# Patient Record
Sex: FEMALE | Race: WHITE | Employment: UNEMPLOYED | URBAN - METROPOLITAN AREA
[De-identification: names, ages, dates, MRNs, and addresses within clinical notes are randomized per-mention and may not be internally consistent; named-entity substitution may affect disease eponyms.]

---

## 2017-01-01 ENCOUNTER — HOSPITAL ENCOUNTER (OUTPATIENT)
Dept: MAMMOGRAPHY | Age: 67
Discharge: HOME | End: 2017-10-16
Attending: INTERNAL MEDICINE
Payer: MEDICARE

## 2017-01-01 ENCOUNTER — OFFICE VISIT (OUTPATIENT)
Dept: INTERNAL MEDICINE CLINIC | Age: 67
End: 2017-01-01

## 2017-01-01 VITALS
OXYGEN SATURATION: 95 % | TEMPERATURE: 97.3 F | DIASTOLIC BLOOD PRESSURE: 80 MMHG | RESPIRATION RATE: 20 BRPM | SYSTOLIC BLOOD PRESSURE: 137 MMHG | HEIGHT: 64 IN | BODY MASS INDEX: 32.61 KG/M2 | HEART RATE: 66 BPM | WEIGHT: 191 LBS

## 2017-01-01 DIAGNOSIS — E11.9 TYPE 2 DIABETES MELLITUS WITHOUT COMPLICATION, WITH LONG-TERM CURRENT USE OF INSULIN (HCC): Primary | ICD-10-CM

## 2017-01-01 DIAGNOSIS — Z12.39 SCREENING BREAST EXAMINATION: ICD-10-CM

## 2017-01-01 DIAGNOSIS — E11.9 TYPE 2 DIABETES MELLITUS WITHOUT COMPLICATION, WITHOUT LONG-TERM CURRENT USE OF INSULIN (HCC): ICD-10-CM

## 2017-01-01 DIAGNOSIS — Z00.00 MEDICARE ANNUAL WELLNESS VISIT, INITIAL: ICD-10-CM

## 2017-01-01 DIAGNOSIS — E78.2 MIXED HYPERLIPIDEMIA: ICD-10-CM

## 2017-01-01 DIAGNOSIS — Z71.89 ADVANCE CARE PLANNING: ICD-10-CM

## 2017-01-01 DIAGNOSIS — Z79.4 TYPE 2 DIABETES MELLITUS WITHOUT COMPLICATION, WITH LONG-TERM CURRENT USE OF INSULIN (HCC): Primary | ICD-10-CM

## 2017-01-01 DIAGNOSIS — L85.3 DRY SKIN: ICD-10-CM

## 2017-01-01 DIAGNOSIS — E11.9 TYPE 2 DIABETES MELLITUS WITHOUT COMPLICATION, UNSPECIFIED LONG TERM INSULIN USE STATUS: ICD-10-CM

## 2017-01-01 DIAGNOSIS — Z23 ENCOUNTER FOR IMMUNIZATION: ICD-10-CM

## 2017-01-01 DIAGNOSIS — Z11.59 NEED FOR HEPATITIS C SCREENING TEST: ICD-10-CM

## 2017-01-01 DIAGNOSIS — I10 ESSENTIAL HYPERTENSION: ICD-10-CM

## 2017-01-01 LAB
ALBUMIN SERPL-MCNC: 3.8 G/DL (ref 3.6–4.8)
ALBUMIN/GLOB SERPL: 1.3 {RATIO} (ref 1.2–2.2)
ALP SERPL-CCNC: 76 IU/L (ref 39–117)
ALT SERPL-CCNC: 17 IU/L (ref 0–32)
AST SERPL-CCNC: 18 IU/L (ref 0–40)
BILIRUB SERPL-MCNC: <0.2 MG/DL (ref 0–1.2)
BUN SERPL-MCNC: 16 MG/DL (ref 8–27)
BUN/CREAT SERPL: 41 (ref 12–28)
CALCIUM SERPL-MCNC: 9.1 MG/DL (ref 8.7–10.3)
CHLORIDE SERPL-SCNC: 105 MMOL/L (ref 96–106)
CO2 SERPL-SCNC: 26 MMOL/L (ref 18–29)
CREAT SERPL-MCNC: 0.39 MG/DL (ref 0.57–1)
EST. AVERAGE GLUCOSE BLD GHB EST-MCNC: 192 MG/DL
GLOBULIN SER CALC-MCNC: 3 G/DL (ref 1.5–4.5)
GLUCOSE SERPL-MCNC: 108 MG/DL (ref 65–99)
HBA1C MFR BLD: 8.3 % (ref 4.8–5.6)
HCV AB S/CO SERPL IA: <0.1 S/CO RATIO (ref 0–0.9)
POTASSIUM SERPL-SCNC: 4.2 MMOL/L (ref 3.5–5.2)
PROT SERPL-MCNC: 6.8 G/DL (ref 6–8.5)
SODIUM SERPL-SCNC: 143 MMOL/L (ref 134–144)

## 2017-01-01 PROCEDURE — 77067 SCR MAMMO BI INCL CAD: CPT

## 2017-01-01 RX ORDER — AMMONIUM LACTATE 12 G/100G
CREAM TOPICAL 2 TIMES DAILY
Qty: 2 G | Refills: 2 | Status: SHIPPED | OUTPATIENT
Start: 2017-01-01 | End: 2018-01-01 | Stop reason: SDUPTHER

## 2017-01-01 RX ORDER — LISINOPRIL 10 MG/1
TABLET ORAL
Qty: 90 TAB | Refills: 1 | Status: SHIPPED | OUTPATIENT
Start: 2017-01-01 | End: 2018-01-01 | Stop reason: SDUPTHER

## 2017-01-01 RX ORDER — METOPROLOL TARTRATE 50 MG/1
TABLET ORAL
COMMUNITY
Start: 2017-01-01 | End: 2017-01-01 | Stop reason: SDUPTHER

## 2017-01-01 RX ORDER — DAPAGLIFLOZIN 10 MG/1
TABLET, FILM COATED ORAL
Qty: 90 TAB | Refills: 1 | Status: SHIPPED | OUTPATIENT
Start: 2017-01-01 | End: 2017-01-01 | Stop reason: SDUPTHER

## 2017-01-01 RX ORDER — METOPROLOL TARTRATE 50 MG/1
TABLET ORAL
Qty: 180 TAB | Refills: 0 | Status: SHIPPED | OUTPATIENT
Start: 2017-01-01 | End: 2018-01-01 | Stop reason: SDUPTHER

## 2017-01-01 RX ORDER — SAXAGLIPTIN AND METFORMIN HYDROCHLORIDE 5; 1000 MG/1; MG/1
TABLET, FILM COATED, EXTENDED RELEASE ORAL
Qty: 90 TAB | Refills: 1 | Status: SHIPPED | OUTPATIENT
Start: 2017-01-01 | End: 2018-01-01 | Stop reason: SDUPTHER

## 2017-01-01 RX ORDER — INSULIN GLARGINE 100 [IU]/ML
INJECTION, SOLUTION SUBCUTANEOUS
Qty: 30 PEN | Refills: 3 | Status: SHIPPED | OUTPATIENT
Start: 2017-01-01 | End: 2018-01-01 | Stop reason: DRUGHIGH

## 2017-01-01 RX ORDER — SIMVASTATIN 20 MG/1
TABLET, FILM COATED ORAL
Qty: 90 TAB | Refills: 1 | Status: SHIPPED | OUTPATIENT
Start: 2017-01-01 | End: 2018-01-01 | Stop reason: SDUPTHER

## 2017-01-01 RX ORDER — INSULIN GLARGINE 100 [IU]/ML
INJECTION, SOLUTION SUBCUTANEOUS
Qty: 3 PEN | Refills: 3 | Status: SHIPPED | OUTPATIENT
Start: 2017-01-01 | End: 2017-01-01 | Stop reason: SDUPTHER

## 2017-01-01 RX ORDER — ATENOLOL 100 MG/1
100 TABLET ORAL DAILY
Qty: 90 TAB | Refills: 1 | Status: SHIPPED | OUTPATIENT
Start: 2017-01-01 | End: 2018-01-01

## 2017-01-16 DIAGNOSIS — E11.9 TYPE 2 DIABETES MELLITUS WITHOUT COMPLICATION, WITHOUT LONG-TERM CURRENT USE OF INSULIN (HCC): ICD-10-CM

## 2017-01-16 RX ORDER — INSULIN GLARGINE 100 [IU]/ML
INJECTION, SOLUTION SUBCUTANEOUS
Qty: 81 ML | Refills: 2 | Status: SHIPPED | OUTPATIENT
Start: 2017-01-16 | End: 2017-01-23 | Stop reason: SDUPTHER

## 2017-01-23 DIAGNOSIS — E11.9 TYPE 2 DIABETES MELLITUS WITHOUT COMPLICATION, WITHOUT LONG-TERM CURRENT USE OF INSULIN (HCC): ICD-10-CM

## 2017-01-24 RX ORDER — INSULIN GLARGINE 100 [IU]/ML
INJECTION, SOLUTION SUBCUTANEOUS
Qty: 81 ML | Refills: 2 | Status: SHIPPED | OUTPATIENT
Start: 2017-01-24 | End: 2017-03-02 | Stop reason: SDUPTHER

## 2017-01-26 ENCOUNTER — OFFICE VISIT (OUTPATIENT)
Dept: INTERNAL MEDICINE CLINIC | Age: 67
End: 2017-01-26

## 2017-01-26 VITALS
HEIGHT: 64 IN | HEART RATE: 71 BPM | OXYGEN SATURATION: 98 % | DIASTOLIC BLOOD PRESSURE: 82 MMHG | TEMPERATURE: 97.6 F | SYSTOLIC BLOOD PRESSURE: 145 MMHG | BODY MASS INDEX: 32.37 KG/M2 | WEIGHT: 189.6 LBS | RESPIRATION RATE: 20 BRPM

## 2017-01-26 DIAGNOSIS — I10 ESSENTIAL HYPERTENSION: ICD-10-CM

## 2017-01-26 DIAGNOSIS — Z79.4 TYPE 2 DIABETES MELLITUS WITHOUT COMPLICATION, WITH LONG-TERM CURRENT USE OF INSULIN (HCC): Primary | ICD-10-CM

## 2017-01-26 DIAGNOSIS — E78.2 MIXED HYPERLIPIDEMIA: ICD-10-CM

## 2017-01-26 DIAGNOSIS — E66.9 OBESITY (BMI 30-39.9): ICD-10-CM

## 2017-01-26 DIAGNOSIS — E11.9 TYPE 2 DIABETES MELLITUS WITHOUT COMPLICATION, WITH LONG-TERM CURRENT USE OF INSULIN (HCC): Primary | ICD-10-CM

## 2017-01-26 NOTE — MR AVS SNAPSHOT
Visit Information Date & Time Provider Department Dept. Phone Encounter #  
 1/26/2017  8:30 AM Omid Isaac, [address] [facility] Internal Medicine [phone] [id number] Upcoming Health Maintenance Date Due Hepatitis C Screening 1950 Pneumococcal 19-64 Medium Risk (1 of 1 - PPSV23) 2/12/1971 DTaP/Tdap/Td series (1 - Tdap) 2/12/1973 PAP AKA CERVICAL CYTOLOGY 2/12/1973 FOBT Q 1 YEAR AGE 50-75 2/12/2002 ZOSTER VACCINE AGE 60> 2/12/2012 FOOT EXAM Q1 9/18/2015 INFLUENZA AGE 9 TO ADULT 8/1/2016 EYE EXAM RETINAL OR DILATED Q1 12/3/2016 HEMOGLOBIN A1C Q6M 3/28/2017 LIPID PANEL Q1 5/25/2017 MICROALBUMIN Q1 9/28/2017 BREAST CANCER SCRN MAMMOGRAM 2/10/2018 Allergies as of 1/26/2017  Review Complete On: 1/26/2017 By: Omid Isaac MD  
  
 Severity Noted Reaction Type Reactions Tylenol [Acetaminophen]  03/14/2012    Swelling Current Immunizations  Reviewed on 9/28/2016 Name Date Pneumococcal Conjugate (PCV-13) 9/28/2016 Not reviewed this visit You Were Diagnosed With   
  
 Codes Comments Type 2 diabetes mellitus without complication, with long-term current use of insulin (HCC)    -  Primary ICD-10-CM: E11.9, Z79.4 ICD-9-CM: 250.00, V58.67 Essential hypertension     ICD-10-CM: I10 
ICD-9-CM: 401.9 Mixed hyperlipidemia     ICD-10-CM: E78.2 ICD-9-CM: 272.2 Obesity (BMI 30-39. 9)     ICD-10-CM: E66.9 ICD-9-CM: 278.00 Vitals BP Pulse Temp Resp Height(growth percentile) Weight(growth percentile) 145/82 (BP 1 Location: Left arm, BP Patient Position: Sitting) 71 97.6 °F (36.4 °C) (Oral) 20 5' 4\" (1.626 m) 189 lb 9.6 oz (86 kg) SpO2 BMI OB Status Smoking Status 98% 32.54 kg/m2 Hysterectomy Never Smoker Vitals History BMI and BSA Data Body Mass Index Body Surface Area 32.54 kg/m 2 1.97 m 2 Preferred Pharmacy Pharmacy Name Phone [facility] [address] [place] [address] [phone] Your Updated Medication List  
  
   
This list is accurate as of: 1/26/17  9:27 AM.  Always use your most recent med list. ALLEGRA 180 mg tablet Generic drug:  fexofenadine Take  by mouth daily. ammonium lactate 12 % topical cream  
Commonly known as:  AMLACTIN Apply  to affected area two (2) times a day. rub in to affected area well  
  
 aspirin delayed-release 81 mg tablet Take 81 mg by mouth daily. atenolol 100 mg tablet Commonly known as:  TENORMIN  
TAKE 1 TABLET DAILY Blood-Glucose Meter Misc Commonly known as:  CONTOUR NEXT METER Check blood sugar BID  
  
 calcium 600 mg Cap Take  by mouth. cholecalciferol (VITAMIN D3) 5,000 unit Tab tablet Commonly known as:  VITAMIN D3 Take  by mouth daily. clotrimazole 1 % topical cream  
Commonly known as:  Mohinder Frames Apply  to affected area two (2) times a day. dapagliflozin 10 mg Tab Commonly known as:  [employer] Take 1 Tab by mouth daily. glucose blood VI test strips strip Commonly known as:  CONTOUR NEXT STRIPS Check sugar BID  
  
 insulin glargine 100 unit/mL (3 mL) pen Commonly known as:  LANTUS SOLOSTAR  
45 units sq in am and 45 units sq in pm  Indications: 90 day supply Iron 325 mg (65 mg iron) tablet Generic drug:  ferrous sulfate Take  by mouth Daily (before breakfast). lisinopril 10 mg tablet Commonly known as:  Paula Mckay Take 1 tablet by mouth daily. Lazarus Slipper LANCETS [address] [facility] Generic drug:  lancets CHECK BLOOD SUGAR TWICE DAILY * sAXagliptin-metFORMIN 5-1,000 mg Tm24 Take  by mouth. * sAXagliptin-metFORMIN 5-1,000 mg Tm24 Commonly known as:  KOMBIGLYZE XR Take 1 Tab by mouth daily. simvastatin 20 mg tablet Commonly known as:  ZOCOR  
TAKE 1 TABLET NIGHTLY * Notice: This list has 2 medication(s) that are the same as other medications prescribed for you. Read the directions carefully, and ask your doctor or other care provider to review them with you. We Performed the Following HEMOGLOBIN A1C WITH EAG [39517 CPT(R)] METABOLIC PANEL, COMPREHENSIVE [44773 CPT(R)] Patient Instructions Learning About Diabetes Food Guidelines Your Care Instructions Meal planning is important to manage diabetes. It helps keep your blood sugar at a target level (which you set with your doctor). You don't have to eat special foods. You can eat what your family eats, including sweets once in a while. But you do have to pay attention to how often you eat and how much you eat of certain foods. You may want to work with a dietitian or a certified diabetes educator (CDE) to help you plan meals and snacks. A dietitian or CDE can also help you lose weight if that is one of your goals. What should you know about eating carbs? Managing the amount of carbohydrate (carbs) you eat is an important part of healthy meals when you have diabetes. Carbohydrate is found in many foods. · Learn which foods have carbs. And learn the amounts of carbs in different foods. ¨ Bread, cereal, pasta, and rice have about 15 grams of carbs in a serving. A serving is 1 slice of bread (1 ounce), ½ cup of cooked cereal, or 1/3 cup of cooked pasta or rice. ¨ Fruits have 15 grams of carbs in a serving. A serving is 1 small fresh fruit, such as an apple or orange; ½ of a banana; ½ cup of cooked or canned fruit; ½ cup of fruit juice; 1 cup of melon or raspberries; or 2 tablespoons of dried fruit. ¨ Milk and no-sugar-added yogurt have 15 grams of carbs in a serving. A serving is 1 cup of milk or 2/3 cup of no-sugar-added yogurt. ¨ Starchy vegetables have 15 grams of carbs in a serving.  A serving is ½ cup of mashed potatoes or sweet potato; 1 cup winter squash; ½ of a small baked potato; ½ cup of cooked beans; or ½ cup cooked corn or green peas. · Learn how much carbs to eat each day and at each meal. A dietitian or CDE can teach you how to keep track of the amount of carbs you eat. This is called carbohydrate counting. · If you are not sure how to count carbohydrate grams, use the Plate Method to plan meals. It is a good, quick way to make sure that you have a balanced meal. It also helps you spread carbs throughout the day. ¨ Divide your plate by types of foods. Put non-starchy vegetables on half the plate, meat or other protein food on one-quarter of the plate, and a grain or starchy vegetable in the final quarter of the plate. To this you can add a small piece of fruit and 1 cup of milk or yogurt, depending on how many carbs you are supposed to eat at a meal. 
· Try to eat about the same amount of carbs at each meal. Do not \"save up\" your daily allowance of carbs to eat at one meal. 
· Proteins have very little or no carbs per serving. Examples of proteins are beef, chicken, turkey, fish, eggs, tofu, cheese, cottage cheese, and peanut butter. A serving size of meat is 3 ounces, which is about the size of a deck of cards. Examples of meat substitute serving sizes (equal to 1 ounce of meat) are 1/4 cup of cottage cheese, 1 egg, 1 tablespoon of peanut butter, and ½ cup of tofu. How can you eat out and still eat healthy? · Learn to estimate the serving sizes of foods that have carbohydrate. If you measure food at home, it will be easier to estimate the amount in a serving of restaurant food. · If the meal you order has too much carbohydrate (such as potatoes, corn, or baked beans), ask to have a low-carbohydrate food instead. Ask for a salad or green vegetables. · If you use insulin, check your blood sugar before and after eating out to help you plan how much to eat in the future.  
· If you eat more carbohydrate at a meal than you had planned, take a walk or do other exercise. This will help lower your blood sugar. What else should you know? · Limit saturated fat, such as the fat from meat and dairy products. This is a healthy choice because people who have diabetes are at higher risk of heart disease. So choose lean cuts of meat and nonfat or low-fat dairy products. Use olive or canola oil instead of butter or shortening when cooking. · Don't skip meals. Your blood sugar may drop too low if you skip meals and take insulin or certain medicines for diabetes. · Check with your doctor before you drink alcohol. Alcohol can cause your blood sugar to drop too low. Alcohol can also cause a bad reaction if you take certain diabetes medicines. Follow-up care is a key part of your treatment and safety. Be sure to make and go to all appointments, and call your doctor if you are having problems. It's also a good idea to know your test results and keep a list of the medicines you take. Where can you learn more? Go to http://thai-freddie.info/. Enter Y767 in the search box to learn more about \"Learning About Diabetes Food Guidelines. \" Current as of: May 23, 2016 Content Version: 11.1 © 2838-6619 [employer]. Care instructions adapted under license by [employer] (which disclaims liability or warranty for this information). If you have questions about a medical condition or this instruction, always ask your healthcare professional. [place] [address] any warranty or liability for your use of this information. Introducing [address]! Dear Mona Brasher: 
Thank you for requesting a [employer] account. Our records indicate that you have previously registered for a [employer] account but its currently inactive. Please call our [employer] support line at [phone]. Additional Information If you have questions, please visit the Frequently Asked Questions section of the [employer] website at [email or web address] Remember, [employer] is NOT to be used for urgent needs. For medical emergencies, dial 911. Now available from your iPhone and Android! Please provide this summary of care documentation to your next provider. Your primary care clinician is listed as [facility] Nurse Navigator. If you have any questions after today's visit, please call [phone].

## 2017-01-26 NOTE — PATIENT INSTRUCTIONS

## 2017-01-26 NOTE — LETTER
2/2/2017 9:38 AM 
 
[facility] [id number] Dear Sharad Villaseñor: 
 
Please find your most recent results below. Resulted Orders METABOLIC PANEL, COMPREHENSIVE Result Value Ref Range Glucose 147 (H) 65 - 99 mg/dL BUN 19 8 - 27 mg/dL Comment: **Verified by repeat analysis** Creatinine 0.38 (L) 0.57 - 1.00 mg/dL Comment: **Verified by repeat analysis**  
 GFR est non- >59 mL/min/1.73 GFR est  >59 mL/min/1.73  
 BUN/Creatinine ratio 50 (H) 11 - 26 Sodium 144 134 - 144 mmol/L Potassium 4.2 3.5 - 5.2 mmol/L Chloride 104 96 - 106 mmol/L  
 CO2 22 18 - 29 mmol/L Calcium 9.4 8.7 - 10.3 mg/dL Protein, total 6.5 6.0 - 8.5 g/dL Albumin 4.1 3.6 - 4.8 g/dL GLOBULIN, TOTAL 2.4 1.5 - 4.5 g/dL A-G Ratio 1.7 1.1 - 2.5 Bilirubin, total 0.3 0.0 - 1.2 mg/dL Alk. phosphatase 74 39 - 117 IU/L  
 AST (SGOT) 15 0 - 40 IU/L  
 ALT (SGPT) 17 0 - 32 IU/L Narrative Performed at:  [address]  [id number] : Lynne Thompson MD, Phone:  [phone] HEMOGLOBIN A1C WITH EAG Result Value Ref Range Hemoglobin A1c 9.1 (H) 4.8 - 5.6 % Comment:  
            Pre-diabetes: 5.7 - 6.4 Diabetes: >6.4 Glycemic control for adults with diabetes: <7.0 Estimated average glucose 214 mg/dL Narrative Performed at:  [address]  [id number] : Lynne Thompson MD, Phone:  [phone] RECOMMENDATIONS: 
Sharad Villaseñor your labs indicate that your diabetes is better but still high, please increase your lantus to 47 units 2 x a day. Please call me if you have any questions: [phone] Sincerely, Omid Isaac MD

## 2017-01-26 NOTE — PROGRESS NOTES
HISTORY OF PRESENT ILLNESS  Shruthi Zavala is a 61 y.o. female here for follow up. Came back from [employer]. BS log reviewed. doing well. She is seen for diabetes. She reports medication compliance: compliant all of the time. Diabetic diet compliance: compliant most of the time. Home glucose monitoring: is performed regularly, minimum reading is 155, maximum reading is 185, and average reading is 160. Angelo Ding Further diabetic ROS: no polyuria or polydipsia, no chest pain, dyspnea or TIA's, no numbness, tingling or pain in extremities, no hypoglycemia. Lab review: labs reviewed, I note that glycosylated hemoglobin normal, abnormal 10, labs reviewed and discussed with patient. Eye exam:up to date. Has elevated lipid and BP. on meds. Follow-up   Pertinent negatives include no chest pain. Hypertension    Pertinent negatives include no chest pain, no palpitations and no blurred vision. Cholesterol Problem   Pertinent negatives include no chest pain. Diabetes   Pertinent negatives include no chest pain. Review of Systems   Constitutional: Negative. Negative for chills and fever. HENT: Negative. Eyes: Negative. Negative for blurred vision and double vision. Respiratory: Negative. Cardiovascular: Negative. Negative for chest pain and palpitations. Gastrointestinal: Negative. Genitourinary: Negative. Musculoskeletal: Negative. Skin: Negative. Neurological: Negative. Psychiatric/Behavioral: Negative. Physical Exam   Constitutional: She appears well-developed and well-nourished. No distress. Neck: Normal range of motion. Neck supple. No JVD present. No thyromegaly present. Cardiovascular: Normal rate, regular rhythm, normal heart sounds and intact distal pulses. Pulmonary/Chest: Effort normal and breath sounds normal. No respiratory distress. She has no wheezes. Musculoskeletal: She exhibits no edema or tenderness. Psychiatric: She has a normal mood and affect.  Her behavior is normal.       ASSESSMENT and Malgorzata Nan was seen today for follow-up, hypertension, cholesterol problem and diabetes. Diagnoses and all orders for this visit:    Type 2 diabetes mellitus without complication, with long-term current use of insulin ([address]    On lantus and kombiglyze. Will do,  -     METABOLIC PANEL, COMPREHENSIVE  -     HEMOGLOBIN A1C WITH EAG    Essential hypertension  On tenormin.  -     METABOLIC PANEL, COMPREHENSIVE    Mixed hyperlipidemia    On statin. -     METABOLIC PANEL, COMPREHENSIVE    Obesity (BMI 30-39. 9)    Addressed weight, diet and exercise with patient. Decrease carbohydrates (white foods, sweet foods, sweet drinks and alcohol), increase green leafy vegetables and protein (lean meats and beans) with each meal. Avoid fried foods. Eat 3-5 small meals daily. Do not skip meals. Increase water intake. Increase physical activity to 30 minutes daily for health benefit or 60 minutes daily to prevent weight regain, as tolerated. Get 7-8 hours uninterrupted sleep nightly. Discussed expected course/resolution/complications of diagnosis in detail with patient. Medication risks/benefits/costs/interactions/alternatives discussed with patient. Pt was given an after visit summary which includes diagnoses, current medications & vitals. Pt expressed understanding with the diagnosis and plan.

## 2017-01-27 LAB
ALBUMIN SERPL-MCNC: 4.1 G/DL (ref 3.6–4.8)
ALBUMIN/GLOB SERPL: 1.7 {RATIO} (ref 1.1–2.5)
ALP SERPL-CCNC: 74 IU/L (ref 39–117)
ALT SERPL-CCNC: 17 IU/L (ref 0–32)
AST SERPL-CCNC: 15 IU/L (ref 0–40)
BILIRUB SERPL-MCNC: 0.3 MG/DL (ref 0–1.2)
BUN SERPL-MCNC: 19 MG/DL (ref 8–27)
BUN/CREAT SERPL: 50 (ref 11–26)
CALCIUM SERPL-MCNC: 9.4 MG/DL (ref 8.7–10.3)
CHLORIDE SERPL-SCNC: 104 MMOL/L (ref 96–106)
CO2 SERPL-SCNC: 22 MMOL/L (ref 18–29)
CREAT SERPL-MCNC: 0.38 MG/DL (ref 0.57–1)
EST. AVERAGE GLUCOSE BLD GHB EST-MCNC: 214 MG/DL
GLOBULIN SER CALC-MCNC: 2.4 G/DL (ref 1.5–4.5)
GLUCOSE SERPL-MCNC: 147 MG/DL (ref 65–99)
HBA1C MFR BLD: 9.1 % (ref 4.8–5.6)
POTASSIUM SERPL-SCNC: 4.2 MMOL/L (ref 3.5–5.2)
PROT SERPL-MCNC: 6.5 G/DL (ref 6–8.5)
SODIUM SERPL-SCNC: 144 MMOL/L (ref 134–144)

## 2017-02-02 NOTE — PROGRESS NOTES
Call placed to patient and left message to call back  Letter mailed to patient with results and recommendations from provider

## 2017-02-03 NOTE — PROGRESS NOTES
Spoke with patient after verifying name and . Notified patient of lab results and recommendation from provider. Patient verbalized understanding and given a chance to ask questions.

## 2017-03-02 DIAGNOSIS — E11.9 TYPE 2 DIABETES MELLITUS WITHOUT COMPLICATION, WITHOUT LONG-TERM CURRENT USE OF INSULIN (HCC): ICD-10-CM

## 2017-03-02 DIAGNOSIS — E11.9 TYPE 2 DIABETES MELLITUS WITHOUT COMPLICATION, UNSPECIFIED LONG TERM INSULIN USE STATUS: ICD-10-CM

## 2017-03-02 RX ORDER — INSULIN GLARGINE 100 [IU]/ML
INJECTION, SOLUTION SUBCUTANEOUS
Qty: 6 PEN | Refills: 1 | Status: SHIPPED | OUTPATIENT
Start: 2017-03-02 | End: 2017-06-01 | Stop reason: SDUPTHER

## 2017-05-04 DIAGNOSIS — E78.2 MIXED HYPERLIPIDEMIA: ICD-10-CM

## 2017-05-04 RX ORDER — SIMVASTATIN 20 MG/1
20 TABLET, FILM COATED ORAL
Qty: 90 TAB | Refills: 1 | Status: SHIPPED | OUTPATIENT
Start: 2017-05-04 | End: 2017-01-01 | Stop reason: SDUPTHER

## 2017-05-04 RX ORDER — ATENOLOL 100 MG/1
100 TABLET ORAL DAILY
Qty: 90 TAB | Refills: 1 | Status: SHIPPED | OUTPATIENT
Start: 2017-05-04 | End: 2017-01-01 | Stop reason: SDUPTHER

## 2017-05-04 RX ORDER — LISINOPRIL 10 MG/1
10 TABLET ORAL DAILY
Qty: 90 TAB | Refills: 1 | Status: SHIPPED | OUTPATIENT
Start: 2017-05-04 | End: 2017-01-01 | Stop reason: SDUPTHER

## 2017-06-01 ENCOUNTER — OFFICE VISIT (OUTPATIENT)
Dept: INTERNAL MEDICINE CLINIC | Age: 67
End: 2017-06-01

## 2017-06-01 VITALS
HEIGHT: 64 IN | HEART RATE: 67 BPM | DIASTOLIC BLOOD PRESSURE: 70 MMHG | WEIGHT: 192.8 LBS | SYSTOLIC BLOOD PRESSURE: 148 MMHG | BODY MASS INDEX: 32.91 KG/M2 | RESPIRATION RATE: 16 BRPM | OXYGEN SATURATION: 97 % | TEMPERATURE: 97.2 F

## 2017-06-01 DIAGNOSIS — E11.9 TYPE 2 DIABETES MELLITUS WITHOUT COMPLICATION, WITH LONG-TERM CURRENT USE OF INSULIN (HCC): Primary | ICD-10-CM

## 2017-06-01 DIAGNOSIS — E11.9 TYPE 2 DIABETES MELLITUS WITHOUT COMPLICATION, WITHOUT LONG-TERM CURRENT USE OF INSULIN (HCC): ICD-10-CM

## 2017-06-01 DIAGNOSIS — E78.2 MIXED HYPERLIPIDEMIA: ICD-10-CM

## 2017-06-01 DIAGNOSIS — I10 ESSENTIAL HYPERTENSION: ICD-10-CM

## 2017-06-01 DIAGNOSIS — Z79.4 TYPE 2 DIABETES MELLITUS WITHOUT COMPLICATION, WITH LONG-TERM CURRENT USE OF INSULIN (HCC): Primary | ICD-10-CM

## 2017-06-01 RX ORDER — INSULIN PUMP SYRINGE, 3 ML
EACH MISCELLANEOUS
Qty: 1 KIT | Refills: 0 | Status: SHIPPED | OUTPATIENT
Start: 2017-06-01 | End: 2018-01-01

## 2017-06-01 RX ORDER — LANCETS
EACH MISCELLANEOUS
Qty: 100 EACH | Refills: 11 | Status: SHIPPED | OUTPATIENT
Start: 2017-06-01 | End: 2018-01-01

## 2017-06-01 RX ORDER — INSULIN GLARGINE 100 [IU]/ML
INJECTION, SOLUTION SUBCUTANEOUS
Qty: 7 PEN | Refills: 1
Start: 2017-06-01 | End: 2018-01-01

## 2017-06-01 NOTE — LETTER
6/9/2017 10:21 AM 
 
Ms. Glass [place] [address] [phone] Dear Dianna Solorzano: 
 
Please find your most recent results below. Resulted Orders METABOLIC PANEL, COMPREHENSIVE Result Value Ref Range Glucose 148 (H) 65 - 99 mg/dL BUN 14 8 - 27 mg/dL Creatinine 0.47 (L) 0.57 - 1.00 mg/dL GFR est non- >59 mL/min/1.73 GFR est  >59 mL/min/1.73  
 BUN/Creatinine ratio 30 (H) 12 - 28 Sodium 142 134 - 144 mmol/L Potassium 4.2 3.5 - 5.2 mmol/L Chloride 103 96 - 106 mmol/L  
 CO2 20 18 - 29 mmol/L Calcium 9.2 8.7 - 10.3 mg/dL Protein, total 6.7 6.0 - 8.5 g/dL Albumin 4.0 3.6 - 4.8 g/dL GLOBULIN, TOTAL 2.7 1.5 - 4.5 g/dL A-G Ratio 1.5 1.2 - 2.2 Bilirubin, total 0.3 0.0 - 1.2 mg/dL Alk. phosphatase 72 39 - 117 IU/L  
 AST (SGOT) 16 0 - 40 IU/L  
 ALT (SGPT) 16 0 - 32 IU/L Narrative Performed at:  [address]  [id number] : Bobbi Britt MD, Phone:  [phone] HEMOGLOBIN A1C WITH EAG Result Value Ref Range Hemoglobin A1c 9.1 (H) 4.8 - 5.6 % Comment:  
            Pre-diabetes: 5.7 - 6.4 Diabetes: >6.4 Glycemic control for adults with diabetes: <7.0 Estimated average glucose 214 mg/dL Narrative Performed at:  [address]  [id number] : Bobbi Britt MD, Phone:  [phone] MICROALBUMIN, UR, RAND W/ MICROALBUMIN/CREA RATIO Result Value Ref Range Creatinine, urine 85.8 Not Estab. mg/dL Microalbumin, urine 15.2 Not Estab. ug/mL Microalb/Creat ratio (ug/mg creat.) 17.7 0.0 - 30.0 mg/g creat Narrative Performed at:  [address]  [id number] : Bobbi Britt MD, Phone:  [phone] LIPID PANEL Result Value Ref Range Cholesterol, total 174 100 - 199 mg/dL Triglyceride 183 (H) 0 - 149 mg/dL HDL Cholesterol 45 >39 mg/dL VLDL, calculated 37 5 - 40 mg/dL LDL, calculated 92 0 - 99 mg/dL Narrative Performed at:  [address]  [id number] : Shantell Torres MD, Phone:  [phone] CVD REPORT Result Value Ref Range INTERPRETATION Note Comment:  
   Supplement report is available. Narrative Performed at:  [address] [place] [address]  [id number] : Cheri Regan PhD, Phone:  [phone] DIABETES PATIENT EDUCATION Result Value Ref Range PDF Image Not applicable Narrative Performed at:  [address] [place] [address]  [id number] : Cheri Regan PhD, Phone:  [phone] RECOMMENDATIONS: 
Madeline Hollis your labs indicate that your diabetes is uncontrolled, please increase your insulin by 2 units and follow up with Dr Manuel Khan, endocrinology. All other labs are stable Please call me if you have any questions: [phone] Sincerely, Alessandra Guzman MD

## 2017-06-01 NOTE — PATIENT INSTRUCTIONS

## 2017-06-01 NOTE — MR AVS SNAPSHOT
Visit Information Date & Time Provider Department Dept. Phone Encounter #  
 6/1/2017  8:30 AM Remigio Howell, [address] [facility] Internal Medicine [phone] [id number] Upcoming Health Maintenance Date Due Hepatitis C Screening 1950 DTaP/Tdap/Td series (1 - Tdap) 2/12/1973 FOBT Q 1 YEAR AGE 50-75 2/12/2002 ZOSTER VACCINE AGE 60> 2/12/2012 FOOT EXAM Q1 9/18/2015 EYE EXAM RETINAL OR DILATED Q1 12/3/2016 OSTEOPOROSIS SCREENING (DEXA) 2/12/2017 MEDICARE YEARLY EXAM 2/12/2017 LIPID PANEL Q1 5/25/2017 HEMOGLOBIN A1C Q6M 7/26/2017 INFLUENZA AGE 9 TO ADULT 8/1/2017 Pneumococcal 65+ Low/Medium Risk (2 of 2 - PPSV23) 9/28/2017 MICROALBUMIN Q1 9/28/2017 GLAUCOMA SCREENING Q2Y 12/3/2017 BREAST CANCER SCRN MAMMOGRAM 2/10/2018 Allergies as of 6/1/2017  Review Complete On: 6/1/2017 By: Remigio Howell MD  
  
 Severity Noted Reaction Type Reactions Tylenol [Acetaminophen]  03/14/2012    Swelling Current Immunizations  Reviewed on 6/1/2017 Name Date Pneumococcal Conjugate (PCV-13) 9/28/2016 Reviewed by Remigio Howell MD on 6/1/2017 at  9:14 AM  
You Were Diagnosed With   
  
 Codes Comments Type 2 diabetes mellitus without complication, with long-term current use of insulin (HCC)    -  Primary ICD-10-CM: E11.9, Z79.4 ICD-9-CM: 250.00, V58.67 Essential hypertension     ICD-10-CM: I10 
ICD-9-CM: 401.9 Mixed hyperlipidemia     ICD-10-CM: E78.2 ICD-9-CM: 272.2 Type 2 diabetes mellitus without complication, without long-term current use of insulin (HCC)     ICD-10-CM: E11.9 ICD-9-CM: 250.00 Vitals BP Pulse Temp Resp Height(growth percentile) Weight(growth percentile) 148/70 (BP 1 Location: Left arm, BP Patient Position: Sitting) 67 97.2 °F (36.2 °C) (Oral) 16 5' 4\" (1.626 m) 192 lb 12.8 oz (87.5 kg) SpO2 BMI OB Status Smoking Status 97% 33.09 kg/m2 Hysterectomy Never Smoker Vitals History BMI and BSA Data Body Mass Index Body Surface Area 33.09 kg/m 2 1.99 m 2 Preferred Pharmacy Pharmacy Name Phone [facility] [address] [place] [address] [phone] Your Updated Medication List  
  
   
This list is accurate as of: 6/1/17  9:14 AM.  Always use your most recent med list. ALLEGRA 180 mg tablet Generic drug:  fexofenadine Take  by mouth daily. ammonium lactate 12 % topical cream  
Commonly known as:  AMLACTIN Apply  to affected area two (2) times a day. rub in to affected area well  
  
 aspirin delayed-release 81 mg tablet Take 81 mg by mouth daily. atenolol 100 mg tablet Commonly known as:  TENORMIN Take 1 Tab by mouth daily. * Blood-Glucose Meter Misc Commonly known as:  CONTOUR NEXT METER Check blood sugar BID * Blood-Glucose Meter monitoring kit Check BS TID. Accjefry hall meter please  
  
 calcium 600 mg Cap Take  by mouth. cholecalciferol (VITAMIN D3) 5,000 unit Tab tablet Commonly known as:  VITAMIN D3 Take  by mouth daily. clotrimazole 1 % topical cream  
Commonly known as:  Leodis Peaks Apply  to affected area two (2) times a day. dapagliflozin 10 mg Tab Commonly known as:  [employer] Take 1 Tab by mouth daily. glucose blood VI test strips strip Commonly known as:  blood glucose test  
Check BS TID.braxton hall  
  
 insulin glargine 100 unit/mL (3 mL) Inpn Commonly known as:  LANTUSBASAGLAR  
50 units in am and 50 units  in pm  Indications: 90 day supply Iron 325 mg (65 mg iron) tablet Generic drug:  ferrous sulfate Take  by mouth Daily (before breakfast). lisinopril 10 mg tablet Commonly known as:  Jordon Petersen Take 1 Tab by mouth daily. * ONETOUCH DELICA LANCETS 30 gauge Misc Generic drug:  lancets CHECK BLOOD SUGAR TWICE DAILY * Lancets Misc Check BS TID.braxton hall * sAXagliptin-metFORMIN 5-1,000 mg Tm24 Take  by mouth. * sAXagliptin-metFORMIN 5-1,000 mg Tm24 Commonly known as:  KOMBIGLYZE XR Take 1 Tab by mouth daily. simvastatin 20 mg tablet Commonly known as:  ZOCOR Take 1 Tab by mouth nightly. * Notice: This list has 6 medication(s) that are the same as other medications prescribed for you. Read the directions carefully, and ask your doctor or other care provider to review them with you. Prescriptions Sent to Pharmacy Refills Blood-Glucose Meter monitoring kit 0 Sig: Check BS TID. Accucheck nanno meter please Class: Normal  
 Pharmacy: [address] Ph #: [phone] Lancets misc 11 Sig: Check BS TID.accucheck nanno Class: Normal  
 Pharmacy: [address] Ph #: [phone]  
 glucose blood VI test strips (BLOOD GLUCOSE TEST) strip 11 Sig: Check BS TID.accucheck nanno Class: Normal  
 Pharmacy: [address] Ph #: [phone] We Performed the Following HEMOGLOBIN A1C WITH EAG [15998 CPT(R)] LIPID PANEL [09890 CPT(R)] METABOLIC PANEL, COMPREHENSIVE [08662 CPT(R)] MICROALBUMIN, UR, RAND W/ MICROALBUMIN/CREA RATIO [id number] CPT(R)] Patient Instructions Nutrition Tips for Diabetes: After Your Visit Your Care Instructions A healthy diet is important to manage diabetes. It helps you lose weight (if you need to) and keep it off. It gives you the nutrition and energy your body needs and helps prevent heart disease. But a diet for diabetes does not mean that you have to eat special foods. You can eat what your family eats, including occasional sweets and other favorites. But you do have to pay attention to how often you eat and how much you eat of certain foods. The right plan for you will give you meals that help you keep your blood sugar at healthy levels. Try to eat a variety of foods and to spread carbohydrate throughout the day. Carbohydrate raises blood sugar higher and more quickly than any other nutrient does. Carbohydrate is found in sugar, breads and cereals, fruit, starchy vegetables such as potatoes and corn, and milk and yogurt. You may want to work with a dietitian or diabetes educator to help you plan meals and snacks. A dietitian or diabetes educator also can help you lose weight if that is one of your goals. The following tips can help you enjoy your meals and stay healthy. Follow-up care is a key part of your treatment and safety. Be sure to make and go to all appointments, and call your doctor if you are having problems. Its also a good idea to know your test results and keep a list of the medicines you take. How can you care for yourself at home? · Learn which foods have carbohydrate and how much carbohydrate to eat. A dietitian or diabetes educator can help you learn to keep track of how much carbohydrate you eat. · Spread carbohydrate throughout the day. Eat some carbohydrate at all meals, but do not eat too much at any one time. · Plan meals to include food from all the food groups. These are the food groups and some example portion sizes: ¨ Grains: 1 slice of bread (1 ounce), ½ cup of cooked cereal, and 1/3 cup of cooked pasta or rice. These have about 15 grams of carbohydrate in a serving. Choose whole grains such as whole wheat bread or crackers, oatmeal, and brown rice more often than refined grains. ¨ Fruit: 1 small fresh fruit, such as an apple or orange; ½ of a banana; ½ cup of chopped, cooked, or canned fruit; ½ cup of fruit juice; 1 cup of melon or raspberries; and 2 tablespoons of dried fruit. These have about 15 grams of carbohydrate in a serving. ¨ Dairy: 1 cup of nonfat or low-fat milk and 2/3 cup of plain yogurt. These have about 15 grams of carbohydrate in a serving. ¨ Protein foods: Beef, chicken, turkey, fish, eggs, tofu, cheese, cottage cheese, and peanut butter. A serving size of meat is 3 ounces, which is about the size of a deck of cards. Examples of meat substitute serving sizes (equal to 1 ounce of meat) are 1/4 cup of cottage cheese, 1 egg, 1 tablespoon of peanut butter, and ½ cup of tofu. These have very little or no carbohydrate per serving. ¨ Vegetables: Starchy vegetables such as ½ cup of cooked dried beans, peas, potatoes, or corn have about 15 grams of carbohydrate. Nonstarchy vegetables have very little carbohydrate, such as 1 cup of raw leafy vegetables (such as spinach), ½ cup of other vegetables (cooked or chopped), and 3/4 cup of vegetable juice. · Use the plate format to plan meals. It is a good, quick way to make sure that you have a balanced meal. It also helps you spread carbohydrate throughout the day. You divide your plate by types of foods. Put vegetables on half the plate, meat or meat substitutes on one-quarter of the plate, and a grain or starchy vegetable (such as brown rice or a potato) in the final quarter of the plate. To this you can add a small piece of fruit and 1 cup of milk or yogurt, depending on how much carbohydrate you are supposed to eat at a meal. 
· Talk to your dietitian or diabetes educator about ways to add limited amounts of sweets into your meal plan. You can eat these foods now and then, as long as you include the amount of carbohydrate they have in your daily carbohydrate allowance. · If you drink alcohol, limit it to no more than 1 drink a day for women and 2 drinks a day for men. If you are pregnant, no amount of alcohol is known to be safe. · Protein, fat, and fiber do not raise blood sugar as much as carbohydrate does. If you eat a lot of these nutrients in a meal, your blood sugar will rise more slowly than it would otherwise. · Limit saturated fats, such as those from meat and dairy products.  Try to replace it with monounsaturated fat, such as olive oil. This is a healthier choice because people who have diabetes are at higher-than-average risk of heart disease. But use a modest amount of olive oil. A tablespoon of olive oil has 14 grams of fat and 120 calories. · Exercise lowers blood sugar. If you take insulin by shots or pump, you can use less than you would if you were not exercising. Keep in mind that timing matters. If you exercise within 1 hour after a meal, your body may need less insulin for that meal than it would if you exercised 3 hours after the meal. Test your blood sugar to find out how exercise affects your need for insulin. · Exercise on most days of the week. Aim for at least 30 minutes. Exercise helps you stay at a healthy weight and helps your body use insulin. Walking is an easy way to get exercise. Gradually increase the amount you walk every day. You also may want to swim, bike, or do other activities. When you eat out · Learn to estimate the serving sizes of foods that have carbohydrate. If you measure food at home, it will be easier to estimate the amount in a serving of restaurant food. · If the meal you order has too much carbohydrate (such as potatoes, corn, or baked beans), ask to have a low-carbohydrate food instead. Ask for a salad or green vegetables. · If you use insulin, check your blood sugar before and after eating out to help you plan how much to eat in the future. · If you eat more carbohydrate at a meal than you had planned, take a walk or do other exercise. This will help lower your blood sugar. Where can you learn more? Go to [employer].be Enter G892 in the search box to learn more about \"Nutrition Tips for Diabetes: After Your Visit. \"  
© 5452-4486 [employer].  Care instructions adapted under license by [employer] (which disclaims liability or warranty for this information). This care instruction is for use with your licensed healthcare professional. If you have questions about a medical condition or this instruction, always ask your healthcare professional. [address] any warranty or liability for your use of this information. Content Version: 28.2.201180; Current as of: June 4, 2014 Introducing [facility]! Dear Levine, Susan. \Hospital Has a New Name and Outlook.\"": 
Thank you for requesting a [employer] account. Our records indicate that you have previously registered for a [employer] account but its currently inactive. Please call our [employer] support line at [phone]. Additional Information If you have questions, please visit the Frequently Asked Questions section of the [employer] website at [email or web address] Remember, [employer] is NOT to be used for urgent needs. For medical emergencies, dial 911. Now available from your iPhone and Android! Please provide this summary of care documentation to your next provider. Your primary care clinician is listed as [facility] Nurse Navigator. If you have any questions after today's visit, please call [phone].

## 2017-06-01 NOTE — PROGRESS NOTES
Alexei Vail is a 74 y.o. female  Chief Complaint   Patient presents with    Diabetes    Hypertension    Cholesterol Problem     1. Have you been to the ER, urgent care clinic since your last visit? Hospitalized since your last visit? No    2. Have you seen or consulted any other health care providers outside of the [address] [place] [address] since your last visit? Include any pap smears or colon screening.   No

## 2017-06-01 NOTE — PROGRESS NOTES
HISTORY OF PRESENT ILLNESS  Harrison Sargent is a 74 y.o. female here for follow up. Her glucometer is not working,need new one. She is seen for diabetes. She reports medication compliance: compliant all of the time. Diabetic diet compliance: compliant most of the time. Home glucose monitoring: is performed regularly, minimum reading is 155, maximum reading is 185, and average reading is 160. Chales Minus Further diabetic ROS: no polyuria or polydipsia, no chest pain, dyspnea or TIA's, no numbness, tingling or pain in extremities, no hypoglycemia. Lab review: labs reviewed, I note that glycosylated hemoglobin normal, abnormal 10, labs reviewed and discussed with patient. Eye exam:up to date. Has elevated lipid and BP. on meds. Has HTN,on med. Diabetes   Pertinent negatives include no chest pain. Hypertension    Pertinent negatives include no chest pain, no palpitations and no blurred vision. Cholesterol Problem   Pertinent negatives include no chest pain. Follow-up   Pertinent negatives include no chest pain. Review of Systems   Constitutional: Negative. Negative for chills and fever. HENT: Negative. Eyes: Negative. Negative for blurred vision and double vision. Respiratory: Negative. Cardiovascular: Negative. Negative for chest pain and palpitations. Gastrointestinal: Negative. Genitourinary: Negative. Musculoskeletal: Negative. Skin: Negative. Neurological: Negative. Psychiatric/Behavioral: Negative. Physical Exam   Constitutional: She appears well-developed and well-nourished. No distress. Neck: Normal range of motion. Neck supple. No JVD present. No thyromegaly present. Cardiovascular: Normal rate, regular rhythm, normal heart sounds and intact distal pulses. Pulmonary/Chest: Effort normal and breath sounds normal. No respiratory distress. She has no wheezes. Musculoskeletal: She exhibits no edema or tenderness. Psychiatric: She has a normal mood and affect. Her behavior is normal.       ASSESSMENT and Roslyn Monteiro was seen today for diabetes, hypertension and cholesterol problem. Diagnoses and all orders for this visit:    Type 2 diabetes mellitus without complication, with long-term current use of insulin ([facility])    Will order,  -     Blood-Glucose Meter monitoring kit; Check BS TID. Accucheck nanno meter please  -     Lancets misc; Check BS TID.accucheck nanno  -     glucose blood VI test strips (BLOOD GLUCOSE TEST) strip; Check BS TID.accucheck nanno  -     METABOLIC PANEL, COMPREHENSIVE  -     HEMOGLOBIN A1C WITH EAG  -     MICROALBUMIN, UR, RAND W/ MICROALBUMIN/CREA RATIO    Essential hypertension    Stable. will do,  -     METABOLIC PANEL, COMPREHENSIVE    Mixed hyperlipidemia    On med. -     METABOLIC PANEL, COMPREHENSIVE  -     LIPID PANEL    Type 2 diabetes mellitus without complication, without long-term current use of insulin ([facility])  -     insulin glargine (LANTUS,BASAGLAR) 100 unit/mL (3 mL) inpn; 50 units in am and 50 units  in pm  Indications: 90 day supply        Discussed expected course/resolution/complications of diagnosis in detail with patient. Medication risks/benefits/costs/interactions/alternatives discussed with patient. Pt was given an after visit summary which includes diagnoses, current medications & vitals. Pt expressed understanding with the diagnosis and plan.

## 2017-06-02 LAB
ALBUMIN SERPL-MCNC: 4 G/DL (ref 3.6–4.8)
ALBUMIN/CREAT UR: 17.7 MG/G CREAT (ref 0–30)
ALBUMIN/GLOB SERPL: 1.5 {RATIO} (ref 1.2–2.2)
ALP SERPL-CCNC: 72 IU/L (ref 39–117)
ALT SERPL-CCNC: 16 IU/L (ref 0–32)
AST SERPL-CCNC: 16 IU/L (ref 0–40)
BILIRUB SERPL-MCNC: 0.3 MG/DL (ref 0–1.2)
BUN SERPL-MCNC: 14 MG/DL (ref 8–27)
BUN/CREAT SERPL: 30 (ref 12–28)
CALCIUM SERPL-MCNC: 9.2 MG/DL (ref 8.7–10.3)
CHLORIDE SERPL-SCNC: 103 MMOL/L (ref 96–106)
CHOLEST SERPL-MCNC: 174 MG/DL (ref 100–199)
CO2 SERPL-SCNC: 20 MMOL/L (ref 18–29)
CREAT SERPL-MCNC: 0.47 MG/DL (ref 0.57–1)
CREAT UR-MCNC: 85.8 MG/DL
EST. AVERAGE GLUCOSE BLD GHB EST-MCNC: 214 MG/DL
GLOBULIN SER CALC-MCNC: 2.7 G/DL (ref 1.5–4.5)
GLUCOSE SERPL-MCNC: 148 MG/DL (ref 65–99)
HBA1C MFR BLD: 9.1 % (ref 4.8–5.6)
HDLC SERPL-MCNC: 45 MG/DL
INTERPRETATION, 910389: NORMAL
LDLC SERPL CALC-MCNC: 92 MG/DL (ref 0–99)
Lab: NORMAL
MICROALBUMIN UR-MCNC: 15.2 UG/ML
POTASSIUM SERPL-SCNC: 4.2 MMOL/L (ref 3.5–5.2)
PROT SERPL-MCNC: 6.7 G/DL (ref 6–8.5)
SODIUM SERPL-SCNC: 142 MMOL/L (ref 134–144)
TRIGL SERPL-MCNC: 183 MG/DL (ref 0–149)
VLDLC SERPL CALC-MCNC: 37 MG/DL (ref 5–40)

## 2017-06-07 NOTE — PROGRESS NOTES
Diabetes is still high. need to increase insulin by 2 unit. may see endocrine. all other labs are stable.

## 2017-10-04 PROBLEM — Z71.89 ADVANCE CARE PLANNING: Status: ACTIVE | Noted: 2017-01-01

## 2017-10-04 NOTE — PROGRESS NOTES
HISTORY OF PRESENT ILLNESS  Asia Mosqueda is a 74 y.o. female here for follow up. Has dry skin,need lotion  She is seen for diabetes. She reports medication compliance: compliant all of the time. Diabetic diet compliance: compliant most of the time. Home glucose monitoring: is performed regularly, minimum reading is 155, maximum reading is 185, and average reading is 160. Laure Arzate Further diabetic ROS: no polyuria or polydipsia, no chest pain, dyspnea or TIA's, no numbness, tingling or pain in extremities, no hypoglycemia. Lab review: labs reviewed, I note that glycosylated hemoglobin normal, abnormal 10, labs reviewed and discussed with patient. Eye exam:up to date. Has elevated lipid and BP. on meds. here for medicare wellness visit. Has no living will. Need vaccine. Diabetes   Pertinent negatives include no chest pain. Hypertension    Pertinent negatives include no chest pain, no palpitations and no blurred vision. Cholesterol Problem   Pertinent negatives include no chest pain. Follow-up   Pertinent negatives include no chest pain. Review of Systems   Constitutional: Negative. Negative for chills and fever. HENT: Negative. Eyes: Negative. Negative for blurred vision and double vision. Respiratory: Negative. Cardiovascular: Negative. Negative for chest pain and palpitations. Gastrointestinal: Negative. Genitourinary: Negative. Musculoskeletal: Negative. Skin: Negative. Neurological: Negative. Psychiatric/Behavioral: Negative. Physical Exam   Constitutional: She appears well-developed and well-nourished. No distress. Neck: Normal range of motion. Neck supple. No JVD present. No thyromegaly present. Cardiovascular: Normal rate, regular rhythm, normal heart sounds and intact distal pulses. Pulmonary/Chest: Effort normal and breath sounds normal. No respiratory distress. She has no wheezes. Musculoskeletal: She exhibits no edema or tenderness.    Diabetic foot exam:     Left: Reflexes 2+     Vibratory sensation absent    Proprioception absent   Sharp/dull discrimination absent    Filament test reduced sensation with micro filament   Pulse DP: 2+ (normal)   Pulse PT: 2+ (normal)   Deformities: None  Right: Reflexes 2+   Vibratory sensation diminished   Proprioception absent   Sharp/dull discrimination absent   Filament test reduced sensation with micro filament   Pulse DP: 2+ (normal)   Pulse PT: 2+ (normal)   Deformities: None   Psychiatric: She has a normal mood and affect. Her behavior is normal.       ASSESSMENT and PLAN  Diagnoses and all orders for this visit:    1. Type 2 diabetes mellitus without complication, with long-term current use of insulin (HCC)    Stable. on meds. Will do,  -     METABOLIC PANEL, COMPREHENSIVE  -     HEMOGLOBIN A1C WITH EAG    2. Essential hypertension  Stable. on med. -     METABOLIC PANEL, COMPREHENSIVE    3. Mixed hyperlipidemia    4. Medicare annual wellness visit, initial    5. Need for hepatitis C screening test  -     HEPATITIS C AB    6. Screening breast examination  -     ABIODUN MAMMO BI SCREENING INCL CAD; Future    7. Encounter for immunization  -     PNEUMOCOCCAL POLYSACCHARIDE VACCINE, 23-VALENT, ADULT OR IMMUNOSUPPRESSED PT DOSE,    8. Dry skin  -     ammonium lactate (AMLACTIN) 12 % topical cream; Apply  to affected area two (2) times a day. rub in to affected area well    9. Advance care planning    ACP discussed with pt in detail , including choosing a healthcare agent to communicate patient's healthcare decisions if patient lost the ability to make decisions, such as after a sudden illness or accident. Understanding of the healthcare agent role was assessed and information provided. Discussed values, goals, and preferences if recovery is not expected, even with continued medical treatment in the event of: Imminent death/serious illness.   Recommended completion of Advance Directive form after review of ACP materials and conversation with prospective healthcare agent. Recommended communicating the plan and making copies for the healthcare agent, personal physician, and others as appropriate (e.g., health system). Recommended review of completed ACP document annually or upon change in health status. Information book and form given. Face to face time spent was16 minutes            Discussed expected course/resolution/complications of diagnosis in detail with patient. Medication risks/benefits/costs/interactions/alternatives discussed with patient. Pt was given an after visit summary which includes diagnoses, current medications & vitals. Pt expressed understanding with the diagnosis and plan.

## 2017-10-04 NOTE — PROGRESS NOTES
Health Maintenance Due   Topic Date Due    Hepatitis C Screening  1950    DTaP/Tdap/Td series (1 - Tdap) 02/12/1973    FOBT Q 1 YEAR AGE 50-75  02/12/2002    ZOSTER VACCINE AGE 60>  12/12/2011    FOOT EXAM Q1  09/18/2015    EYE EXAM RETINAL OR DILATED Q1  12/03/2016    OSTEOPOROSIS SCREENING (DEXA)  02/12/2017    MEDICARE YEARLY EXAM  02/12/2017    INFLUENZA AGE 9 TO ADULT  08/01/2017    Pneumococcal 65+ Low/Medium Risk (2 of 2 - PPSV23) 09/28/2017    GLAUCOMA SCREENING Q2Y  12/03/2017       Chief Complaint   Patient presents with    Diabetes    Hypertension    Cholesterol Problem    Fall     has \"knot\" on left hip/buttocks, no longer painful       1. Have you been to the ER, urgent care clinic since your last visit? Hospitalized since your last visit? No    2. Have you seen or consulted any other health care providers outside of the [address] [place] [address] since your last visit? Include any pap smears or colon screening. No    3) Do you have an Advance Directive on file? no    4) Are you interested in receiving information on Advance Directives? NO      Patient is accompanied by self I have received verbal consent from Monroe Del Valle to discuss any/all medical information while they are present in the room. Monroe Del Valle is a 74 y.o. female  who presents for routine immunizations. She denies any symptoms , reactions or allergies that would exclude them from being immunized today. Risks and adverse reactions were discussed and the VIS was given to them. All questions were addressed. She was observed for 10 min post injection. There were no reactions observed.     Andi Halsted, LPN

## 2017-10-04 NOTE — PATIENT INSTRUCTIONS
Schedule of Personalized Health Plan  (Provide Copy to Patient)  The best way to stay healthy is to live a healthy lifestyle. A healthy lifestyle includes regular exercise, eating a well-balanced diet, keeping a healthy weight and not smoking. Regular physical exams and screening tests are another important way to take care of yourself. Preventive exams provided by health care providers can find health problems early when treatment works best and can keep you from getting certain diseases or illnesses. Preventive services include exams, lab tests, screenings, shots, monitoring and information to help you take care of your own health. All people over 65 should have a pneumonia shot. Pneumonia shots are usually only needed once in a lifetime unless your doctor decides differently. Will order. All people over 65 should have a yearly flu shot. pt declined. People over 65 are at medium to high risk for Hepatitis B. Three shots are needed for complete protection. In addition to your physical exam, some screening tests are recommended:    Bone mass measurement (dexa scan) is recommended every two years  Diabetes Mellitus screening is recommended every year. Glaucoma is an eye disease caused by high pressure in the eye. An eye exam is recommended every year. Cardiovascular screening tests that check your cholesterol and other blood fat (lipid) levels are recommended every five years. Up to date    Colorectal Cancer screening tests help to find pre-cancerous polyps (growths in the colon) so they can be removed before they turn into cancer. Tests ordered for screening depend on your personal and family history risk factors. up to date,need another one.     Screening for Breast Cancer is recommended yearly with a mammogram.    Screening for Cervical Cancer is recommended every two years (annually for certain risk factors, such as previous history of STD or abnormal PAP in past 7 years), with a Pelvic Exam with PAP    Here is a list of your current Health Maintenance items with a due date:  Health Maintenance   Topic Date Due    DTaP/Tdap/Td series (1 - Tdap) 02/12/1973    FOBT Q 1 YEAR AGE 50-75  02/12/2002    ZOSTER VACCINE AGE 60>  12/12/2011    EYE EXAM RETINAL OR DILATED Q1  12/03/2016    OSTEOPOROSIS SCREENING (DEXA)  02/12/2017    Pneumococcal 65+ Low/Medium Risk (2 of 2 - PPSV23) 09/28/2017    HEMOGLOBIN A1C Q6M  12/01/2017    BREAST CANCER SCRN MAMMOGRAM  02/10/2018    MICROALBUMIN Q1  06/01/2018    LIPID PANEL Q1  06/01/2018    FOOT EXAM Q1  10/04/2018    MEDICARE YEARLY EXAM  10/05/2018    GLAUCOMA SCREENING Q2Y  09/28/2019    Hepatitis C Screening  Addressed    INFLUENZA AGE 9 TO ADULT  Addressed       Vaccine Information Statement    Pneumococcal Polysaccharide Vaccine: What You Need to Know    Many Vaccine Information Statements are available in [place] and other languages. See www.immunize.org/vis. Hojas de información Sobre Vacunas están disponibles en español y en muchos otros idiomas. Visite [employer]. 1. Why get vaccinated? Vaccination can protect older adults (and some children and younger adults) from pneumococcal disease. Pneumococcal disease is caused by bacteria that can spread from person to person through close contact. It can cause ear infections, and it can also lead to more serious infections of the:   Lungs (pneumonia),   Blood (bacteremia), and   Covering of the brain and spinal cord (meningitis). Meningitis can cause deafness and brain damage, and it can be fatal.      Anyone can get pneumococcal disease, but children under 3years of age, people with certain medical conditions, adults over 72years of age, and cigarette smokers are at the highest risk. About 18,000 older adults die each year from pneumococcal disease in the [place]. Treatment of pneumococcal infections with penicillin and other drugs used to be more effective. But some strains of the disease have become resistant to these drugs. This makes prevention of the disease, through vaccination, even more important. 2. Pneumococcal polysaccharide vaccine (PPSV23)    Pneumococcal polysaccharide vaccine (PPSV23) protects against 23 types of pneumococcal bacteria. It will not prevent all pneumococcal disease. PPSV23 is recommended for:   All adults 72years of age and older,   Anyone 2 through 59years of age with certain long-term health problems,   Anyone 2 through 59years of age with a weakened immune system,   Adults 23 through 59years of age who smoke cigarettes or have asthma. Most people need only one dose of PPSV. A second dose is recommended for certain high-risk groups. People 72 and older should get a dose even if they have gotten one or more doses of the vaccine before they turned 65. Your healthcare provider can give you more information about these recommendations. Most healthy adults develop protection within 2 to 3 weeks of getting the shot. 3. Some people should not get this vaccine     Anyone who has had a life-threatening allergic reaction to PPSV should not get another dose.  Anyone who has a severe allergy to any component of PPSV should not receive it. Tell your provider if you have any severe allergies.  Anyone who is moderately or severely ill when the shot is scheduled may be asked to wait until they recover before getting the vaccine. Someone with a mild illness can usually be vaccinated.  Children less than 3years of age should not receive this vaccine.  There is no evidence that PPSV is harmful to either a pregnant woman or to her fetus. However, as a precaution, women who need the vaccine should be vaccinated before becoming pregnant, if possible. 4. Risks of a vaccine reaction    With any medicine, including vaccines, there is a chance of side effects.  These are usually mild and go away on their own, but serious reactions are also possible. About half of people who get PPSV have mild side effects, such as redness or pain where the shot is given, which go away within about two days. Less than 1 out of 100 people develop a fever, muscle aches, or more severe local reactions. Problems that could happen after any vaccine:     People sometimes faint after a medical procedure, including vaccination. Sitting or lying down for about 15 minutes can help prevent fainting, and injuries caused by a fall. Tell your doctor if you feel dizzy, or have vision changes or ringing in the ears.  Some people get severe pain in the shoulder and have difficulty moving the arm where a shot was given. This happens very rarely.  Any medication can cause a severe allergic reaction. Such reactions from a vaccine are very rare, estimated at about 1 in a million doses, and would happen within a few minutes to a few hours after the vaccination. As with any medicine, there is a very remote chance of a vaccine causing a serious injury or death. The safety of vaccines is always being monitored. For more information, visit: www.cdc.gov/vaccinesafety/     5. What if there is a serious reaction? What should I look for? Look for anything that concerns you, such as signs of a severe allergic reaction, very high fever, or unusual behavior. Signs of a severe allergic reaction can include hives, swelling of the face and throat, difficulty breathing, a fast heartbeat, dizziness, and weakness. These would usually start a few minutes to a few hours after the vaccination. What should I do? If you think it is a severe allergic reaction or other emergency that cant wait, call 9-1-1 or get to the nearest hospital. Otherwise, call your doctor. Afterward, the reaction should be reported to the Vaccine Adverse Event Reporting System (VAERS).  Your doctor might file this report, or you can do it yourself through the VAERS web site at [employer]. [employer] [place], or by calling 9-415.772.3016. VAERS does not give medical advice. 6. How can I learn more?  Ask your doctor. He or she can give you the vaccine package insert or suggest other sources of information.  Call your local or state health department.    Contact the Centers for Disease Control and Prevention (CDC):  - Call 7-188.976.4521 (1-800-CDC-INFO) or  - Visit CDCs website at [facility] 18 04/24/2015    Department of Health and [facility] for Disease Control and Prevention    Office Use Only

## 2017-10-04 NOTE — LETTER
10/12/2017 1:46 PM 
 
[facility] [address] [id number] Dear Zaina Martino: 
 
Please find your most recent results below. Resulted Orders HEPATITIS C AB Result Value Ref Range Hep C Virus Ab <0.1 0.0 - 0.9 s/co ratio Comment:  
                                     Negative:     < 0.8 Indeterminate: 0.8 - 0.9 Positive:     > 0.9 The CDC recommends that a positive HCV antibody result 
 be followed up with a HCV Nucleic Acid Amplification 
 test ([id number]). Narrative Performed at:  [address]  [id number] : Jemima Fontenot MD, Phone:  [phone] METABOLIC PANEL, COMPREHENSIVE Result Value Ref Range Glucose 108 (H) 65 - 99 mg/dL BUN 16 8 - 27 mg/dL Creatinine 0.39 (L) 0.57 - 1.00 mg/dL GFR est non- >59 mL/min/1.73 GFR est  >59 mL/min/1.73  
 BUN/Creatinine ratio 41 (H) 12 - 28 Sodium 143 134 - 144 mmol/L Potassium 4.2 3.5 - 5.2 mmol/L Chloride 105 96 - 106 mmol/L  
 CO2 26 18 - 29 mmol/L Calcium 9.1 8.7 - 10.3 mg/dL Protein, total 6.8 6.0 - 8.5 g/dL Albumin 3.8 3.6 - 4.8 g/dL GLOBULIN, TOTAL 3.0 1.5 - 4.5 g/dL A-G Ratio 1.3 1.2 - 2.2 Bilirubin, total <0.2 0.0 - 1.2 mg/dL Alk. phosphatase 76 39 - 117 IU/L  
 AST (SGOT) 18 0 - 40 IU/L  
 ALT (SGPT) 17 0 - 32 IU/L Narrative Performed at:  [address]  [id number] : Jemima Fontenot MD, Phone:  [phone] HEMOGLOBIN A1C WITH EAG Result Value Ref Range Hemoglobin A1c 8.3 (H) 4.8 - 5.6 % Comment:  
            Pre-diabetes: 5.7 - 6.4 Diabetes: >6.4 Glycemic control for adults with diabetes: <7.0 Estimated average glucose 192 mg/dL Narrative Performed at:  [address]  [id number] : Farzaneh Parson MD, Phone:  [phone] RECOMMENDATIONS: 
Marianna Snide your diabetes had improved, please continue on a diabetic diet, keep up the good work! Please call me if you have any questions: [phone] Sincerely, Apoorva Montilla MD

## 2017-10-04 NOTE — MR AVS SNAPSHOT
Visit Information Date & Time Provider Department Dept. Phone Encounter #  
 10/4/2017  8:30 AM Elayne Soulier, [address] [facility] [phone] [id number] Upcoming Health Maintenance Date Due DTaP/Tdap/Td series (1 - Tdap) 2/12/1973 FOBT Q 1 YEAR AGE 50-75 2/12/2002 ZOSTER VACCINE AGE 60> 12/12/2011 EYE EXAM RETINAL OR DILATED Q1 12/3/2016 OSTEOPOROSIS SCREENING (DEXA) 2/12/2017 Pneumococcal 65+ Low/Medium Risk (2 of 2 - PPSV23) 9/28/2017 HEMOGLOBIN A1C Q6M 12/1/2017 BREAST CANCER SCRN MAMMOGRAM 2/10/2018 MICROALBUMIN Q1 6/1/2018 LIPID PANEL Q1 6/1/2018 FOOT EXAM Q1 10/4/2018 MEDICARE YEARLY EXAM 10/5/2018 GLAUCOMA SCREENING Q2Y 9/28/2019 Allergies as of 10/4/2017  Review Complete On: 10/4/2017 By: Elayne Soulier, MD  
  
 Severity Noted Reaction Type Reactions Tylenol [Acetaminophen]  03/14/2012    Swelling Current Immunizations  Reviewed on 6/1/2017 Name Date Pneumococcal Conjugate (PCV-13) 9/28/2016 Pneumococcal Polysaccharide (PPSV-23)  Incomplete Not reviewed this visit You Were Diagnosed With   
  
 Codes Comments Type 2 diabetes mellitus without complication, with long-term current use of insulin (HCC)    -  Primary ICD-10-CM: E11.9, Z79.4 ICD-9-CM: 250.00, V58.67 Essential hypertension     ICD-10-CM: I10 
ICD-9-CM: 401.9 Mixed hyperlipidemia     ICD-10-CM: E78.2 ICD-9-CM: 272.2 Medicare annual wellness visit, initial     ICD-10-CM: Z00.00 ICD-9-CM: V70.0 Need for hepatitis C screening test     ICD-10-CM: Z11.59 
ICD-9-CM: V73.89 Screening breast examination     ICD-10-CM: Z12.31 
ICD-9-CM: V76.10 Encounter for immunization     ICD-10-CM: H95 ICD-9-CM: V03.89 Dry skin     ICD-10-CM: L85.3 ICD-9-CM: 701.1 Advance care planning     ICD-10-CM: Z71.89 ICD-9-CM: V65.49 Vitals BP Pulse Temp Resp Height(growth percentile) Weight(growth percentile) 137/80 (BP 1 Location: Left arm, BP Patient Position: Sitting) 66 97.3 °F (36.3 °C) (Oral) 20 5' 4\" (1.626 m) 191 lb (86.6 kg) SpO2 BMI OB Status Smoking Status 95% 32.79 kg/m2 Hysterectomy Never Smoker Vitals History BMI and BSA Data Body Mass Index Body Surface Area 32.79 kg/m 2 1.98 m 2 Preferred Pharmacy Pharmacy Name Phone [facility] [address] [place] [address] [phone] Your Updated Medication List  
  
   
This list is accurate as of: 10/4/17  9:38 AM.  Always use your most recent med list. ALLEGRA 180 mg tablet Generic drug:  fexofenadine Take  by mouth daily. ammonium lactate 12 % topical cream  
Commonly known as:  AMLACTIN Apply  to affected area two (2) times a day. rub in to affected area well  
  
 aspirin delayed-release 81 mg tablet Take 81 mg by mouth daily. atenolol 100 mg tablet Commonly known as:  TENORMIN Take 1 Tab by mouth daily. * Blood-Glucose Meter Misc Commonly known as:  CONTOUR NEXT METER Check blood sugar BID * Blood-Glucose Meter monitoring kit Check BS TID. Accucheck nanno meter please  
  
 calcium 600 mg Cap Take  by mouth. cholecalciferol (VITAMIN D3) 5,000 unit Tab tablet Commonly known as:  VITAMIN D3 Take  by mouth daily. clotrimazole 1 % topical cream  
Commonly known as:  Danielle Shelby Apply  to affected area two (2) times a day. dapagliflozin 10 mg Tab tablet Commonly known as:  [employer] Take 1 Tab by mouth daily. glucose blood VI test strips strip Commonly known as:  blood glucose test  
Check BS TID.accucheck nanno * insulin glargine 100 unit/mL (3 mL) Inpn Commonly known as:  LANTUS,BASAGLAR  
50 units in am and 50 units  in pm  Indications: 90 day supply * insulin glargine 100 unit/mL (3 mL) Inpn Commonly known as:  LANTUS SOLOSTAR  
 INJECT 47 UNITS UNDER THE SKIN IN THE MORNING AND 47 UNITS IN THE EVENING ( please give 90 day supply 5 pens per box, give 6 boxes=30 pens) Iron 325 mg (65 mg iron) tablet Generic drug:  ferrous sulfate Take  by mouth Daily (before breakfast). KOMBIGLYZE XR 5-1,000 mg Tm24 Generic drug:  sAXagliptin-metFORMIN  
TAKE ONE TABLET BY MOUTH ONCE DAILY  
  
 lisinopril 10 mg tablet Commonly known as:  Cristal Pinch Take 1 Tab by mouth daily. metoprolol tartrate 50 mg tablet Commonly known as:  LOPRESSOR  
  
 * ONETOUCH DELICA LANCETS 30 gauge Misc Generic drug:  lancets CHECK BLOOD SUGAR TWICE DAILY * Lancets Misc Check BS TID.accucheck nanno  
  
 simvastatin 20 mg tablet Commonly known as:  ZOCOR Take 1 Tab by mouth nightly. * Notice: This list has 6 medication(s) that are the same as other medications prescribed for you. Read the directions carefully, and ask your doctor or other care provider to review them with you. Prescriptions Sent to Pharmacy Refills  
 ammonium lactate (AMLACTIN) 12 % topical cream 2 Sig: Apply  to affected area two (2) times a day. rub in to affected area well Class: Normal  
 Pharmacy: [address] Ph #: [phone] Route: Topical  
  
We Performed the Following HEMOGLOBIN A1C WITH EAG [60754 CPT(R)] HEPATITIS C AB [52448 CPT(R)] METABOLIC PANEL, COMPREHENSIVE [34379 CPT(R)] PNEUMOCOCCAL POLYSACCHARIDE VACCINE, 23-VALENT, ADULT OR IMMUNOSUPPRESSED PT DOSE, [44002 CPT(R)] To-Do List   
 11/04/2017 Imaging:  ABIODUN MAMMO BI SCREENING INCL CAD Patient Instructions Schedule of Personalized Health Plan (Provide Copy to Patient) The best way to stay healthy is to live a healthy lifestyle. A healthy lifestyle includes regular exercise, eating a well-balanced diet, keeping a healthy weight and not smoking. Regular physical exams and screening tests are another important way to take care of yourself. Preventive exams provided by health care providers can find health problems early when treatment works best and can keep you from getting certain diseases or illnesses. Preventive services include exams, lab tests, screenings, shots, monitoring and information to help you take care of your own health. All people over 65 should have a pneumonia shot. Pneumonia shots are usually only needed once in a lifetime unless your doctor decides differently. Will order. All people over 65 should have a yearly flu shot. pt declined. People over 65 are at medium to high risk for Hepatitis B. Three shots are needed for complete protection. In addition to your physical exam, some screening tests are recommended: 
 
Bone mass measurement (dexa scan) is recommended every two years Diabetes Mellitus screening is recommended every year. Glaucoma is an eye disease caused by high pressure in the eye. An eye exam is recommended every year. Cardiovascular screening tests that check your cholesterol and other blood fat (lipid) levels are recommended every five years. Up to date Colorectal Cancer screening tests help to find pre-cancerous polyps (growths in the colon) so they can be removed before they turn into cancer. Tests ordered for screening depend on your personal and family history risk factors. up to date,need another one. Screening for Breast Cancer is recommended yearly with a mammogram. 
 
Screening for Cervical Cancer is recommended every two years (annually for certain risk factors, such as previous history of STD or abnormal PAP in past 7 years), with a Pelvic Exam with PAP Here is a list of your current Health Maintenance items with a due date: 
Health Maintenance Topic Date Due  
 DTaP/Tdap/Td series (1 - Tdap) 02/12/1973  
 FOBT Q 1 YEAR AGE 50-75  02/12/2002  ZOSTER VACCINE AGE 60>  12/12/2011  EYE EXAM RETINAL OR DILATED Q1  12/03/2016  
 OSTEOPOROSIS SCREENING (DEXA)  02/12/2017  Pneumococcal 65+ Low/Medium Risk (2 of 2 - PPSV23) 09/28/2017  
 HEMOGLOBIN A1C Q6M  12/01/2017  BREAST CANCER SCRN MAMMOGRAM  02/10/2018  MICROALBUMIN Q1  06/01/2018  LIPID PANEL Q1  06/01/2018  
 FOOT EXAM Q1  10/04/2018  MEDICARE YEARLY EXAM  10/05/2018  GLAUCOMA SCREENING Q2Y  09/28/2019  Hepatitis C Screening  Addressed  INFLUENZA AGE 9 TO ADULT  Addressed Vaccine Information Statement Pneumococcal Polysaccharide Vaccine: What You Need to Know Many Vaccine Information Statements are available in Surinamese and other languages. See www.immunize.org/vis. Hojas de información Sobre Vacunas están disponibles en español y en muchos otros idiomas. Visite WorthScale.si. 1. Why get vaccinated? Vaccination can protect older adults (and some children and younger adults) from pneumococcal disease. Pneumococcal disease is caused by bacteria that can spread from person to person through close contact. It can cause ear infections, and it can also lead to more serious infections of the: 
 Lungs (pneumonia),  Blood (bacteremia), and 
 Covering of the brain and spinal cord (meningitis). Meningitis can cause deafness and brain damage, and it can be fatal.   
 
Anyone can get pneumococcal disease, but children under 3years of age, people with certain medical conditions, adults over 72years of age, and cigarette smokers are at the highest risk. About 18,000 older adults die each year from pneumococcal disease in the [place]. Treatment of pneumococcal infections with penicillin and other drugs used to be more effective. But some strains of the disease have become resistant to these drugs. This makes prevention of the disease, through vaccination, even more important. 2. Pneumococcal polysaccharide vaccine (PPSV23) Pneumococcal polysaccharide vaccine (PPSV23) protects against 23 types of pneumococcal bacteria. It will not prevent all pneumococcal disease. PPSV23 is recommended for:  All adults 72years of age and older,  Anyone 2 through 59years of age with certain long-term health problems, 
 Anyone 2 through 59years of age with a weakened immune system, 
 Adults 23 through 59years of age who smoke cigarettes or have asthma. Most people need only one dose of PPSV. A second dose is recommended for certain high-risk groups. People 72 and older should get a dose even if they have gotten one or more doses of the vaccine before they turned 65. Your healthcare provider can give you more information about these recommendations. Most healthy adults develop protection within 2 to 3 weeks of getting the shot. 3. Some people should not get this vaccine  Anyone who has had a life-threatening allergic reaction to PPSV should not get another dose.  Anyone who has a severe allergy to any component of PPSV should not receive it. Tell your provider if you have any severe allergies.  Anyone who is moderately or severely ill when the shot is scheduled may be asked to wait until they recover before getting the vaccine. Someone with a mild illness can usually be vaccinated.  Children less than 3years of age should not receive this vaccine.  There is no evidence that PPSV is harmful to either a pregnant woman or to her fetus. However, as a precaution, women who need the vaccine should be vaccinated before becoming pregnant, if possible. 4. Risks of a vaccine reaction With any medicine, including vaccines, there is a chance of side effects. These are usually mild and go away on their own, but serious reactions are also possible. About half of people who get PPSV have mild side effects, such as redness or pain where the shot is given, which go away within about two days. Less than 1 out of 100 people develop a fever, muscle aches, or more severe local reactions. Problems that could happen after any vaccine:  People sometimes faint after a medical procedure, including vaccination. Sitting or lying down for about 15 minutes can help prevent fainting, and injuries caused by a fall. Tell your doctor if you feel dizzy, or have vision changes or ringing in the ears.  Some people get severe pain in the shoulder and have difficulty moving the arm where a shot was given. This happens very rarely.  Any medication can cause a severe allergic reaction. Such reactions from a vaccine are very rare, estimated at about 1 in a million doses, and would happen within a few minutes to a few hours after the vaccination. As with any medicine, there is a very remote chance of a vaccine causing a serious injury or death. The safety of vaccines is always being monitored. For more information, visit: www.cdc.gov/vaccinesafety/  
 
5. What if there is a serious reaction? What should I look for? Look for anything that concerns you, such as signs of a severe allergic reaction, very high fever, or unusual behavior. Signs of a severe allergic reaction can include hives, swelling of the face and throat, difficulty breathing, a fast heartbeat, dizziness, and weakness. These would usually start a few minutes to a few hours after the vaccination. What should I do? If you think it is a severe allergic reaction or other emergency that cant wait, call 9-1-1 or get to the nearest hospital. Otherwise, call your doctor. Afterward, the reaction should be reported to the Vaccine Adverse Event Reporting System (VAERS). Your doctor might file this report, or you can do it yourself through the VAERS web site at www.vaers. hhs.gov, or by calling 4-218.606.5710. VAERS does not give medical advice. 6. How can I learn more?  Ask your doctor. He or she can give you the vaccine package insert or suggest other sources of information.  Call your local or state health department.  Contact the Centers for Disease Control and Prevention (CDC): 
- Call 4-889.736.7768 (1-800-CDC-INFO) or 
- Visit CDCs website at www.cdc.gov/vaccines Vaccine Information Statement PPSV  
04/24/2015 [facility] and [employer] Centers for Disease Control and Prevention Office Use Only Introducing \[facility]\"" & HEALTH SERVICES! [employer] introduces [employer] patient portal. Now you can access parts of your medical record, email your doctor's office, and request medication refills online. 1. In your internet browser, go to [email or web address] 2. Click on the First Time User? Click Here link in the Sign In box. You will see the New Member Sign Up page. 3. Enter your [employer] Access Code exactly as it appears below. You will not need to use this code after youve completed the sign-up process. If you do not sign up before the expiration date, you must request a new code. · [employer] Access Code: [email or web address] Expires: 1/2/2018  9:06 AM 
 
4. Enter the last four digits of your Social Security Number (xxxx) and Date of Birth (mm/dd/yyyy) as indicated and click Submit. You will be taken to the next sign-up page. 5. Create a [employer] ID. This will be your [employer] login ID and cannot be changed, so think of one that is secure and easy to remember. 6. Create a [employer] password. You can change your password at any time. 7. Enter your Password Reset Question and Answer. This can be used at a later time if you forget your password. 8. Enter your e-mail address. You will receive e-mail notification when new information is available in [address] 9. Click Sign Up. You can now view and download portions of your medical record.  
10. Click the Download Summary menu link to download a portable copy of your medical information. If you have questions, please visit the Frequently Asked Questions section of the [employer] website. Remember, [employer] is NOT to be used for urgent needs. For medical emergencies, dial 911. Now available from your iPhone and Android! Please provide this summary of care documentation to your next provider. Your primary care clinician is listed as [facility] Nurse Navigator. If you have any questions after today's visit, please call [phone].

## 2017-10-04 NOTE — PROGRESS NOTES
Krish Goodman is a 74 y.o. female and presents for annual Medicare Wellness Visit. Problem List: Reviewed with patient and discussed risk factors. Patient Active Problem List   Diagnosis Code    HTN (hypertension) I10    [facility] mellitus) [facility] E11.9    Mixed hyperlipidemia E78.2       Current medical providers:  Patient Care Team:  Liliane VIERA (Inactive) as PCP - General (Internal Medicine)  Ann Taylor LPN as Ambulatory Care Navigator (Internal Medicine)    PSH: Reviewed with patient  Past Surgical History:   Procedure Laterality Date    HX  SECTION      Hysterectomy,total for precancer        SH: Reviewed with patient  Social History   Substance Use Topics    Smoking status: Never Smoker    Smokeless tobacco: Never Used    Alcohol use No       FH: Reviewed with patient  Family History   Problem Relation Age of Onset    Alcohol abuse Father     Cancer Maternal Aunt      breast ca       Medications/Allergies: Reviewed with patient  Current Outpatient Prescriptions on File Prior to Visit   Medication Sig Dispense Refill    KOMBIGLYZE XR 5-1,000 mg TM24 TAKE ONE TABLET BY MOUTH ONCE DAILY 90 Tab 1    Blood-Glucose Meter monitoring kit Check BS TID. Accucheck nanno meter please 1 Kit 0    Lancets misc Check BS TID.accucheck nanno 100 Each 11    glucose blood VI test strips (BLOOD GLUCOSE TEST) strip Check BS TID.accucheck nanno 100 Strip 11    insulin glargine (LANTUS,BASAGLAR) 100 unit/mL (3 mL) inpn 50 units in am and 50 units  in pm  Indications: 90 day supply 7 Pen 1    lisinopril (PRINIVIL, ZESTRIL) 10 mg tablet Take 1 Tab by mouth daily. 90 Tab 1    simvastatin (ZOCOR) 20 mg tablet Take 1 Tab by mouth nightly. 90 Tab 1    dapagliflozin (FARXIGA) 10 mg tab Take 1 Tab by mouth daily. 90 Tab 1    cholecalciferol, VITAMIN D3, (VITAMIN D3) 5,000 unit tab tablet Take  by mouth daily.       ammonium lactate (AMLACTIN) 12 % topical cream Apply  to affected area two (2) times a day. rub in to affected area well 2 g 2    ferrous sulfate (IRON) 325 mg (65 mg iron) tablet Take  by mouth Daily (before breakfast).  aspirin delayed-release 81 mg tablet Take 81 mg by mouth daily.  clotrimazole (LOTRIMIN) 1 % topical cream Apply  to affected area two (2) times a day. 30 g 0    ONE TOUCH DELICA LANCETS 30 gauge misc CHECK BLOOD SUGAR TWICE DAILY 60 Each 11    Blood-Glucose Meter (CONTOUR NEXT METER) misc Check blood sugar BID 1 Each 0    calcium 600 mg Cap Take  by mouth.  fexofenadine (ALLEGRA) 180 mg tablet Take  by mouth daily.  atenolol (TENORMIN) 100 mg tablet Take 1 Tab by mouth daily. 90 Tab 1    insulin glargine (LANTUS SOLOSTAR) 100 unit/mL (3 mL) inpn INJECT 47 UNITS UNDER THE SKIN IN THE MORNING AND 47 UNITS IN THE EVENING ( please give 90 day supply 5 pens per box, give 6 boxes=30 pens) 30 Pen 3     No current facility-administered medications on file prior to visit. Allergies   Allergen Reactions    Tylenol [Acetaminophen] Swelling       Objective:  Visit Vitals    /80 (BP 1 Location: Left arm, BP Patient Position: Sitting)    Pulse 66    Temp 97.3 °F (36.3 °C) (Oral)    Resp 20    Ht 5' 4\" (1.626 m)    Wt 191 lb (86.6 kg)    SpO2 95%    BMI 32.79 kg/m2    Body mass index is 32.79 kg/(m^2). Assessment of cognitive impairment: Alert and oriented x 3    Depression Screen:   PHQ over the last two weeks 10/4/2017   Little interest or pleasure in doing things Not at all   Feeling down, depressed or hopeless Not at all   Total Score PHQ 2 0       Fall Risk Assessment:    Fall Risk Assessment, last 12 mths 10/4/2017   Able to walk? Yes   Fall in past 12 months? Yes   Fall with injury? No   Number of falls in past 12 months 1   Fall Risk Score 1       Functional Ability:   Does the patient exhibit a steady gait? yes   How long did it take the patient to get up and walk from a sitting position?  10sec   Is the patient self reliant?  (ie can do own laundry, meals, household chores)  yes     Does the patient handle his/her own medications? yes     Does the patient handle his/her own money? yes     Is the patients home safe (ie good lighting, handrails on stairs and bath, etc.)? yes     Did you notice or did patient express any hearing difficulties? no     Did you notice or did patient express any vision difficulties?   no     Were distance and reading eye charts used? no     Advance Care Planning:   Patient was offered the opportunity to discuss advance care planning:  yes     Does patient have an Advance Directive:  no   If no, did you provide information on Caring Connections? yes       Plan:      Orders Placed This Encounter    ABIODUN MAMMO BI SCREENING INCL CAD    PNEUMOCOCCAL POLYSACCHARIDE VACCINE, 23-VALENT, ADULT OR IMMUNOSUPPRESSED PT DOSE,    HEPATITIS C AB    METABOLIC PANEL, COMPREHENSIVE    HEMOGLOBIN A1C WITH EAG    metoprolol tartrate (LOPRESSOR) 50 mg tablet       Health Maintenance   Topic Date Due    DTaP/Tdap/Td series (1 - Tdap) 02/12/1973    FOBT Q 1 YEAR AGE 50-75  02/12/2002    ZOSTER VACCINE AGE 60>  12/12/2011    EYE EXAM RETINAL OR DILATED Q1  12/03/2016    OSTEOPOROSIS SCREENING (DEXA)  02/12/2017    Pneumococcal 65+ Low/Medium Risk (2 of 2 - PPSV23) 09/28/2017    HEMOGLOBIN A1C Q6M  12/01/2017    BREAST CANCER SCRN MAMMOGRAM  02/10/2018    MICROALBUMIN Q1  06/01/2018    LIPID PANEL Q1  06/01/2018    FOOT EXAM Q1  10/04/2018    MEDICARE YEARLY EXAM  10/05/2018    GLAUCOMA SCREENING Q2Y  09/28/2019    Hepatitis C Screening  Addressed    INFLUENZA AGE 9 TO ADULT  Addressed       *Patient verbalized understanding and agreement with the plan. A copy of the After Visit Summary with personalized health plan was given to the patient today.

## 2017-10-04 NOTE — LETTER
10/9/2017 3:40 PM 
 
[facility] [phone] Dear Mkie Gonzalez: 
 
Please find your most recent results below. Resulted Orders HEPATITIS C AB Result Value Ref Range Hep C Virus Ab <0.1 0.0 - 0.9 s/co ratio Comment:  
                                     Negative:     < 0.8 Indeterminate: 0.8 - 0.9 Positive:     > 0.9 The CDC recommends that a positive HCV antibody result 
 be followed up with a HCV Nucleic Acid Amplification 
 test (061249). Narrative Performed at:  [address]  [id number] : Colton Gillespie MD, Phone:  [phone] METABOLIC PANEL, COMPREHENSIVE Result Value Ref Range Glucose 108 (H) 65 - 99 mg/dL BUN 16 8 - 27 mg/dL Creatinine 0.39 (L) 0.57 - 1.00 mg/dL GFR est non- >59 mL/min/1.73 GFR est  >59 mL/min/1.73  
 BUN/Creatinine ratio 41 (H) 12 - 28 Sodium 143 134 - 144 mmol/L Potassium 4.2 3.5 - 5.2 mmol/L Chloride 105 96 - 106 mmol/L  
 CO2 26 18 - 29 mmol/L Calcium 9.1 8.7 - 10.3 mg/dL Protein, total 6.8 6.0 - 8.5 g/dL Albumin 3.8 3.6 - 4.8 g/dL GLOBULIN, TOTAL 3.0 1.5 - 4.5 g/dL A-G Ratio 1.3 1.2 - 2.2 Bilirubin, total <0.2 0.0 - 1.2 mg/dL Alk. phosphatase 76 39 - 117 IU/L  
 AST (SGOT) 18 0 - 40 IU/L  
 ALT (SGPT) 17 0 - 32 IU/L Narrative Performed at:  [address]  [id number] : Colton Gillespie MD, Phone:  [phone] HEMOGLOBIN A1C WITH EAG Result Value Ref Range Hemoglobin A1c 8.3 (H) 4.8 - 5.6 % Comment:  
            Pre-diabetes: 5.7 - 6.4 Diabetes: >6.4 Glycemic control for adults with diabetes: <7.0 Estimated average glucose 192 mg/dL Narrative Performed at:  [place] [address], [place], [place]  [id number] : Kelley Liu MD, Phone:  [phone] RECOMMENDATIONS: 
Diabetes has improved. be oN A DA diet and exercise. Please call me if you have any questions: [phone] Sincerely, Angelika Gamboa MD

## 2017-10-16 NOTE — LETTER
10/18/2017 3:30 PM 
 
Ms. Glass [facility] [address] [id number] Dear Karis Treviño: 
 
Please find your most recent results below. [facility] Orders [facility] MAMMO BI SCREENING INCL CAD Narrative STUDY: Bilateral digital screening mammogram 
 
INDICATION:  Screening. COMPARISON:  2016 BREAST COMPOSITION:  The breasts are almost entirely fatty. FINDINGS: Bilateral digital screening mammography was performed and is 
interpreted in conjunction with a computer assisted detection (CAD) system. No 
suspicious masses or calcifications are identified. The patient has a background 
bilateral diffuse secretory and vascular calcifications. There has been no 
significant change. Impression IMPRESSION: 
BI-RADS 2: Benign. No mammographic evidence of malignancy. RECOMMENDATIONS: 
Next screening mammogram is recommended in one year. The patient may be increased risk due to family history history. Consider annual 
screening MRI in addition to mammography. The patient will be notified of these results. RECOMMENDATIONS: 
Mammogram is negative, repeat in one year. Please call me if you have any questions: [phone] Sincerely, [address] [facility] [address]. Luis Dick

## 2018-01-01 ENCOUNTER — HOME CARE VISIT (OUTPATIENT)
Dept: SCHEDULING | Facility: HOME HEALTH | Age: 68
End: 2018-01-01
Payer: MEDICARE

## 2018-01-01 ENCOUNTER — ANESTHESIA (OUTPATIENT)
Dept: SURGERY | Age: 68
DRG: 330 | End: 2018-01-01
Payer: MEDICARE

## 2018-01-01 ENCOUNTER — TELEPHONE (OUTPATIENT)
Dept: ONCOLOGY | Age: 68
End: 2018-01-01

## 2018-01-01 ENCOUNTER — TELEPHONE (OUTPATIENT)
Dept: SURGERY | Age: 68
End: 2018-01-01

## 2018-01-01 ENCOUNTER — APPOINTMENT (OUTPATIENT)
Dept: ULTRASOUND IMAGING | Age: 68
DRG: 862 | End: 2018-01-01
Attending: INTERNAL MEDICINE
Payer: MEDICARE

## 2018-01-01 ENCOUNTER — TELEPHONE (OUTPATIENT)
Dept: INTERNAL MEDICINE CLINIC | Age: 68
End: 2018-01-01

## 2018-01-01 ENCOUNTER — HOSPITAL ENCOUNTER (INPATIENT)
Age: 68
LOS: 10 days | Discharge: HOME HEALTH | DRG: 862 | End: 2018-07-20
Attending: SURGERY | Admitting: SURGERY
Payer: MEDICARE

## 2018-01-01 ENCOUNTER — ANESTHESIA (OUTPATIENT)
Dept: SURGERY | Age: 68
DRG: 862 | End: 2018-01-01
Payer: MEDICARE

## 2018-01-01 ENCOUNTER — HOME HEALTH ADMISSION (OUTPATIENT)
Dept: HOME HEALTH SERVICES | Facility: HOME HEALTH | Age: 68
End: 2018-01-01

## 2018-01-01 ENCOUNTER — DOCUMENTATION ONLY (OUTPATIENT)
Dept: ONCOLOGY | Age: 68
End: 2018-01-01

## 2018-01-01 ENCOUNTER — APPOINTMENT (OUTPATIENT)
Dept: ULTRASOUND IMAGING | Age: 68
DRG: 862 | End: 2018-01-01
Attending: NURSE PRACTITIONER
Payer: MEDICARE

## 2018-01-01 ENCOUNTER — APPOINTMENT (OUTPATIENT)
Dept: GENERAL RADIOLOGY | Age: 68
DRG: 871 | End: 2018-01-01
Attending: INTERNAL MEDICINE
Payer: MEDICARE

## 2018-01-01 ENCOUNTER — OFFICE VISIT (OUTPATIENT)
Dept: SURGERY | Age: 68
End: 2018-01-01

## 2018-01-01 ENCOUNTER — OFFICE VISIT (OUTPATIENT)
Dept: INTERNAL MEDICINE CLINIC | Age: 68
End: 2018-01-01

## 2018-01-01 ENCOUNTER — HOME CARE VISIT (OUTPATIENT)
Dept: HOME HEALTH SERVICES | Facility: HOME HEALTH | Age: 68
End: 2018-01-01
Payer: MEDICARE

## 2018-01-01 ENCOUNTER — APPOINTMENT (OUTPATIENT)
Dept: GENERAL RADIOLOGY | Age: 68
DRG: 862 | End: 2018-01-01
Attending: NURSE PRACTITIONER
Payer: MEDICARE

## 2018-01-01 ENCOUNTER — APPOINTMENT (OUTPATIENT)
Dept: GENERAL RADIOLOGY | Age: 68
DRG: 330 | End: 2018-01-01
Attending: SURGERY
Payer: MEDICARE

## 2018-01-01 ENCOUNTER — PATIENT OUTREACH (OUTPATIENT)
Dept: ONCOLOGY | Age: 68
End: 2018-01-01

## 2018-01-01 ENCOUNTER — APPOINTMENT (OUTPATIENT)
Dept: CT IMAGING | Age: 68
DRG: 862 | End: 2018-01-01
Attending: NURSE PRACTITIONER
Payer: MEDICARE

## 2018-01-01 ENCOUNTER — HOSPITAL ENCOUNTER (OUTPATIENT)
Dept: INFUSION THERAPY | Age: 68
Discharge: HOME | End: 2018-07-20
Payer: MEDICARE

## 2018-01-01 ENCOUNTER — HOSPITAL ENCOUNTER (INPATIENT)
Age: 68
LOS: 7 days | Discharge: HOME HEALTH | DRG: 330 | End: 2018-06-02
Attending: EMERGENCY MEDICINE | Admitting: FAMILY MEDICINE
Payer: MEDICARE

## 2018-01-01 ENCOUNTER — APPOINTMENT (OUTPATIENT)
Dept: INTERVENTIONAL RADIOLOGY/VASCULAR | Age: 68
DRG: 862 | End: 2018-01-01
Attending: SURGERY
Payer: MEDICARE

## 2018-01-01 ENCOUNTER — APPOINTMENT (OUTPATIENT)
Dept: CT IMAGING | Age: 68
DRG: 863 | End: 2018-01-01
Attending: EMERGENCY MEDICINE
Payer: MEDICARE

## 2018-01-01 ENCOUNTER — PATIENT OUTREACH (OUTPATIENT)
Dept: INTERNAL MEDICINE CLINIC | Age: 68
End: 2018-01-01

## 2018-01-01 ENCOUNTER — NURSE NAVIGATOR (OUTPATIENT)
Dept: ONCOLOGY | Age: 68
End: 2018-01-01

## 2018-01-01 ENCOUNTER — APPOINTMENT (OUTPATIENT)
Dept: GENERAL RADIOLOGY | Age: 68
DRG: 862 | End: 2018-01-01
Attending: SURGERY
Payer: MEDICARE

## 2018-01-01 ENCOUNTER — OFFICE VISIT (OUTPATIENT)
Dept: ONCOLOGY | Age: 68
End: 2018-01-01

## 2018-01-01 ENCOUNTER — HOSPITAL ENCOUNTER (INPATIENT)
Age: 68
LOS: 4 days | DRG: 871 | End: 2018-07-27
Attending: EMERGENCY MEDICINE | Admitting: HOSPITALIST
Payer: MEDICARE

## 2018-01-01 ENCOUNTER — HOME HEALTH ADMISSION (OUTPATIENT)
Dept: HOME HEALTH SERVICES | Facility: HOME HEALTH | Age: 68
End: 2018-01-01
Payer: MEDICARE

## 2018-01-01 ENCOUNTER — APPOINTMENT (OUTPATIENT)
Dept: CT IMAGING | Age: 68
DRG: 330 | End: 2018-01-01
Attending: HOSPITALIST
Payer: MEDICARE

## 2018-01-01 ENCOUNTER — APPOINTMENT (OUTPATIENT)
Dept: CT IMAGING | Age: 68
DRG: 330 | End: 2018-01-01
Attending: PHYSICIAN ASSISTANT
Payer: MEDICARE

## 2018-01-01 ENCOUNTER — HOSPITAL ENCOUNTER (INPATIENT)
Age: 68
LOS: 5 days | Discharge: HOME HEALTH | DRG: 863 | End: 2018-06-13
Attending: EMERGENCY MEDICINE | Admitting: SURGERY
Payer: MEDICARE

## 2018-01-01 ENCOUNTER — ANESTHESIA EVENT (OUTPATIENT)
Dept: SURGERY | Age: 68
DRG: 862 | End: 2018-01-01
Payer: MEDICARE

## 2018-01-01 ENCOUNTER — APPOINTMENT (OUTPATIENT)
Dept: CT IMAGING | Age: 68
DRG: 871 | End: 2018-01-01
Attending: EMERGENCY MEDICINE
Payer: MEDICARE

## 2018-01-01 ENCOUNTER — ANESTHESIA EVENT (OUTPATIENT)
Dept: SURGERY | Age: 68
DRG: 330 | End: 2018-01-01
Payer: MEDICARE

## 2018-01-01 ENCOUNTER — APPOINTMENT (OUTPATIENT)
Dept: GENERAL RADIOLOGY | Age: 68
DRG: 871 | End: 2018-01-01
Attending: EMERGENCY MEDICINE
Payer: MEDICARE

## 2018-01-01 ENCOUNTER — APPOINTMENT (OUTPATIENT)
Dept: ULTRASOUND IMAGING | Age: 68
DRG: 862 | End: 2018-01-01
Attending: SURGERY
Payer: MEDICARE

## 2018-01-01 VITALS
HEART RATE: 88 BPM | RESPIRATION RATE: 18 BRPM | OXYGEN SATURATION: 97 % | TEMPERATURE: 98 F | SYSTOLIC BLOOD PRESSURE: 133 MMHG | DIASTOLIC BLOOD PRESSURE: 79 MMHG

## 2018-01-01 VITALS
SYSTOLIC BLOOD PRESSURE: 130 MMHG | HEART RATE: 82 BPM | DIASTOLIC BLOOD PRESSURE: 81 MMHG | HEIGHT: 64 IN | OXYGEN SATURATION: 95 % | WEIGHT: 187 LBS | BODY MASS INDEX: 31.92 KG/M2 | TEMPERATURE: 97.7 F | RESPIRATION RATE: 18 BRPM

## 2018-01-01 VITALS
OXYGEN SATURATION: 96 % | HEART RATE: 77 BPM | RESPIRATION RATE: 20 BRPM | SYSTOLIC BLOOD PRESSURE: 128 MMHG | BODY MASS INDEX: 29.71 KG/M2 | TEMPERATURE: 98 F | DIASTOLIC BLOOD PRESSURE: 83 MMHG | WEIGHT: 174 LBS | HEIGHT: 64 IN

## 2018-01-01 VITALS
BODY MASS INDEX: 35.08 KG/M2 | OXYGEN SATURATION: 98 % | DIASTOLIC BLOOD PRESSURE: 61 MMHG | TEMPERATURE: 98.8 F | SYSTOLIC BLOOD PRESSURE: 107 MMHG | WEIGHT: 204.37 LBS

## 2018-01-01 VITALS
TEMPERATURE: 98.1 F | SYSTOLIC BLOOD PRESSURE: 116 MMHG | RESPIRATION RATE: 18 BRPM | DIASTOLIC BLOOD PRESSURE: 70 MMHG | OXYGEN SATURATION: 98 % | HEART RATE: 70 BPM

## 2018-01-01 VITALS
HEIGHT: 64 IN | RESPIRATION RATE: 18 BRPM | SYSTOLIC BLOOD PRESSURE: 135 MMHG | WEIGHT: 179.2 LBS | DIASTOLIC BLOOD PRESSURE: 77 MMHG | HEART RATE: 70 BPM | OXYGEN SATURATION: 94 % | BODY MASS INDEX: 30.59 KG/M2 | TEMPERATURE: 98.3 F

## 2018-01-01 VITALS
RESPIRATION RATE: 18 BRPM | HEART RATE: 70 BPM | SYSTOLIC BLOOD PRESSURE: 122 MMHG | TEMPERATURE: 98 F | OXYGEN SATURATION: 98 % | DIASTOLIC BLOOD PRESSURE: 70 MMHG

## 2018-01-01 VITALS
SYSTOLIC BLOOD PRESSURE: 118 MMHG | HEART RATE: 82 BPM | DIASTOLIC BLOOD PRESSURE: 76 MMHG | HEIGHT: 64 IN | TEMPERATURE: 98.2 F | OXYGEN SATURATION: 98 % | WEIGHT: 174 LBS | BODY MASS INDEX: 29.71 KG/M2 | RESPIRATION RATE: 16 BRPM

## 2018-01-01 VITALS
DIASTOLIC BLOOD PRESSURE: 67 MMHG | OXYGEN SATURATION: 93 % | BODY MASS INDEX: 31.41 KG/M2 | RESPIRATION RATE: 18 BRPM | HEIGHT: 64 IN | SYSTOLIC BLOOD PRESSURE: 110 MMHG | HEART RATE: 87 BPM | WEIGHT: 184 LBS | TEMPERATURE: 98.3 F

## 2018-01-01 VITALS
DIASTOLIC BLOOD PRESSURE: 78 MMHG | RESPIRATION RATE: 18 BRPM | SYSTOLIC BLOOD PRESSURE: 122 MMHG | OXYGEN SATURATION: 98 % | HEART RATE: 70 BPM | TEMPERATURE: 98.1 F

## 2018-01-01 VITALS
WEIGHT: 182.6 LBS | OXYGEN SATURATION: 96 % | SYSTOLIC BLOOD PRESSURE: 112 MMHG | TEMPERATURE: 97.8 F | RESPIRATION RATE: 20 BRPM | HEART RATE: 104 BPM | DIASTOLIC BLOOD PRESSURE: 76 MMHG | BODY MASS INDEX: 31.18 KG/M2 | HEIGHT: 64 IN

## 2018-01-01 VITALS
SYSTOLIC BLOOD PRESSURE: 122 MMHG | OXYGEN SATURATION: 98 % | DIASTOLIC BLOOD PRESSURE: 76 MMHG | RESPIRATION RATE: 18 BRPM | HEART RATE: 70 BPM | TEMPERATURE: 98 F

## 2018-01-01 VITALS
WEIGHT: 182 LBS | DIASTOLIC BLOOD PRESSURE: 74 MMHG | HEART RATE: 110 BPM | SYSTOLIC BLOOD PRESSURE: 118 MMHG | BODY MASS INDEX: 31.07 KG/M2 | HEIGHT: 64 IN | OXYGEN SATURATION: 95 % | TEMPERATURE: 97.5 F | RESPIRATION RATE: 20 BRPM

## 2018-01-01 VITALS
RESPIRATION RATE: 18 BRPM | DIASTOLIC BLOOD PRESSURE: 70 MMHG | TEMPERATURE: 98 F | OXYGEN SATURATION: 98 % | HEART RATE: 70 BPM | SYSTOLIC BLOOD PRESSURE: 122 MMHG

## 2018-01-01 VITALS
TEMPERATURE: 98.1 F | HEART RATE: 77 BPM | RESPIRATION RATE: 18 BRPM | DIASTOLIC BLOOD PRESSURE: 64 MMHG | OXYGEN SATURATION: 97 % | SYSTOLIC BLOOD PRESSURE: 110 MMHG

## 2018-01-01 VITALS
OXYGEN SATURATION: 96 % | RESPIRATION RATE: 20 BRPM | HEIGHT: 64 IN | WEIGHT: 182 LBS | SYSTOLIC BLOOD PRESSURE: 112 MMHG | TEMPERATURE: 97.8 F | DIASTOLIC BLOOD PRESSURE: 76 MMHG | HEART RATE: 104 BPM | BODY MASS INDEX: 31.07 KG/M2

## 2018-01-01 VITALS
HEIGHT: 64 IN | WEIGHT: 182 LBS | HEART RATE: 70 BPM | BODY MASS INDEX: 31.07 KG/M2 | OXYGEN SATURATION: 95 % | DIASTOLIC BLOOD PRESSURE: 76 MMHG | RESPIRATION RATE: 20 BRPM | TEMPERATURE: 97.8 F | SYSTOLIC BLOOD PRESSURE: 142 MMHG

## 2018-01-01 VITALS
HEIGHT: 64 IN | BODY MASS INDEX: 30.3 KG/M2 | RESPIRATION RATE: 20 BRPM | HEART RATE: 81 BPM | WEIGHT: 177.5 LBS | DIASTOLIC BLOOD PRESSURE: 74 MMHG | TEMPERATURE: 98.8 F | SYSTOLIC BLOOD PRESSURE: 123 MMHG

## 2018-01-01 VITALS
DIASTOLIC BLOOD PRESSURE: 76 MMHG | RESPIRATION RATE: 18 BRPM | HEART RATE: 70 BPM | OXYGEN SATURATION: 98 % | TEMPERATURE: 98 F | SYSTOLIC BLOOD PRESSURE: 126 MMHG

## 2018-01-01 VITALS
HEART RATE: 72 BPM | WEIGHT: 183.8 LBS | BODY MASS INDEX: 31.38 KG/M2 | OXYGEN SATURATION: 92 % | RESPIRATION RATE: 18 BRPM | TEMPERATURE: 97.7 F | SYSTOLIC BLOOD PRESSURE: 146 MMHG | DIASTOLIC BLOOD PRESSURE: 71 MMHG | HEIGHT: 64 IN

## 2018-01-01 DIAGNOSIS — R14.0 BLOATING: ICD-10-CM

## 2018-01-01 DIAGNOSIS — K56.609 SBO (SMALL BOWEL OBSTRUCTION) (HCC): ICD-10-CM

## 2018-01-01 DIAGNOSIS — I10 ESSENTIAL HYPERTENSION: Primary | ICD-10-CM

## 2018-01-01 DIAGNOSIS — R10.84 GENERALIZED ABDOMINAL PAIN: ICD-10-CM

## 2018-01-01 DIAGNOSIS — Z90.49 S/P RIGHT COLECTOMY: ICD-10-CM

## 2018-01-01 DIAGNOSIS — E11.9 TYPE 2 DIABETES MELLITUS WITHOUT COMPLICATION, WITH LONG-TERM CURRENT USE OF INSULIN (HCC): ICD-10-CM

## 2018-01-01 DIAGNOSIS — C78.7 CANCER, METASTATIC TO LIVER (HCC): ICD-10-CM

## 2018-01-01 DIAGNOSIS — S31.109D OPEN WOUND OF ANTERIOR ABDOMINAL WALL WITH COMPLICATION, SUBSEQUENT ENCOUNTER: ICD-10-CM

## 2018-01-01 DIAGNOSIS — Z79.4 TYPE 2 DIABETES MELLITUS WITHOUT COMPLICATION, WITH LONG-TERM CURRENT USE OF INSULIN (HCC): ICD-10-CM

## 2018-01-01 DIAGNOSIS — G93.40 ACUTE ENCEPHALOPATHY: ICD-10-CM

## 2018-01-01 DIAGNOSIS — K63.89 COLONIC MASS: ICD-10-CM

## 2018-01-01 DIAGNOSIS — E88.09 HYPOALBUMINEMIA: ICD-10-CM

## 2018-01-01 DIAGNOSIS — N17.9 ACUTE RENAL FAILURE, UNSPECIFIED ACUTE RENAL FAILURE TYPE (HCC): ICD-10-CM

## 2018-01-01 DIAGNOSIS — C18.2 MALIGNANT NEOPLASM OF ASCENDING COLON (HCC): ICD-10-CM

## 2018-01-01 DIAGNOSIS — K63.89 MASS OF CECUM: Primary | ICD-10-CM

## 2018-01-01 DIAGNOSIS — R05.9 COUGH: Primary | ICD-10-CM

## 2018-01-01 DIAGNOSIS — C18.9 METASTATIC COLON CANCER IN FEMALE (HCC): Primary | ICD-10-CM

## 2018-01-01 DIAGNOSIS — S31.109D OPEN WOUND OF ANTERIOR ABDOMINAL WALL WITH COMPLICATION, SUBSEQUENT ENCOUNTER: Primary | ICD-10-CM

## 2018-01-01 DIAGNOSIS — R11.2 NAUSEA AND VOMITING IN ADULT: ICD-10-CM

## 2018-01-01 DIAGNOSIS — R53.0 NEOPLASTIC MALIGNANT RELATED FATIGUE: ICD-10-CM

## 2018-01-01 DIAGNOSIS — Z09 FOLLOW-UP EXAMINATION FOLLOWING SURGERY: Primary | ICD-10-CM

## 2018-01-01 DIAGNOSIS — J30.2 ACUTE SEASONAL ALLERGIC RHINITIS, UNSPECIFIED TRIGGER: ICD-10-CM

## 2018-01-01 DIAGNOSIS — L85.3 DRY SKIN: ICD-10-CM

## 2018-01-01 DIAGNOSIS — R10.9 ABDOMINAL PAIN, UNSPECIFIED ABDOMINAL LOCATION: ICD-10-CM

## 2018-01-01 DIAGNOSIS — R18.0 MALIGNANT ASCITES: ICD-10-CM

## 2018-01-01 DIAGNOSIS — R63.0 POOR APPETITE: ICD-10-CM

## 2018-01-01 DIAGNOSIS — C18.9 METASTATIC COLON CANCER IN FEMALE (HCC): ICD-10-CM

## 2018-01-01 DIAGNOSIS — R53.1 WEAKNESS GENERALIZED: ICD-10-CM

## 2018-01-01 DIAGNOSIS — Z79.899 PALLIATIVE CHEMOTHERAPY UNDERWAY: ICD-10-CM

## 2018-01-01 DIAGNOSIS — R68.81 EARLY SATIETY: ICD-10-CM

## 2018-01-01 DIAGNOSIS — D72.829 LEUKOCYTOSIS, UNSPECIFIED TYPE: ICD-10-CM

## 2018-01-01 DIAGNOSIS — S31.109S OPEN WOUND OF ABDOMINAL WALL, SEQUELA: ICD-10-CM

## 2018-01-01 DIAGNOSIS — J96.02 ACUTE RESPIRATORY FAILURE WITH HYPOXIA AND HYPERCAPNIA (HCC): ICD-10-CM

## 2018-01-01 DIAGNOSIS — C18.2 MALIGNANT NEOPLASM OF ASCENDING COLON (HCC): Primary | ICD-10-CM

## 2018-01-01 DIAGNOSIS — S31.109A OPEN WOUND OF ANTERIOR ABDOMINAL WALL WITH COMPLICATION, INITIAL ENCOUNTER: ICD-10-CM

## 2018-01-01 DIAGNOSIS — I10 ESSENTIAL HYPERTENSION: ICD-10-CM

## 2018-01-01 DIAGNOSIS — J40 BRONCHITIS: ICD-10-CM

## 2018-01-01 DIAGNOSIS — A49.8 METHICILLIN RESISTANT STAPHYLOCOCCUS EPIDERMIDIS INFECTION: ICD-10-CM

## 2018-01-01 DIAGNOSIS — Z16.29 METHICILLIN RESISTANT STAPHYLOCOCCUS EPIDERMIDIS INFECTION: ICD-10-CM

## 2018-01-01 DIAGNOSIS — E11.9 TYPE 2 DIABETES MELLITUS WITHOUT COMPLICATION, UNSPECIFIED LONG TERM INSULIN USE STATUS: ICD-10-CM

## 2018-01-01 DIAGNOSIS — R57.9 SHOCK (HCC): ICD-10-CM

## 2018-01-01 DIAGNOSIS — R65.10 SYSTEMIC INFLAMMATORY RESPONSE SYNDROME (SIRS) (HCC): ICD-10-CM

## 2018-01-01 DIAGNOSIS — K76.9 LIVER LESION: ICD-10-CM

## 2018-01-01 DIAGNOSIS — L08.9 WOUND INFECTION: Primary | ICD-10-CM

## 2018-01-01 DIAGNOSIS — T14.8XXA WOUND INFECTION: Primary | ICD-10-CM

## 2018-01-01 DIAGNOSIS — Z51.5 CARING FOR THE DYING: ICD-10-CM

## 2018-01-01 DIAGNOSIS — R91.8 PULMONARY NODULES: ICD-10-CM

## 2018-01-01 DIAGNOSIS — Z71.89 COUNSELING REGARDING GOALS OF CARE: ICD-10-CM

## 2018-01-01 DIAGNOSIS — Z90.49 S/P RIGHT COLECTOMY: Primary | ICD-10-CM

## 2018-01-01 DIAGNOSIS — J96.01 ACUTE RESPIRATORY FAILURE WITH HYPOXIA AND HYPERCAPNIA (HCC): ICD-10-CM

## 2018-01-01 DIAGNOSIS — I46.9 CARDIAC ARREST (HCC): Primary | ICD-10-CM

## 2018-01-01 DIAGNOSIS — K92.2 UPPER GI BLEED: ICD-10-CM

## 2018-01-01 DIAGNOSIS — G93.1 ANOXIC ENCEPHALOPATHY (HCC): ICD-10-CM

## 2018-01-01 DIAGNOSIS — E78.2 MIXED HYPERLIPIDEMIA: ICD-10-CM

## 2018-01-01 DIAGNOSIS — R14.0 ABDOMINAL BLOATING: ICD-10-CM

## 2018-01-01 LAB
ABO + RH BLD: NORMAL
ABO + RH BLD: NORMAL
ALBUMIN FLD-MCNC: 1.8 G/DL
ALBUMIN SERPL-MCNC: 1.3 G/DL (ref 3.5–5)
ALBUMIN SERPL-MCNC: 1.4 G/DL (ref 3.5–5)
ALBUMIN SERPL-MCNC: 1.5 G/DL (ref 3.5–5)
ALBUMIN SERPL-MCNC: 1.7 G/DL (ref 3.5–5)
ALBUMIN SERPL-MCNC: 1.9 G/DL (ref 3.5–5)
ALBUMIN SERPL-MCNC: 2.2 G/DL (ref 3.5–5)
ALBUMIN SERPL-MCNC: 2.2 G/DL (ref 3.5–5)
ALBUMIN SERPL-MCNC: 3 G/DL (ref 3.5–5)
ALBUMIN SERPL-MCNC: 4.1 G/DL (ref 3.6–4.8)
ALBUMIN/CREAT UR: 15 MG/G CREAT (ref 0–30)
ALBUMIN/GLOB SERPL: 0.4 {RATIO} (ref 1.1–2.2)
ALBUMIN/GLOB SERPL: 0.5 {RATIO} (ref 1.1–2.2)
ALBUMIN/GLOB SERPL: 0.8 {RATIO} (ref 1.1–2.2)
ALBUMIN/GLOB SERPL: 1.5 {RATIO} (ref 1.2–2.2)
ALP SERPL-CCNC: 185 U/L (ref 45–117)
ALP SERPL-CCNC: 186 U/L (ref 45–117)
ALP SERPL-CCNC: 215 U/L (ref 45–117)
ALP SERPL-CCNC: 240 U/L (ref 45–117)
ALP SERPL-CCNC: 71 U/L (ref 45–117)
ALP SERPL-CCNC: 73 U/L (ref 45–117)
ALP SERPL-CCNC: 76 U/L (ref 45–117)
ALP SERPL-CCNC: 80 IU/L (ref 39–117)
ALP SERPL-CCNC: 91 U/L (ref 45–117)
ALT SERPL-CCNC: 10 U/L (ref 12–78)
ALT SERPL-CCNC: 14 IU/L (ref 0–32)
ALT SERPL-CCNC: 20 U/L (ref 12–78)
ALT SERPL-CCNC: 23 U/L (ref 12–78)
ALT SERPL-CCNC: 37 U/L (ref 12–78)
ALT SERPL-CCNC: 43 U/L (ref 12–78)
ALT SERPL-CCNC: 54 U/L (ref 12–78)
ALT SERPL-CCNC: 89 U/L (ref 12–78)
ALT SERPL-CCNC: 9 U/L (ref 12–78)
AMORPH CRY URNS QL MICRO: ABNORMAL
AMYLASE SERPL-CCNC: 8 U/L (ref 25–115)
ANION GAP SERPL CALC-SCNC: 10 MMOL/L (ref 5–15)
ANION GAP SERPL CALC-SCNC: 11 MMOL/L (ref 5–15)
ANION GAP SERPL CALC-SCNC: 12 MMOL/L (ref 5–15)
ANION GAP SERPL CALC-SCNC: 13 MMOL/L (ref 5–15)
ANION GAP SERPL CALC-SCNC: 14 MMOL/L (ref 5–15)
ANION GAP SERPL CALC-SCNC: 14 MMOL/L (ref 5–15)
ANION GAP SERPL CALC-SCNC: 16 MMOL/L (ref 5–15)
ANION GAP SERPL CALC-SCNC: 18 MMOL/L (ref 5–15)
ANION GAP SERPL CALC-SCNC: 5 MMOL/L (ref 5–15)
ANION GAP SERPL CALC-SCNC: 6 MMOL/L (ref 5–15)
ANION GAP SERPL CALC-SCNC: 7 MMOL/L (ref 5–15)
ANION GAP SERPL CALC-SCNC: 8 MMOL/L (ref 5–15)
ANION GAP SERPL CALC-SCNC: 9 MMOL/L (ref 5–15)
ANION GAP SERPL CALC-SCNC: 9 MMOL/L (ref 5–15)
APPEARANCE FLD: ABNORMAL
APPEARANCE UR: ABNORMAL
APPEARANCE UR: CLEAR
ARTERIAL PATENCY WRIST A: ABNORMAL
ARTERIAL PATENCY WRIST A: YES
AST SERPL-CCNC: 101 U/L (ref 15–37)
AST SERPL-CCNC: 12 U/L (ref 15–37)
AST SERPL-CCNC: 149 U/L (ref 15–37)
AST SERPL-CCNC: 19 IU/L (ref 0–40)
AST SERPL-CCNC: 195 U/L (ref 15–37)
AST SERPL-CCNC: 21 U/L (ref 15–37)
AST SERPL-CCNC: 218 U/L (ref 15–37)
AST SERPL-CCNC: 27 U/L (ref 15–37)
AST SERPL-CCNC: 415 U/L (ref 15–37)
ATRIAL RATE: 85 BPM
ATRIAL RATE: 88 BPM
BACTERIA SPEC CULT: ABNORMAL
BACTERIA SPEC CULT: NORMAL
BACTERIA URNS QL MICRO: ABNORMAL /HPF
BACTERIA URNS QL MICRO: ABNORMAL /HPF
BASE DEFICIT BLDA-SCNC: 13.2 MMOL/L
BASE DEFICIT BLDA-SCNC: 4.4 MMOL/L
BASE DEFICIT BLDA-SCNC: 5.4 MMOL/L
BASE EXCESS BLDA CALC-SCNC: 0.2 MMOL/L
BASOPHILS # BLD: 0 K/UL (ref 0–0.1)
BASOPHILS # BLD: 0.1 K/UL (ref 0–0.1)
BASOPHILS # BLD: 0.2 K/UL (ref 0–0.1)
BASOPHILS NFR BLD: 0 % (ref 0–1)
BASOPHILS NFR BLD: 1 % (ref 0–1)
BDY SITE: ABNORMAL
BILIRUB SERPL-MCNC: 0.2 MG/DL (ref 0.2–1)
BILIRUB SERPL-MCNC: 0.2 MG/DL (ref 0.2–1)
BILIRUB SERPL-MCNC: 0.2 MG/DL (ref 0–1.2)
BILIRUB SERPL-MCNC: 0.3 MG/DL (ref 0.2–1)
BILIRUB SERPL-MCNC: 0.4 MG/DL (ref 0.2–1)
BILIRUB SERPL-MCNC: 0.5 MG/DL (ref 0.2–1)
BILIRUB SERPL-MCNC: 0.6 MG/DL (ref 0.2–1)
BILIRUB UR QL CFM: NEGATIVE
BILIRUB UR QL CFM: NEGATIVE
BLD PROD TYP BPU: NORMAL
BLOOD GROUP ANTIBODIES SERPL: NORMAL
BLOOD GROUP ANTIBODIES SERPL: NORMAL
BNP SERPL-MCNC: ABNORMAL PG/ML (ref 0–125)
BPU ID: NORMAL
BUN SERPL-MCNC: 10 MG/DL (ref 6–20)
BUN SERPL-MCNC: 11 MG/DL (ref 6–20)
BUN SERPL-MCNC: 11 MG/DL (ref 6–20)
BUN SERPL-MCNC: 12 MG/DL (ref 6–20)
BUN SERPL-MCNC: 14 MG/DL (ref 6–20)
BUN SERPL-MCNC: 16 MG/DL (ref 6–20)
BUN SERPL-MCNC: 17 MG/DL (ref 6–20)
BUN SERPL-MCNC: 17 MG/DL (ref 6–20)
BUN SERPL-MCNC: 19 MG/DL (ref 6–20)
BUN SERPL-MCNC: 19 MG/DL (ref 8–27)
BUN SERPL-MCNC: 22 MG/DL (ref 6–20)
BUN SERPL-MCNC: 23 MG/DL (ref 6–20)
BUN SERPL-MCNC: 27 MG/DL (ref 6–20)
BUN SERPL-MCNC: 31 MG/DL (ref 6–20)
BUN SERPL-MCNC: 34 MG/DL (ref 6–20)
BUN SERPL-MCNC: 4 MG/DL (ref 6–20)
BUN SERPL-MCNC: 46 MG/DL (ref 6–20)
BUN SERPL-MCNC: 7 MG/DL (ref 6–20)
BUN SERPL-MCNC: 7 MG/DL (ref 6–20)
BUN SERPL-MCNC: 8 MG/DL (ref 6–20)
BUN/CREAT SERPL: 16 (ref 12–20)
BUN/CREAT SERPL: 16 (ref 12–20)
BUN/CREAT SERPL: 17 (ref 12–20)
BUN/CREAT SERPL: 18 (ref 12–20)
BUN/CREAT SERPL: 19 (ref 12–20)
BUN/CREAT SERPL: 19 (ref 12–20)
BUN/CREAT SERPL: 20 (ref 12–20)
BUN/CREAT SERPL: 22 (ref 12–20)
BUN/CREAT SERPL: 22 (ref 12–20)
BUN/CREAT SERPL: 24 (ref 12–20)
BUN/CREAT SERPL: 26 (ref 12–20)
BUN/CREAT SERPL: 30 (ref 12–20)
BUN/CREAT SERPL: 31 (ref 12–20)
BUN/CREAT SERPL: 33 (ref 12–20)
BUN/CREAT SERPL: 38 (ref 12–20)
BUN/CREAT SERPL: 42 (ref 12–20)
BUN/CREAT SERPL: 43 (ref 12–28)
BUN/CREAT SERPL: 64 (ref 12–20)
BUN/CREAT SERPL: 65 (ref 12–20)
BUN/CREAT SERPL: 68 (ref 12–20)
CALCIUM SERPL-MCNC: 7.8 MG/DL (ref 8.5–10.1)
CALCIUM SERPL-MCNC: 7.9 MG/DL (ref 8.5–10.1)
CALCIUM SERPL-MCNC: 7.9 MG/DL (ref 8.5–10.1)
CALCIUM SERPL-MCNC: 8.1 MG/DL (ref 8.5–10.1)
CALCIUM SERPL-MCNC: 8.1 MG/DL (ref 8.5–10.1)
CALCIUM SERPL-MCNC: 8.2 MG/DL (ref 8.5–10.1)
CALCIUM SERPL-MCNC: 8.2 MG/DL (ref 8.5–10.1)
CALCIUM SERPL-MCNC: 8.3 MG/DL (ref 8.5–10.1)
CALCIUM SERPL-MCNC: 8.4 MG/DL (ref 8.5–10.1)
CALCIUM SERPL-MCNC: 8.7 MG/DL (ref 8.5–10.1)
CALCIUM SERPL-MCNC: 8.9 MG/DL (ref 8.5–10.1)
CALCIUM SERPL-MCNC: 9.1 MG/DL (ref 8.5–10.1)
CALCIUM SERPL-MCNC: 9.2 MG/DL (ref 8.5–10.1)
CALCIUM SERPL-MCNC: 9.3 MG/DL (ref 8.5–10.1)
CALCIUM SERPL-MCNC: 9.7 MG/DL (ref 8.5–10.1)
CALCIUM SERPL-MCNC: 9.7 MG/DL (ref 8.7–10.3)
CALCULATED P AXIS, ECG09: -7 DEGREES
CALCULATED P AXIS, ECG09: 26 DEGREES
CALCULATED R AXIS, ECG10: -30 DEGREES
CALCULATED R AXIS, ECG10: -72 DEGREES
CALCULATED T AXIS, ECG11: 0 DEGREES
CALCULATED T AXIS, ECG11: 31 DEGREES
CC UR VC: NORMAL
CEA SERPL-MCNC: 28.4 NG/ML
CHLORIDE SERPL-SCNC: 100 MMOL/L (ref 97–108)
CHLORIDE SERPL-SCNC: 101 MMOL/L (ref 97–108)
CHLORIDE SERPL-SCNC: 101 MMOL/L (ref 97–108)
CHLORIDE SERPL-SCNC: 103 MMOL/L (ref 96–106)
CHLORIDE SERPL-SCNC: 104 MMOL/L (ref 97–108)
CHLORIDE SERPL-SCNC: 105 MMOL/L (ref 97–108)
CHLORIDE SERPL-SCNC: 107 MMOL/L (ref 97–108)
CHLORIDE SERPL-SCNC: 107 MMOL/L (ref 97–108)
CHLORIDE SERPL-SCNC: 108 MMOL/L (ref 97–108)
CHLORIDE SERPL-SCNC: 109 MMOL/L (ref 97–108)
CHLORIDE SERPL-SCNC: 109 MMOL/L (ref 97–108)
CHLORIDE SERPL-SCNC: 110 MMOL/L (ref 97–108)
CHLORIDE SERPL-SCNC: 110 MMOL/L (ref 97–108)
CHLORIDE SERPL-SCNC: 111 MMOL/L (ref 97–108)
CHLORIDE SERPL-SCNC: 112 MMOL/L (ref 97–108)
CHLORIDE SERPL-SCNC: 112 MMOL/L (ref 97–108)
CHLORIDE SERPL-SCNC: 98 MMOL/L (ref 97–108)
CHLORIDE SERPL-SCNC: 98 MMOL/L (ref 97–108)
CHLORIDE SERPL-SCNC: 99 MMOL/L (ref 97–108)
CK MB CFR SERPL CALC: 1.4 % (ref 0–2.5)
CK MB CFR SERPL CALC: 2.2 % (ref 0–2.5)
CK MB SERPL-MCNC: 14.6 NG/ML (ref 5–25)
CK MB SERPL-MCNC: 6.5 NG/ML (ref 5–25)
CK SERPL-CCNC: 462 U/L (ref 26–192)
CK SERPL-CCNC: 670 U/L (ref 26–192)
CO2 SERPL-SCNC: 17 MMOL/L (ref 21–32)
CO2 SERPL-SCNC: 19 MMOL/L (ref 21–32)
CO2 SERPL-SCNC: 20 MMOL/L (ref 21–32)
CO2 SERPL-SCNC: 21 MMOL/L (ref 18–29)
CO2 SERPL-SCNC: 21 MMOL/L (ref 21–32)
CO2 SERPL-SCNC: 22 MMOL/L (ref 21–32)
CO2 SERPL-SCNC: 23 MMOL/L (ref 21–32)
CO2 SERPL-SCNC: 24 MMOL/L (ref 21–32)
CO2 SERPL-SCNC: 26 MMOL/L (ref 21–32)
CO2 SERPL-SCNC: 27 MMOL/L (ref 21–32)
CO2 SERPL-SCNC: 28 MMOL/L (ref 21–32)
CO2 SERPL-SCNC: 28 MMOL/L (ref 21–32)
CO2 SERPL-SCNC: 29 MMOL/L (ref 21–32)
CO2 SERPL-SCNC: 31 MMOL/L (ref 21–32)
COLOR FLD: YELLOW
COLOR UR: ABNORMAL
COLOR UR: ABNORMAL
CREAT SERPL-MCNC: 0.18 MG/DL (ref 0.55–1.02)
CREAT SERPL-MCNC: 0.21 MG/DL (ref 0.55–1.02)
CREAT SERPL-MCNC: 0.26 MG/DL (ref 0.55–1.02)
CREAT SERPL-MCNC: 0.34 MG/DL (ref 0.55–1.02)
CREAT SERPL-MCNC: 0.36 MG/DL (ref 0.55–1.02)
CREAT SERPL-MCNC: 0.37 MG/DL (ref 0.55–1.02)
CREAT SERPL-MCNC: 0.38 MG/DL (ref 0.55–1.02)
CREAT SERPL-MCNC: 0.42 MG/DL (ref 0.55–1.02)
CREAT SERPL-MCNC: 0.42 MG/DL (ref 0.55–1.02)
CREAT SERPL-MCNC: 0.44 MG/DL (ref 0.57–1)
CREAT SERPL-MCNC: 0.45 MG/DL (ref 0.55–1.02)
CREAT SERPL-MCNC: 0.47 MG/DL (ref 0.55–1.02)
CREAT SERPL-MCNC: 0.47 MG/DL (ref 0.55–1.02)
CREAT SERPL-MCNC: 0.49 MG/DL (ref 0.55–1.02)
CREAT SERPL-MCNC: 0.49 MG/DL (ref 0.55–1.02)
CREAT SERPL-MCNC: 0.5 MG/DL (ref 0.55–1.02)
CREAT SERPL-MCNC: 0.61 MG/DL (ref 0.55–1.02)
CREAT SERPL-MCNC: 0.94 MG/DL (ref 0.55–1.02)
CREAT SERPL-MCNC: 1.4 MG/DL (ref 0.55–1.02)
CREAT SERPL-MCNC: 1.52 MG/DL (ref 0.55–1.02)
CREAT SERPL-MCNC: 1.52 MG/DL (ref 0.55–1.02)
CREAT SERPL-MCNC: 1.66 MG/DL (ref 0.55–1.02)
CREAT UR-MCNC: 84 MG/DL
CROSSMATCH RESULT,%XM: NORMAL
DATE LAST DOSE: ABNORMAL
DIAGNOSIS, 93000: NORMAL
DIAGNOSIS, 93000: NORMAL
DIFFERENTIAL METHOD BLD: ABNORMAL
EOSINOPHIL # BLD: 0 K/UL (ref 0–0.4)
EOSINOPHIL # BLD: 0.4 K/UL (ref 0–0.4)
EOSINOPHIL # BLD: 0.6 K/UL (ref 0–0.4)
EOSINOPHIL # BLD: 0.8 K/UL (ref 0–0.4)
EOSINOPHIL # BLD: 1.3 K/UL (ref 0–0.4)
EOSINOPHIL # BLD: 1.9 K/UL (ref 0–0.4)
EOSINOPHIL # BLD: 2 K/UL (ref 0–0.4)
EOSINOPHIL NFR BLD: 0 % (ref 0–7)
EOSINOPHIL NFR BLD: 13 % (ref 0–7)
EOSINOPHIL NFR BLD: 14 % (ref 0–7)
EOSINOPHIL NFR BLD: 2 % (ref 0–7)
EOSINOPHIL NFR BLD: 4 % (ref 0–7)
EOSINOPHIL NFR BLD: 7 % (ref 0–7)
EOSINOPHIL NFR BLD: 8 % (ref 0–7)
EOSINOPHIL NFR FLD MANUAL: 21 %
EPAP/CPAP/PEEP, PAPEEP: 6
EPITH CASTS URNS QL MICRO: ABNORMAL /LPF
EPITH CASTS URNS QL MICRO: ABNORMAL /LPF
ERYTHROCYTE [DISTWIDTH] IN BLOOD BY AUTOMATED COUNT: 13.4 % (ref 11.5–14.5)
ERYTHROCYTE [DISTWIDTH] IN BLOOD BY AUTOMATED COUNT: 13.4 % (ref 11.5–14.5)
ERYTHROCYTE [DISTWIDTH] IN BLOOD BY AUTOMATED COUNT: 13.5 % (ref 11.5–14.5)
ERYTHROCYTE [DISTWIDTH] IN BLOOD BY AUTOMATED COUNT: 13.5 % (ref 11.5–14.5)
ERYTHROCYTE [DISTWIDTH] IN BLOOD BY AUTOMATED COUNT: 13.7 % (ref 11.5–14.5)
ERYTHROCYTE [DISTWIDTH] IN BLOOD BY AUTOMATED COUNT: 13.8 % (ref 11.5–14.5)
ERYTHROCYTE [DISTWIDTH] IN BLOOD BY AUTOMATED COUNT: 14.7 % (ref 11.5–14.5)
ERYTHROCYTE [DISTWIDTH] IN BLOOD BY AUTOMATED COUNT: 14.8 % (ref 11.5–14.5)
ERYTHROCYTE [DISTWIDTH] IN BLOOD BY AUTOMATED COUNT: 14.9 % (ref 11.5–14.5)
ERYTHROCYTE [DISTWIDTH] IN BLOOD BY AUTOMATED COUNT: 15.6 % (ref 12.3–15.4)
ERYTHROCYTE [DISTWIDTH] IN BLOOD BY AUTOMATED COUNT: 16.5 % (ref 11.5–14.5)
ERYTHROCYTE [DISTWIDTH] IN BLOOD BY AUTOMATED COUNT: 16.5 % (ref 11.5–14.5)
ERYTHROCYTE [DISTWIDTH] IN BLOOD BY AUTOMATED COUNT: 16.9 % (ref 11.5–14.5)
ERYTHROCYTE [DISTWIDTH] IN BLOOD BY AUTOMATED COUNT: 17.1 % (ref 11.5–14.5)
EST. AVERAGE GLUCOSE BLD GHB EST-MCNC: 186 MG/DL
EST. AVERAGE GLUCOSE BLD GHB EST-MCNC: 192 MG/DL
FIO2 ON VENT: 100 %
FIO2 ON VENT: 40 %
FIO2 ON VENT: 40 %
FIO2 ON VENT: 50 %
GAS FLOW.O2 O2 DELIVERY SYS: 16 L/MIN
GAS FLOW.O2 SETTING OXYMISER: 14 L/MIN
GAS FLOW.O2 SETTING OXYMISER: 14 L/MIN
GAS FLOW.O2 SETTING OXYMISER: 16 L/MIN
GAS FLOW.O2 SETTING OXYMISER: 16 L/MIN
GASTROCULT GAST QL: POSITIVE
GFR SERPLBLD CREATININE-BSD FMLA CKD-EPI: 106 ML/MIN/1.73
GFR SERPLBLD CREATININE-BSD FMLA CKD-EPI: 122 ML/MIN/1.73
GLOBULIN SER CALC-MCNC: 2.8 G/DL (ref 1.5–4.5)
GLOBULIN SER CALC-MCNC: 3.3 G/DL (ref 2–4)
GLOBULIN SER CALC-MCNC: 3.4 G/DL (ref 2–4)
GLOBULIN SER CALC-MCNC: 3.5 G/DL (ref 2–4)
GLOBULIN SER CALC-MCNC: 3.6 G/DL (ref 2–4)
GLOBULIN SER CALC-MCNC: 3.7 G/DL (ref 2–4)
GLOBULIN SER CALC-MCNC: 4 G/DL (ref 2–4)
GLOBULIN SER CALC-MCNC: 4.7 G/DL (ref 2–4)
GLOBULIN SER CALC-MCNC: 4.9 G/DL (ref 2–4)
GLUCOSE BLD STRIP.AUTO-MCNC: 100 MG/DL (ref 65–100)
GLUCOSE BLD STRIP.AUTO-MCNC: 110 MG/DL (ref 65–100)
GLUCOSE BLD STRIP.AUTO-MCNC: 111 MG/DL (ref 65–100)
GLUCOSE BLD STRIP.AUTO-MCNC: 115 MG/DL (ref 65–100)
GLUCOSE BLD STRIP.AUTO-MCNC: 117 MG/DL (ref 65–100)
GLUCOSE BLD STRIP.AUTO-MCNC: 118 MG/DL (ref 65–100)
GLUCOSE BLD STRIP.AUTO-MCNC: 124 MG/DL (ref 65–100)
GLUCOSE BLD STRIP.AUTO-MCNC: 125 MG/DL (ref 65–100)
GLUCOSE BLD STRIP.AUTO-MCNC: 126 MG/DL (ref 65–100)
GLUCOSE BLD STRIP.AUTO-MCNC: 127 MG/DL (ref 65–100)
GLUCOSE BLD STRIP.AUTO-MCNC: 128 MG/DL (ref 65–100)
GLUCOSE BLD STRIP.AUTO-MCNC: 130 MG/DL (ref 65–100)
GLUCOSE BLD STRIP.AUTO-MCNC: 131 MG/DL (ref 65–100)
GLUCOSE BLD STRIP.AUTO-MCNC: 131 MG/DL (ref 65–100)
GLUCOSE BLD STRIP.AUTO-MCNC: 132 MG/DL (ref 65–100)
GLUCOSE BLD STRIP.AUTO-MCNC: 134 MG/DL (ref 65–100)
GLUCOSE BLD STRIP.AUTO-MCNC: 135 MG/DL (ref 65–100)
GLUCOSE BLD STRIP.AUTO-MCNC: 136 MG/DL (ref 65–100)
GLUCOSE BLD STRIP.AUTO-MCNC: 136 MG/DL (ref 65–100)
GLUCOSE BLD STRIP.AUTO-MCNC: 137 MG/DL (ref 65–100)
GLUCOSE BLD STRIP.AUTO-MCNC: 137 MG/DL (ref 65–100)
GLUCOSE BLD STRIP.AUTO-MCNC: 138 MG/DL (ref 65–100)
GLUCOSE BLD STRIP.AUTO-MCNC: 141 MG/DL (ref 65–100)
GLUCOSE BLD STRIP.AUTO-MCNC: 141 MG/DL (ref 65–100)
GLUCOSE BLD STRIP.AUTO-MCNC: 144 MG/DL (ref 65–100)
GLUCOSE BLD STRIP.AUTO-MCNC: 145 MG/DL (ref 65–100)
GLUCOSE BLD STRIP.AUTO-MCNC: 145 MG/DL (ref 65–100)
GLUCOSE BLD STRIP.AUTO-MCNC: 147 MG/DL (ref 65–100)
GLUCOSE BLD STRIP.AUTO-MCNC: 148 MG/DL (ref 65–100)
GLUCOSE BLD STRIP.AUTO-MCNC: 149 MG/DL (ref 65–100)
GLUCOSE BLD STRIP.AUTO-MCNC: 151 MG/DL (ref 65–100)
GLUCOSE BLD STRIP.AUTO-MCNC: 152 MG/DL (ref 65–100)
GLUCOSE BLD STRIP.AUTO-MCNC: 153 MG/DL (ref 65–100)
GLUCOSE BLD STRIP.AUTO-MCNC: 157 MG/DL (ref 65–100)
GLUCOSE BLD STRIP.AUTO-MCNC: 157 MG/DL (ref 65–100)
GLUCOSE BLD STRIP.AUTO-MCNC: 158 MG/DL (ref 65–100)
GLUCOSE BLD STRIP.AUTO-MCNC: 160 MG/DL (ref 65–100)
GLUCOSE BLD STRIP.AUTO-MCNC: 162 MG/DL (ref 65–100)
GLUCOSE BLD STRIP.AUTO-MCNC: 162 MG/DL (ref 65–100)
GLUCOSE BLD STRIP.AUTO-MCNC: 164 MG/DL (ref 65–100)
GLUCOSE BLD STRIP.AUTO-MCNC: 165 MG/DL (ref 65–100)
GLUCOSE BLD STRIP.AUTO-MCNC: 166 MG/DL (ref 65–100)
GLUCOSE BLD STRIP.AUTO-MCNC: 166 MG/DL (ref 65–100)
GLUCOSE BLD STRIP.AUTO-MCNC: 168 MG/DL (ref 65–100)
GLUCOSE BLD STRIP.AUTO-MCNC: 168 MG/DL (ref 65–100)
GLUCOSE BLD STRIP.AUTO-MCNC: 169 MG/DL (ref 65–100)
GLUCOSE BLD STRIP.AUTO-MCNC: 172 MG/DL (ref 65–100)
GLUCOSE BLD STRIP.AUTO-MCNC: 172 MG/DL (ref 65–100)
GLUCOSE BLD STRIP.AUTO-MCNC: 173 MG/DL (ref 65–100)
GLUCOSE BLD STRIP.AUTO-MCNC: 173 MG/DL (ref 65–100)
GLUCOSE BLD STRIP.AUTO-MCNC: 174 MG/DL (ref 65–100)
GLUCOSE BLD STRIP.AUTO-MCNC: 175 MG/DL (ref 65–100)
GLUCOSE BLD STRIP.AUTO-MCNC: 178 MG/DL (ref 65–100)
GLUCOSE BLD STRIP.AUTO-MCNC: 180 MG/DL (ref 65–100)
GLUCOSE BLD STRIP.AUTO-MCNC: 181 MG/DL (ref 65–100)
GLUCOSE BLD STRIP.AUTO-MCNC: 181 MG/DL (ref 65–100)
GLUCOSE BLD STRIP.AUTO-MCNC: 183 MG/DL (ref 65–100)
GLUCOSE BLD STRIP.AUTO-MCNC: 185 MG/DL (ref 65–100)
GLUCOSE BLD STRIP.AUTO-MCNC: 186 MG/DL (ref 65–100)
GLUCOSE BLD STRIP.AUTO-MCNC: 191 MG/DL (ref 65–100)
GLUCOSE BLD STRIP.AUTO-MCNC: 195 MG/DL (ref 65–100)
GLUCOSE BLD STRIP.AUTO-MCNC: 197 MG/DL (ref 65–100)
GLUCOSE BLD STRIP.AUTO-MCNC: 197 MG/DL (ref 65–100)
GLUCOSE BLD STRIP.AUTO-MCNC: 203 MG/DL (ref 65–100)
GLUCOSE BLD STRIP.AUTO-MCNC: 206 MG/DL (ref 65–100)
GLUCOSE BLD STRIP.AUTO-MCNC: 207 MG/DL (ref 65–100)
GLUCOSE BLD STRIP.AUTO-MCNC: 209 MG/DL (ref 65–100)
GLUCOSE BLD STRIP.AUTO-MCNC: 211 MG/DL (ref 65–100)
GLUCOSE BLD STRIP.AUTO-MCNC: 213 MG/DL (ref 65–100)
GLUCOSE BLD STRIP.AUTO-MCNC: 215 MG/DL (ref 65–100)
GLUCOSE BLD STRIP.AUTO-MCNC: 219 MG/DL (ref 65–100)
GLUCOSE BLD STRIP.AUTO-MCNC: 220 MG/DL (ref 65–100)
GLUCOSE BLD STRIP.AUTO-MCNC: 221 MG/DL (ref 65–100)
GLUCOSE BLD STRIP.AUTO-MCNC: 222 MG/DL (ref 65–100)
GLUCOSE BLD STRIP.AUTO-MCNC: 225 MG/DL (ref 65–100)
GLUCOSE BLD STRIP.AUTO-MCNC: 238 MG/DL (ref 65–100)
GLUCOSE BLD STRIP.AUTO-MCNC: 239 MG/DL (ref 65–100)
GLUCOSE BLD STRIP.AUTO-MCNC: 245 MG/DL (ref 65–100)
GLUCOSE BLD STRIP.AUTO-MCNC: 247 MG/DL (ref 65–100)
GLUCOSE BLD STRIP.AUTO-MCNC: 254 MG/DL (ref 65–100)
GLUCOSE BLD STRIP.AUTO-MCNC: 257 MG/DL (ref 65–100)
GLUCOSE BLD STRIP.AUTO-MCNC: 257 MG/DL (ref 65–100)
GLUCOSE BLD STRIP.AUTO-MCNC: 309 MG/DL (ref 65–100)
GLUCOSE BLD STRIP.AUTO-MCNC: 340 MG/DL (ref 65–100)
GLUCOSE BLD STRIP.AUTO-MCNC: 364 MG/DL (ref 65–100)
GLUCOSE BLD STRIP.AUTO-MCNC: 51 MG/DL (ref 65–100)
GLUCOSE BLD STRIP.AUTO-MCNC: 55 MG/DL (ref 65–100)
GLUCOSE BLD STRIP.AUTO-MCNC: 58 MG/DL (ref 65–100)
GLUCOSE BLD STRIP.AUTO-MCNC: 60 MG/DL (ref 65–100)
GLUCOSE BLD STRIP.AUTO-MCNC: 65 MG/DL (ref 65–100)
GLUCOSE BLD STRIP.AUTO-MCNC: 70 MG/DL (ref 65–100)
GLUCOSE BLD STRIP.AUTO-MCNC: 74 MG/DL (ref 65–100)
GLUCOSE BLD STRIP.AUTO-MCNC: 75 MG/DL (ref 65–100)
GLUCOSE BLD STRIP.AUTO-MCNC: 81 MG/DL (ref 65–100)
GLUCOSE BLD STRIP.AUTO-MCNC: 84 MG/DL (ref 65–100)
GLUCOSE BLD STRIP.AUTO-MCNC: 85 MG/DL (ref 65–100)
GLUCOSE BLD STRIP.AUTO-MCNC: 88 MG/DL (ref 65–100)
GLUCOSE BLD STRIP.AUTO-MCNC: 89 MG/DL (ref 65–100)
GLUCOSE BLD STRIP.AUTO-MCNC: 90 MG/DL (ref 65–100)
GLUCOSE BLD STRIP.AUTO-MCNC: 97 MG/DL (ref 65–100)
GLUCOSE BLD STRIP.AUTO-MCNC: 98 MG/DL (ref 65–100)
GLUCOSE SERPL-MCNC: 107 MG/DL (ref 65–100)
GLUCOSE SERPL-MCNC: 114 MG/DL (ref 65–100)
GLUCOSE SERPL-MCNC: 120 MG/DL (ref 65–100)
GLUCOSE SERPL-MCNC: 129 MG/DL (ref 65–100)
GLUCOSE SERPL-MCNC: 133 MG/DL (ref 65–100)
GLUCOSE SERPL-MCNC: 135 MG/DL (ref 65–100)
GLUCOSE SERPL-MCNC: 141 MG/DL (ref 65–100)
GLUCOSE SERPL-MCNC: 145 MG/DL (ref 65–100)
GLUCOSE SERPL-MCNC: 151 MG/DL (ref 65–100)
GLUCOSE SERPL-MCNC: 174 MG/DL (ref 65–100)
GLUCOSE SERPL-MCNC: 180 MG/DL (ref 65–100)
GLUCOSE SERPL-MCNC: 181 MG/DL (ref 65–100)
GLUCOSE SERPL-MCNC: 183 MG/DL (ref 65–100)
GLUCOSE SERPL-MCNC: 201 MG/DL (ref 65–100)
GLUCOSE SERPL-MCNC: 207 MG/DL (ref 65–100)
GLUCOSE SERPL-MCNC: 208 MG/DL (ref 65–100)
GLUCOSE SERPL-MCNC: 213 MG/DL (ref 65–100)
GLUCOSE SERPL-MCNC: 51 MG/DL (ref 65–100)
GLUCOSE SERPL-MCNC: 57 MG/DL (ref 65–100)
GLUCOSE SERPL-MCNC: 78 MG/DL (ref 65–99)
GLUCOSE SERPL-MCNC: 82 MG/DL (ref 65–100)
GLUCOSE SERPL-MCNC: 91 MG/DL (ref 65–100)
GLUCOSE UR STRIP.AUTO-MCNC: >1000 MG/DL
GLUCOSE UR STRIP.AUTO-MCNC: NEGATIVE MG/DL
GRAM STN SPEC: ABNORMAL
GRAM STN SPEC: NORMAL
GRAM STN SPEC: NORMAL
GRAN CASTS URNS QL MICRO: ABNORMAL /LPF
HBA1C MFR BLD: 8.1 % (ref 4.8–5.6)
HBA1C MFR BLD: 8.3 % (ref 4.2–6.3)
HCO3 BLDA-SCNC: 14 MMOL/L (ref 22–26)
HCO3 BLDA-SCNC: 18 MMOL/L (ref 22–26)
HCO3 BLDA-SCNC: 19 MMOL/L (ref 22–26)
HCO3 BLDA-SCNC: 21 MMOL/L (ref 22–26)
HCT VFR BLD AUTO: 29 % (ref 35–47)
HCT VFR BLD AUTO: 30.3 % (ref 35–47)
HCT VFR BLD AUTO: 31.7 % (ref 35–47)
HCT VFR BLD AUTO: 32.7 % (ref 35–47)
HCT VFR BLD AUTO: 33.2 % (ref 35–47)
HCT VFR BLD AUTO: 33.4 % (ref 35–47)
HCT VFR BLD AUTO: 33.7 % (ref 35–47)
HCT VFR BLD AUTO: 33.8 % (ref 35–47)
HCT VFR BLD AUTO: 34.2 % (ref 35–47)
HCT VFR BLD AUTO: 35.2 % (ref 35–47)
HCT VFR BLD AUTO: 35.3 % (ref 35–47)
HCT VFR BLD AUTO: 35.5 % (ref 35–47)
HCT VFR BLD AUTO: 35.9 % (ref 35–47)
HCT VFR BLD AUTO: 36.2 % (ref 35–47)
HCT VFR BLD AUTO: 36.8 % (ref 35–47)
HCT VFR BLD AUTO: 37.6 % (ref 35–47)
HCT VFR BLD AUTO: 38.3 % (ref 35–47)
HCT VFR BLD AUTO: 40.8 % (ref 35–47)
HCT VFR BLD AUTO: 42.1 % (ref 35–47)
HCT VFR BLD AUTO: 45.7 % (ref 34–46.6)
HCT VFR BLD AUTO: 45.7 % (ref 35–47)
HCT VFR BLD AUTO: 45.8 % (ref 35–47)
HCT VFR BLD AUTO: 51.1 % (ref 35–47)
HGB BLD-MCNC: 10.3 G/DL (ref 11.5–16)
HGB BLD-MCNC: 10.5 G/DL (ref 11.5–16)
HGB BLD-MCNC: 10.5 G/DL (ref 11.5–16)
HGB BLD-MCNC: 10.6 G/DL (ref 11.5–16)
HGB BLD-MCNC: 10.9 G/DL (ref 11.5–16)
HGB BLD-MCNC: 11 G/DL (ref 11.5–16)
HGB BLD-MCNC: 11.1 G/DL (ref 11.5–16)
HGB BLD-MCNC: 11.3 G/DL (ref 11.5–16)
HGB BLD-MCNC: 11.4 G/DL (ref 11.5–16)
HGB BLD-MCNC: 11.5 G/DL (ref 11.5–16)
HGB BLD-MCNC: 11.7 G/DL (ref 11.5–16)
HGB BLD-MCNC: 12.3 G/DL (ref 11.5–16)
HGB BLD-MCNC: 13.4 G/DL (ref 11.5–16)
HGB BLD-MCNC: 13.6 G/DL (ref 11.5–16)
HGB BLD-MCNC: 14.5 G/DL (ref 11.5–16)
HGB BLD-MCNC: 14.9 G/DL (ref 11.5–16)
HGB BLD-MCNC: 15.2 G/DL (ref 11.5–16)
HGB BLD-MCNC: 15.4 G/DL (ref 11.1–15.9)
HGB BLD-MCNC: 9.3 G/DL (ref 11.5–16)
HGB BLD-MCNC: 9.3 G/DL (ref 11.5–16)
HGB BLD-MCNC: 9.7 G/DL (ref 11.5–16)
HGB UR QL STRIP: ABNORMAL
HGB UR QL STRIP: NEGATIVE
IMM GRANULOCYTES # BLD: 0 K/UL
IMM GRANULOCYTES # BLD: 0 K/UL (ref 0–0.04)
IMM GRANULOCYTES # BLD: 0.1 K/UL (ref 0–0.04)
IMM GRANULOCYTES # BLD: 0.2 K/UL (ref 0–0.04)
IMM GRANULOCYTES NFR BLD AUTO: 0 %
IMM GRANULOCYTES NFR BLD AUTO: 0 % (ref 0–0.5)
IMM GRANULOCYTES NFR BLD AUTO: 1 % (ref 0–0.5)
INR PPP: 1.3 (ref 0.9–1.1)
INR PPP: 1.5 (ref 0.9–1.1)
KETONES UR QL STRIP.AUTO: 80 MG/DL
KETONES UR QL STRIP.AUTO: ABNORMAL MG/DL
LACTATE SERPL-SCNC: 1.3 MMOL/L (ref 0.4–2)
LACTATE SERPL-SCNC: 1.5 MMOL/L (ref 0.4–2)
LACTATE SERPL-SCNC: 2 MMOL/L (ref 0.4–2)
LACTATE SERPL-SCNC: 3.5 MMOL/L (ref 0.4–2)
LACTATE SERPL-SCNC: 7.7 MMOL/L (ref 0.4–2)
LDH FLD L TO P-CCNC: 568 U/L
LEUKOCYTE ESTERASE UR QL STRIP.AUTO: ABNORMAL
LEUKOCYTE ESTERASE UR QL STRIP.AUTO: ABNORMAL
LIPASE SERPL-CCNC: 26 U/L (ref 73–393)
LIPASE SERPL-CCNC: 38 U/L (ref 73–393)
LYMPHOCYTES # BLD: 0.7 K/UL (ref 0.8–3.5)
LYMPHOCYTES # BLD: 0.9 K/UL (ref 0.8–3.5)
LYMPHOCYTES # BLD: 1.1 K/UL (ref 0.8–3.5)
LYMPHOCYTES # BLD: 1.3 K/UL (ref 0.8–3.5)
LYMPHOCYTES # BLD: 1.3 K/UL (ref 0.8–3.5)
LYMPHOCYTES # BLD: 1.4 K/UL (ref 0.8–3.5)
LYMPHOCYTES # BLD: 1.5 K/UL (ref 0.8–3.5)
LYMPHOCYTES # BLD: 1.6 K/UL (ref 0.8–3.5)
LYMPHOCYTES # BLD: 1.7 K/UL (ref 0.8–3.5)
LYMPHOCYTES # BLD: 3.3 K/UL (ref 0.8–3.5)
LYMPHOCYTES NFR BLD: 10 % (ref 12–49)
LYMPHOCYTES NFR BLD: 10 % (ref 12–49)
LYMPHOCYTES NFR BLD: 14 % (ref 12–49)
LYMPHOCYTES NFR BLD: 2 % (ref 12–49)
LYMPHOCYTES NFR BLD: 4 % (ref 12–49)
LYMPHOCYTES NFR BLD: 6 % (ref 12–49)
LYMPHOCYTES NFR BLD: 6 % (ref 12–49)
LYMPHOCYTES NFR BLD: 7 % (ref 12–49)
LYMPHOCYTES NFR BLD: 9 % (ref 12–49)
LYMPHOCYTES NFR BLD: 9 % (ref 12–49)
LYMPHOCYTES NFR FLD: 55 %
MAGNESIUM SERPL-MCNC: 1.9 MG/DL (ref 1.6–2.4)
MAGNESIUM SERPL-MCNC: 1.9 MG/DL (ref 1.6–2.4)
MAGNESIUM SERPL-MCNC: 2 MG/DL (ref 1.6–2.4)
MAGNESIUM SERPL-MCNC: 2.1 MG/DL (ref 1.6–2.4)
MAGNESIUM SERPL-MCNC: 2.2 MG/DL (ref 1.6–2.4)
MAGNESIUM SERPL-MCNC: 2.2 MG/DL (ref 1.6–2.4)
MAGNESIUM SERPL-MCNC: 2.3 MG/DL (ref 1.6–2.4)
MCH RBC QN AUTO: 24.7 PG (ref 26–34)
MCH RBC QN AUTO: 25.3 PG (ref 26–34)
MCH RBC QN AUTO: 25.4 PG (ref 26–34)
MCH RBC QN AUTO: 25.6 PG (ref 26–34)
MCH RBC QN AUTO: 26.1 PG (ref 26–34)
MCH RBC QN AUTO: 27 PG (ref 26–34)
MCH RBC QN AUTO: 27 PG (ref 26–34)
MCH RBC QN AUTO: 27.1 PG (ref 26–34)
MCH RBC QN AUTO: 27.4 PG (ref 26–34)
MCH RBC QN AUTO: 27.6 PG (ref 26–34)
MCH RBC QN AUTO: 27.9 PG (ref 26–34)
MCH RBC QN AUTO: 28 PG (ref 26–34)
MCH RBC QN AUTO: 28.1 PG (ref 26–34)
MCH RBC QN AUTO: 28.1 PG (ref 26–34)
MCH RBC QN AUTO: 28.2 PG (ref 26–34)
MCH RBC QN AUTO: 28.2 PG (ref 26–34)
MCH RBC QN AUTO: 28.3 PG (ref 26.6–33)
MCH RBC QN AUTO: 28.8 PG (ref 26–34)
MCHC RBC AUTO-ENTMCNC: 29.3 G/DL (ref 30–36.5)
MCHC RBC AUTO-ENTMCNC: 29.7 G/DL (ref 30–36.5)
MCHC RBC AUTO-ENTMCNC: 30.6 G/DL (ref 30–36.5)
MCHC RBC AUTO-ENTMCNC: 31 G/DL (ref 30–36.5)
MCHC RBC AUTO-ENTMCNC: 31.5 G/DL (ref 30–36.5)
MCHC RBC AUTO-ENTMCNC: 31.6 G/DL (ref 30–36.5)
MCHC RBC AUTO-ENTMCNC: 31.7 G/DL (ref 30–36.5)
MCHC RBC AUTO-ENTMCNC: 31.7 G/DL (ref 30–36.5)
MCHC RBC AUTO-ENTMCNC: 31.8 G/DL (ref 30–36.5)
MCHC RBC AUTO-ENTMCNC: 31.8 G/DL (ref 30–36.5)
MCHC RBC AUTO-ENTMCNC: 32 G/DL (ref 30–36.5)
MCHC RBC AUTO-ENTMCNC: 32 G/DL (ref 30–36.5)
MCHC RBC AUTO-ENTMCNC: 32.1 G/DL (ref 30–36.5)
MCHC RBC AUTO-ENTMCNC: 32.3 G/DL (ref 30–36.5)
MCHC RBC AUTO-ENTMCNC: 32.5 G/DL (ref 30–36.5)
MCHC RBC AUTO-ENTMCNC: 32.5 G/DL (ref 30–36.5)
MCHC RBC AUTO-ENTMCNC: 32.8 G/DL (ref 30–36.5)
MCHC RBC AUTO-ENTMCNC: 33.7 G/DL (ref 31.5–35.7)
MCV RBC AUTO: 76.9 FL (ref 80–99)
MCV RBC AUTO: 77.9 FL (ref 80–99)
MCV RBC AUTO: 79.3 FL (ref 80–99)
MCV RBC AUTO: 79.6 FL (ref 80–99)
MCV RBC AUTO: 80 FL (ref 80–99)
MCV RBC AUTO: 81.1 FL (ref 80–99)
MCV RBC AUTO: 84 FL (ref 79–97)
MCV RBC AUTO: 85.4 FL (ref 80–99)
MCV RBC AUTO: 85.4 FL (ref 80–99)
MCV RBC AUTO: 85.6 FL (ref 80–99)
MCV RBC AUTO: 85.8 FL (ref 80–99)
MCV RBC AUTO: 86.4 FL (ref 80–99)
MCV RBC AUTO: 86.7 FL (ref 80–99)
MCV RBC AUTO: 86.8 FL (ref 80–99)
MCV RBC AUTO: 87 FL (ref 80–99)
MCV RBC AUTO: 87 FL (ref 80–99)
MCV RBC AUTO: 87.2 FL (ref 80–99)
MCV RBC AUTO: 87.3 FL (ref 80–99)
MCV RBC AUTO: 87.3 FL (ref 80–99)
MCV RBC AUTO: 88.7 FL (ref 80–99)
MCV RBC AUTO: 91.5 FL (ref 80–99)
MCV RBC AUTO: 96.8 FL (ref 80–99)
MESOTHL CELL NFR FLD: 12 %
METAMYELOCYTES NFR BLD MANUAL: 1 %
MICROALBUMIN UR-MCNC: 12.6 UG/ML
MONOCYTES # BLD: 0 K/UL (ref 0–1)
MONOCYTES # BLD: 0.3 K/UL (ref 0–1)
MONOCYTES # BLD: 0.8 K/UL (ref 0–1)
MONOCYTES # BLD: 1 K/UL (ref 0–1)
MONOCYTES # BLD: 1.3 K/UL (ref 0–1)
MONOCYTES # BLD: 1.3 K/UL (ref 0–1)
MONOCYTES # BLD: 1.4 K/UL (ref 0–1)
MONOCYTES # BLD: 1.4 K/UL (ref 0–1)
MONOCYTES # BLD: 1.5 K/UL (ref 0–1)
MONOCYTES # BLD: 1.6 K/UL (ref 0–1)
MONOCYTES NFR BLD: 0 % (ref 5–13)
MONOCYTES NFR BLD: 1 % (ref 5–13)
MONOCYTES NFR BLD: 11 % (ref 5–13)
MONOCYTES NFR BLD: 12 % (ref 5–13)
MONOCYTES NFR BLD: 3 % (ref 5–13)
MONOCYTES NFR BLD: 5 % (ref 5–13)
MONOCYTES NFR BLD: 8 % (ref 5–13)
MONOCYTES NFR BLD: 9 % (ref 5–13)
MONOS+MACROS NFR FLD: 10 %
NEUTROPHILS NFR FLD: 2 %
NEUTS BAND NFR BLD MANUAL: 11 %
NEUTS BAND NFR BLD MANUAL: 4 % (ref 0–6)
NEUTS BAND NFR BLD MANUAL: 5 %
NEUTS BAND NFR BLD MANUAL: 8 %
NEUTS SEG # BLD: 10 K/UL (ref 1.8–8)
NEUTS SEG # BLD: 11.6 K/UL (ref 1.8–8)
NEUTS SEG # BLD: 11.8 K/UL (ref 1.8–8)
NEUTS SEG # BLD: 13.7 K/UL (ref 1.8–8)
NEUTS SEG # BLD: 13.9 K/UL (ref 1.8–8)
NEUTS SEG # BLD: 31.7 K/UL (ref 1.8–8)
NEUTS SEG # BLD: 32.1 K/UL (ref 1.8–8)
NEUTS SEG # BLD: 51.7 K/UL (ref 1.8–8)
NEUTS SEG # BLD: 7.6 K/UL (ref 1.8–8)
NEUTS SEG # BLD: 9.5 K/UL (ref 1.8–8)
NEUTS SEG NFR BLD: 64 % (ref 32–75)
NEUTS SEG NFR BLD: 67 % (ref 32–75)
NEUTS SEG NFR BLD: 67 % (ref 32–75)
NEUTS SEG NFR BLD: 75 % (ref 32–75)
NEUTS SEG NFR BLD: 76 % (ref 32–75)
NEUTS SEG NFR BLD: 79 % (ref 32–75)
NEUTS SEG NFR BLD: 83 % (ref 32–75)
NEUTS SEG NFR BLD: 85 % (ref 32–75)
NEUTS SEG NFR BLD: 86 % (ref 32–75)
NEUTS SEG NFR BLD: 90 % (ref 32–75)
NITRITE UR QL STRIP.AUTO: NEGATIVE
NITRITE UR QL STRIP.AUTO: NEGATIVE
NRBC # BLD: 0 K/UL (ref 0–0.01)
NRBC # BLD: 0.13 K/UL (ref 0–0.01)
NRBC # BLD: 0.19 K/UL (ref 0–0.01)
NRBC # BLD: 0.26 K/UL (ref 0–0.01)
NRBC # BLD: 0.51 K/UL (ref 0–0.01)
NRBC BLD-RTO: 0 PER 100 WBC
NRBC BLD-RTO: 0.4 PER 100 WBC
NRBC BLD-RTO: 0.6 PER 100 WBC
NRBC BLD-RTO: 0.8 PER 100 WBC
NRBC BLD-RTO: 0.9 PER 100 WBC
NUC CELL # FLD: 1056 /CU MM
O+P SPEC MICRO: NORMAL
O+P STL CONC: NORMAL
P-R INTERVAL, ECG05: 140 MS
P-R INTERVAL, ECG05: 176 MS
PATH REV BLD -IMP: NORMAL
PCO2 BLDA: 26 MMHG (ref 35–45)
PCO2 BLDA: 31 MMHG (ref 35–45)
PCO2 BLDA: 31 MMHG (ref 35–45)
PCO2 BLDA: 38 MMHG (ref 35–45)
PH BLDA: 7.19 [PH] (ref 7.35–7.45)
PH BLDA: 7.39 [PH] (ref 7.35–7.45)
PH BLDA: 7.41 [PH] (ref 7.35–7.45)
PH BLDA: 7.53 [PH] (ref 7.35–7.45)
PH GAST: 4 [PH] (ref 1.5–3.5)
PH UR STRIP: 5 [PH] (ref 5–8)
PH UR STRIP: 6 [PH] (ref 5–8)
PHOSPHATE SERPL-MCNC: 2.8 MG/DL (ref 2.6–4.7)
PHOSPHATE SERPL-MCNC: 3.3 MG/DL (ref 2.6–4.7)
PHOSPHATE SERPL-MCNC: 3.4 MG/DL (ref 2.6–4.7)
PHOSPHATE SERPL-MCNC: 3.5 MG/DL (ref 2.6–4.7)
PLATELET # BLD AUTO: 223 K/UL (ref 150–400)
PLATELET # BLD AUTO: 235 X10E3/UL (ref 150–379)
PLATELET # BLD AUTO: 268 K/UL (ref 150–400)
PLATELET # BLD AUTO: 277 K/UL (ref 150–400)
PLATELET # BLD AUTO: 277 K/UL (ref 150–400)
PLATELET # BLD AUTO: 278 K/UL (ref 150–400)
PLATELET # BLD AUTO: 283 K/UL (ref 150–400)
PLATELET # BLD AUTO: 285 K/UL (ref 150–400)
PLATELET # BLD AUTO: 292 K/UL (ref 150–400)
PLATELET # BLD AUTO: 294 K/UL (ref 150–400)
PLATELET # BLD AUTO: 315 K/UL (ref 150–400)
PLATELET # BLD AUTO: 315 K/UL (ref 150–400)
PLATELET # BLD AUTO: 317 K/UL (ref 150–400)
PLATELET # BLD AUTO: 333 K/UL (ref 150–400)
PLATELET # BLD AUTO: 339 K/UL (ref 150–400)
PLATELET # BLD AUTO: 379 K/UL (ref 150–400)
PLATELET # BLD AUTO: 390 K/UL (ref 150–400)
PLATELET # BLD AUTO: 420 K/UL (ref 150–400)
PLATELET # BLD AUTO: 431 K/UL (ref 150–400)
PLATELET # BLD AUTO: 439 K/UL (ref 150–400)
PLATELET # BLD AUTO: 441 K/UL (ref 150–400)
PLATELET # BLD AUTO: 471 K/UL (ref 150–400)
PMV BLD AUTO: 10 FL (ref 8.9–12.9)
PMV BLD AUTO: 10.1 FL (ref 8.9–12.9)
PMV BLD AUTO: 10.2 FL (ref 8.9–12.9)
PMV BLD AUTO: 9.2 FL (ref 8.9–12.9)
PMV BLD AUTO: 9.2 FL (ref 8.9–12.9)
PMV BLD AUTO: 9.3 FL (ref 8.9–12.9)
PMV BLD AUTO: 9.4 FL (ref 8.9–12.9)
PMV BLD AUTO: 9.4 FL (ref 8.9–12.9)
PMV BLD AUTO: 9.5 FL (ref 8.9–12.9)
PMV BLD AUTO: 9.6 FL (ref 8.9–12.9)
PMV BLD AUTO: 9.6 FL (ref 8.9–12.9)
PMV BLD AUTO: 9.7 FL (ref 8.9–12.9)
PMV BLD AUTO: 9.8 FL (ref 8.9–12.9)
PO2 BLDA: 135 MMHG (ref 80–100)
PO2 BLDA: 80 MMHG (ref 80–100)
PO2 BLDA: 84 MMHG (ref 80–100)
PO2 BLDA: 97 MMHG (ref 80–100)
POTASSIUM SERPL-SCNC: 3.1 MMOL/L (ref 3.5–5.1)
POTASSIUM SERPL-SCNC: 3.3 MMOL/L (ref 3.5–5.1)
POTASSIUM SERPL-SCNC: 3.4 MMOL/L (ref 3.5–5.1)
POTASSIUM SERPL-SCNC: 3.5 MMOL/L (ref 3.5–5.1)
POTASSIUM SERPL-SCNC: 3.6 MMOL/L (ref 3.5–5.1)
POTASSIUM SERPL-SCNC: 3.7 MMOL/L (ref 3.5–5.1)
POTASSIUM SERPL-SCNC: 3.7 MMOL/L (ref 3.5–5.1)
POTASSIUM SERPL-SCNC: 3.8 MMOL/L (ref 3.5–5.1)
POTASSIUM SERPL-SCNC: 3.9 MMOL/L (ref 3.5–5.1)
POTASSIUM SERPL-SCNC: 4.1 MMOL/L (ref 3.5–5.1)
POTASSIUM SERPL-SCNC: 4.1 MMOL/L (ref 3.5–5.1)
POTASSIUM SERPL-SCNC: 4.3 MMOL/L (ref 3.5–5.1)
POTASSIUM SERPL-SCNC: 4.4 MMOL/L (ref 3.5–5.1)
POTASSIUM SERPL-SCNC: 4.7 MMOL/L (ref 3.5–5.1)
POTASSIUM SERPL-SCNC: 4.7 MMOL/L (ref 3.5–5.2)
POTASSIUM SERPL-SCNC: 5 MMOL/L (ref 3.5–5.1)
PROCALCITONIN SERPL-MCNC: 4.7 NG/ML (ref 0–0.08)
PROT FLD-MCNC: 4.2 G/DL
PROT SERPL-MCNC: 4.6 G/DL (ref 6.4–8.2)
PROT SERPL-MCNC: 5.1 G/DL (ref 6.4–8.2)
PROT SERPL-MCNC: 5.4 G/DL (ref 6.4–8.2)
PROT SERPL-MCNC: 6.9 G/DL (ref 6.4–8.2)
PROT SERPL-MCNC: 6.9 G/DL (ref 6–8.5)
PROT SERPL-MCNC: 7 G/DL (ref 6.4–8.2)
PROT SERPL-MCNC: 7.1 G/DL (ref 6.4–8.2)
PROT UR STRIP-MCNC: 100 MG/DL
PROT UR STRIP-MCNC: NEGATIVE MG/DL
PROTHROMBIN TIME: 13.5 SEC (ref 9–11.1)
PROTHROMBIN TIME: 15.1 SEC (ref 9–11.1)
Q-T INTERVAL, ECG07: 298 MS
Q-T INTERVAL, ECG07: 396 MS
QRS DURATION, ECG06: 102 MS
QRS DURATION, ECG06: 74 MS
QTC CALCULATION (BEZET), ECG08: 479 MS
QTC CALCULATION (BEZET), ECG08: 493 MS
RBC # BLD AUTO: 3.67 M/UL (ref 3.8–5.2)
RBC # BLD AUTO: 3.77 M/UL (ref 3.8–5.2)
RBC # BLD AUTO: 3.81 M/UL (ref 3.8–5.2)
RBC # BLD AUTO: 3.82 M/UL (ref 3.8–5.2)
RBC # BLD AUTO: 3.83 M/UL (ref 3.8–5.2)
RBC # BLD AUTO: 3.86 M/UL (ref 3.8–5.2)
RBC # BLD AUTO: 3.91 M/UL (ref 3.8–5.2)
RBC # BLD AUTO: 4.03 M/UL (ref 3.8–5.2)
RBC # BLD AUTO: 4.05 M/UL (ref 3.8–5.2)
RBC # BLD AUTO: 4.08 M/UL (ref 3.8–5.2)
RBC # BLD AUTO: 4.11 M/UL (ref 3.8–5.2)
RBC # BLD AUTO: 4.24 M/UL (ref 3.8–5.2)
RBC # BLD AUTO: 4.39 M/UL (ref 3.8–5.2)
RBC # BLD AUTO: 4.49 M/UL (ref 3.8–5.2)
RBC # BLD AUTO: 4.55 M/UL (ref 3.8–5.2)
RBC # BLD AUTO: 4.72 M/UL (ref 3.8–5.2)
RBC # BLD AUTO: 4.78 M/UL (ref 3.8–5.2)
RBC # BLD AUTO: 4.84 M/UL (ref 3.8–5.2)
RBC # BLD AUTO: 5.15 M/UL (ref 3.8–5.2)
RBC # BLD AUTO: 5.28 M/UL (ref 3.8–5.2)
RBC # BLD AUTO: 5.35 M/UL (ref 3.8–5.2)
RBC # BLD AUTO: 5.45 X10E6/UL (ref 3.77–5.28)
RBC # FLD: >100 /CU MM
RBC #/AREA URNS HPF: >100 /HPF (ref 0–5)
RBC #/AREA URNS HPF: ABNORMAL /HPF (ref 0–5)
RBC MORPH BLD: ABNORMAL
REPORTED DOSE,DOSE: ABNORMAL UNITS
REPORTED DOSE/TIME,TMG: ABNORMAL
SAO2 % BLD: 94 % (ref 92–97)
SAO2 % BLD: 97 % (ref 92–97)
SAO2 % BLD: 97 % (ref 92–97)
SAO2 % BLD: 99 % (ref 92–97)
SAO2% DEVICE SAO2% SENSOR NAME: ABNORMAL
SERVICE CMNT-IMP: ABNORMAL
SERVICE CMNT-IMP: NORMAL
SODIUM SERPL-SCNC: 129 MMOL/L (ref 136–145)
SODIUM SERPL-SCNC: 133 MMOL/L (ref 136–145)
SODIUM SERPL-SCNC: 134 MMOL/L (ref 136–145)
SODIUM SERPL-SCNC: 134 MMOL/L (ref 136–145)
SODIUM SERPL-SCNC: 135 MMOL/L (ref 136–145)
SODIUM SERPL-SCNC: 136 MMOL/L (ref 136–145)
SODIUM SERPL-SCNC: 138 MMOL/L (ref 136–145)
SODIUM SERPL-SCNC: 139 MMOL/L (ref 136–145)
SODIUM SERPL-SCNC: 139 MMOL/L (ref 136–145)
SODIUM SERPL-SCNC: 140 MMOL/L (ref 136–145)
SODIUM SERPL-SCNC: 141 MMOL/L (ref 136–145)
SODIUM SERPL-SCNC: 141 MMOL/L (ref 136–145)
SODIUM SERPL-SCNC: 143 MMOL/L (ref 134–144)
SODIUM SERPL-SCNC: 143 MMOL/L (ref 136–145)
SODIUM SERPL-SCNC: 143 MMOL/L (ref 136–145)
SODIUM SERPL-SCNC: 144 MMOL/L (ref 136–145)
SODIUM SERPL-SCNC: 145 MMOL/L (ref 136–145)
SODIUM SERPL-SCNC: 146 MMOL/L (ref 136–145)
SODIUM SERPL-SCNC: 146 MMOL/L (ref 136–145)
SODIUM SERPL-SCNC: 148 MMOL/L (ref 136–145)
SP GR UR REFRACTOMETRY: 1.01
SP GR UR REFRACTOMETRY: 1.02 (ref 1–1.03)
SPECIMEN EXP DATE BLD: NORMAL
SPECIMEN EXP DATE BLD: NORMAL
SPECIMEN SITE: ABNORMAL
SPECIMEN SOURCE FLD: ABNORMAL
SPECIMEN SOURCE FLD: NORMAL
SPECIMEN SOURCE: NORMAL
STATUS OF UNIT,%ST: NORMAL
TROPONIN I SERPL-MCNC: 1.25 NG/ML
TROPONIN I SERPL-MCNC: 1.54 NG/ML
TROPONIN I SERPL-MCNC: 1.82 NG/ML
UA: UC IF INDICATED,UAUC: ABNORMAL
UNIT DIVISION, %UDIV: 0
UR CULT HOLD, URHOLD: NORMAL
UROBILINOGEN UR QL STRIP.AUTO: 0.2 EU/DL (ref 0.2–1)
UROBILINOGEN UR QL STRIP.AUTO: 1 EU/DL (ref 0.2–1)
VANCOMYCIN TROUGH SERPL-MCNC: 13.9 UG/ML (ref 5–10)
VENTILATION MODE VENT: ABNORMAL
VENTRICULAR RATE, ECG03: 165 BPM
VENTRICULAR RATE, ECG03: 88 BPM
VT SETTING VENT: 450 ML
WBC # BLD AUTO: 10.6 K/UL (ref 3.6–11)
WBC # BLD AUTO: 10.7 K/UL (ref 3.6–11)
WBC # BLD AUTO: 10.8 K/UL (ref 3.6–11)
WBC # BLD AUTO: 11.5 K/UL (ref 3.6–11)
WBC # BLD AUTO: 11.6 K/UL (ref 3.6–11)
WBC # BLD AUTO: 11.8 K/UL (ref 3.6–11)
WBC # BLD AUTO: 11.9 K/UL (ref 3.6–11)
WBC # BLD AUTO: 11.9 X10E3/UL (ref 3.4–10.8)
WBC # BLD AUTO: 13 K/UL (ref 3.6–11)
WBC # BLD AUTO: 14.6 K/UL (ref 3.6–11)
WBC # BLD AUTO: 14.7 K/UL (ref 3.6–11)
WBC # BLD AUTO: 14.8 K/UL (ref 3.6–11)
WBC # BLD AUTO: 15.1 K/UL (ref 3.6–11)
WBC # BLD AUTO: 15.6 K/UL (ref 3.6–11)
WBC # BLD AUTO: 15.7 K/UL (ref 3.6–11)
WBC # BLD AUTO: 15.9 K/UL (ref 3.6–11)
WBC # BLD AUTO: 17.4 K/UL (ref 3.6–11)
WBC # BLD AUTO: 33.4 K/UL (ref 3.6–11)
WBC # BLD AUTO: 34.1 K/UL (ref 3.6–11)
WBC # BLD AUTO: 34.4 K/UL (ref 3.6–11)
WBC # BLD AUTO: 55 K/UL (ref 3.6–11)
WBC # BLD AUTO: 9.3 K/UL (ref 3.6–11)
WBC MORPH BLD: ABNORMAL
WBC MORPH BLD: ABNORMAL
WBC URNS QL MICRO: >100 /HPF (ref 0–4)
WBC URNS QL MICRO: ABNORMAL /HPF (ref 0–4)

## 2018-01-01 PROCEDURE — 82962 GLUCOSE BLOOD TEST: CPT

## 2018-01-01 PROCEDURE — 74011250637 HC RX REV CODE- 250/637: Performed by: SURGERY

## 2018-01-01 PROCEDURE — 3331090002 HH PPS REVENUE DEBIT

## 2018-01-01 PROCEDURE — 36415 COLL VENOUS BLD VENIPUNCTURE: CPT | Performed by: SURGERY

## 2018-01-01 PROCEDURE — 71045 X-RAY EXAM CHEST 1 VIEW: CPT

## 2018-01-01 PROCEDURE — 74011636637 HC RX REV CODE- 636/637: Performed by: FAMILY MEDICINE

## 2018-01-01 PROCEDURE — 77030011640 HC PAD GRND REM COVD -A: Performed by: SURGERY

## 2018-01-01 PROCEDURE — 74177 CT ABD & PELVIS W/CONTRAST: CPT

## 2018-01-01 PROCEDURE — 85027 COMPLETE CBC AUTOMATED: CPT | Performed by: SURGERY

## 2018-01-01 PROCEDURE — 74011250636 HC RX REV CODE- 250/636: Performed by: SURGERY

## 2018-01-01 PROCEDURE — 80048 BASIC METABOLIC PNL TOTAL CA: CPT | Performed by: SURGERY

## 2018-01-01 PROCEDURE — 80053 COMPREHEN METABOLIC PANEL: CPT | Performed by: INTERNAL MEDICINE

## 2018-01-01 PROCEDURE — 74011250636 HC RX REV CODE- 250/636: Performed by: INTERNAL MEDICINE

## 2018-01-01 PROCEDURE — 84484 ASSAY OF TROPONIN QUANT: CPT | Performed by: EMERGENCY MEDICINE

## 2018-01-01 PROCEDURE — 74011250637 HC RX REV CODE- 250/637: Performed by: HOSPITALIST

## 2018-01-01 PROCEDURE — 77030037878 HC DRSG MEPILEX >48IN BORD MOLN -B

## 2018-01-01 PROCEDURE — 77030008684 HC TU ET CUF COVD -B: Performed by: NURSE ANESTHETIST, CERTIFIED REGISTERED

## 2018-01-01 PROCEDURE — 65270000032 HC RM SEMIPRIVATE

## 2018-01-01 PROCEDURE — 49083 ABD PARACENTESIS W/IMAGING: CPT

## 2018-01-01 PROCEDURE — 81001 URINALYSIS AUTO W/SCOPE: CPT | Performed by: INTERNAL MEDICINE

## 2018-01-01 PROCEDURE — A6260 WOUND CLEANSER ANY TYPE/SIZE: HCPCS

## 2018-01-01 PROCEDURE — 74011250636 HC RX REV CODE- 250/636: Performed by: NURSE PRACTITIONER

## 2018-01-01 PROCEDURE — 96372 THER/PROPH/DIAG INJ SC/IM: CPT

## 2018-01-01 PROCEDURE — 74011636320 HC RX REV CODE- 636/320: Performed by: EMERGENCY MEDICINE

## 2018-01-01 PROCEDURE — 93005 ELECTROCARDIOGRAM TRACING: CPT

## 2018-01-01 PROCEDURE — 74011250637 HC RX REV CODE- 250/637: Performed by: NURSE PRACTITIONER

## 2018-01-01 PROCEDURE — 31500 INSERT EMERGENCY AIRWAY: CPT

## 2018-01-01 PROCEDURE — 87185 SC STD ENZYME DETCJ PER NZM: CPT | Performed by: SURGERY

## 2018-01-01 PROCEDURE — 87186 SC STD MICRODIL/AGAR DIL: CPT | Performed by: EMERGENCY MEDICINE

## 2018-01-01 PROCEDURE — 74011250637 HC RX REV CODE- 250/637: Performed by: INTERNAL MEDICINE

## 2018-01-01 PROCEDURE — A4216 STERILE WATER/SALINE, 10 ML: HCPCS

## 2018-01-01 PROCEDURE — 74011636637 HC RX REV CODE- 636/637: Performed by: HOSPITALIST

## 2018-01-01 PROCEDURE — 85025 COMPLETE CBC W/AUTO DIFF WBC: CPT | Performed by: SURGERY

## 2018-01-01 PROCEDURE — 95816 EEG AWAKE AND DROWSY: CPT | Performed by: PSYCHIATRY & NEUROLOGY

## 2018-01-01 PROCEDURE — 3331090001 HH PPS REVENUE CREDIT

## 2018-01-01 PROCEDURE — 77030011256 HC DRSG MEPILEX <16IN NO BORD MOLN -A

## 2018-01-01 PROCEDURE — 87177 OVA AND PARASITES SMEARS: CPT | Performed by: INTERNAL MEDICINE

## 2018-01-01 PROCEDURE — 96365 THER/PROPH/DIAG IV INF INIT: CPT

## 2018-01-01 PROCEDURE — 94760 N-INVAS EAR/PLS OXIMETRY 1: CPT

## 2018-01-01 PROCEDURE — 82803 BLOOD GASES ANY COMBINATION: CPT | Performed by: INTERNAL MEDICINE

## 2018-01-01 PROCEDURE — 74011636637 HC RX REV CODE- 636/637: Performed by: INTERNAL MEDICINE

## 2018-01-01 PROCEDURE — 36430 TRANSFUSION BLD/BLD COMPNT: CPT

## 2018-01-01 PROCEDURE — 76010000172 HC OR TIME 2.5 TO 3 HR INTENSV-TIER 1: Performed by: SURGERY

## 2018-01-01 PROCEDURE — 36415 COLL VENOUS BLD VENIPUNCTURE: CPT | Performed by: HOSPITALIST

## 2018-01-01 PROCEDURE — 74011000258 HC RX REV CODE- 258: Performed by: NURSE PRACTITIONER

## 2018-01-01 PROCEDURE — 94003 VENT MGMT INPAT SUBQ DAY: CPT

## 2018-01-01 PROCEDURE — 77030018836 HC SOL IRR NACL ICUM -A

## 2018-01-01 PROCEDURE — 02HV33Z INSERTION OF INFUSION DEVICE INTO SUPERIOR VENA CAVA, PERCUTANEOUS APPROACH: ICD-10-PCS | Performed by: EMERGENCY MEDICINE

## 2018-01-01 PROCEDURE — C1729 CATH, DRAINAGE: HCPCS

## 2018-01-01 PROCEDURE — 74011250636 HC RX REV CODE- 250/636

## 2018-01-01 PROCEDURE — 77030026438 HC STYL ET INTUB CARD -A: Performed by: ANESTHESIOLOGY

## 2018-01-01 PROCEDURE — C1751 CATH, INF, PER/CENT/MIDLINE: HCPCS

## 2018-01-01 PROCEDURE — 74011000250 HC RX REV CODE- 250

## 2018-01-01 PROCEDURE — 77030018846 HC SOL IRR STRL H20 ICUM -A

## 2018-01-01 PROCEDURE — 77030018846 HC SOL IRR STRL H20 ICUM -A: Performed by: SURGERY

## 2018-01-01 PROCEDURE — 74011000258 HC RX REV CODE- 258: Performed by: INTERNAL MEDICINE

## 2018-01-01 PROCEDURE — 65610000006 HC RM INTENSIVE CARE

## 2018-01-01 PROCEDURE — 71275 CT ANGIOGRAPHY CHEST: CPT

## 2018-01-01 PROCEDURE — 74011000250 HC RX REV CODE- 250: Performed by: SURGERY

## 2018-01-01 PROCEDURE — 87077 CULTURE AEROBIC IDENTIFY: CPT | Performed by: EMERGENCY MEDICINE

## 2018-01-01 PROCEDURE — 74011250636 HC RX REV CODE- 250/636: Performed by: HOSPITALIST

## 2018-01-01 PROCEDURE — 77030018719 HC DRSG PTCH ANTIMIC J&J -A

## 2018-01-01 PROCEDURE — 74011000250 HC RX REV CODE- 250: Performed by: INTERNAL MEDICINE

## 2018-01-01 PROCEDURE — P9059 PLASMA, FRZ BETWEEN 8-24HOUR: HCPCS | Performed by: EMERGENCY MEDICINE

## 2018-01-01 PROCEDURE — 94762 N-INVAS EAR/PLS OXIMTRY CONT: CPT

## 2018-01-01 PROCEDURE — 80053 COMPREHEN METABOLIC PANEL: CPT | Performed by: SURGERY

## 2018-01-01 PROCEDURE — 85025 COMPLETE CBC W/AUTO DIFF WBC: CPT | Performed by: FAMILY MEDICINE

## 2018-01-01 PROCEDURE — 81001 URINALYSIS AUTO W/SCOPE: CPT | Performed by: PHYSICIAN ASSISTANT

## 2018-01-01 PROCEDURE — 36415 COLL VENOUS BLD VENIPUNCTURE: CPT | Performed by: NURSE PRACTITIONER

## 2018-01-01 PROCEDURE — 84484 ASSAY OF TROPONIN QUANT: CPT | Performed by: HOSPITALIST

## 2018-01-01 PROCEDURE — 65270000029 HC RM PRIVATE

## 2018-01-01 PROCEDURE — 74011636637 HC RX REV CODE- 636/637: Performed by: NURSE PRACTITIONER

## 2018-01-01 PROCEDURE — 77030034850: Performed by: SURGERY

## 2018-01-01 PROCEDURE — 83735 ASSAY OF MAGNESIUM: CPT | Performed by: INTERNAL MEDICINE

## 2018-01-01 PROCEDURE — 82042 OTHER SOURCE ALBUMIN QUAN EA: CPT | Performed by: NURSE PRACTITIONER

## 2018-01-01 PROCEDURE — 74011000258 HC RX REV CODE- 258: Performed by: SURGERY

## 2018-01-01 PROCEDURE — 74011636320 HC RX REV CODE- 636/320: Performed by: HOSPITALIST

## 2018-01-01 PROCEDURE — 83735 ASSAY OF MAGNESIUM: CPT | Performed by: SURGERY

## 2018-01-01 PROCEDURE — 74011636637 HC RX REV CODE- 636/637: Performed by: SURGERY

## 2018-01-01 PROCEDURE — 77030031197 HC NDL INFUS INTOSSE TELE -C

## 2018-01-01 PROCEDURE — 74018 RADEX ABDOMEN 1 VIEW: CPT

## 2018-01-01 PROCEDURE — 77030005208 HC CATH HLDR GLWC -A

## 2018-01-01 PROCEDURE — 97116 GAIT TRAINING THERAPY: CPT

## 2018-01-01 PROCEDURE — 80048 BASIC METABOLIC PNL TOTAL CA: CPT | Performed by: INTERNAL MEDICINE

## 2018-01-01 PROCEDURE — 77030039266 HC ADH SKN EXOFIN S2SG -A: Performed by: SURGERY

## 2018-01-01 PROCEDURE — 0DBW0ZZ EXCISION OF PERITONEUM, OPEN APPROACH: ICD-10-PCS | Performed by: SURGERY

## 2018-01-01 PROCEDURE — 74011000258 HC RX REV CODE- 258: Performed by: EMERGENCY MEDICINE

## 2018-01-01 PROCEDURE — 36591 DRAW BLOOD OFF VENOUS DEVICE: CPT

## 2018-01-01 PROCEDURE — G0299 HHS/HOSPICE OF RN EA 15 MIN: HCPCS

## 2018-01-01 PROCEDURE — 94002 VENT MGMT INPAT INIT DAY: CPT

## 2018-01-01 PROCEDURE — C9113 INJ PANTOPRAZOLE SODIUM, VIA: HCPCS | Performed by: HOSPITALIST

## 2018-01-01 PROCEDURE — 77030013079 HC BLNKT BAIR HGGR 3M -A: Performed by: NURSE ANESTHETIST, CERTIFIED REGISTERED

## 2018-01-01 PROCEDURE — 36415 COLL VENOUS BLD VENIPUNCTURE: CPT | Performed by: INTERNAL MEDICINE

## 2018-01-01 PROCEDURE — 83605 ASSAY OF LACTIC ACID: CPT | Performed by: HOSPITALIST

## 2018-01-01 PROCEDURE — 36600 WITHDRAWAL OF ARTERIAL BLOOD: CPT | Performed by: INTERNAL MEDICINE

## 2018-01-01 PROCEDURE — 0W9G3ZZ DRAINAGE OF PERITONEAL CAVITY, PERCUTANEOUS APPROACH: ICD-10-PCS | Performed by: RADIOLOGY

## 2018-01-01 PROCEDURE — 86920 COMPATIBILITY TEST SPIN: CPT | Performed by: EMERGENCY MEDICINE

## 2018-01-01 PROCEDURE — 83036 HEMOGLOBIN GLYCOSYLATED A1C: CPT | Performed by: FAMILY MEDICINE

## 2018-01-01 PROCEDURE — 77030022643 HC PAD DEFIB AD HRTSTRT PHL -B

## 2018-01-01 PROCEDURE — 70450 CT HEAD/BRAIN W/O DYE: CPT

## 2018-01-01 PROCEDURE — 85025 COMPLETE CBC W/AUTO DIFF WBC: CPT | Performed by: EMERGENCY MEDICINE

## 2018-01-01 PROCEDURE — 77030010545

## 2018-01-01 PROCEDURE — 85025 COMPLETE CBC W/AUTO DIFF WBC: CPT | Performed by: INTERNAL MEDICINE

## 2018-01-01 PROCEDURE — 84100 ASSAY OF PHOSPHORUS: CPT | Performed by: INTERNAL MEDICINE

## 2018-01-01 PROCEDURE — C1788 PORT, INDWELLING, IMP: HCPCS | Performed by: SURGERY

## 2018-01-01 PROCEDURE — A6216 NON-STERILE GAUZE<=16 SQ IN: HCPCS

## 2018-01-01 PROCEDURE — 74011000250 HC RX REV CODE- 250: Performed by: HOSPITALIST

## 2018-01-01 PROCEDURE — 88309 TISSUE EXAM BY PATHOLOGIST: CPT | Performed by: SURGERY

## 2018-01-01 PROCEDURE — 88305 TISSUE EXAM BY PATHOLOGIST: CPT | Performed by: SURGERY

## 2018-01-01 PROCEDURE — 84145 PROCALCITONIN (PCT): CPT | Performed by: INTERNAL MEDICINE

## 2018-01-01 PROCEDURE — 36415 COLL VENOUS BLD VENIPUNCTURE: CPT | Performed by: PHYSICIAN ASSISTANT

## 2018-01-01 PROCEDURE — 400013 HH SOC

## 2018-01-01 PROCEDURE — 74011000258 HC RX REV CODE- 258: Performed by: HOSPITALIST

## 2018-01-01 PROCEDURE — 76060000033 HC ANESTHESIA 1 TO 1.5 HR: Performed by: SURGERY

## 2018-01-01 PROCEDURE — 77030011255 HC DSG AQUACEL AG BMS -A

## 2018-01-01 PROCEDURE — 82378 CARCINOEMBRYONIC ANTIGEN: CPT | Performed by: NURSE PRACTITIONER

## 2018-01-01 PROCEDURE — 87186 SC STD MICRODIL/AGAR DIL: CPT | Performed by: INTERNAL MEDICINE

## 2018-01-01 PROCEDURE — 85025 COMPLETE CBC W/AUTO DIFF WBC: CPT | Performed by: HOSPITALIST

## 2018-01-01 PROCEDURE — 87205 SMEAR GRAM STAIN: CPT | Performed by: INTERNAL MEDICINE

## 2018-01-01 PROCEDURE — 86900 BLOOD TYPING SEROLOGIC ABO: CPT | Performed by: EMERGENCY MEDICINE

## 2018-01-01 PROCEDURE — A6446 CONFORM BAND S W>=3" <5"/YD: HCPCS

## 2018-01-01 PROCEDURE — 74011000258 HC RX REV CODE- 258: Performed by: PHYSICIAN ASSISTANT

## 2018-01-01 PROCEDURE — 36600 WITHDRAWAL OF ARTERIAL BLOOD: CPT | Performed by: EMERGENCY MEDICINE

## 2018-01-01 PROCEDURE — 71260 CT THORAX DX C+: CPT

## 2018-01-01 PROCEDURE — 76210000006 HC OR PH I REC 0.5 TO 1 HR: Performed by: SURGERY

## 2018-01-01 PROCEDURE — 96368 THER/DIAG CONCURRENT INF: CPT

## 2018-01-01 PROCEDURE — 83605 ASSAY OF LACTIC ACID: CPT | Performed by: INTERNAL MEDICINE

## 2018-01-01 PROCEDURE — A4452 WATERPROOF TAPE: HCPCS

## 2018-01-01 PROCEDURE — 77030022474 HC RELD STPLR ENDO GIA COVD -C: Performed by: SURGERY

## 2018-01-01 PROCEDURE — 97605 NEG PRS WND THER DME<=50SQCM: CPT

## 2018-01-01 PROCEDURE — 76705 ECHO EXAM OF ABDOMEN: CPT

## 2018-01-01 PROCEDURE — 77030026438 HC STYL ET INTUB CARD -A: Performed by: NURSE ANESTHETIST, CERTIFIED REGISTERED

## 2018-01-01 PROCEDURE — 77030008771 HC TU NG SALEM SUMP -A

## 2018-01-01 PROCEDURE — 77030008684 HC TU ET CUF COVD -B: Performed by: ANESTHESIOLOGY

## 2018-01-01 PROCEDURE — 77030010939 HC CLP LIG TELE -B: Performed by: SURGERY

## 2018-01-01 PROCEDURE — 30233N1 TRANSFUSION OF NONAUTOLOGOUS RED BLOOD CELLS INTO PERIPHERAL VEIN, PERCUTANEOUS APPROACH: ICD-10-PCS | Performed by: HOSPITALIST

## 2018-01-01 PROCEDURE — 0W9G30Z DRAINAGE OF PERITONEAL CAVITY WITH DRAINAGE DEVICE, PERCUTANEOUS APPROACH: ICD-10-PCS | Performed by: RADIOLOGY

## 2018-01-01 PROCEDURE — 96366 THER/PROPH/DIAG IV INF ADDON: CPT

## 2018-01-01 PROCEDURE — 80048 BASIC METABOLIC PNL TOTAL CA: CPT | Performed by: HOSPITALIST

## 2018-01-01 PROCEDURE — 77030020256 HC SOL INJ NACL 0.9%  500ML: Performed by: SURGERY

## 2018-01-01 PROCEDURE — 99284 EMERGENCY DEPT VISIT MOD MDM: CPT

## 2018-01-01 PROCEDURE — 87077 CULTURE AEROBIC IDENTIFY: CPT | Performed by: INTERNAL MEDICINE

## 2018-01-01 PROCEDURE — 83615 LACTATE (LD) (LDH) ENZYME: CPT | Performed by: NURSE PRACTITIONER

## 2018-01-01 PROCEDURE — P9045 ALBUMIN (HUMAN), 5%, 250 ML: HCPCS

## 2018-01-01 PROCEDURE — 83735 ASSAY OF MAGNESIUM: CPT | Performed by: NURSE PRACTITIONER

## 2018-01-01 PROCEDURE — 85027 COMPLETE CBC AUTOMATED: CPT | Performed by: NURSE PRACTITIONER

## 2018-01-01 PROCEDURE — 77030019702 HC WRP THER MENM -C: Performed by: SURGERY

## 2018-01-01 PROCEDURE — 85025 COMPLETE CBC W/AUTO DIFF WBC: CPT | Performed by: NURSE PRACTITIONER

## 2018-01-01 PROCEDURE — 80053 COMPREHEN METABOLIC PANEL: CPT | Performed by: HOSPITALIST

## 2018-01-01 PROCEDURE — 96374 THER/PROPH/DIAG INJ IV PUSH: CPT

## 2018-01-01 PROCEDURE — 76937 US GUIDE VASCULAR ACCESS: CPT

## 2018-01-01 PROCEDURE — 92950 HEART/LUNG RESUSCITATION CPR: CPT

## 2018-01-01 PROCEDURE — 77030022473 HC HNDL ENDO GIA UNIV USDA -C: Performed by: SURGERY

## 2018-01-01 PROCEDURE — 80053 COMPREHEN METABOLIC PANEL: CPT | Performed by: EMERGENCY MEDICINE

## 2018-01-01 PROCEDURE — 88112 CYTOPATH CELL ENHANCE TECH: CPT | Performed by: SURGERY

## 2018-01-01 PROCEDURE — 85018 HEMOGLOBIN: CPT | Performed by: EMERGENCY MEDICINE

## 2018-01-01 PROCEDURE — 87205 SMEAR GRAM STAIN: CPT | Performed by: EMERGENCY MEDICINE

## 2018-01-01 PROCEDURE — 36600 WITHDRAWAL OF ARTERIAL BLOOD: CPT | Performed by: HOSPITALIST

## 2018-01-01 PROCEDURE — 5A1945Z RESPIRATORY VENTILATION, 24-96 CONSECUTIVE HOURS: ICD-10-PCS | Performed by: HOSPITALIST

## 2018-01-01 PROCEDURE — 76450000000

## 2018-01-01 PROCEDURE — 97161 PT EVAL LOW COMPLEX 20 MIN: CPT | Performed by: PHYSICAL THERAPIST

## 2018-01-01 PROCEDURE — G8978 MOBILITY CURRENT STATUS: HCPCS

## 2018-01-01 PROCEDURE — 99285 EMERGENCY DEPT VISIT HI MDM: CPT

## 2018-01-01 PROCEDURE — 83605 ASSAY OF LACTIC ACID: CPT | Performed by: SURGERY

## 2018-01-01 PROCEDURE — 82803 BLOOD GASES ANY COMBINATION: CPT | Performed by: EMERGENCY MEDICINE

## 2018-01-01 PROCEDURE — 77030032490 HC SLV COMPR SCD KNE COVD -B

## 2018-01-01 PROCEDURE — 97530 THERAPEUTIC ACTIVITIES: CPT | Performed by: PHYSICAL THERAPIST

## 2018-01-01 PROCEDURE — 87205 SMEAR GRAM STAIN: CPT | Performed by: NURSE PRACTITIONER

## 2018-01-01 PROCEDURE — 77030034850

## 2018-01-01 PROCEDURE — 76010000149 HC OR TIME 1 TO 1.5 HR: Performed by: SURGERY

## 2018-01-01 PROCEDURE — 89050 BODY FLUID CELL COUNT: CPT | Performed by: NURSE PRACTITIONER

## 2018-01-01 PROCEDURE — 83605 ASSAY OF LACTIC ACID: CPT | Performed by: NURSE PRACTITIONER

## 2018-01-01 PROCEDURE — 77030009526 HC GEL CARSYN MDII -A

## 2018-01-01 PROCEDURE — 87086 URINE CULTURE/COLONY COUNT: CPT | Performed by: INTERNAL MEDICINE

## 2018-01-01 PROCEDURE — 74011250636 HC RX REV CODE- 250/636: Performed by: PHYSICIAN ASSISTANT

## 2018-01-01 PROCEDURE — 71046 X-RAY EXAM CHEST 2 VIEWS: CPT

## 2018-01-01 PROCEDURE — 83605 ASSAY OF LACTIC ACID: CPT | Performed by: EMERGENCY MEDICINE

## 2018-01-01 PROCEDURE — 80048 BASIC METABOLIC PNL TOTAL CA: CPT | Performed by: NURSE PRACTITIONER

## 2018-01-01 PROCEDURE — 97161 PT EVAL LOW COMPLEX 20 MIN: CPT

## 2018-01-01 PROCEDURE — 83735 ASSAY OF MAGNESIUM: CPT | Performed by: HOSPITALIST

## 2018-01-01 PROCEDURE — 77030033065 HC RET VISC GLSMN DISP CARF -B: Performed by: SURGERY

## 2018-01-01 PROCEDURE — 77030008467 HC STPLR SKN COVD -B: Performed by: SURGERY

## 2018-01-01 PROCEDURE — 87040 BLOOD CULTURE FOR BACTERIA: CPT | Performed by: INTERNAL MEDICINE

## 2018-01-01 PROCEDURE — 82150 ASSAY OF AMYLASE: CPT | Performed by: NURSE PRACTITIONER

## 2018-01-01 PROCEDURE — 85027 COMPLETE CBC AUTOMATED: CPT | Performed by: HOSPITALIST

## 2018-01-01 PROCEDURE — 87040 BLOOD CULTURE FOR BACTERIA: CPT | Performed by: EMERGENCY MEDICINE

## 2018-01-01 PROCEDURE — 77030008771 HC TU NG SALEM SUMP -A: Performed by: SURGERY

## 2018-01-01 PROCEDURE — 77030020365 HC SOL INJ SOD CL 0.9% 50ML

## 2018-01-01 PROCEDURE — 77030031139 HC SUT VCRL2 J&J -A: Performed by: SURGERY

## 2018-01-01 PROCEDURE — 82550 ASSAY OF CK (CPK): CPT | Performed by: HOSPITALIST

## 2018-01-01 PROCEDURE — 87070 CULTURE OTHR SPECIMN AEROBIC: CPT | Performed by: INTERNAL MEDICINE

## 2018-01-01 PROCEDURE — 77030019952 HC CANSTR VAC ASST KCON -B

## 2018-01-01 PROCEDURE — 77030025242: Performed by: SURGERY

## 2018-01-01 PROCEDURE — 36415 COLL VENOUS BLD VENIPUNCTURE: CPT | Performed by: EMERGENCY MEDICINE

## 2018-01-01 PROCEDURE — 74011000250 HC RX REV CODE- 250: Performed by: NURSE PRACTITIONER

## 2018-01-01 PROCEDURE — 77030008683 HC TU ET CUF COVD -A

## 2018-01-01 PROCEDURE — 82803 BLOOD GASES ANY COMBINATION: CPT | Performed by: HOSPITALIST

## 2018-01-01 PROCEDURE — 80053 COMPREHEN METABOLIC PANEL: CPT | Performed by: NURSE PRACTITIONER

## 2018-01-01 PROCEDURE — 77030002933 HC SUT MCRYL J&J -A: Performed by: SURGERY

## 2018-01-01 PROCEDURE — 0DTF0ZZ RESECTION OF RIGHT LARGE INTESTINE, OPEN APPROACH: ICD-10-PCS | Performed by: SURGERY

## 2018-01-01 PROCEDURE — 74011250636 HC RX REV CODE- 250/636: Performed by: EMERGENCY MEDICINE

## 2018-01-01 PROCEDURE — 83880 ASSAY OF NATRIURETIC PEPTIDE: CPT | Performed by: EMERGENCY MEDICINE

## 2018-01-01 PROCEDURE — 77030020847 HC STATLOK BARD -A

## 2018-01-01 PROCEDURE — 76000 FLUOROSCOPY <1 HR PHYS/QHP: CPT

## 2018-01-01 PROCEDURE — 77030019895 HC PCKNG STRP IODO -A

## 2018-01-01 PROCEDURE — 74011250636 HC RX REV CODE- 250/636: Performed by: FAMILY MEDICINE

## 2018-01-01 PROCEDURE — A9270 NON-COVERED ITEM OR SERVICE: HCPCS

## 2018-01-01 PROCEDURE — 88342 IMHCHEM/IMCYTCHM 1ST ANTB: CPT | Performed by: SURGERY

## 2018-01-01 PROCEDURE — 77030032034 HC SYS EVAC ASEPT DISP BBMI -B

## 2018-01-01 PROCEDURE — 0JH60WZ INSERTION OF TOTALLY IMPLANTABLE VASCULAR ACCESS DEVICE INTO CHEST SUBCUTANEOUS TISSUE AND FASCIA, OPEN APPROACH: ICD-10-PCS | Performed by: SURGERY

## 2018-01-01 PROCEDURE — 74011000250 HC RX REV CODE- 250: Performed by: RADIOLOGY

## 2018-01-01 PROCEDURE — 76060000036 HC ANESTHESIA 2.5 TO 3 HR: Performed by: SURGERY

## 2018-01-01 PROCEDURE — 36415 COLL VENOUS BLD VENIPUNCTURE: CPT | Performed by: FAMILY MEDICINE

## 2018-01-01 PROCEDURE — 77010033678 HC OXYGEN DAILY

## 2018-01-01 PROCEDURE — 74011250636 HC RX REV CODE- 250/636: Performed by: ANESTHESIOLOGY

## 2018-01-01 PROCEDURE — 85027 COMPLETE CBC AUTOMATED: CPT | Performed by: INTERNAL MEDICINE

## 2018-01-01 PROCEDURE — 75810000137 HC PLCMT CENT VENOUS CATH

## 2018-01-01 PROCEDURE — 84100 ASSAY OF PHOSPHORUS: CPT | Performed by: HOSPITALIST

## 2018-01-01 PROCEDURE — G8979 MOBILITY GOAL STATUS: HCPCS

## 2018-01-01 PROCEDURE — 80202 ASSAY OF VANCOMYCIN: CPT | Performed by: SURGERY

## 2018-01-01 PROCEDURE — 87040 BLOOD CULTURE FOR BACTERIA: CPT | Performed by: HOSPITALIST

## 2018-01-01 PROCEDURE — 95816 EEG AWAKE AND DROWSY: CPT | Performed by: INTERNAL MEDICINE

## 2018-01-01 PROCEDURE — 84157 ASSAY OF PROTEIN OTHER: CPT | Performed by: NURSE PRACTITIONER

## 2018-01-01 PROCEDURE — 74011636320 HC RX REV CODE- 636/320: Performed by: SURGERY

## 2018-01-01 PROCEDURE — 96361 HYDRATE IV INFUSION ADD-ON: CPT

## 2018-01-01 PROCEDURE — P9047 ALBUMIN (HUMAN), 25%, 50ML: HCPCS | Performed by: HOSPITALIST

## 2018-01-01 PROCEDURE — 88112 CYTOPATH CELL ENHANCE TECH: CPT | Performed by: FAMILY MEDICINE

## 2018-01-01 PROCEDURE — 84100 ASSAY OF PHOSPHORUS: CPT | Performed by: NURSE PRACTITIONER

## 2018-01-01 PROCEDURE — 77030018836 HC SOL IRR NACL ICUM -A: Performed by: SURGERY

## 2018-01-01 PROCEDURE — P9016 RBC LEUKOCYTES REDUCED: HCPCS | Performed by: EMERGENCY MEDICINE

## 2018-01-01 PROCEDURE — 86900 BLOOD TYPING SEROLOGIC ABO: CPT | Performed by: SURGERY

## 2018-01-01 PROCEDURE — 77030012935 HC DRSG AQUACEL BMS -B: Performed by: SURGERY

## 2018-01-01 PROCEDURE — 74011000250 HC RX REV CODE- 250: Performed by: EMERGENCY MEDICINE

## 2018-01-01 PROCEDURE — 51702 INSERT TEMP BLADDER CATH: CPT

## 2018-01-01 PROCEDURE — 93306 TTE W/DOPPLER COMPLETE: CPT

## 2018-01-01 PROCEDURE — 77030037366 HC STPLR ENDO TRI-STPLR COVD -C: Performed by: SURGERY

## 2018-01-01 PROCEDURE — 77030002996 HC SUT SLK J&J -A: Performed by: SURGERY

## 2018-01-01 PROCEDURE — 77030009965 HC RELD STPLR ENDOS COVD -D: Performed by: SURGERY

## 2018-01-01 PROCEDURE — 77030032490 HC SLV COMPR SCD KNE COVD -B: Performed by: SURGERY

## 2018-01-01 PROCEDURE — 80048 BASIC METABOLIC PNL TOTAL CA: CPT | Performed by: FAMILY MEDICINE

## 2018-01-01 PROCEDURE — 83690 ASSAY OF LIPASE: CPT | Performed by: NURSE PRACTITIONER

## 2018-01-01 PROCEDURE — 88305 TISSUE EXAM BY PATHOLOGIST: CPT | Performed by: FAMILY MEDICINE

## 2018-01-01 PROCEDURE — 0DBU0ZZ EXCISION OF OMENTUM, OPEN APPROACH: ICD-10-PCS | Performed by: SURGERY

## 2018-01-01 PROCEDURE — 85610 PROTHROMBIN TIME: CPT | Performed by: HOSPITALIST

## 2018-01-01 PROCEDURE — 87205 SMEAR GRAM STAIN: CPT | Performed by: SURGERY

## 2018-01-01 PROCEDURE — 82271 OCCULT BLOOD OTHER SOURCES: CPT | Performed by: EMERGENCY MEDICINE

## 2018-01-01 PROCEDURE — 77030018717 HC DRSG GRNUFM KCON -B

## 2018-01-01 PROCEDURE — 30233K1 TRANSFUSION OF NONAUTOLOGOUS FROZEN PLASMA INTO PERIPHERAL VEIN, PERCUTANEOUS APPROACH: ICD-10-PCS | Performed by: HOSPITALIST

## 2018-01-01 PROCEDURE — 83690 ASSAY OF LIPASE: CPT | Performed by: PHYSICIAN ASSISTANT

## 2018-01-01 PROCEDURE — 02HV33Z INSERTION OF INFUSION DEVICE INTO SUPERIOR VENA CAVA, PERCUTANEOUS APPROACH: ICD-10-PCS | Performed by: SURGERY

## 2018-01-01 PROCEDURE — 77030026102 HC DEV TISS ENSEAL G2 J&J -F: Performed by: SURGERY

## 2018-01-01 PROCEDURE — 88360 TUMOR IMMUNOHISTOCHEM/MANUAL: CPT | Performed by: SURGERY

## 2018-01-01 PROCEDURE — 85610 PROTHROMBIN TIME: CPT | Performed by: EMERGENCY MEDICINE

## 2018-01-01 DEVICE — SYSTEM INFUS PRT CATH 8FR L66CM INTRO 8FR CHST TI SGL LUMN: Type: IMPLANTABLE DEVICE | Site: CHEST | Status: FUNCTIONAL

## 2018-01-01 RX ORDER — MIDAZOLAM HYDROCHLORIDE 1 MG/ML
INJECTION, SOLUTION INTRAMUSCULAR; INTRAVENOUS AS NEEDED
Status: DISCONTINUED | OUTPATIENT
Start: 2018-01-01 | End: 2018-01-01 | Stop reason: HOSPADM

## 2018-01-01 RX ORDER — DEXTROSE MONOHYDRATE 50 MG/ML
25 INJECTION, SOLUTION INTRAVENOUS CONTINUOUS
Status: DISCONTINUED | OUTPATIENT
Start: 2018-01-01 | End: 2018-01-01

## 2018-01-01 RX ORDER — SODIUM CHLORIDE 0.9 % (FLUSH) 0.9 %
10 SYRINGE (ML) INJECTION
Status: COMPLETED | OUTPATIENT
Start: 2018-01-01 | End: 2018-01-01

## 2018-01-01 RX ORDER — FENTANYL CITRATE 50 UG/ML
INJECTION, SOLUTION INTRAMUSCULAR; INTRAVENOUS AS NEEDED
Status: DISCONTINUED | OUTPATIENT
Start: 2018-01-01 | End: 2018-01-01 | Stop reason: HOSPADM

## 2018-01-01 RX ORDER — MIDAZOLAM HYDROCHLORIDE 1 MG/ML
1 INJECTION, SOLUTION INTRAMUSCULAR; INTRAVENOUS AS NEEDED
Status: DISCONTINUED | OUTPATIENT
Start: 2018-01-01 | End: 2018-01-01 | Stop reason: HOSPADM

## 2018-01-01 RX ORDER — SODIUM CHLORIDE 0.9 % (FLUSH) 0.9 %
5-10 SYRINGE (ML) INJECTION EVERY 8 HOURS
Status: DISCONTINUED | OUTPATIENT
Start: 2018-01-01 | End: 2018-01-01 | Stop reason: HOSPADM

## 2018-01-01 RX ORDER — SODIUM BICARBONATE 42 MG/ML
1 INJECTION, SOLUTION INTRAVENOUS
Status: COMPLETED | OUTPATIENT
Start: 2018-01-01 | End: 2018-01-01

## 2018-01-01 RX ORDER — VANCOMYCIN HYDROCHLORIDE
1250 EVERY 12 HOURS
Status: DISCONTINUED | OUTPATIENT
Start: 2018-01-01 | End: 2018-01-01 | Stop reason: DRUGHIGH

## 2018-01-01 RX ORDER — AMMONIUM LACTATE 12 G/100G
CREAM TOPICAL
Qty: 1 TUBE | Refills: 2 | Status: SHIPPED | OUTPATIENT
Start: 2018-01-01 | End: 2018-01-01 | Stop reason: DRUGHIGH

## 2018-01-01 RX ORDER — SODIUM CHLORIDE 9 MG/ML
INJECTION, SOLUTION INTRAVENOUS
Status: DISCONTINUED | OUTPATIENT
Start: 2018-01-01 | End: 2018-01-01 | Stop reason: HOSPADM

## 2018-01-01 RX ORDER — NALOXONE HYDROCHLORIDE 0.4 MG/ML
0.4 INJECTION, SOLUTION INTRAMUSCULAR; INTRAVENOUS; SUBCUTANEOUS AS NEEDED
Status: DISCONTINUED | OUTPATIENT
Start: 2018-01-01 | End: 2018-01-01 | Stop reason: HOSPADM

## 2018-01-01 RX ORDER — DEXMEDETOMIDINE HYDROCHLORIDE 4 UG/ML
INJECTION, SOLUTION INTRAVENOUS AS NEEDED
Status: DISCONTINUED | OUTPATIENT
Start: 2018-01-01 | End: 2018-01-01 | Stop reason: HOSPADM

## 2018-01-01 RX ORDER — LIDOCAINE HYDROCHLORIDE AND EPINEPHRINE 10; 10 MG/ML; UG/ML
INJECTION, SOLUTION INFILTRATION; PERINEURAL AS NEEDED
Status: DISCONTINUED | OUTPATIENT
Start: 2018-01-01 | End: 2018-01-01 | Stop reason: HOSPADM

## 2018-01-01 RX ORDER — POTASSIUM CHLORIDE 750 MG/1
30 TABLET, FILM COATED, EXTENDED RELEASE ORAL
Status: COMPLETED | OUTPATIENT
Start: 2018-01-01 | End: 2018-01-01

## 2018-01-01 RX ORDER — ONDANSETRON 2 MG/ML
INJECTION INTRAMUSCULAR; INTRAVENOUS AS NEEDED
Status: DISCONTINUED | OUTPATIENT
Start: 2018-01-01 | End: 2018-01-01 | Stop reason: HOSPADM

## 2018-01-01 RX ORDER — SODIUM CHLORIDE 9 MG/ML
50 INJECTION, SOLUTION INTRAVENOUS CONTINUOUS
Status: DISCONTINUED | OUTPATIENT
Start: 2018-01-01 | End: 2018-01-01

## 2018-01-01 RX ORDER — INSULIN LISPRO 100 [IU]/ML
INJECTION, SOLUTION INTRAVENOUS; SUBCUTANEOUS EVERY 6 HOURS
Status: DISCONTINUED | OUTPATIENT
Start: 2018-01-01 | End: 2018-01-01 | Stop reason: SDUPTHER

## 2018-01-01 RX ORDER — HYDROMORPHONE HYDROCHLORIDE 2 MG/ML
INJECTION, SOLUTION INTRAMUSCULAR; INTRAVENOUS; SUBCUTANEOUS AS NEEDED
Status: DISCONTINUED | OUTPATIENT
Start: 2018-01-01 | End: 2018-01-01 | Stop reason: HOSPADM

## 2018-01-01 RX ORDER — INSULIN GLARGINE 100 [IU]/ML
25 INJECTION, SOLUTION SUBCUTANEOUS DAILY
Status: DISCONTINUED | OUTPATIENT
Start: 2018-01-01 | End: 2018-01-01 | Stop reason: HOSPADM

## 2018-01-01 RX ORDER — SODIUM CHLORIDE 9 MG/ML
25 INJECTION, SOLUTION INTRAVENOUS CONTINUOUS
Status: DISCONTINUED | OUTPATIENT
Start: 2018-01-01 | End: 2018-01-01

## 2018-01-01 RX ORDER — SIMVASTATIN 20 MG/1
20 TABLET, FILM COATED ORAL
Status: DISCONTINUED | OUTPATIENT
Start: 2018-01-01 | End: 2018-01-01 | Stop reason: HOSPADM

## 2018-01-01 RX ORDER — SODIUM CHLORIDE 0.9 % (FLUSH) 0.9 %
5-10 SYRINGE (ML) INJECTION EVERY 8 HOURS
Status: DISCONTINUED | OUTPATIENT
Start: 2018-01-01 | End: 2018-01-01

## 2018-01-01 RX ORDER — ONDANSETRON 2 MG/ML
4 INJECTION INTRAMUSCULAR; INTRAVENOUS AS NEEDED
Status: DISCONTINUED | OUTPATIENT
Start: 2018-01-01 | End: 2018-01-01

## 2018-01-01 RX ORDER — ONDANSETRON 2 MG/ML
4 INJECTION INTRAMUSCULAR; INTRAVENOUS
Status: DISCONTINUED | OUTPATIENT
Start: 2018-01-01 | End: 2018-01-01

## 2018-01-01 RX ORDER — SIMVASTATIN 20 MG/1
20 TABLET, FILM COATED ORAL
Qty: 90 TAB | Refills: 1 | Status: SHIPPED | OUTPATIENT
Start: 2018-01-01

## 2018-01-01 RX ORDER — HYDROMORPHONE HYDROCHLORIDE 1 MG/ML
1 INJECTION, SOLUTION INTRAMUSCULAR; INTRAVENOUS; SUBCUTANEOUS
Status: DISCONTINUED | OUTPATIENT
Start: 2018-01-01 | End: 2018-01-01

## 2018-01-01 RX ORDER — FLUTICASONE PROPIONATE 50 MCG
2 SPRAY, SUSPENSION (ML) NASAL
COMMUNITY

## 2018-01-01 RX ORDER — INSULIN GLARGINE 100 [IU]/ML
5 INJECTION, SOLUTION SUBCUTANEOUS EVERY 24 HOURS
Status: DISCONTINUED | OUTPATIENT
Start: 2018-01-01 | End: 2018-01-01

## 2018-01-01 RX ORDER — ONDANSETRON 2 MG/ML
4 INJECTION INTRAMUSCULAR; INTRAVENOUS
Status: DISCONTINUED | OUTPATIENT
Start: 2018-01-01 | End: 2018-01-01 | Stop reason: HOSPADM

## 2018-01-01 RX ORDER — MIDAZOLAM HYDROCHLORIDE 1 MG/ML
0.5 INJECTION, SOLUTION INTRAMUSCULAR; INTRAVENOUS
Status: DISCONTINUED | OUTPATIENT
Start: 2018-01-01 | End: 2018-01-01 | Stop reason: HOSPADM

## 2018-01-01 RX ORDER — DEXTROSE 50 % IN WATER (D50W) INTRAVENOUS SYRINGE
12.5-25 AS NEEDED
Status: DISCONTINUED | OUTPATIENT
Start: 2018-01-01 | End: 2018-01-01 | Stop reason: SDUPTHER

## 2018-01-01 RX ORDER — PHENYLEPHRINE HCL IN 0.9% NACL 0.4MG/10ML
SYRINGE (ML) INTRAVENOUS AS NEEDED
Status: DISCONTINUED | OUTPATIENT
Start: 2018-01-01 | End: 2018-01-01 | Stop reason: HOSPADM

## 2018-01-01 RX ORDER — LIDOCAINE HYDROCHLORIDE 20 MG/ML
INJECTION, SOLUTION EPIDURAL; INFILTRATION; INTRACAUDAL; PERINEURAL AS NEEDED
Status: DISCONTINUED | OUTPATIENT
Start: 2018-01-01 | End: 2018-01-01 | Stop reason: HOSPADM

## 2018-01-01 RX ORDER — ROPIVACAINE HYDROCHLORIDE 5 MG/ML
30 INJECTION, SOLUTION EPIDURAL; INFILTRATION; PERINEURAL AS NEEDED
Status: DISCONTINUED | OUTPATIENT
Start: 2018-01-01 | End: 2018-01-01 | Stop reason: HOSPADM

## 2018-01-01 RX ORDER — SODIUM CHLORIDE 0.9 % (FLUSH) 0.9 %
5-10 SYRINGE (ML) INJECTION AS NEEDED
Status: DISCONTINUED | OUTPATIENT
Start: 2018-01-01 | End: 2018-01-01 | Stop reason: HOSPADM

## 2018-01-01 RX ORDER — ALBUTEROL SULFATE 0.83 MG/ML
2.5 SOLUTION RESPIRATORY (INHALATION)
Status: DISCONTINUED | OUTPATIENT
Start: 2018-01-01 | End: 2018-01-01 | Stop reason: HOSPADM

## 2018-01-01 RX ORDER — INSULIN GLARGINE 100 [IU]/ML
30 INJECTION, SOLUTION SUBCUTANEOUS
Status: DISCONTINUED | OUTPATIENT
Start: 2018-01-01 | End: 2018-01-01

## 2018-01-01 RX ORDER — LORATADINE 10 MG/1
10 TABLET ORAL DAILY
Status: DISCONTINUED | OUTPATIENT
Start: 2018-01-01 | End: 2018-01-01

## 2018-01-01 RX ORDER — DEXTROSE 50 % IN WATER (D50W) INTRAVENOUS SYRINGE
12.5-25 AS NEEDED
Status: DISCONTINUED | OUTPATIENT
Start: 2018-01-01 | End: 2018-01-01

## 2018-01-01 RX ORDER — VANCOMYCIN 2 GRAM/500 ML IN 0.9 % SODIUM CHLORIDE INTRAVENOUS
2000
Status: COMPLETED | OUTPATIENT
Start: 2018-01-01 | End: 2018-01-01

## 2018-01-01 RX ORDER — ONDANSETRON 4 MG/1
4 TABLET, ORALLY DISINTEGRATING ORAL
Status: DISCONTINUED | OUTPATIENT
Start: 2018-01-01 | End: 2018-01-01 | Stop reason: HOSPADM

## 2018-01-01 RX ORDER — OXYCODONE HYDROCHLORIDE 5 MG/1
5 TABLET ORAL
Qty: 21 TAB | Refills: 0 | Status: SHIPPED | OUTPATIENT
Start: 2018-01-01

## 2018-01-01 RX ORDER — ALBUTEROL SULFATE 90 UG/1
1 AEROSOL, METERED RESPIRATORY (INHALATION)
Status: DISCONTINUED | OUTPATIENT
Start: 2018-01-01 | End: 2018-01-01 | Stop reason: CLARIF

## 2018-01-01 RX ORDER — LIDOCAINE HYDROCHLORIDE 10 MG/ML
0.1 INJECTION, SOLUTION EPIDURAL; INFILTRATION; INTRACAUDAL; PERINEURAL AS NEEDED
Status: DISCONTINUED | OUTPATIENT
Start: 2018-01-01 | End: 2018-01-01 | Stop reason: HOSPADM

## 2018-01-01 RX ORDER — SODIUM CHLORIDE 9 MG/ML
1000 INJECTION, SOLUTION INTRAVENOUS ONCE
Status: COMPLETED | OUTPATIENT
Start: 2018-01-01 | End: 2018-01-01

## 2018-01-01 RX ORDER — ALBUMIN HUMAN 50 G/1000ML
SOLUTION INTRAVENOUS AS NEEDED
Status: DISCONTINUED | OUTPATIENT
Start: 2018-01-01 | End: 2018-01-01 | Stop reason: HOSPADM

## 2018-01-01 RX ORDER — NOREPINEPHRINE BITARTRATE/D5W 8 MG/250ML
2-30 PLASTIC BAG, INJECTION (ML) INTRAVENOUS
Status: DISCONTINUED | OUTPATIENT
Start: 2018-01-01 | End: 2018-01-01

## 2018-01-01 RX ORDER — OXYCODONE HYDROCHLORIDE 5 MG/1
5 TABLET ORAL AS NEEDED
Status: DISCONTINUED | OUTPATIENT
Start: 2018-01-01 | End: 2018-01-01 | Stop reason: HOSPADM

## 2018-01-01 RX ORDER — INSULIN GLARGINE 100 [IU]/ML
47 INJECTION, SOLUTION SUBCUTANEOUS
Status: DISCONTINUED | OUTPATIENT
Start: 2018-01-01 | End: 2018-01-01 | Stop reason: HOSPADM

## 2018-01-01 RX ORDER — CELECOXIB 200 MG/1
200 CAPSULE ORAL ONCE
Status: DISCONTINUED | OUTPATIENT
Start: 2018-01-01 | End: 2018-01-01 | Stop reason: HOSPADM

## 2018-01-01 RX ORDER — ACETAMINOPHEN 325 MG/1
650 TABLET ORAL
Status: DISCONTINUED | OUTPATIENT
Start: 2018-01-01 | End: 2018-01-01 | Stop reason: SINTOL

## 2018-01-01 RX ORDER — HEPARIN 100 UNIT/ML
300 SYRINGE INTRAVENOUS AS NEEDED
Status: DISCONTINUED | OUTPATIENT
Start: 2018-01-01 | End: 2018-01-01 | Stop reason: HOSPADM

## 2018-01-01 RX ORDER — LIDOCAINE HYDROCHLORIDE 10 MG/ML
5 INJECTION, SOLUTION EPIDURAL; INFILTRATION; INTRACAUDAL; PERINEURAL
Status: DISPENSED | OUTPATIENT
Start: 2018-01-01 | End: 2018-01-01

## 2018-01-01 RX ORDER — INSULIN GLARGINE 100 [IU]/ML
28 INJECTION, SOLUTION SUBCUTANEOUS DAILY
Status: DISCONTINUED | OUTPATIENT
Start: 2018-01-01 | End: 2018-01-01 | Stop reason: HOSPADM

## 2018-01-01 RX ORDER — AMOXICILLIN 250 MG
1 CAPSULE ORAL
Qty: 30 TAB | Refills: 0 | Status: SHIPPED | OUTPATIENT
Start: 2018-01-01 | End: 2018-01-01

## 2018-01-01 RX ORDER — LISINOPRIL 10 MG/1
10 TABLET ORAL DAILY
Status: DISCONTINUED | OUTPATIENT
Start: 2018-01-01 | End: 2018-01-01 | Stop reason: HOSPADM

## 2018-01-01 RX ORDER — METRONIDAZOLE 500 MG/1
500 TABLET ORAL 3 TIMES DAILY
Qty: 30 TAB | Refills: 0 | Status: SHIPPED | OUTPATIENT
Start: 2018-01-01 | End: 2018-01-01

## 2018-01-01 RX ORDER — SODIUM CHLORIDE 9 MG/ML
50 INJECTION, SOLUTION INTRAVENOUS CONTINUOUS
Status: DISCONTINUED | OUTPATIENT
Start: 2018-01-01 | End: 2018-01-01 | Stop reason: HOSPADM

## 2018-01-01 RX ORDER — INSULIN GLARGINE 100 [IU]/ML
10 INJECTION, SOLUTION SUBCUTANEOUS ONCE
Status: COMPLETED | OUTPATIENT
Start: 2018-01-01 | End: 2018-01-01

## 2018-01-01 RX ORDER — INSULIN LISPRO 100 [IU]/ML
INJECTION, SOLUTION INTRAVENOUS; SUBCUTANEOUS EVERY 6 HOURS
Status: DISCONTINUED | OUTPATIENT
Start: 2018-01-01 | End: 2018-01-01

## 2018-01-01 RX ORDER — METOPROLOL TARTRATE 50 MG/1
50 TABLET ORAL DAILY
Status: DISCONTINUED | OUTPATIENT
Start: 2018-01-01 | End: 2018-01-01

## 2018-01-01 RX ORDER — SUCCINYLCHOLINE CHLORIDE 20 MG/ML
INJECTION INTRAMUSCULAR; INTRAVENOUS AS NEEDED
Status: DISCONTINUED | OUTPATIENT
Start: 2018-01-01 | End: 2018-01-01 | Stop reason: HOSPADM

## 2018-01-01 RX ORDER — SODIUM CHLORIDE, SODIUM LACTATE, POTASSIUM CHLORIDE, CALCIUM CHLORIDE 600; 310; 30; 20 MG/100ML; MG/100ML; MG/100ML; MG/100ML
25 INJECTION, SOLUTION INTRAVENOUS CONTINUOUS
Status: DISCONTINUED | OUTPATIENT
Start: 2018-01-01 | End: 2018-01-01 | Stop reason: HOSPADM

## 2018-01-01 RX ORDER — PROPOFOL 10 MG/ML
INJECTION, EMULSION INTRAVENOUS AS NEEDED
Status: DISCONTINUED | OUTPATIENT
Start: 2018-01-01 | End: 2018-01-01 | Stop reason: HOSPADM

## 2018-01-01 RX ORDER — SODIUM CHLORIDE 450 MG/100ML
100 INJECTION, SOLUTION INTRAVENOUS CONTINUOUS
Status: DISCONTINUED | OUTPATIENT
Start: 2018-01-01 | End: 2018-01-01

## 2018-01-01 RX ORDER — OXYCODONE HYDROCHLORIDE 5 MG/1
5 TABLET ORAL
Status: DISCONTINUED | OUTPATIENT
Start: 2018-01-01 | End: 2018-01-01 | Stop reason: HOSPADM

## 2018-01-01 RX ORDER — ENOXAPARIN SODIUM 100 MG/ML
40 INJECTION SUBCUTANEOUS EVERY 24 HOURS
Status: DISCONTINUED | OUTPATIENT
Start: 2018-01-01 | End: 2018-01-01 | Stop reason: HOSPADM

## 2018-01-01 RX ORDER — ROCURONIUM BROMIDE 10 MG/ML
INJECTION, SOLUTION INTRAVENOUS AS NEEDED
Status: DISCONTINUED | OUTPATIENT
Start: 2018-01-01 | End: 2018-01-01 | Stop reason: HOSPADM

## 2018-01-01 RX ORDER — DEXTROSE MONOHYDRATE AND SODIUM CHLORIDE 5; .45 G/100ML; G/100ML
75 INJECTION, SOLUTION INTRAVENOUS CONTINUOUS
Status: DISCONTINUED | OUTPATIENT
Start: 2018-01-01 | End: 2018-01-01

## 2018-01-01 RX ORDER — KETOROLAC TROMETHAMINE 30 MG/ML
15 INJECTION, SOLUTION INTRAMUSCULAR; INTRAVENOUS
Status: ACTIVE | OUTPATIENT
Start: 2018-01-01 | End: 2018-01-01

## 2018-01-01 RX ORDER — SODIUM CHLORIDE 0.9 % (FLUSH) 0.9 %
5-10 SYRINGE (ML) INJECTION AS NEEDED
Status: DISCONTINUED | OUTPATIENT
Start: 2018-01-01 | End: 2018-01-01

## 2018-01-01 RX ORDER — ROPIVACAINE HYDROCHLORIDE 5 MG/ML
30 INJECTION, SOLUTION EPIDURAL; INFILTRATION; PERINEURAL ONCE
Status: DISCONTINUED | OUTPATIENT
Start: 2018-01-01 | End: 2018-01-01 | Stop reason: HOSPADM

## 2018-01-01 RX ORDER — ALBUTEROL SULFATE 90 UG/1
1 AEROSOL, METERED RESPIRATORY (INHALATION)
Qty: 1 INHALER | Refills: 0 | Status: SHIPPED | OUTPATIENT
Start: 2018-01-01

## 2018-01-01 RX ORDER — CEFOTETAN DISODIUM 2 G/20ML
INJECTION, POWDER, FOR SOLUTION INTRAMUSCULAR; INTRAVENOUS
Status: COMPLETED
Start: 2018-01-01 | End: 2018-01-01

## 2018-01-01 RX ORDER — FENTANYL CITRATE 50 UG/ML
25 INJECTION, SOLUTION INTRAMUSCULAR; INTRAVENOUS
Status: DISCONTINUED | OUTPATIENT
Start: 2018-01-01 | End: 2018-01-01

## 2018-01-01 RX ORDER — FLUTICASONE PROPIONATE 50 MCG
2 SPRAY, SUSPENSION (ML) NASAL DAILY
Status: DISCONTINUED | OUTPATIENT
Start: 2018-01-01 | End: 2018-01-01

## 2018-01-01 RX ORDER — IBUPROFEN 600 MG/1
600 TABLET ORAL EVERY 8 HOURS
Qty: 12 TAB | Refills: 0 | Status: SHIPPED | OUTPATIENT
Start: 2018-01-01 | End: 2018-01-01

## 2018-01-01 RX ORDER — SODIUM CHLORIDE, SODIUM LACTATE, POTASSIUM CHLORIDE, CALCIUM CHLORIDE 600; 310; 30; 20 MG/100ML; MG/100ML; MG/100ML; MG/100ML
100 INJECTION, SOLUTION INTRAVENOUS CONTINUOUS
Status: DISCONTINUED | OUTPATIENT
Start: 2018-01-01 | End: 2018-01-01 | Stop reason: HOSPADM

## 2018-01-01 RX ORDER — POTASSIUM CHLORIDE 750 MG/1
40 TABLET, FILM COATED, EXTENDED RELEASE ORAL
Status: DISCONTINUED | OUTPATIENT
Start: 2018-01-01 | End: 2018-01-01

## 2018-01-01 RX ORDER — METRONIDAZOLE 500 MG/100ML
500 INJECTION, SOLUTION INTRAVENOUS EVERY 12 HOURS
Status: DISCONTINUED | OUTPATIENT
Start: 2018-01-01 | End: 2018-01-01

## 2018-01-01 RX ORDER — MORPHINE SULFATE 10 MG/ML
2 INJECTION, SOLUTION INTRAMUSCULAR; INTRAVENOUS
Status: DISCONTINUED | OUTPATIENT
Start: 2018-01-01 | End: 2018-01-01 | Stop reason: HOSPADM

## 2018-01-01 RX ORDER — FAMOTIDINE 20 MG/1
20 TABLET, FILM COATED ORAL 2 TIMES DAILY
Status: DISCONTINUED | OUTPATIENT
Start: 2018-01-01 | End: 2018-01-01 | Stop reason: HOSPADM

## 2018-01-01 RX ORDER — ALBUMIN HUMAN 250 G/1000ML
25 SOLUTION INTRAVENOUS ONCE
Status: COMPLETED | OUTPATIENT
Start: 2018-01-01 | End: 2018-01-01

## 2018-01-01 RX ORDER — INSULIN LISPRO 100 [IU]/ML
INJECTION, SOLUTION INTRAVENOUS; SUBCUTANEOUS EVERY 6 HOURS
Status: DISCONTINUED | OUTPATIENT
Start: 2018-01-01 | End: 2018-01-01 | Stop reason: HOSPADM

## 2018-01-01 RX ORDER — ENOXAPARIN SODIUM 100 MG/ML
40 INJECTION SUBCUTANEOUS DAILY
Status: DISCONTINUED | OUTPATIENT
Start: 2018-01-01 | End: 2018-01-01 | Stop reason: HOSPADM

## 2018-01-01 RX ORDER — MAGNESIUM SULFATE 100 %
4 CRYSTALS MISCELLANEOUS AS NEEDED
Status: DISCONTINUED | OUTPATIENT
Start: 2018-01-01 | End: 2018-01-01 | Stop reason: HOSPADM

## 2018-01-01 RX ORDER — ENOXAPARIN SODIUM 100 MG/ML
40 INJECTION SUBCUTANEOUS EVERY 24 HOURS
Status: DISCONTINUED | OUTPATIENT
Start: 2018-01-01 | End: 2018-01-01

## 2018-01-01 RX ORDER — MUPIROCIN 20 MG/G
OINTMENT TOPICAL EVERY 12 HOURS
Status: DISCONTINUED | OUTPATIENT
Start: 2018-01-01 | End: 2018-01-01 | Stop reason: HOSPADM

## 2018-01-01 RX ORDER — LEVOFLOXACIN 5 MG/ML
750 INJECTION, SOLUTION INTRAVENOUS EVERY 24 HOURS
Status: DISCONTINUED | OUTPATIENT
Start: 2018-01-01 | End: 2018-01-01 | Stop reason: DRUGHIGH

## 2018-01-01 RX ORDER — INSULIN LISPRO 100 [IU]/ML
INJECTION, SOLUTION INTRAVENOUS; SUBCUTANEOUS
Status: DISCONTINUED | OUTPATIENT
Start: 2018-01-01 | End: 2018-01-01 | Stop reason: HOSPADM

## 2018-01-01 RX ORDER — NEOSTIGMINE METHYLSULFATE 1 MG/ML
INJECTION INTRAVENOUS AS NEEDED
Status: DISCONTINUED | OUTPATIENT
Start: 2018-01-01 | End: 2018-01-01 | Stop reason: HOSPADM

## 2018-01-01 RX ORDER — POTASSIUM CHLORIDE 750 MG/1
40 TABLET, FILM COATED, EXTENDED RELEASE ORAL 2 TIMES DAILY
Status: COMPLETED | OUTPATIENT
Start: 2018-01-01 | End: 2018-01-01

## 2018-01-01 RX ORDER — POTASSIUM CHLORIDE 750 MG/1
10 TABLET, FILM COATED, EXTENDED RELEASE ORAL DAILY
Status: COMPLETED | OUTPATIENT
Start: 2018-01-01 | End: 2018-01-01

## 2018-01-01 RX ORDER — FENTANYL CITRATE-0.9 % NACL/PF 900MCG/30
PATIENT CONTROLLED ANALGESIA VIAL INJECTION
Status: DISCONTINUED | OUTPATIENT
Start: 2018-01-01 | End: 2018-01-01

## 2018-01-01 RX ORDER — SODIUM CHLORIDE, SODIUM LACTATE, POTASSIUM CHLORIDE, CALCIUM CHLORIDE 600; 310; 30; 20 MG/100ML; MG/100ML; MG/100ML; MG/100ML
125 INJECTION, SOLUTION INTRAVENOUS CONTINUOUS
Status: DISCONTINUED | OUTPATIENT
Start: 2018-01-01 | End: 2018-01-01

## 2018-01-01 RX ORDER — LANOLIN ALCOHOL/MO/W.PET/CERES
1 CREAM (GRAM) TOPICAL
Status: DISCONTINUED | OUTPATIENT
Start: 2018-01-01 | End: 2018-01-01 | Stop reason: HOSPADM

## 2018-01-01 RX ORDER — HYDROMORPHONE HYDROCHLORIDE 1 MG/ML
0.5 INJECTION, SOLUTION INTRAMUSCULAR; INTRAVENOUS; SUBCUTANEOUS
Status: DISCONTINUED | OUTPATIENT
Start: 2018-01-01 | End: 2018-01-01 | Stop reason: HOSPADM

## 2018-01-01 RX ORDER — POTASSIUM CHLORIDE 14.9 MG/ML
10 INJECTION INTRAVENOUS
Status: COMPLETED | OUTPATIENT
Start: 2018-01-01 | End: 2018-01-01

## 2018-01-01 RX ORDER — SODIUM BICARBONATE 1 MEQ/ML
SYRINGE (ML) INTRAVENOUS
Status: COMPLETED | OUTPATIENT
Start: 2018-01-01 | End: 2018-01-01

## 2018-01-01 RX ORDER — MAGNESIUM SULFATE 100 %
4 CRYSTALS MISCELLANEOUS AS NEEDED
Status: DISCONTINUED | OUTPATIENT
Start: 2018-01-01 | End: 2018-01-01 | Stop reason: SDUPTHER

## 2018-01-01 RX ORDER — SODIUM CHLORIDE 9 MG/ML
50 INJECTION, SOLUTION INTRAVENOUS
Status: COMPLETED | OUTPATIENT
Start: 2018-01-01 | End: 2018-01-01

## 2018-01-01 RX ORDER — LEVOFLOXACIN 750 MG/1
750 TABLET ORAL EVERY 24 HOURS
Status: DISCONTINUED | OUTPATIENT
Start: 2018-01-01 | End: 2018-01-01

## 2018-01-01 RX ORDER — INSULIN GLARGINE 100 [IU]/ML
30 INJECTION, SOLUTION SUBCUTANEOUS DAILY
Status: DISCONTINUED | OUTPATIENT
Start: 2018-01-01 | End: 2018-01-01 | Stop reason: HOSPADM

## 2018-01-01 RX ORDER — METOPROLOL TARTRATE 50 MG/1
50 TABLET ORAL 2 TIMES DAILY
Status: DISCONTINUED | OUTPATIENT
Start: 2018-01-01 | End: 2018-01-01 | Stop reason: HOSPADM

## 2018-01-01 RX ORDER — SAME BUTANEDISULFONATE/BETAINE 400-600 MG
250 POWDER IN PACKET (EA) ORAL DAILY
Status: DISCONTINUED | OUTPATIENT
Start: 2018-01-01 | End: 2018-01-01 | Stop reason: HOSPADM

## 2018-01-01 RX ORDER — METOPROLOL TARTRATE 5 MG/5ML
2.5 INJECTION INTRAVENOUS
Status: DISCONTINUED | OUTPATIENT
Start: 2018-01-01 | End: 2018-01-01

## 2018-01-01 RX ORDER — KETOROLAC TROMETHAMINE 30 MG/ML
15 INJECTION, SOLUTION INTRAMUSCULAR; INTRAVENOUS
Status: COMPLETED | OUTPATIENT
Start: 2018-01-01 | End: 2018-01-01

## 2018-01-01 RX ORDER — LORAZEPAM 2 MG/ML
2 INJECTION INTRAMUSCULAR
Status: DISCONTINUED | OUTPATIENT
Start: 2018-01-01 | End: 2018-01-01 | Stop reason: HOSPADM

## 2018-01-01 RX ORDER — METOPROLOL TARTRATE 50 MG/1
TABLET ORAL
Qty: 180 TAB | Refills: 0 | Status: SHIPPED | OUTPATIENT
Start: 2018-01-01

## 2018-01-01 RX ORDER — FENTANYL CITRATE 50 UG/ML
25 INJECTION, SOLUTION INTRAMUSCULAR; INTRAVENOUS
Status: DISCONTINUED | OUTPATIENT
Start: 2018-01-01 | End: 2018-01-01 | Stop reason: HOSPADM

## 2018-01-01 RX ORDER — VANCOMYCIN 2 GRAM/500 ML IN 0.9 % SODIUM CHLORIDE INTRAVENOUS
2000 ONCE
Status: COMPLETED | OUTPATIENT
Start: 2018-01-01 | End: 2018-01-01

## 2018-01-01 RX ORDER — METRONIDAZOLE 250 MG/1
500 TABLET ORAL EVERY 12 HOURS
Status: DISCONTINUED | OUTPATIENT
Start: 2018-01-01 | End: 2018-01-01

## 2018-01-01 RX ORDER — CHLORHEXIDINE GLUCONATE 1.2 MG/ML
15 RINSE ORAL EVERY 12 HOURS
Status: DISCONTINUED | OUTPATIENT
Start: 2018-01-01 | End: 2018-01-01

## 2018-01-01 RX ORDER — AZITHROMYCIN 250 MG/1
TABLET, FILM COATED ORAL
Qty: 6 TAB | Refills: 0 | Status: SHIPPED | OUTPATIENT
Start: 2018-01-01 | End: 2018-01-01

## 2018-01-01 RX ORDER — GLYCOPYRROLATE 0.2 MG/ML
INJECTION INTRAMUSCULAR; INTRAVENOUS AS NEEDED
Status: DISCONTINUED | OUTPATIENT
Start: 2018-01-01 | End: 2018-01-01 | Stop reason: HOSPADM

## 2018-01-01 RX ORDER — INSULIN GLARGINE 100 [IU]/ML
28 INJECTION, SOLUTION SUBCUTANEOUS ONCE
Status: COMPLETED | OUTPATIENT
Start: 2018-01-01 | End: 2018-01-01

## 2018-01-01 RX ORDER — LEVOFLOXACIN 500 MG/1
500 TABLET, FILM COATED ORAL DAILY
Qty: 10 TAB | Refills: 0 | Status: SHIPPED | OUTPATIENT
Start: 2018-01-01 | End: 2018-01-01

## 2018-01-01 RX ORDER — INSULIN GLARGINE 100 [IU]/ML
47 INJECTION, SOLUTION SUBCUTANEOUS DAILY
Status: DISCONTINUED | OUTPATIENT
Start: 2018-01-01 | End: 2018-01-01

## 2018-01-01 RX ORDER — LIDOCAINE HYDROCHLORIDE 10 MG/ML
10 INJECTION, SOLUTION EPIDURAL; INFILTRATION; INTRACAUDAL; PERINEURAL
Status: COMPLETED | OUTPATIENT
Start: 2018-01-01 | End: 2018-01-01

## 2018-01-01 RX ORDER — FENTANYL CITRATE 50 UG/ML
50 INJECTION, SOLUTION INTRAMUSCULAR; INTRAVENOUS
Status: DISCONTINUED | OUTPATIENT
Start: 2018-01-01 | End: 2018-01-01 | Stop reason: HOSPADM

## 2018-01-01 RX ORDER — METRONIDAZOLE 500 MG/100ML
500 INJECTION, SOLUTION INTRAVENOUS EVERY 8 HOURS
Status: DISCONTINUED | OUTPATIENT
Start: 2018-01-01 | End: 2018-01-01 | Stop reason: DRUGHIGH

## 2018-01-01 RX ORDER — SODIUM CHLORIDE, SODIUM LACTATE, POTASSIUM CHLORIDE, CALCIUM CHLORIDE 600; 310; 30; 20 MG/100ML; MG/100ML; MG/100ML; MG/100ML
75 INJECTION, SOLUTION INTRAVENOUS CONTINUOUS
Status: DISCONTINUED | OUTPATIENT
Start: 2018-01-01 | End: 2018-01-01

## 2018-01-01 RX ORDER — FENTANYL CITRATE 50 UG/ML
50 INJECTION, SOLUTION INTRAMUSCULAR; INTRAVENOUS AS NEEDED
Status: DISCONTINUED | OUTPATIENT
Start: 2018-01-01 | End: 2018-01-01 | Stop reason: HOSPADM

## 2018-01-01 RX ORDER — MORPHINE SULFATE 4 MG/ML
2-8 INJECTION INTRAVENOUS AS NEEDED
Status: DISCONTINUED | OUTPATIENT
Start: 2018-01-01 | End: 2018-01-01 | Stop reason: HOSPADM

## 2018-01-01 RX ORDER — SODIUM CHLORIDE, SODIUM LACTATE, POTASSIUM CHLORIDE, CALCIUM CHLORIDE 600; 310; 30; 20 MG/100ML; MG/100ML; MG/100ML; MG/100ML
125 INJECTION, SOLUTION INTRAVENOUS CONTINUOUS
Status: DISCONTINUED | OUTPATIENT
Start: 2018-01-01 | End: 2018-01-01 | Stop reason: HOSPADM

## 2018-01-01 RX ORDER — SODIUM CHLORIDE 9 MG/ML
250 INJECTION, SOLUTION INTRAVENOUS AS NEEDED
Status: DISCONTINUED | OUTPATIENT
Start: 2018-01-01 | End: 2018-01-01

## 2018-01-01 RX ORDER — SCOLOPAMINE TRANSDERMAL SYSTEM 1 MG/1
1 PATCH, EXTENDED RELEASE TRANSDERMAL
Status: DISCONTINUED | OUTPATIENT
Start: 2018-01-01 | End: 2018-01-01 | Stop reason: HOSPADM

## 2018-01-01 RX ORDER — DEXTROSE MONOHYDRATE AND SODIUM CHLORIDE 5; .9 G/100ML; G/100ML
75 INJECTION, SOLUTION INTRAVENOUS CONTINUOUS
Status: DISCONTINUED | OUTPATIENT
Start: 2018-01-01 | End: 2018-01-01

## 2018-01-01 RX ORDER — AMMONIUM LACTATE 12 G/100G
CREAM TOPICAL
COMMUNITY

## 2018-01-01 RX ORDER — LEVOFLOXACIN 5 MG/ML
750 INJECTION, SOLUTION INTRAVENOUS
Status: DISCONTINUED | OUTPATIENT
Start: 2018-01-01 | End: 2018-01-01

## 2018-01-01 RX ORDER — AMOXICILLIN 250 MG
2 CAPSULE ORAL
Status: COMPLETED | OUTPATIENT
Start: 2018-01-01 | End: 2018-01-01

## 2018-01-01 RX ORDER — POTASSIUM CHLORIDE AND SODIUM CHLORIDE 450; 150 MG/100ML; MG/100ML
INJECTION, SOLUTION INTRAVENOUS CONTINUOUS
Status: DISCONTINUED | OUTPATIENT
Start: 2018-01-01 | End: 2018-01-01

## 2018-01-01 RX ORDER — OXYCODONE HYDROCHLORIDE 5 MG/1
5 TABLET ORAL
Qty: 20 TAB | Refills: 0 | Status: SHIPPED | OUTPATIENT
Start: 2018-01-01 | End: 2018-01-01

## 2018-01-01 RX ORDER — ALVIMOPAN 12 MG/1
12 CAPSULE ORAL ONCE
Status: DISCONTINUED | OUTPATIENT
Start: 2018-01-01 | End: 2018-01-01 | Stop reason: HOSPADM

## 2018-01-01 RX ORDER — ALVIMOPAN 12 MG/1
12 CAPSULE ORAL 2 TIMES DAILY
Status: DISCONTINUED | OUTPATIENT
Start: 2018-01-01 | End: 2018-01-01

## 2018-01-01 RX ORDER — MAGNESIUM SULFATE 100 %
4 CRYSTALS MISCELLANEOUS AS NEEDED
Status: DISCONTINUED | OUTPATIENT
Start: 2018-01-01 | End: 2018-01-01

## 2018-01-01 RX ORDER — DIPHENHYDRAMINE HYDROCHLORIDE 50 MG/ML
12.5 INJECTION, SOLUTION INTRAMUSCULAR; INTRAVENOUS AS NEEDED
Status: DISCONTINUED | OUTPATIENT
Start: 2018-01-01 | End: 2018-01-01 | Stop reason: HOSPADM

## 2018-01-01 RX ORDER — DEXTROSE 50 % IN WATER (D50W) INTRAVENOUS SYRINGE
12.5-25 AS NEEDED
Status: DISCONTINUED | OUTPATIENT
Start: 2018-01-01 | End: 2018-01-01 | Stop reason: HOSPADM

## 2018-01-01 RX ORDER — INSULIN GLARGINE 100 [IU]/ML
30 INJECTION, SOLUTION SUBCUTANEOUS
Status: DISCONTINUED | OUTPATIENT
Start: 2018-01-01 | End: 2018-01-01 | Stop reason: HOSPADM

## 2018-01-01 RX ORDER — POTASSIUM CHLORIDE 7.45 MG/ML
10 INJECTION INTRAVENOUS
Status: COMPLETED | OUTPATIENT
Start: 2018-01-01 | End: 2018-01-01

## 2018-01-01 RX ORDER — ENOXAPARIN SODIUM 100 MG/ML
40 INJECTION SUBCUTANEOUS EVERY 24 HOURS
Status: DISCONTINUED | OUTPATIENT
Start: 2018-01-01 | End: 2018-01-01 | Stop reason: SDUPTHER

## 2018-01-01 RX ORDER — MINERAL OIL
180 ENEMA (ML) RECTAL DAILY
Qty: 30 TAB | Refills: 3 | Status: SHIPPED | OUTPATIENT
Start: 2018-01-01 | End: 2018-01-01 | Stop reason: DRUGHIGH

## 2018-01-01 RX ORDER — DIPHENHYDRAMINE HCL 25 MG
25 CAPSULE ORAL
Status: ACTIVE | OUTPATIENT
Start: 2018-01-01 | End: 2018-01-01

## 2018-01-01 RX ORDER — LORAZEPAM 2 MG/ML
1 INJECTION INTRAMUSCULAR
Status: DISCONTINUED | OUTPATIENT
Start: 2018-01-01 | End: 2018-01-01

## 2018-01-01 RX ORDER — GABAPENTIN 300 MG/1
600 CAPSULE ORAL ONCE
Status: DISCONTINUED | OUTPATIENT
Start: 2018-01-01 | End: 2018-01-01 | Stop reason: HOSPADM

## 2018-01-01 RX ORDER — CEFDINIR 300 MG/1
300 CAPSULE ORAL EVERY 12 HOURS
Status: DISCONTINUED | OUTPATIENT
Start: 2018-01-01 | End: 2018-01-01 | Stop reason: HOSPADM

## 2018-01-01 RX ORDER — LEVOFLOXACIN 5 MG/ML
500 INJECTION, SOLUTION INTRAVENOUS EVERY 24 HOURS
Status: DISCONTINUED | OUTPATIENT
Start: 2018-01-01 | End: 2018-01-01 | Stop reason: DRUGHIGH

## 2018-01-01 RX ORDER — DIPHENHYDRAMINE HYDROCHLORIDE 50 MG/ML
12.5 INJECTION, SOLUTION INTRAMUSCULAR; INTRAVENOUS AS NEEDED
Status: ACTIVE | OUTPATIENT
Start: 2018-01-01 | End: 2018-01-01

## 2018-01-01 RX ORDER — LIDOCAINE 40 MG/G
CREAM TOPICAL AS NEEDED
Status: DISCONTINUED | OUTPATIENT
Start: 2018-01-01 | End: 2018-01-01 | Stop reason: HOSPADM

## 2018-01-01 RX ORDER — LISINOPRIL 10 MG/1
10 TABLET ORAL DAILY
Qty: 90 TAB | Refills: 1 | Status: SHIPPED | OUTPATIENT
Start: 2018-01-01

## 2018-01-01 RX ORDER — SODIUM CHLORIDE 9 MG/ML
25 INJECTION, SOLUTION INTRAVENOUS CONTINUOUS
Status: DISCONTINUED | OUTPATIENT
Start: 2018-01-01 | End: 2018-01-01 | Stop reason: HOSPADM

## 2018-01-01 RX ORDER — ONDANSETRON 2 MG/ML
8 INJECTION INTRAMUSCULAR; INTRAVENOUS ONCE
Status: COMPLETED | OUTPATIENT
Start: 2018-01-01 | End: 2018-01-01

## 2018-01-01 RX ORDER — DAPAGLIFLOZIN 10 MG/1
TABLET, FILM COATED ORAL
Qty: 90 TAB | Refills: 1 | Status: SHIPPED | OUTPATIENT
Start: 2018-01-01 | End: 2018-01-01 | Stop reason: SDUPTHER

## 2018-01-01 RX ORDER — ZOLPIDEM TARTRATE 5 MG/1
5 TABLET ORAL
Status: DISCONTINUED | OUTPATIENT
Start: 2018-01-01 | End: 2018-01-01 | Stop reason: HOSPADM

## 2018-01-01 RX ORDER — VANCOMYCIN HYDROCHLORIDE
1250 EVERY 24 HOURS
Status: DISCONTINUED | OUTPATIENT
Start: 2018-01-01 | End: 2018-01-01

## 2018-01-01 RX ORDER — INSULIN GLARGINE 100 [IU]/ML
20 INJECTION, SOLUTION SUBCUTANEOUS DAILY
Status: DISCONTINUED | OUTPATIENT
Start: 2018-01-01 | End: 2018-01-01

## 2018-01-01 RX ORDER — METOPROLOL TARTRATE 50 MG/1
TABLET ORAL
Qty: 180 TAB | Refills: 0 | Status: SHIPPED | OUTPATIENT
Start: 2018-01-01 | End: 2018-01-01 | Stop reason: SDUPTHER

## 2018-01-01 RX ORDER — EPINEPHRINE 0.1 MG/ML
INJECTION INTRACARDIAC; INTRAVENOUS
Status: COMPLETED | OUTPATIENT
Start: 2018-01-01 | End: 2018-01-01

## 2018-01-01 RX ORDER — DIPHENHYDRAMINE HYDROCHLORIDE 50 MG/ML
12.5 INJECTION, SOLUTION INTRAMUSCULAR; INTRAVENOUS
Status: ACTIVE | OUTPATIENT
Start: 2018-01-01 | End: 2018-01-01

## 2018-01-01 RX ORDER — PROCHLORPERAZINE EDISYLATE 5 MG/ML
5 INJECTION INTRAMUSCULAR; INTRAVENOUS
Status: DISCONTINUED | OUTPATIENT
Start: 2018-01-01 | End: 2018-01-01 | Stop reason: HOSPADM

## 2018-01-01 RX ORDER — ONDANSETRON 2 MG/ML
4 INJECTION INTRAMUSCULAR; INTRAVENOUS AS NEEDED
Status: DISCONTINUED | OUTPATIENT
Start: 2018-01-01 | End: 2018-01-01 | Stop reason: HOSPADM

## 2018-01-01 RX ORDER — LEVOFLOXACIN 5 MG/ML
750 INJECTION, SOLUTION INTRAVENOUS EVERY 24 HOURS
Status: DISCONTINUED | OUTPATIENT
Start: 2018-01-01 | End: 2018-01-01

## 2018-01-01 RX ORDER — MINERAL OIL
180 ENEMA (ML) RECTAL
COMMUNITY

## 2018-01-01 RX ORDER — SODIUM CHLORIDE 9 MG/ML
75 INJECTION, SOLUTION INTRAVENOUS CONTINUOUS
Status: DISCONTINUED | OUTPATIENT
Start: 2018-01-01 | End: 2018-01-01

## 2018-01-01 RX ORDER — INSULIN GLARGINE 100 [IU]/ML
10 INJECTION, SOLUTION SUBCUTANEOUS DAILY
Status: DISCONTINUED | OUTPATIENT
Start: 2018-01-01 | End: 2018-01-01

## 2018-01-01 RX ORDER — GLYCOPYRROLATE 0.2 MG/ML
0.2 INJECTION INTRAMUSCULAR; INTRAVENOUS 3 TIMES DAILY
Status: DISCONTINUED | OUTPATIENT
Start: 2018-01-01 | End: 2018-01-01 | Stop reason: HOSPADM

## 2018-01-01 RX ORDER — SODIUM CHLORIDE 0.9 % (FLUSH) 0.9 %
10 SYRINGE (ML) INJECTION
Status: DISPENSED | OUTPATIENT
Start: 2018-01-01 | End: 2018-01-01

## 2018-01-01 RX ORDER — SODIUM CHLORIDE, SODIUM LACTATE, POTASSIUM CHLORIDE, CALCIUM CHLORIDE 600; 310; 30; 20 MG/100ML; MG/100ML; MG/100ML; MG/100ML
75 INJECTION, SOLUTION INTRAVENOUS CONTINUOUS
Status: DISCONTINUED | OUTPATIENT
Start: 2018-01-01 | End: 2018-01-01 | Stop reason: HOSPADM

## 2018-01-01 RX ORDER — INSULIN GLARGINE 100 [IU]/ML
47 INJECTION, SOLUTION SUBCUTANEOUS 2 TIMES DAILY
Status: DISCONTINUED | OUTPATIENT
Start: 2018-01-01 | End: 2018-01-01

## 2018-01-01 RX ORDER — INSULIN GLARGINE 100 [IU]/ML
40 INJECTION, SOLUTION SUBCUTANEOUS
Status: DISCONTINUED | OUTPATIENT
Start: 2018-01-01 | End: 2018-01-01

## 2018-01-01 RX ORDER — DEXAMETHASONE 4 MG/1
8 TABLET ORAL DAILY
Status: COMPLETED | OUTPATIENT
Start: 2018-01-01 | End: 2018-01-01

## 2018-01-01 RX ORDER — CELECOXIB 100 MG/1
100 CAPSULE ORAL 2 TIMES DAILY
Status: DISCONTINUED | OUTPATIENT
Start: 2018-01-01 | End: 2018-01-01 | Stop reason: HOSPADM

## 2018-01-01 RX ORDER — POTASSIUM CHLORIDE 20MEQ/15ML
40 LIQUID (ML) ORAL DAILY
Status: DISCONTINUED | OUTPATIENT
Start: 2018-01-01 | End: 2018-01-01 | Stop reason: HOSPADM

## 2018-01-01 RX ORDER — CHLORPHENIRAMINE MALEATE 4 MG
TABLET ORAL
COMMUNITY

## 2018-01-01 RX ORDER — VANCOMYCIN/0.9 % SOD CHLORIDE 1.5G/250ML
1500 PLASTIC BAG, INJECTION (ML) INTRAVENOUS EVERY 12 HOURS
Status: DISCONTINUED | OUTPATIENT
Start: 2018-01-01 | End: 2018-01-01 | Stop reason: HOSPADM

## 2018-01-01 RX ORDER — FLUOROURACIL 50 MG/ML
390 INJECTION, SOLUTION INTRAVENOUS ONCE
Status: COMPLETED | OUTPATIENT
Start: 2018-01-01 | End: 2018-01-01

## 2018-01-01 RX ORDER — INSULIN GLARGINE 100 [IU]/ML
30 INJECTION, SOLUTION SUBCUTANEOUS 2 TIMES DAILY
COMMUNITY

## 2018-01-01 RX ORDER — ASPIRIN 300 MG/1
300 SUPPOSITORY RECTAL DAILY
Status: DISCONTINUED | OUTPATIENT
Start: 2018-01-01 | End: 2018-01-01

## 2018-01-01 RX ORDER — FLUTICASONE PROPIONATE 50 MCG
2 SPRAY, SUSPENSION (ML) NASAL DAILY
Qty: 1 BOTTLE | Refills: 2 | Status: SHIPPED | OUTPATIENT
Start: 2018-01-01 | End: 2018-01-01 | Stop reason: DRUGHIGH

## 2018-01-01 RX ADMIN — OXYCODONE HYDROCHLORIDE 5 MG: 5 TABLET ORAL at 20:45

## 2018-01-01 RX ADMIN — CELECOXIB 100 MG: 100 CAPSULE ORAL at 17:57

## 2018-01-01 RX ADMIN — VANCOMYCIN HYDROCHLORIDE 1500 MG: 10 INJECTION, POWDER, LYOPHILIZED, FOR SOLUTION INTRAVENOUS at 23:11

## 2018-01-01 RX ADMIN — HUMAN INSULIN 3 UNITS: 100 INJECTION, SOLUTION SUBCUTANEOUS at 12:29

## 2018-01-01 RX ADMIN — LISINOPRIL 10 MG: 10 TABLET ORAL at 09:00

## 2018-01-01 RX ADMIN — FLUOROURACIL 1200 MG: 50 INJECTION, SOLUTION INTRAVENOUS at 17:33

## 2018-01-01 RX ADMIN — FAMOTIDINE 20 MG: 20 TABLET ORAL at 10:25

## 2018-01-01 RX ADMIN — DEXTROSE MONOHYDRATE AND SODIUM CHLORIDE 75 ML/HR: 5; .9 INJECTION, SOLUTION INTRAVENOUS at 12:04

## 2018-01-01 RX ADMIN — OXYCODONE HYDROCHLORIDE 5 MG: 5 TABLET ORAL at 17:10

## 2018-01-01 RX ADMIN — CHLORHEXIDINE GLUCONATE 15 ML: 1.2 RINSE ORAL at 08:14

## 2018-01-01 RX ADMIN — SODIUM CHLORIDE AND POTASSIUM CHLORIDE: 4.5; 1.49 INJECTION, SOLUTION INTRAVENOUS at 09:09

## 2018-01-01 RX ADMIN — OXYCODONE HYDROCHLORIDE 5 MG: 5 TABLET ORAL at 14:55

## 2018-01-01 RX ADMIN — CEFEPIME HYDROCHLORIDE 2 G: 2 INJECTION, POWDER, FOR SOLUTION INTRAVENOUS at 17:45

## 2018-01-01 RX ADMIN — LORAZEPAM 1 MG: 2 INJECTION INTRAMUSCULAR; INTRAVENOUS at 09:49

## 2018-01-01 RX ADMIN — INSULIN GLARGINE 5 UNITS: 100 INJECTION, SOLUTION SUBCUTANEOUS at 11:00

## 2018-01-01 RX ADMIN — Medication 10 ML: at 23:34

## 2018-01-01 RX ADMIN — Medication 5 ML: at 22:00

## 2018-01-01 RX ADMIN — SODIUM CHLORIDE 100 ML/HR: 450 INJECTION, SOLUTION INTRAVENOUS at 18:27

## 2018-01-01 RX ADMIN — INSULIN GLARGINE 47 UNITS: 100 INJECTION, SOLUTION SUBCUTANEOUS at 17:07

## 2018-01-01 RX ADMIN — VANCOMYCIN HYDROCHLORIDE 1250 MG: 10 INJECTION, POWDER, LYOPHILIZED, FOR SOLUTION INTRAVENOUS at 21:08

## 2018-01-01 RX ADMIN — HYDROMORPHONE HYDROCHLORIDE 0.5 MG: 1 INJECTION, SOLUTION INTRAMUSCULAR; INTRAVENOUS; SUBCUTANEOUS at 15:08

## 2018-01-01 RX ADMIN — SODIUM CHLORIDE 50 ML/HR: 900 INJECTION, SOLUTION INTRAVENOUS at 21:09

## 2018-01-01 RX ADMIN — HEPARIN 500 UNITS: 100 SYRINGE at 16:46

## 2018-01-01 RX ADMIN — Medication 10 ML: at 14:36

## 2018-01-01 RX ADMIN — LIDOCAINE HYDROCHLORIDE 10 ML: 10 INJECTION, SOLUTION EPIDURAL; INFILTRATION; INTRACAUDAL; PERINEURAL at 09:38

## 2018-01-01 RX ADMIN — Medication 10 ML: at 13:16

## 2018-01-01 RX ADMIN — HUMAN INSULIN 3 UNITS: 100 INJECTION, SOLUTION SUBCUTANEOUS at 17:36

## 2018-01-01 RX ADMIN — RDII 250 MG CAPSULE 250 MG: at 10:25

## 2018-01-01 RX ADMIN — CHLORHEXIDINE GLUCONATE 15 ML: 1.2 RINSE ORAL at 21:52

## 2018-01-01 RX ADMIN — LISINOPRIL 10 MG: 10 TABLET ORAL at 08:41

## 2018-01-01 RX ADMIN — IOPAMIDOL 100 ML: 755 INJECTION, SOLUTION INTRAVENOUS at 23:32

## 2018-01-01 RX ADMIN — VANCOMYCIN HYDROCHLORIDE 2000 MG: 10 INJECTION, POWDER, LYOPHILIZED, FOR SOLUTION INTRAVENOUS at 23:00

## 2018-01-01 RX ADMIN — SODIUM CHLORIDE 125 ML/HR: 900 INJECTION, SOLUTION INTRAVENOUS at 18:13

## 2018-01-01 RX ADMIN — CEFEPIME HYDROCHLORIDE 2 G: 2 INJECTION, POWDER, FOR SOLUTION INTRAVENOUS at 17:43

## 2018-01-01 RX ADMIN — METOPROLOL TARTRATE 50 MG: 50 TABLET ORAL at 09:21

## 2018-01-01 RX ADMIN — DEXTROSE MONOHYDRATE 12.5 G: 25 INJECTION, SOLUTION INTRAVENOUS at 20:55

## 2018-01-01 RX ADMIN — INSULIN LISPRO 2 UNITS: 100 INJECTION, SOLUTION INTRAVENOUS; SUBCUTANEOUS at 23:19

## 2018-01-01 RX ADMIN — PIPERACILLIN SODIUM AND TAZOBACTAM SODIUM 3.38 G: 3; .375 INJECTION, POWDER, LYOPHILIZED, FOR SOLUTION INTRAVENOUS at 11:46

## 2018-01-01 RX ADMIN — FAMOTIDINE 20 MG: 20 TABLET ORAL at 17:42

## 2018-01-01 RX ADMIN — Medication 10 ML: at 11:33

## 2018-01-01 RX ADMIN — CEFOTETAN DISODIUM 2 G: 2 INJECTION, POWDER, FOR SOLUTION INTRAMUSCULAR; INTRAVENOUS at 15:00

## 2018-01-01 RX ADMIN — SODIUM CHLORIDE 40 MG: 9 INJECTION INTRAMUSCULAR; INTRAVENOUS; SUBCUTANEOUS at 21:05

## 2018-01-01 RX ADMIN — Medication 10 ML: at 06:32

## 2018-01-01 RX ADMIN — ENOXAPARIN SODIUM 40 MG: 100 INJECTION SUBCUTANEOUS at 17:08

## 2018-01-01 RX ADMIN — INSULIN GLARGINE 30 UNITS: 100 INJECTION, SOLUTION SUBCUTANEOUS at 23:10

## 2018-01-01 RX ADMIN — Medication 10 ML: at 22:20

## 2018-01-01 RX ADMIN — METOPROLOL TARTRATE 50 MG: 50 TABLET ORAL at 18:39

## 2018-01-01 RX ADMIN — LISINOPRIL 10 MG: 10 TABLET ORAL at 09:53

## 2018-01-01 RX ADMIN — HYDROMORPHONE HYDROCHLORIDE 0.5 MG: 1 INJECTION, SOLUTION INTRAMUSCULAR; INTRAVENOUS; SUBCUTANEOUS at 18:01

## 2018-01-01 RX ADMIN — DEXMEDETOMIDINE HYDROCHLORIDE 4 MCG: 4 INJECTION, SOLUTION INTRAVENOUS at 20:33

## 2018-01-01 RX ADMIN — ZOLPIDEM TARTRATE 5 MG: 5 TABLET ORAL at 21:46

## 2018-01-01 RX ADMIN — Medication 10 ML: at 03:46

## 2018-01-01 RX ADMIN — LEVOFLOXACIN 750 MG: 5 INJECTION, SOLUTION INTRAVENOUS at 17:17

## 2018-01-01 RX ADMIN — Medication 10 ML: at 12:03

## 2018-01-01 RX ADMIN — Medication 10 ML: at 23:32

## 2018-01-01 RX ADMIN — POTASSIUM CHLORIDE 40 MEQ: 750 TABLET, EXTENDED RELEASE ORAL at 17:21

## 2018-01-01 RX ADMIN — PIPERACILLIN SODIUM AND TAZOBACTAM SODIUM 3.38 G: 3; .375 INJECTION, POWDER, LYOPHILIZED, FOR SOLUTION INTRAVENOUS at 11:45

## 2018-01-01 RX ADMIN — SODIUM CHLORIDE 50 ML/HR: 900 INJECTION, SOLUTION INTRAVENOUS at 04:16

## 2018-01-01 RX ADMIN — SODIUM CHLORIDE AND POTASSIUM CHLORIDE: 4.5; 1.49 INJECTION, SOLUTION INTRAVENOUS at 21:07

## 2018-01-01 RX ADMIN — CELECOXIB 100 MG: 100 CAPSULE ORAL at 09:56

## 2018-01-01 RX ADMIN — INSULIN LISPRO 3 UNITS: 100 INJECTION, SOLUTION INTRAVENOUS; SUBCUTANEOUS at 06:51

## 2018-01-01 RX ADMIN — VANCOMYCIN HYDROCHLORIDE 1250 MG: 10 INJECTION, POWDER, LYOPHILIZED, FOR SOLUTION INTRAVENOUS at 12:50

## 2018-01-01 RX ADMIN — METOPROLOL TARTRATE 50 MG: 50 TABLET ORAL at 08:41

## 2018-01-01 RX ADMIN — METRONIDAZOLE 500 MG: 500 INJECTION, SOLUTION INTRAVENOUS at 03:46

## 2018-01-01 RX ADMIN — IOPAMIDOL 100 ML: 755 INJECTION, SOLUTION INTRAVENOUS at 14:24

## 2018-01-01 RX ADMIN — METRONIDAZOLE 500 MG: 250 TABLET ORAL at 16:55

## 2018-01-01 RX ADMIN — ONDANSETRON 4 MG: 4 TABLET, ORALLY DISINTEGRATING ORAL at 10:26

## 2018-01-01 RX ADMIN — Medication 10 ML: at 23:05

## 2018-01-01 RX ADMIN — CELECOXIB 100 MG: 100 CAPSULE ORAL at 09:09

## 2018-01-01 RX ADMIN — DEXTROSE MONOHYDRATE AND SODIUM CHLORIDE 75 ML/HR: 5; .45 INJECTION, SOLUTION INTRAVENOUS at 10:21

## 2018-01-01 RX ADMIN — LEVOFLOXACIN 750 MG: 750 TABLET, FILM COATED ORAL at 13:34

## 2018-01-01 RX ADMIN — FENTANYL CITRATE 50 MCG: 50 INJECTION, SOLUTION INTRAMUSCULAR; INTRAVENOUS at 17:32

## 2018-01-01 RX ADMIN — INSULIN GLARGINE 30 UNITS: 100 INJECTION, SOLUTION SUBCUTANEOUS at 22:01

## 2018-01-01 RX ADMIN — VANCOMYCIN HYDROCHLORIDE 2000 MG: 10 INJECTION, POWDER, LYOPHILIZED, FOR SOLUTION INTRAVENOUS at 00:04

## 2018-01-01 RX ADMIN — ALVIMOPAN 12 MG: 12 CAPSULE ORAL at 17:49

## 2018-01-01 RX ADMIN — INSULIN GLARGINE 47 UNITS: 100 INJECTION, SOLUTION SUBCUTANEOUS at 17:21

## 2018-01-01 RX ADMIN — Medication 10 ML: at 15:52

## 2018-01-01 RX ADMIN — INSULIN LISPRO 4 UNITS: 100 INJECTION, SOLUTION INTRAVENOUS; SUBCUTANEOUS at 18:07

## 2018-01-01 RX ADMIN — CHLORHEXIDINE GLUCONATE 15 ML: 1.2 RINSE ORAL at 09:45

## 2018-01-01 RX ADMIN — INSULIN GLARGINE 5 UNITS: 100 INJECTION, SOLUTION SUBCUTANEOUS at 11:52

## 2018-01-01 RX ADMIN — INSULIN LISPRO 2 UNITS: 100 INJECTION, SOLUTION INTRAVENOUS; SUBCUTANEOUS at 12:25

## 2018-01-01 RX ADMIN — POTASSIUM CHLORIDE 10 MEQ: 200 INJECTION, SOLUTION INTRAVENOUS at 08:43

## 2018-01-01 RX ADMIN — FLUOROURACIL 390 MG: 50 INJECTION, SOLUTION INTRAVENOUS at 17:26

## 2018-01-01 RX ADMIN — ROCURONIUM BROMIDE 10 MG: 10 INJECTION, SOLUTION INTRAVENOUS at 16:05

## 2018-01-01 RX ADMIN — PIPERACILLIN SODIUM AND TAZOBACTAM SODIUM 3.38 G: 3; .375 INJECTION, POWDER, LYOPHILIZED, FOR SOLUTION INTRAVENOUS at 11:19

## 2018-01-01 RX ADMIN — Medication 10 ML: at 13:25

## 2018-01-01 RX ADMIN — INSULIN LISPRO 2 UNITS: 100 INJECTION, SOLUTION INTRAVENOUS; SUBCUTANEOUS at 05:29

## 2018-01-01 RX ADMIN — OXYCODONE HYDROCHLORIDE 5 MG: 5 TABLET ORAL at 04:49

## 2018-01-01 RX ADMIN — INSULIN GLARGINE 10 UNITS: 100 INJECTION, SOLUTION SUBCUTANEOUS at 08:04

## 2018-01-01 RX ADMIN — DAPAGLIFLOZIN 10 MG: 10 TABLET, FILM COATED ORAL at 08:44

## 2018-01-01 RX ADMIN — Medication 10 ML: at 14:11

## 2018-01-01 RX ADMIN — Medication 10 ML: at 16:53

## 2018-01-01 RX ADMIN — FLUOROURACIL 1100 MG: 50 INJECTION, SOLUTION INTRAVENOUS at 17:58

## 2018-01-01 RX ADMIN — METOPROLOL TARTRATE 50 MG: 50 TABLET ORAL at 08:14

## 2018-01-01 RX ADMIN — LEVOFLOXACIN 750 MG: 5 INJECTION, SOLUTION INTRAVENOUS at 15:43

## 2018-01-01 RX ADMIN — FERROUS SULFATE TAB 325 MG (65 MG ELEMENTAL FE) 325 MG: 325 (65 FE) TAB at 08:14

## 2018-01-01 RX ADMIN — INSULIN LISPRO 3 UNITS: 100 INJECTION, SOLUTION INTRAVENOUS; SUBCUTANEOUS at 12:41

## 2018-01-01 RX ADMIN — METOPROLOL TARTRATE 50 MG: 50 TABLET ORAL at 09:53

## 2018-01-01 RX ADMIN — SIMVASTATIN 20 MG: 20 TABLET, FILM COATED ORAL at 21:46

## 2018-01-01 RX ADMIN — Medication 10 ML: at 21:05

## 2018-01-01 RX ADMIN — FENTANYL CITRATE 100 MCG: 50 INJECTION, SOLUTION INTRAMUSCULAR; INTRAVENOUS at 19:34

## 2018-01-01 RX ADMIN — HYDROMORPHONE HYDROCHLORIDE 0.5 MG: 1 INJECTION, SOLUTION INTRAMUSCULAR; INTRAVENOUS; SUBCUTANEOUS at 20:59

## 2018-01-01 RX ADMIN — SODIUM CHLORIDE 75 ML/HR: 900 INJECTION, SOLUTION INTRAVENOUS at 17:42

## 2018-01-01 RX ADMIN — Medication 10 ML: at 21:49

## 2018-01-01 RX ADMIN — LISINOPRIL 10 MG: 10 TABLET ORAL at 09:13

## 2018-01-01 RX ADMIN — LISINOPRIL 10 MG: 10 TABLET ORAL at 09:06

## 2018-01-01 RX ADMIN — METOPROLOL TARTRATE 50 MG: 50 TABLET ORAL at 10:26

## 2018-01-01 RX ADMIN — SODIUM CHLORIDE 1000 ML/HR: 900 INJECTION, SOLUTION INTRAVENOUS at 23:44

## 2018-01-01 RX ADMIN — LORAZEPAM 1 MG: 2 INJECTION INTRAMUSCULAR; INTRAVENOUS at 10:08

## 2018-01-01 RX ADMIN — LEVOFLOXACIN 750 MG: 5 INJECTION, SOLUTION INTRAVENOUS at 18:19

## 2018-01-01 RX ADMIN — METRONIDAZOLE 500 MG: 500 INJECTION, SOLUTION INTRAVENOUS at 15:48

## 2018-01-01 RX ADMIN — CEFDINIR 300 MG: 300 CAPSULE ORAL at 00:09

## 2018-01-01 RX ADMIN — Medication 10 ML: at 05:31

## 2018-01-01 RX ADMIN — METOPROLOL TARTRATE 50 MG: 50 TABLET ORAL at 09:00

## 2018-01-01 RX ADMIN — SODIUM CHLORIDE 100 ML: 900 INJECTION, SOLUTION INTRAVENOUS at 23:32

## 2018-01-01 RX ADMIN — INSULIN GLARGINE 25 UNITS: 100 INJECTION, SOLUTION SUBCUTANEOUS at 10:21

## 2018-01-01 RX ADMIN — SODIUM CHLORIDE 50 ML/HR: 900 INJECTION, SOLUTION INTRAVENOUS at 01:00

## 2018-01-01 RX ADMIN — INSULIN LISPRO 2 UNITS: 100 INJECTION, SOLUTION INTRAVENOUS; SUBCUTANEOUS at 17:54

## 2018-01-01 RX ADMIN — POTASSIUM CHLORIDE 10 MEQ: 10 INJECTION, SOLUTION INTRAVENOUS at 00:25

## 2018-01-01 RX ADMIN — ROCURONIUM BROMIDE 10 MG: 10 INJECTION, SOLUTION INTRAVENOUS at 15:17

## 2018-01-01 RX ADMIN — LIDOCAINE HYDROCHLORIDE 80 MG: 20 INJECTION, SOLUTION EPIDURAL; INFILTRATION; INTRACAUDAL; PERINEURAL at 14:55

## 2018-01-01 RX ADMIN — FAMOTIDINE 20 MG: 20 TABLET ORAL at 18:00

## 2018-01-01 RX ADMIN — LISINOPRIL 10 MG: 10 TABLET ORAL at 08:21

## 2018-01-01 RX ADMIN — CEFEPIME HYDROCHLORIDE 2 G: 2 INJECTION, POWDER, FOR SOLUTION INTRAVENOUS at 06:14

## 2018-01-01 RX ADMIN — LISINOPRIL 10 MG: 10 TABLET ORAL at 08:30

## 2018-01-01 RX ADMIN — POTASSIUM CHLORIDE 10 MEQ: 750 TABLET, EXTENDED RELEASE ORAL at 15:17

## 2018-01-01 RX ADMIN — ROCURONIUM BROMIDE 5 MG: 10 INJECTION, SOLUTION INTRAVENOUS at 19:34

## 2018-01-01 RX ADMIN — INSULIN GLARGINE 20 UNITS: 100 INJECTION, SOLUTION SUBCUTANEOUS at 08:24

## 2018-01-01 RX ADMIN — METOPROLOL TARTRATE 50 MG: 50 TABLET ORAL at 19:15

## 2018-01-01 RX ADMIN — ALVIMOPAN 12 MG: 12 CAPSULE ORAL at 17:08

## 2018-01-01 RX ADMIN — METOPROLOL TARTRATE 50 MG: 50 TABLET ORAL at 10:20

## 2018-01-01 RX ADMIN — ONDANSETRON 4 MG: 2 INJECTION INTRAMUSCULAR; INTRAVENOUS at 19:51

## 2018-01-01 RX ADMIN — OXYCODONE HYDROCHLORIDE 5 MG: 5 TABLET ORAL at 19:02

## 2018-01-01 RX ADMIN — METOPROLOL TARTRATE 2.5 MG: 1 INJECTION, SOLUTION INTRAVENOUS at 18:03

## 2018-01-01 RX ADMIN — INSULIN LISPRO 2 UNITS: 100 INJECTION, SOLUTION INTRAVENOUS; SUBCUTANEOUS at 17:07

## 2018-01-01 RX ADMIN — CEFDINIR 300 MG: 300 CAPSULE ORAL at 10:25

## 2018-01-01 RX ADMIN — INSULIN GLARGINE 47 UNITS: 100 INJECTION, SOLUTION SUBCUTANEOUS at 17:08

## 2018-01-01 RX ADMIN — METOPROLOL TARTRATE 50 MG: 50 TABLET ORAL at 08:23

## 2018-01-01 RX ADMIN — SODIUM CHLORIDE 100 ML/HR: 450 INJECTION, SOLUTION INTRAVENOUS at 09:20

## 2018-01-01 RX ADMIN — OXYCODONE HYDROCHLORIDE 5 MG: 5 TABLET ORAL at 14:20

## 2018-01-01 RX ADMIN — POTASSIUM CHLORIDE 10 MEQ: 750 TABLET, EXTENDED RELEASE ORAL at 08:30

## 2018-01-01 RX ADMIN — INSULIN GLARGINE 10 UNITS: 100 INJECTION, SOLUTION SUBCUTANEOUS at 09:21

## 2018-01-01 RX ADMIN — PIPERACILLIN SODIUM AND TAZOBACTAM SODIUM 3.38 G: 3; .375 INJECTION, POWDER, LYOPHILIZED, FOR SOLUTION INTRAVENOUS at 19:48

## 2018-01-01 RX ADMIN — SIMVASTATIN 20 MG: 20 TABLET, FILM COATED ORAL at 21:26

## 2018-01-01 RX ADMIN — INSULIN LISPRO 3 UNITS: 100 INJECTION, SOLUTION INTRAVENOUS; SUBCUTANEOUS at 23:15

## 2018-01-01 RX ADMIN — SODIUM CHLORIDE 125 ML/HR: 900 INJECTION, SOLUTION INTRAVENOUS at 12:29

## 2018-01-01 RX ADMIN — SITAGLIPTIN 50 MG: 25 TABLET, FILM COATED ORAL at 10:25

## 2018-01-01 RX ADMIN — Medication 10 ML: at 18:16

## 2018-01-01 RX ADMIN — DEXTROSE MONOHYDRATE 25 ML/HR: 5 INJECTION, SOLUTION INTRAVENOUS at 14:19

## 2018-01-01 RX ADMIN — PIPERACILLIN SODIUM AND TAZOBACTAM SODIUM 3.38 G: 3; .375 INJECTION, POWDER, LYOPHILIZED, FOR SOLUTION INTRAVENOUS at 04:08

## 2018-01-01 RX ADMIN — ONDANSETRON 4 MG: 2 INJECTION, SOLUTION INTRAMUSCULAR; INTRAVENOUS at 18:11

## 2018-01-01 RX ADMIN — CELECOXIB 100 MG: 100 CAPSULE ORAL at 09:07

## 2018-01-01 RX ADMIN — Medication 10 ML: at 22:35

## 2018-01-01 RX ADMIN — VANCOMYCIN HYDROCHLORIDE 1250 MG: 10 INJECTION, POWDER, LYOPHILIZED, FOR SOLUTION INTRAVENOUS at 02:41

## 2018-01-01 RX ADMIN — Medication 10 ML: at 22:00

## 2018-01-01 RX ADMIN — PIPERACILLIN SODIUM AND TAZOBACTAM SODIUM 3.38 G: 3; .375 INJECTION, POWDER, LYOPHILIZED, FOR SOLUTION INTRAVENOUS at 18:25

## 2018-01-01 RX ADMIN — MIDAZOLAM HYDROCHLORIDE 2 MG: 1 INJECTION, SOLUTION INTRAMUSCULAR; INTRAVENOUS at 14:44

## 2018-01-01 RX ADMIN — PIPERACILLIN SODIUM AND TAZOBACTAM SODIUM 3.38 G: 3; .375 INJECTION, POWDER, LYOPHILIZED, FOR SOLUTION INTRAVENOUS at 04:15

## 2018-01-01 RX ADMIN — METOPROLOL TARTRATE 50 MG: 50 TABLET ORAL at 17:08

## 2018-01-01 RX ADMIN — SIMVASTATIN 20 MG: 20 TABLET, FILM COATED ORAL at 22:01

## 2018-01-01 RX ADMIN — ENOXAPARIN SODIUM 40 MG: 100 INJECTION SUBCUTANEOUS at 08:21

## 2018-01-01 RX ADMIN — FAMOTIDINE 20 MG: 20 TABLET ORAL at 19:51

## 2018-01-01 RX ADMIN — INSULIN LISPRO 2 UNITS: 100 INJECTION, SOLUTION INTRAVENOUS; SUBCUTANEOUS at 06:40

## 2018-01-01 RX ADMIN — INSULIN LISPRO 2 UNITS: 100 INJECTION, SOLUTION INTRAVENOUS; SUBCUTANEOUS at 17:53

## 2018-01-01 RX ADMIN — VANCOMYCIN HYDROCHLORIDE 1500 MG: 10 INJECTION, POWDER, LYOPHILIZED, FOR SOLUTION INTRAVENOUS at 21:18

## 2018-01-01 RX ADMIN — IOPAMIDOL 100 ML: 755 INJECTION, SOLUTION INTRAVENOUS at 22:45

## 2018-01-01 RX ADMIN — Medication 10 ML: at 23:16

## 2018-01-01 RX ADMIN — Medication 10 ML: at 23:20

## 2018-01-01 RX ADMIN — SIMVASTATIN 20 MG: 20 TABLET, FILM COATED ORAL at 22:16

## 2018-01-01 RX ADMIN — Medication 10 ML: at 05:13

## 2018-01-01 RX ADMIN — DEXTROSE MONOHYDRATE AND SODIUM CHLORIDE 125 ML/HR: 5; .45 INJECTION, SOLUTION INTRAVENOUS at 05:49

## 2018-01-01 RX ADMIN — FAMOTIDINE 20 MG: 20 TABLET ORAL at 18:13

## 2018-01-01 RX ADMIN — HYDROMORPHONE HYDROCHLORIDE 0.5 MG: 1 INJECTION, SOLUTION INTRAMUSCULAR; INTRAVENOUS; SUBCUTANEOUS at 21:49

## 2018-01-01 RX ADMIN — MUPIROCIN: 20 OINTMENT TOPICAL at 09:54

## 2018-01-01 RX ADMIN — INSULIN GLARGINE 47 UNITS: 100 INJECTION, SOLUTION SUBCUTANEOUS at 22:18

## 2018-01-01 RX ADMIN — METOPROLOL TARTRATE 50 MG: 50 TABLET ORAL at 08:43

## 2018-01-01 RX ADMIN — WATER: 1000 INJECTION, SOLUTION INTRAVENOUS at 02:48

## 2018-01-01 RX ADMIN — HYDROMORPHONE HYDROCHLORIDE 0.5 MG: 1 INJECTION, SOLUTION INTRAMUSCULAR; INTRAVENOUS; SUBCUTANEOUS at 08:03

## 2018-01-01 RX ADMIN — FAMOTIDINE 20 MG: 20 TABLET ORAL at 18:05

## 2018-01-01 RX ADMIN — PIPERACILLIN SODIUM AND TAZOBACTAM SODIUM 3.38 G: 3; .375 INJECTION, POWDER, LYOPHILIZED, FOR SOLUTION INTRAVENOUS at 02:25

## 2018-01-01 RX ADMIN — CELECOXIB 100 MG: 100 CAPSULE ORAL at 08:23

## 2018-01-01 RX ADMIN — PIPERACILLIN SODIUM AND TAZOBACTAM SODIUM 3.38 G: 3; .375 INJECTION, POWDER, LYOPHILIZED, FOR SOLUTION INTRAVENOUS at 19:14

## 2018-01-01 RX ADMIN — DEXTROSE MONOHYDRATE AND SODIUM CHLORIDE 75 ML/HR: 5; .45 INJECTION, SOLUTION INTRAVENOUS at 20:21

## 2018-01-01 RX ADMIN — INSULIN LISPRO 3 UNITS: 100 INJECTION, SOLUTION INTRAVENOUS; SUBCUTANEOUS at 19:03

## 2018-01-01 RX ADMIN — CHLORHEXIDINE GLUCONATE 15 ML: 1.2 RINSE ORAL at 02:00

## 2018-01-01 RX ADMIN — SIMVASTATIN 20 MG: 20 TABLET, FILM COATED ORAL at 21:05

## 2018-01-01 RX ADMIN — FAMOTIDINE 20 MG: 20 TABLET ORAL at 09:49

## 2018-01-01 RX ADMIN — IOPAMIDOL 100 ML: 612 INJECTION, SOLUTION INTRAVENOUS at 16:53

## 2018-01-01 RX ADMIN — FENTANYL CITRATE 25 MCG: 50 INJECTION, SOLUTION INTRAMUSCULAR; INTRAVENOUS at 17:34

## 2018-01-01 RX ADMIN — INSULIN GLARGINE 47 UNITS: 100 INJECTION, SOLUTION SUBCUTANEOUS at 08:44

## 2018-01-01 RX ADMIN — CEFEPIME HYDROCHLORIDE 2 G: 2 INJECTION, POWDER, FOR SOLUTION INTRAVENOUS at 05:17

## 2018-01-01 RX ADMIN — HUMAN INSULIN 3 UNITS: 100 INJECTION, SOLUTION SUBCUTANEOUS at 18:48

## 2018-01-01 RX ADMIN — HUMAN INSULIN 4 UNITS: 100 INJECTION, SOLUTION SUBCUTANEOUS at 12:06

## 2018-01-01 RX ADMIN — METOPROLOL TARTRATE 50 MG: 50 TABLET ORAL at 09:56

## 2018-01-01 RX ADMIN — CHLORHEXIDINE GLUCONATE 15 ML: 1.2 RINSE ORAL at 21:06

## 2018-01-01 RX ADMIN — METRONIDAZOLE 500 MG: 500 INJECTION, SOLUTION INTRAVENOUS at 14:12

## 2018-01-01 RX ADMIN — MUPIROCIN: 20 OINTMENT TOPICAL at 09:26

## 2018-01-01 RX ADMIN — HUMAN INSULIN 3 UNITS: 100 INJECTION, SOLUTION SUBCUTANEOUS at 06:26

## 2018-01-01 RX ADMIN — METRONIDAZOLE 500 MG: 500 INJECTION, SOLUTION INTRAVENOUS at 14:36

## 2018-01-01 RX ADMIN — POTASSIUM CHLORIDE 30 MEQ: 750 TABLET, EXTENDED RELEASE ORAL at 12:17

## 2018-01-01 RX ADMIN — INSULIN GLARGINE 47 UNITS: 100 INJECTION, SOLUTION SUBCUTANEOUS at 09:19

## 2018-01-01 RX ADMIN — POTASSIUM CHLORIDE 40 MEQ: 750 TABLET, EXTENDED RELEASE ORAL at 11:18

## 2018-01-01 RX ADMIN — CEFOTETAN DISODIUM: 2 INJECTION, POWDER, FOR SOLUTION INTRAMUSCULAR; INTRAVENOUS at 15:00

## 2018-01-01 RX ADMIN — HUMAN INSULIN 3 UNITS: 100 INJECTION, SOLUTION SUBCUTANEOUS at 12:15

## 2018-01-01 RX ADMIN — METRONIDAZOLE 500 MG: 500 INJECTION, SOLUTION INTRAVENOUS at 03:35

## 2018-01-01 RX ADMIN — OXYCODONE HYDROCHLORIDE 5 MG: 5 TABLET ORAL at 19:33

## 2018-01-01 RX ADMIN — FAMOTIDINE 20 MG: 20 TABLET ORAL at 09:53

## 2018-01-01 RX ADMIN — MORPHINE SULFATE 4 MG: 4 INJECTION INTRAVENOUS at 10:11

## 2018-01-01 RX ADMIN — SODIUM CHLORIDE, SODIUM LACTATE, POTASSIUM CHLORIDE, AND CALCIUM CHLORIDE 125 ML/HR: 600; 310; 30; 20 INJECTION, SOLUTION INTRAVENOUS at 18:14

## 2018-01-01 RX ADMIN — SODIUM CHLORIDE 125 ML/HR: 900 INJECTION, SOLUTION INTRAVENOUS at 13:01

## 2018-01-01 RX ADMIN — LISINOPRIL 10 MG: 10 TABLET ORAL at 10:25

## 2018-01-01 RX ADMIN — DEXAMETHASONE 8 MG: 4 TABLET ORAL at 09:52

## 2018-01-01 RX ADMIN — INSULIN GLARGINE 10 UNITS: 100 INJECTION, SOLUTION SUBCUTANEOUS at 12:05

## 2018-01-01 RX ADMIN — ALVIMOPAN 12 MG: 12 CAPSULE ORAL at 09:56

## 2018-01-01 RX ADMIN — Medication 10 ML: at 05:14

## 2018-01-01 RX ADMIN — Medication 10 ML: at 13:53

## 2018-01-01 RX ADMIN — FAMOTIDINE 20 MG: 20 TABLET ORAL at 17:36

## 2018-01-01 RX ADMIN — Medication 10 ML: at 14:32

## 2018-01-01 RX ADMIN — PROPOFOL 100 MG: 10 INJECTION, EMULSION INTRAVENOUS at 19:34

## 2018-01-01 RX ADMIN — PROPOFOL 150 MG: 10 INJECTION, EMULSION INTRAVENOUS at 14:55

## 2018-01-01 RX ADMIN — INSULIN LISPRO 8 UNITS: 100 INJECTION, SOLUTION INTRAVENOUS; SUBCUTANEOUS at 12:15

## 2018-01-01 RX ADMIN — SODIUM CHLORIDE 125 ML/HR: 900 INJECTION, SOLUTION INTRAVENOUS at 05:14

## 2018-01-01 RX ADMIN — FAMOTIDINE 20 MG: 20 TABLET ORAL at 09:13

## 2018-01-01 RX ADMIN — LISINOPRIL 10 MG: 10 TABLET ORAL at 10:20

## 2018-01-01 RX ADMIN — ENOXAPARIN SODIUM 40 MG: 100 INJECTION SUBCUTANEOUS at 15:17

## 2018-01-01 RX ADMIN — CELECOXIB 100 MG: 100 CAPSULE ORAL at 17:48

## 2018-01-01 RX ADMIN — Medication 80 MCG: at 19:47

## 2018-01-01 RX ADMIN — LORAZEPAM 2 MG: 2 INJECTION INTRAMUSCULAR; INTRAVENOUS at 11:02

## 2018-01-01 RX ADMIN — SODIUM CHLORIDE: 9 INJECTION, SOLUTION INTRAVENOUS at 19:29

## 2018-01-01 RX ADMIN — CELECOXIB 100 MG: 100 CAPSULE ORAL at 10:20

## 2018-01-01 RX ADMIN — ALVIMOPAN 12 MG: 12 CAPSULE ORAL at 10:20

## 2018-01-01 RX ADMIN — LIDOCAINE: 40 CREAM TOPICAL at 14:23

## 2018-01-01 RX ADMIN — GLYCOPYRROLATE 0.2 MG: 0.2 INJECTION, SOLUTION INTRAMUSCULAR; INTRAVENOUS at 11:09

## 2018-01-01 RX ADMIN — ZOLPIDEM TARTRATE 5 MG: 5 TABLET ORAL at 21:05

## 2018-01-01 RX ADMIN — Medication 10 ML: at 22:45

## 2018-01-01 RX ADMIN — MUPIROCIN: 20 OINTMENT TOPICAL at 21:52

## 2018-01-01 RX ADMIN — POTASSIUM CHLORIDE 40 MEQ: 20 SOLUTION ORAL at 09:08

## 2018-01-01 RX ADMIN — PIPERACILLIN SODIUM AND TAZOBACTAM SODIUM 3.38 G: 3; .375 INJECTION, POWDER, LYOPHILIZED, FOR SOLUTION INTRAVENOUS at 04:44

## 2018-01-01 RX ADMIN — ASPIRIN 300 MG: 300 SUPPOSITORY RECTAL at 09:26

## 2018-01-01 RX ADMIN — LISINOPRIL 10 MG: 10 TABLET ORAL at 09:09

## 2018-01-01 RX ADMIN — Medication 10 ML: at 05:52

## 2018-01-01 RX ADMIN — CHLORHEXIDINE GLUCONATE 15 ML: 1.2 RINSE ORAL at 08:37

## 2018-01-01 RX ADMIN — HYDROMORPHONE HYDROCHLORIDE 0.5 MG: 1 INJECTION, SOLUTION INTRAMUSCULAR; INTRAVENOUS; SUBCUTANEOUS at 04:31

## 2018-01-01 RX ADMIN — OXYCODONE HYDROCHLORIDE 5 MG: 5 TABLET ORAL at 08:43

## 2018-01-01 RX ADMIN — ENOXAPARIN SODIUM 40 MG: 100 INJECTION SUBCUTANEOUS at 23:59

## 2018-01-01 RX ADMIN — HYDROMORPHONE HYDROCHLORIDE 0.5 MG: 1 INJECTION, SOLUTION INTRAMUSCULAR; INTRAVENOUS; SUBCUTANEOUS at 16:32

## 2018-01-01 RX ADMIN — Medication 10 ML: at 21:43

## 2018-01-01 RX ADMIN — ENOXAPARIN SODIUM 40 MG: 100 INJECTION SUBCUTANEOUS at 17:54

## 2018-01-01 RX ADMIN — DEXTRAN 70, AND HYPROMELLOSE 2910 1 DROP: 1; 3 SOLUTION/ DROPS OPHTHALMIC at 12:42

## 2018-01-01 RX ADMIN — CELECOXIB 100 MG: 100 CAPSULE ORAL at 17:54

## 2018-01-01 RX ADMIN — SODIUM CHLORIDE AND POTASSIUM CHLORIDE: 4.5; 1.49 INJECTION, SOLUTION INTRAVENOUS at 17:08

## 2018-01-01 RX ADMIN — Medication 10 ML: at 21:25

## 2018-01-01 RX ADMIN — FAMOTIDINE 20 MG: 20 TABLET ORAL at 19:14

## 2018-01-01 RX ADMIN — METOPROLOL TARTRATE 50 MG: 50 TABLET ORAL at 19:51

## 2018-01-01 RX ADMIN — METOPROLOL TARTRATE 50 MG: 50 TABLET ORAL at 09:07

## 2018-01-01 RX ADMIN — METOPROLOL TARTRATE 50 MG: 50 TABLET ORAL at 17:07

## 2018-01-01 RX ADMIN — INSULIN GLARGINE 47 UNITS: 100 INJECTION, SOLUTION SUBCUTANEOUS at 23:16

## 2018-01-01 RX ADMIN — PIPERACILLIN SODIUM AND TAZOBACTAM SODIUM 3.38 G: 3; .375 INJECTION, POWDER, LYOPHILIZED, FOR SOLUTION INTRAVENOUS at 11:35

## 2018-01-01 RX ADMIN — METOPROLOL TARTRATE 50 MG: 50 TABLET ORAL at 09:13

## 2018-01-01 RX ADMIN — METRONIDAZOLE 500 MG: 250 TABLET ORAL at 06:29

## 2018-01-01 RX ADMIN — CEFEPIME HYDROCHLORIDE 2 G: 2 INJECTION, POWDER, FOR SOLUTION INTRAVENOUS at 05:51

## 2018-01-01 RX ADMIN — GLYCOPYRROLATE 0.4 MG: 0.2 INJECTION INTRAMUSCULAR; INTRAVENOUS at 17:34

## 2018-01-01 RX ADMIN — METOPROLOL TARTRATE 50 MG: 50 TABLET ORAL at 19:54

## 2018-01-01 RX ADMIN — SODIUM CHLORIDE AND POTASSIUM CHLORIDE: 4.5; 1.49 INJECTION, SOLUTION INTRAVENOUS at 22:09

## 2018-01-01 RX ADMIN — ASPIRIN 300 MG: 300 SUPPOSITORY RECTAL at 08:45

## 2018-01-01 RX ADMIN — METOPROLOL TARTRATE 50 MG: 50 TABLET ORAL at 08:06

## 2018-01-01 RX ADMIN — FENTANYL CITRATE 25 MCG: 50 INJECTION, SOLUTION INTRAMUSCULAR; INTRAVENOUS at 17:37

## 2018-01-01 RX ADMIN — DEXTROSE MONOHYDRATE 25 G: 25 INJECTION, SOLUTION INTRAVENOUS at 06:58

## 2018-01-01 RX ADMIN — HUMAN INSULIN 3 UNITS: 100 INJECTION, SOLUTION SUBCUTANEOUS at 06:21

## 2018-01-01 RX ADMIN — SODIUM CHLORIDE 125 ML/HR: 900 INJECTION, SOLUTION INTRAVENOUS at 10:16

## 2018-01-01 RX ADMIN — INSULIN LISPRO 3 UNITS: 100 INJECTION, SOLUTION INTRAVENOUS; SUBCUTANEOUS at 11:32

## 2018-01-01 RX ADMIN — INSULIN LISPRO 2 UNITS: 100 INJECTION, SOLUTION INTRAVENOUS; SUBCUTANEOUS at 11:44

## 2018-01-01 RX ADMIN — OXYCODONE HYDROCHLORIDE 5 MG: 5 TABLET ORAL at 12:58

## 2018-01-01 RX ADMIN — Medication 10 ML: at 18:01

## 2018-01-01 RX ADMIN — SODIUM CHLORIDE 1000 ML: 900 INJECTION, SOLUTION INTRAVENOUS at 12:56

## 2018-01-01 RX ADMIN — Medication 10 ML: at 02:48

## 2018-01-01 RX ADMIN — HUMAN INSULIN 4 UNITS: 100 INJECTION, SOLUTION SUBCUTANEOUS at 17:41

## 2018-01-01 RX ADMIN — METOPROLOL TARTRATE 50 MG: 50 TABLET ORAL at 18:15

## 2018-01-01 RX ADMIN — INSULIN LISPRO 5 UNITS: 100 INJECTION, SOLUTION INTRAVENOUS; SUBCUTANEOUS at 17:07

## 2018-01-01 RX ADMIN — Medication 10 ML: at 09:22

## 2018-01-01 RX ADMIN — MUPIROCIN: 20 OINTMENT TOPICAL at 22:25

## 2018-01-01 RX ADMIN — INSULIN GLARGINE 10 UNITS: 100 INJECTION, SOLUTION SUBCUTANEOUS at 10:58

## 2018-01-01 RX ADMIN — ALBUMIN (HUMAN) 25 G: 0.25 INJECTION, SOLUTION INTRAVENOUS at 18:01

## 2018-01-01 RX ADMIN — OXYCODONE HYDROCHLORIDE 5 MG: 5 TABLET ORAL at 19:44

## 2018-01-01 RX ADMIN — SIMVASTATIN 20 MG: 20 TABLET, FILM COATED ORAL at 21:55

## 2018-01-01 RX ADMIN — FAMOTIDINE 20 MG: 10 INJECTION, SOLUTION INTRAVENOUS at 08:14

## 2018-01-01 RX ADMIN — HYDROMORPHONE HYDROCHLORIDE 0.5 MG: 2 INJECTION, SOLUTION INTRAMUSCULAR; INTRAVENOUS; SUBCUTANEOUS at 15:19

## 2018-01-01 RX ADMIN — LEUCOVORIN CALCIUM 390 MG: 200 INJECTION, POWDER, LYOPHILIZED, FOR SOLUTION INTRAMUSCULAR; INTRAVENOUS at 14:27

## 2018-01-01 RX ADMIN — HUMAN INSULIN 4 UNITS: 100 INJECTION, SOLUTION SUBCUTANEOUS at 07:20

## 2018-01-01 RX ADMIN — SODIUM CHLORIDE 40 MG: 9 INJECTION INTRAMUSCULAR; INTRAVENOUS; SUBCUTANEOUS at 09:26

## 2018-01-01 RX ADMIN — DEXTRAN 70, AND HYPROMELLOSE 2910 1 DROP: 1; 3 SOLUTION/ DROPS OPHTHALMIC at 17:25

## 2018-01-01 RX ADMIN — PIPERACILLIN SODIUM AND TAZOBACTAM SODIUM 3.38 G: 3; .375 INJECTION, POWDER, LYOPHILIZED, FOR SOLUTION INTRAVENOUS at 19:01

## 2018-01-01 RX ADMIN — HUMAN INSULIN 7 UNITS: 100 INJECTION, SOLUTION SUBCUTANEOUS at 18:38

## 2018-01-01 RX ADMIN — Medication 10 ML: at 00:29

## 2018-01-01 RX ADMIN — PIPERACILLIN SODIUM AND TAZOBACTAM SODIUM 3.38 G: 3; .375 INJECTION, POWDER, LYOPHILIZED, FOR SOLUTION INTRAVENOUS at 19:43

## 2018-01-01 RX ADMIN — METOPROLOL TARTRATE 50 MG: 50 TABLET ORAL at 17:57

## 2018-01-01 RX ADMIN — ENOXAPARIN SODIUM 40 MG: 100 INJECTION SUBCUTANEOUS at 10:52

## 2018-01-01 RX ADMIN — DEXTROSE MONOHYDRATE AND SODIUM CHLORIDE 50 ML/HR: 5; .45 INJECTION, SOLUTION INTRAVENOUS at 01:33

## 2018-01-01 RX ADMIN — ENOXAPARIN SODIUM 40 MG: 100 INJECTION SUBCUTANEOUS at 08:14

## 2018-01-01 RX ADMIN — LIDOCAINE HYDROCHLORIDE 10 ML: 10 INJECTION, SOLUTION EPIDURAL; INFILTRATION; INTRACAUDAL; PERINEURAL at 18:00

## 2018-01-01 RX ADMIN — ENOXAPARIN SODIUM 40 MG: 100 INJECTION SUBCUTANEOUS at 16:07

## 2018-01-01 RX ADMIN — METOPROLOL TARTRATE 50 MG: 50 TABLET ORAL at 17:54

## 2018-01-01 RX ADMIN — ONDANSETRON 4 MG: 4 TABLET, ORALLY DISINTEGRATING ORAL at 10:25

## 2018-01-01 RX ADMIN — Medication 10 ML: at 17:26

## 2018-01-01 RX ADMIN — MUPIROCIN: 20 OINTMENT TOPICAL at 08:14

## 2018-01-01 RX ADMIN — LISINOPRIL 10 MG: 10 TABLET ORAL at 08:14

## 2018-01-01 RX ADMIN — Medication 10 ML: at 15:20

## 2018-01-01 RX ADMIN — Medication 10 ML: at 15:39

## 2018-01-01 RX ADMIN — ENOXAPARIN SODIUM 40 MG: 100 INJECTION SUBCUTANEOUS at 09:52

## 2018-01-01 RX ADMIN — Medication 10 ML: at 05:49

## 2018-01-01 RX ADMIN — LISINOPRIL 10 MG: 10 TABLET ORAL at 08:43

## 2018-01-01 RX ADMIN — METOPROLOL TARTRATE 50 MG: 50 TABLET ORAL at 08:30

## 2018-01-01 RX ADMIN — SUCCINYLCHOLINE CHLORIDE 200 MG: 20 INJECTION INTRAMUSCULAR; INTRAVENOUS at 19:35

## 2018-01-01 RX ADMIN — Medication 10 ML: at 05:18

## 2018-01-01 RX ADMIN — MUPIROCIN: 20 OINTMENT TOPICAL at 20:42

## 2018-01-01 RX ADMIN — POTASSIUM CHLORIDE 10 MEQ: 10 INJECTION, SOLUTION INTRAVENOUS at 21:58

## 2018-01-01 RX ADMIN — DAPAGLIFLOZIN 10 MG: 10 TABLET, FILM COATED ORAL at 09:00

## 2018-01-01 RX ADMIN — Medication 5 ML: at 06:00

## 2018-01-01 RX ADMIN — SODIUM CHLORIDE 40 MG: 9 INJECTION INTRAMUSCULAR; INTRAVENOUS; SUBCUTANEOUS at 08:38

## 2018-01-01 RX ADMIN — FAMOTIDINE 20 MG: 20 TABLET ORAL at 08:14

## 2018-01-01 RX ADMIN — INSULIN GLARGINE 10 UNITS: 100 INJECTION, SOLUTION SUBCUTANEOUS at 08:43

## 2018-01-01 RX ADMIN — INSULIN LISPRO 3 UNITS: 100 INJECTION, SOLUTION INTRAVENOUS; SUBCUTANEOUS at 23:59

## 2018-01-01 RX ADMIN — EPINEPHRINE 1 MG: 0.1 INJECTION, SOLUTION ENDOTRACHEAL; INTRACARDIAC; INTRAVENOUS at 11:00

## 2018-01-01 RX ADMIN — Medication 10 ML: at 08:40

## 2018-01-01 RX ADMIN — VANCOMYCIN HYDROCHLORIDE 1500 MG: 10 INJECTION, POWDER, LYOPHILIZED, FOR SOLUTION INTRAVENOUS at 09:08

## 2018-01-01 RX ADMIN — HYDROMORPHONE HYDROCHLORIDE 0.5 MG: 2 INJECTION, SOLUTION INTRAMUSCULAR; INTRAVENOUS; SUBCUTANEOUS at 15:33

## 2018-01-01 RX ADMIN — Medication 10 ML: at 06:22

## 2018-01-01 RX ADMIN — CELECOXIB 100 MG: 100 CAPSULE ORAL at 17:08

## 2018-01-01 RX ADMIN — INSULIN LISPRO 2 UNITS: 100 INJECTION, SOLUTION INTRAVENOUS; SUBCUTANEOUS at 05:50

## 2018-01-01 RX ADMIN — FAMOTIDINE 20 MG: 20 TABLET ORAL at 10:53

## 2018-01-01 RX ADMIN — METOPROLOL TARTRATE 50 MG: 50 TABLET ORAL at 09:05

## 2018-01-01 RX ADMIN — Medication: at 18:21

## 2018-01-01 RX ADMIN — ENOXAPARIN SODIUM 40 MG: 100 INJECTION SUBCUTANEOUS at 16:24

## 2018-01-01 RX ADMIN — VANCOMYCIN HYDROCHLORIDE 2250 MG: 10 INJECTION, POWDER, LYOPHILIZED, FOR SOLUTION INTRAVENOUS at 20:45

## 2018-01-01 RX ADMIN — INSULIN LISPRO 3 UNITS: 100 INJECTION, SOLUTION INTRAVENOUS; SUBCUTANEOUS at 12:59

## 2018-01-01 RX ADMIN — WATER: 1000 INJECTION, SOLUTION INTRAVENOUS at 17:51

## 2018-01-01 RX ADMIN — POTASSIUM CHLORIDE 40 MEQ: 20 SOLUTION ORAL at 08:23

## 2018-01-01 RX ADMIN — WATER: 1000 INJECTION, SOLUTION INTRAVENOUS at 05:51

## 2018-01-01 RX ADMIN — METOPROLOL TARTRATE 50 MG: 50 TABLET ORAL at 17:21

## 2018-01-01 RX ADMIN — IOPAMIDOL 100 ML: 755 INJECTION, SOLUTION INTRAVENOUS at 21:43

## 2018-01-01 RX ADMIN — Medication 10 ML: at 19:00

## 2018-01-01 RX ADMIN — PIPERACILLIN SODIUM AND TAZOBACTAM SODIUM 3.38 G: 3; .375 INJECTION, POWDER, LYOPHILIZED, FOR SOLUTION INTRAVENOUS at 22:16

## 2018-01-01 RX ADMIN — LISINOPRIL 10 MG: 10 TABLET ORAL at 09:55

## 2018-01-01 RX ADMIN — DEXAMETHASONE 8 MG: 4 TABLET ORAL at 10:52

## 2018-01-01 RX ADMIN — OXALIPLATIN 80 MG: 5 INJECTION, SOLUTION INTRAVENOUS at 14:35

## 2018-01-01 RX ADMIN — INSULIN GLARGINE 47 UNITS: 100 INJECTION, SOLUTION SUBCUTANEOUS at 11:19

## 2018-01-01 RX ADMIN — OXYCODONE HYDROCHLORIDE 5 MG: 5 TABLET ORAL at 19:47

## 2018-01-01 RX ADMIN — SODIUM CHLORIDE, POTASSIUM CHLORIDE, SODIUM LACTATE AND CALCIUM CHLORIDE: 600; 310; 30; 20 INJECTION, SOLUTION INTRAVENOUS at 16:36

## 2018-01-01 RX ADMIN — INSULIN GLARGINE 47 UNITS: 100 INJECTION, SOLUTION SUBCUTANEOUS at 08:36

## 2018-01-01 RX ADMIN — METOPROLOL TARTRATE 50 MG: 50 TABLET ORAL at 08:21

## 2018-01-01 RX ADMIN — Medication 10 ML: at 16:46

## 2018-01-01 RX ADMIN — HUMAN INSULIN 3 UNITS: 100 INJECTION, SOLUTION SUBCUTANEOUS at 06:59

## 2018-01-01 RX ADMIN — SODIUM CHLORIDE 100 ML: 900 INJECTION, SOLUTION INTRAVENOUS at 16:53

## 2018-01-01 RX ADMIN — Medication 10 ML: at 06:14

## 2018-01-01 RX ADMIN — OXYCODONE HYDROCHLORIDE 5 MG: 5 TABLET ORAL at 17:05

## 2018-01-01 RX ADMIN — INSULIN LISPRO 3 UNITS: 100 INJECTION, SOLUTION INTRAVENOUS; SUBCUTANEOUS at 12:25

## 2018-01-01 RX ADMIN — Medication 80 MCG: at 19:34

## 2018-01-01 RX ADMIN — CEFDINIR 300 MG: 300 CAPSULE ORAL at 09:51

## 2018-01-01 RX ADMIN — Medication 10 ML: at 10:54

## 2018-01-01 RX ADMIN — FAMOTIDINE 20 MG: 20 TABLET ORAL at 08:05

## 2018-01-01 RX ADMIN — FAMOTIDINE 20 MG: 20 TABLET ORAL at 08:21

## 2018-01-01 RX ADMIN — Medication 10 ML: at 21:57

## 2018-01-01 RX ADMIN — VANCOMYCIN HYDROCHLORIDE 1500 MG: 10 INJECTION, POWDER, LYOPHILIZED, FOR SOLUTION INTRAVENOUS at 08:44

## 2018-01-01 RX ADMIN — Medication 10 ML: at 04:43

## 2018-01-01 RX ADMIN — CHLORHEXIDINE GLUCONATE 15 ML: 1.2 RINSE ORAL at 20:38

## 2018-01-01 RX ADMIN — SODIUM CHLORIDE 100 ML: 900 INJECTION, SOLUTION INTRAVENOUS at 22:45

## 2018-01-01 RX ADMIN — LISINOPRIL 10 MG: 10 TABLET ORAL at 08:23

## 2018-01-01 RX ADMIN — SODIUM CHLORIDE 100 ML: 900 INJECTION, SOLUTION INTRAVENOUS at 21:43

## 2018-01-01 RX ADMIN — Medication 10 ML: at 07:02

## 2018-01-01 RX ADMIN — INSULIN LISPRO 2 UNITS: 100 INJECTION, SOLUTION INTRAVENOUS; SUBCUTANEOUS at 06:32

## 2018-01-01 RX ADMIN — NEOSTIGMINE METHYLSULFATE 3 MG: 1 INJECTION INTRAVENOUS at 17:34

## 2018-01-01 RX ADMIN — INSULIN LISPRO 3 UNITS: 100 INJECTION, SOLUTION INTRAVENOUS; SUBCUTANEOUS at 23:10

## 2018-01-01 RX ADMIN — ENOXAPARIN SODIUM 40 MG: 100 INJECTION SUBCUTANEOUS at 15:50

## 2018-01-01 RX ADMIN — Medication 10 ML: at 22:26

## 2018-01-01 RX ADMIN — PIPERACILLIN SODIUM AND TAZOBACTAM SODIUM 3.38 G: 3; .375 INJECTION, POWDER, LYOPHILIZED, FOR SOLUTION INTRAVENOUS at 03:50

## 2018-01-01 RX ADMIN — SODIUM CHLORIDE 50 ML/HR: 900 INJECTION, SOLUTION INTRAVENOUS at 14:24

## 2018-01-01 RX ADMIN — PIPERACILLIN SODIUM AND TAZOBACTAM SODIUM 3.38 G: 3; .375 INJECTION, POWDER, LYOPHILIZED, FOR SOLUTION INTRAVENOUS at 12:41

## 2018-01-01 RX ADMIN — VANCOMYCIN HYDROCHLORIDE 1250 MG: 10 INJECTION, POWDER, LYOPHILIZED, FOR SOLUTION INTRAVENOUS at 00:51

## 2018-01-01 RX ADMIN — ENOXAPARIN SODIUM 40 MG: 100 INJECTION SUBCUTANEOUS at 17:42

## 2018-01-01 RX ADMIN — DEXMEDETOMIDINE HYDROCHLORIDE 6 MCG: 4 INJECTION, SOLUTION INTRAVENOUS at 20:22

## 2018-01-01 RX ADMIN — ENOXAPARIN SODIUM 40 MG: 100 INJECTION SUBCUTANEOUS at 17:50

## 2018-01-01 RX ADMIN — INSULIN GLARGINE 20 UNITS: 100 INJECTION, SOLUTION SUBCUTANEOUS at 09:09

## 2018-01-01 RX ADMIN — MORPHINE SULFATE 4 MG: 4 INJECTION INTRAVENOUS at 17:52

## 2018-01-01 RX ADMIN — CEFEPIME HYDROCHLORIDE 2 G: 2 INJECTION, POWDER, FOR SOLUTION INTRAVENOUS at 05:12

## 2018-01-01 RX ADMIN — Medication 10 ML: at 14:25

## 2018-01-01 RX ADMIN — VANCOMYCIN HYDROCHLORIDE 1000 MG: 1 INJECTION, POWDER, LYOPHILIZED, FOR SOLUTION INTRAVENOUS at 10:53

## 2018-01-01 RX ADMIN — INSULIN GLARGINE 28 UNITS: 100 INJECTION, SOLUTION SUBCUTANEOUS at 10:37

## 2018-01-01 RX ADMIN — INSULIN GLARGINE 20 UNITS: 100 INJECTION, SOLUTION SUBCUTANEOUS at 14:11

## 2018-01-01 RX ADMIN — Medication 5 ML: at 23:47

## 2018-01-01 RX ADMIN — SODIUM CHLORIDE 125 ML/HR: 900 INJECTION, SOLUTION INTRAVENOUS at 04:43

## 2018-01-01 RX ADMIN — Medication 10 ML: at 07:13

## 2018-01-01 RX ADMIN — SODIUM CHLORIDE AND POTASSIUM CHLORIDE: 4.5; 1.49 INJECTION, SOLUTION INTRAVENOUS at 09:32

## 2018-01-01 RX ADMIN — METOPROLOL TARTRATE 50 MG: 50 TABLET ORAL at 17:59

## 2018-01-01 RX ADMIN — ENOXAPARIN SODIUM 40 MG: 100 INJECTION SUBCUTANEOUS at 17:55

## 2018-01-01 RX ADMIN — FAMOTIDINE 20 MG: 20 TABLET ORAL at 08:41

## 2018-01-01 RX ADMIN — ENOXAPARIN SODIUM 40 MG: 100 INJECTION SUBCUTANEOUS at 08:04

## 2018-01-01 RX ADMIN — ALBUMIN HUMAN 250 ML: 50 SOLUTION INTRAVENOUS at 19:47

## 2018-01-01 RX ADMIN — INSULIN LISPRO 2 UNITS: 100 INJECTION, SOLUTION INTRAVENOUS; SUBCUTANEOUS at 18:24

## 2018-01-01 RX ADMIN — INSULIN GLARGINE 10 UNITS: 100 INJECTION, SOLUTION SUBCUTANEOUS at 08:21

## 2018-01-01 RX ADMIN — MUPIROCIN: 20 OINTMENT TOPICAL at 21:06

## 2018-01-01 RX ADMIN — INSULIN GLARGINE 25 UNITS: 100 INJECTION, SOLUTION SUBCUTANEOUS at 09:52

## 2018-01-01 RX ADMIN — Medication 10 ML: at 06:57

## 2018-01-01 RX ADMIN — SUCCINYLCHOLINE CHLORIDE 140 MG: 20 INJECTION INTRAMUSCULAR; INTRAVENOUS at 14:55

## 2018-01-01 RX ADMIN — Medication 10 ML: at 23:10

## 2018-01-01 RX ADMIN — ROCURONIUM BROMIDE 30 MG: 10 INJECTION, SOLUTION INTRAVENOUS at 15:00

## 2018-01-01 RX ADMIN — IOHEXOL 50 ML: 240 INJECTION, SOLUTION INTRATHECAL; INTRAVASCULAR; INTRAVENOUS; ORAL at 20:00

## 2018-01-01 RX ADMIN — LEVOFLOXACIN 750 MG: 5 INJECTION, SOLUTION INTRAVENOUS at 14:36

## 2018-01-01 RX ADMIN — OXYCODONE HYDROCHLORIDE 5 MG: 5 TABLET ORAL at 05:37

## 2018-01-01 RX ADMIN — ENOXAPARIN SODIUM 40 MG: 100 INJECTION SUBCUTANEOUS at 18:49

## 2018-01-01 RX ADMIN — FENTANYL CITRATE 50 MCG: 50 INJECTION, SOLUTION INTRAMUSCULAR; INTRAVENOUS at 14:55

## 2018-01-01 RX ADMIN — Medication 10 ML: at 04:15

## 2018-01-01 RX ADMIN — SODIUM CHLORIDE 40 MG: 9 INJECTION INTRAMUSCULAR; INTRAVENOUS; SUBCUTANEOUS at 20:38

## 2018-01-01 RX ADMIN — Medication 10 ML: at 07:24

## 2018-01-01 RX ADMIN — SODIUM BICARBONATE 210 MG: 42 INJECTION, SOLUTION INTRAVENOUS at 17:01

## 2018-01-01 RX ADMIN — HYDROMORPHONE HYDROCHLORIDE 0.5 MG: 2 INJECTION, SOLUTION INTRAMUSCULAR; INTRAVENOUS; SUBCUTANEOUS at 17:18

## 2018-01-01 RX ADMIN — Medication 10 ML: at 21:50

## 2018-01-01 RX ADMIN — SODIUM CHLORIDE 125 ML/HR: 900 INJECTION, SOLUTION INTRAVENOUS at 02:29

## 2018-01-01 RX ADMIN — POTASSIUM CHLORIDE 10 MEQ: 750 TABLET, EXTENDED RELEASE ORAL at 09:53

## 2018-01-01 RX ADMIN — LIDOCAINE HYDROCHLORIDE 50 MG: 20 INJECTION, SOLUTION EPIDURAL; INFILTRATION; INTRACAUDAL; PERINEURAL at 19:34

## 2018-01-01 RX ADMIN — CEFDINIR 300 MG: 300 CAPSULE ORAL at 21:31

## 2018-01-01 RX ADMIN — ENOXAPARIN SODIUM 40 MG: 100 INJECTION SUBCUTANEOUS at 15:39

## 2018-01-01 RX ADMIN — WATER: 1000 INJECTION, SOLUTION INTRAVENOUS at 20:36

## 2018-01-01 RX ADMIN — PIPERACILLIN SODIUM AND TAZOBACTAM SODIUM 3.38 G: 3; .375 INJECTION, POWDER, LYOPHILIZED, FOR SOLUTION INTRAVENOUS at 03:54

## 2018-01-01 RX ADMIN — DEXTROSE MONOHYDRATE AND SODIUM CHLORIDE 75 ML/HR: 5; .9 INJECTION, SOLUTION INTRAVENOUS at 21:43

## 2018-01-01 RX ADMIN — ONDANSETRON 8 MG: 2 INJECTION INTRAMUSCULAR; INTRAVENOUS at 13:34

## 2018-01-01 RX ADMIN — INSULIN GLARGINE 30 UNITS: 100 INJECTION, SOLUTION SUBCUTANEOUS at 22:43

## 2018-01-01 RX ADMIN — SODIUM CHLORIDE 1000 ML: 900 INJECTION, SOLUTION INTRAVENOUS at 21:57

## 2018-01-01 RX ADMIN — HYDROMORPHONE HYDROCHLORIDE 0.5 MG: 1 INJECTION, SOLUTION INTRAMUSCULAR; INTRAVENOUS; SUBCUTANEOUS at 02:40

## 2018-01-01 RX ADMIN — HUMAN INSULIN 3 UNITS: 100 INJECTION, SOLUTION SUBCUTANEOUS at 06:00

## 2018-01-01 RX ADMIN — KETOROLAC TROMETHAMINE 15 MG: 30 INJECTION, SOLUTION INTRAMUSCULAR; INTRAVENOUS at 22:35

## 2018-01-01 RX ADMIN — LEVOFLOXACIN 750 MG: 5 INJECTION, SOLUTION INTRAVENOUS at 14:11

## 2018-01-01 RX ADMIN — PEGFILGRASTIM 6 MG: 6 INJECTION SUBCUTANEOUS at 17:49

## 2018-01-01 RX ADMIN — Medication 10 ML: at 21:09

## 2018-01-01 RX ADMIN — FERROUS SULFATE TAB 325 MG (65 MG ELEMENTAL FE) 325 MG: 325 (65 FE) TAB at 07:24

## 2018-01-01 RX ADMIN — HYDROMORPHONE HYDROCHLORIDE 0.5 MG: 1 INJECTION, SOLUTION INTRAMUSCULAR; INTRAVENOUS; SUBCUTANEOUS at 05:49

## 2018-01-01 RX ADMIN — FENTANYL CITRATE 50 MCG: 50 INJECTION, SOLUTION INTRAMUSCULAR; INTRAVENOUS at 15:18

## 2018-01-01 RX ADMIN — METOPROLOL TARTRATE 50 MG: 50 TABLET ORAL at 09:09

## 2018-01-01 RX ADMIN — Medication 10 ML: at 05:12

## 2018-01-01 RX ADMIN — SODIUM CHLORIDE 125 ML/HR: 900 INJECTION, SOLUTION INTRAVENOUS at 21:16

## 2018-01-01 RX ADMIN — Medication 10 ML: at 06:51

## 2018-01-01 RX ADMIN — METOPROLOL TARTRATE 50 MG: 50 TABLET ORAL at 17:00

## 2018-01-01 RX ADMIN — SIMVASTATIN 20 MG: 20 TABLET, FILM COATED ORAL at 22:33

## 2018-01-01 RX ADMIN — RDII 250 MG CAPSULE 250 MG: at 09:52

## 2018-01-01 RX ADMIN — VANCOMYCIN HYDROCHLORIDE 1250 MG: 10 INJECTION, POWDER, LYOPHILIZED, FOR SOLUTION INTRAVENOUS at 11:44

## 2018-01-01 RX ADMIN — VANCOMYCIN HYDROCHLORIDE 1250 MG: 10 INJECTION, POWDER, LYOPHILIZED, FOR SOLUTION INTRAVENOUS at 11:24

## 2018-01-01 RX ADMIN — SODIUM CHLORIDE AND POTASSIUM CHLORIDE: 4.5; 1.49 INJECTION, SOLUTION INTRAVENOUS at 12:25

## 2018-01-01 RX ADMIN — ENOXAPARIN SODIUM 40 MG: 100 INJECTION SUBCUTANEOUS at 09:05

## 2018-01-01 RX ADMIN — OXYCODONE HYDROCHLORIDE 5 MG: 5 TABLET ORAL at 06:00

## 2018-01-01 RX ADMIN — Medication 10 ML: at 06:50

## 2018-01-01 RX ADMIN — MUPIROCIN: 20 OINTMENT TOPICAL at 08:35

## 2018-01-01 RX ADMIN — HUMAN INSULIN 4 UNITS: 100 INJECTION, SOLUTION SUBCUTANEOUS at 01:43

## 2018-01-01 RX ADMIN — INSULIN LISPRO 2 UNITS: 100 INJECTION, SOLUTION INTRAVENOUS; SUBCUTANEOUS at 11:52

## 2018-01-01 RX ADMIN — Medication 10 ML: at 21:51

## 2018-01-01 RX ADMIN — SODIUM BICARBONATE 100 MEQ: 84 INJECTION, SOLUTION INTRAVENOUS at 10:58

## 2018-01-01 RX ADMIN — CELECOXIB 100 MG: 100 CAPSULE ORAL at 09:05

## 2018-01-01 RX ADMIN — HYDROMORPHONE HYDROCHLORIDE 0.5 MG: 1 INJECTION, SOLUTION INTRAMUSCULAR; INTRAVENOUS; SUBCUTANEOUS at 06:21

## 2018-01-01 RX ADMIN — POTASSIUM CHLORIDE 10 MEQ: 200 INJECTION, SOLUTION INTRAVENOUS at 10:19

## 2018-01-01 RX ADMIN — FERROUS SULFATE TAB 325 MG (65 MG ELEMENTAL FE) 325 MG: 325 (65 FE) TAB at 12:17

## 2018-01-01 RX ADMIN — EPINEPHRINE 1 MG: 0.1 INJECTION, SOLUTION ENDOTRACHEAL; INTRACARDIAC; INTRAVENOUS at 10:57

## 2018-01-01 RX ADMIN — SODIUM CHLORIDE 125 ML/HR: 900 INJECTION, SOLUTION INTRAVENOUS at 20:34

## 2018-01-01 RX ADMIN — LISINOPRIL 10 MG: 10 TABLET ORAL at 09:05

## 2018-01-01 RX ADMIN — INSULIN GLARGINE 10 UNITS: 100 INJECTION, SOLUTION SUBCUTANEOUS at 09:05

## 2018-01-01 RX ADMIN — DEXTROSE MONOHYDRATE 25 G: 25 INJECTION, SOLUTION INTRAVENOUS at 05:32

## 2018-01-01 RX ADMIN — INSULIN LISPRO 2 UNITS: 100 INJECTION, SOLUTION INTRAVENOUS; SUBCUTANEOUS at 08:13

## 2018-01-01 RX ADMIN — FAMOTIDINE 20 MG: 20 TABLET ORAL at 18:49

## 2018-01-01 RX ADMIN — WATER: 1000 INJECTION, SOLUTION INTRAVENOUS at 09:21

## 2018-01-01 RX ADMIN — METOPROLOL TARTRATE 50 MG: 50 TABLET ORAL at 18:49

## 2018-01-01 RX ADMIN — INSULIN GLARGINE 30 UNITS: 100 INJECTION, SOLUTION SUBCUTANEOUS at 21:47

## 2018-01-01 RX ADMIN — STANDARDIZED SENNA CONCENTRATE AND DOCUSATE SODIUM 2 TABLET: 8.6; 5 TABLET, FILM COATED ORAL at 10:58

## 2018-01-01 RX ADMIN — SODIUM CHLORIDE 125 ML/HR: 900 INJECTION, SOLUTION INTRAVENOUS at 05:00

## 2018-01-01 RX ADMIN — METOPROLOL TARTRATE 50 MG: 50 TABLET ORAL at 18:04

## 2018-01-01 RX ADMIN — Medication 10 ML: at 08:15

## 2018-01-01 RX ADMIN — SODIUM CHLORIDE 100 ML/HR: 450 INJECTION, SOLUTION INTRAVENOUS at 04:15

## 2018-01-01 RX ADMIN — SODIUM CHLORIDE, POTASSIUM CHLORIDE, SODIUM LACTATE AND CALCIUM CHLORIDE: 600; 310; 30; 20 INJECTION, SOLUTION INTRAVENOUS at 14:43

## 2018-01-01 RX ADMIN — HUMAN INSULIN 3 UNITS: 100 INJECTION, SOLUTION SUBCUTANEOUS at 12:07

## 2018-01-01 RX ADMIN — DEXAMETHASONE SODIUM PHOSPHATE 12 MG: 4 INJECTION, SOLUTION INTRAMUSCULAR; INTRAVENOUS at 13:40

## 2018-01-01 RX ADMIN — FAMOTIDINE 20 MG: 20 TABLET ORAL at 18:39

## 2018-01-01 RX ADMIN — ALVIMOPAN 12 MG: 12 CAPSULE ORAL at 09:05

## 2018-01-01 RX ADMIN — INSULIN LISPRO 2 UNITS: 100 INJECTION, SOLUTION INTRAVENOUS; SUBCUTANEOUS at 22:00

## 2018-01-01 RX ADMIN — ONDANSETRON 4 MG: 2 INJECTION INTRAMUSCULAR; INTRAVENOUS at 15:00

## 2018-01-01 RX ADMIN — FAMOTIDINE 20 MG: 10 INJECTION, SOLUTION INTRAVENOUS at 20:31

## 2018-01-01 RX ADMIN — ASPIRIN 300 MG: 300 SUPPOSITORY RECTAL at 08:14

## 2018-01-01 RX ADMIN — FAMOTIDINE 20 MG: 20 TABLET ORAL at 08:16

## 2018-01-01 RX ADMIN — Medication 5 MCG/MIN: at 11:09

## 2018-01-01 RX ADMIN — METOPROLOL TARTRATE 50 MG: 50 TABLET ORAL at 17:48

## 2018-01-01 RX ADMIN — ENOXAPARIN SODIUM 40 MG: 100 INJECTION SUBCUTANEOUS at 10:21

## 2018-01-01 RX ADMIN — INSULIN LISPRO 3 UNITS: 100 INJECTION, SOLUTION INTRAVENOUS; SUBCUTANEOUS at 12:03

## 2018-03-02 NOTE — MR AVS SNAPSHOT
[address] [facility] [address] [place] [address] 
[phone] Patient: Jim Montnao MRN: [id number] Visit Information Date & Time Provider Department Dept. Phone Encounter #  
 3/2/2018  1:20 PM Jennifer Gutierrez, [address] [facility] Internal Medicine [phone] [id number] Your Appointments 3/19/2018  8:30 AM  
ROUTINE CARE with Armaan Lopez MD  
[facility]: follow up [address] [facility] [address] [facility] [address]  
[phone]  
  
   
 [address] [facility] [address] Upcoming Health Maintenance Date Due DTaP/Tdap/Td series (1 - Tdap) 2/12/1973 FOBT Q 1 YEAR AGE 50-75 2/12/2002 ZOSTER VACCINE AGE 60> 12/12/2011 EYE EXAM RETINAL OR DILATED Q1 12/3/2016 OSTEOPOROSIS SCREENING (DEXA) 2/12/2017 HEMOGLOBIN A1C Q6M 4/4/2018 MICROALBUMIN Q1 6/1/2018 LIPID PANEL Q1 6/1/2018 FOOT EXAM Q1 10/4/2018 MEDICARE YEARLY EXAM 10/5/2018 GLAUCOMA SCREENING Q2Y 9/28/2019 BREAST CANCER SCRN MAMMOGRAM 10/16/2019 Allergies as of 3/2/2018  Review Complete On: 3/2/2018 By: Jennifer Gutierrez, NP Severity Noted Reaction Type Reactions Tylenol [Acetaminophen]  03/14/2012    Swelling Current Immunizations  Reviewed on 10/4/2017 Name Date Pneumococcal Conjugate (PCV-13) 9/28/2016 Pneumococcal Polysaccharide (PPSV-23) 10/4/2017 Not reviewed this visit You Were Diagnosed With   
  
 Codes Comments Cough    -  Primary ICD-10-CM: X65 ICD-9-CM: 133. 2 Type 2 diabetes mellitus without complication, with long-term current use of insulin (HCC)     ICD-10-CM: E11.9, Z79.4 ICD-9-CM: 250.00, V58.67 Essential hypertension     ICD-10-CM: I10 
ICD-9-CM: 401.9 Vitals BP Pulse Temp Resp Height(growth percentile) Weight(growth percentile) 146/71 (BP 1 Location: Left arm, BP Patient Position: Sitting) 72 97.7 °F (36.5 °C) (Oral) 18 5' 4\" (1.626 m) 183 lb 12.8 oz (83.4 kg) SpO2 BMI OB Status Smoking Status 92% 31.55 kg/m2 Hysterectomy Never Smoker Vitals History BMI and BSA Data Body Mass Index Body Surface Area 31.55 kg/m 2 1.94 m 2 Preferred Pharmacy Pharmacy Name Phone [address] [place] [address] [phone] Your Updated Medication List  
  
   
This list is accurate as of 3/2/18  1:55 PM.  Always use your most recent med list.  
  
  
  
  
 * fexofenadine 180 mg tablet Commonly known as:  Gabby Pies Take 1 Tab by mouth daily. * ALLEGRA 180 mg tablet Generic drug:  fexofenadine Take  by mouth daily. ammonium lactate 12 % topical cream  
Commonly known as:  AMLACTIN Apply  to affected area two (2) times a day. rub in to affected area well  
  
 aspirin delayed-release 81 mg tablet Take 81 mg by mouth daily. atenolol 100 mg tablet Commonly known as:  TENORMIN Take 1 Tab by mouth daily. * Blood-Glucose Meter Misc Commonly known as:  CONTOUR NEXT METER Check blood sugar BID * Blood-Glucose Meter monitoring kit Check BS TID. Accucheck nanno meter please  
  
 calcium 600 mg Cap Take  by mouth. cholecalciferol (VITAMIN D3) 5,000 unit Tab tablet Commonly known as:  VITAMIN D3 Take  by mouth daily. clotrimazole 1 % topical cream  
Commonly known as:  Gregg Canal Apply  to affected area two (2) times a day. * dapagliflozin 10 mg Tab tablet Commonly known as:  [employer] Take 1 Tab by mouth daily. * FARXIGA 10 mg Tab tablet Generic drug:  dapagliflozin TAKE ONE TABLET BY MOUTH ONCE DAILY  
  
 fluticasone 50 mcg/actuation nasal spray Commonly known as:  Aubery Cables 2 Sprays by Both Nostrils route daily. glucose blood VI test strips strip Commonly known as:  blood glucose test  
 Check BS TID.accucheck nanno * insulin glargine 100 unit/mL (3 mL) Inpn Commonly known as:  LANTUS,BASAGLAR  
50 units in am and 50 units  in pm  Indications: 90 day supply * insulin glargine 100 unit/mL (3 mL) Inpn Commonly known as:  LANTUS SOLOSTAR U-100 INSULIN INJECT 47 UNITS UNDER THE SKIN IN THE MORNING AND 47 UNITS IN THE EVENING ( please give 90 day supply 5 pens per box, give 6 boxes=30 pens) Iron 325 mg (65 mg iron) tablet Generic drug:  ferrous sulfate Take  by mouth Daily (before breakfast). KOMBIGLYZE XR 5-1,000 mg Tm24 Generic drug:  sAXagliptin-metFORMIN  
TAKE ONE TABLET BY MOUTH ONCE DAILY  
  
 lisinopril 10 mg tablet Commonly known as:  PRINIVIL, ZESTRIL  
TAKE ONE TABLET BY MOUTH ONCE DAILY  
  
 metoprolol tartrate 50 mg tablet Commonly known as:  LOPRESSOR  
TAKE ONE TABLET BY MOUTH TWICE DAILY  
  
 * Te Minium LANCETS 30 gauge Misc Generic drug:  lancets CHECK BLOOD SUGAR TWICE DAILY * Lancets Misc Check BS TID.accucheck nanno  
  
 simvastatin 20 mg tablet Commonly known as:  ZOCOR  
TAKE ONE TABLET BY MOUTH NIGHTLY * Notice: This list has 10 medication(s) that are the same as other medications prescribed for you. Read the directions carefully, and ask your doctor or other care provider to review them with you. Prescriptions Sent to Pharmacy Refills  
 fexofenadine (ALLEGRA) 180 mg tablet 3 Sig: Take 1 Tab by mouth daily. Class: Normal  
 Pharmacy: [facility] [address] [facility] Ph #: [phone] Route: Oral  
 fluticasone (FLONASE) 50 mcg/actuation nasal spray 2 Si Sprays by Both Nostrils route daily. Class: Normal  
 Pharmacy: [facility] [address] [facility] Ph #: [phone] Route: Both Nostrils Introducing [facility] [place] [facility]!    
 [facility] introduces RedT patient portal. Now you can access parts of your medical record, email your doctor's office, and request medication refills online. 1. In your internet browser, go to [email or web address] 2. Click on the First Time User? Click Here link in the Sign In box. You will see the New Member Sign Up page. 3. Enter your [employer] Access Code exactly as it appears below. You will not need to use this code after youve completed the sign-up process. If you do not sign up before the expiration date, you must request a new code. · [employer] Access Code: [email or web address] Expires: 5/31/2018  1:55 PM 
 
4. Enter the last four digits of your Social Security Number (xxxx) and Date of Birth (mm/dd/yyyy) as indicated and click Submit. You will be taken to the next sign-up page. 5. Create a [employer] ID. This will be your [employer] login ID and cannot be changed, so think of one that is secure and easy to remember. 6. Create a [employer] password. You can change your password at any time. 7. Enter your Password Reset Question and Answer. This can be used at a later time if you forget your password. 8. Enter your e-mail address. You will receive e-mail notification when new information is available in [address] 9. Click Sign Up. You can now view and download portions of your medical record. 10. Click the Download Summary menu link to download a portable copy of your medical information. If you have questions, please visit the Frequently Asked Questions section of the [employer] website. Remember, [employer] is NOT to be used for urgent needs. For medical emergencies, dial 911. Now available from your iPhone and Android! Please provide this summary of care documentation to your next provider. Your primary care clinician is listed as [facility] Nurse Navigator. If you have any questions after today's visit, please call [phone].

## 2018-03-02 NOTE — PROGRESS NOTES
Health Maintenance Due   Topic Date Due    DTaP/Tdap/Td series (1 - Tdap) 02/12/1973    FOBT Q 1 YEAR AGE 50-75  02/12/2002    ZOSTER VACCINE AGE 60>  12/12/2011    EYE EXAM RETINAL OR DILATED Q1  12/03/2016    OSTEOPOROSIS SCREENING (DEXA)  02/12/2017       Chief Complaint   Patient presents with    Cough     Phlem, coughs alot, dry cough,       1. Have you been to the ER, urgent care clinic since your last visit? Hospitalized since your last visit? No    2. Have you seen or consulted any other health care providers outside of the [address] [place] [address] since your last visit? Include any pap smears or colon screening. No    3) Do you have an Advance Directive on file? no    4) Are you interested in receiving information on Advance Directives? NO      Patient is accompanied by self I have received verbal consent from Merline Ramírez to discuss any/all medical information while they are present in the room. Was in [place] in January, winds were bad, could be allergies.

## 2018-03-02 NOTE — PROGRESS NOTES
Subjective:     Chief Complaint   Patient presents with    Cough     Phlem, coughs alot, dry cough,    Allergies    Nasal Discharge     runny, clear    Sore Throat       History of Present Illness    Brian Castillo is a 79y.o. year old female who is a patient of Dr. Jennifer Clemente that presents today with complaints of cough and clear nasal drainage. She just arrived back in [place] after spending 5 months in [place]. She states her symptoms started while in [place]. She state the weather was much different there than normal, windy, and changes in temperature. She denies any fever or other associated symptoms. She has not taken anything for her symptoms. She is due to see Dr. Jennifer Clemente later this month for 6 month follow-up. She reports her BS have been running from 100-120. Her BP is elevated today but she states that is because she is tired and doesn't feel well. No CP, SOB, GI, or  symptoms. Reviewed medications, recent lab work and imaging with patient. Pt reports compliance with medications. Current Outpatient Prescriptions on File Prior to Visit   Medication Sig Dispense Refill    metoprolol tartrate (LOPRESSOR) 50 mg tablet TAKE ONE TABLET BY MOUTH TWICE DAILY 180 Tab 0    FARXIGA 10 mg tab tablet TAKE ONE TABLET BY MOUTH ONCE DAILY 90 Tab 1    simvastatin (ZOCOR) 20 mg tablet TAKE ONE TABLET BY MOUTH NIGHTLY 90 Tab 1    lisinopril (PRINIVIL, ZESTRIL) 10 mg tablet TAKE ONE TABLET BY MOUTH ONCE DAILY 90 Tab 1    ammonium lactate (AMLACTIN) 12 % topical cream Apply  to affected area two (2) times a day. rub in to affected area well 2 g 2    KOMBIGLYZE XR 5-1,000 mg TM24 TAKE ONE TABLET BY MOUTH ONCE DAILY 90 Tab 1    atenolol (TENORMIN) 100 mg tablet Take 1 Tab by mouth daily.  90 Tab 1    insulin glargine (LANTUS SOLOSTAR) 100 unit/mL (3 mL) inpn INJECT 47 UNITS UNDER THE SKIN IN THE MORNING AND 47 UNITS IN THE EVENING ( please give 90 day supply 5 pens per box, give 6 boxes=30 pens) 30 Pen 3    Blood-Glucose Meter monitoring kit Check BS TID. Accucheck nanno meter please 1 Kit 0    Lancets misc Check BS TID.accucheck nanno 100 Each 11    glucose blood VI test strips (BLOOD GLUCOSE TEST) strip Check BS TID.accucheck nanno 100 Strip 11    insulin glargine (LANTUS,BASAGLAR) 100 unit/mL (3 mL) inpn 50 units in am and 50 units  in pm  Indications: 90 day supply 7 Pen 1    dapagliflozin (FARXIGA) 10 mg tab Take 1 Tab by mouth daily. 90 Tab 1    cholecalciferol, VITAMIN D3, (VITAMIN D3) 5,000 unit tab tablet Take  by mouth daily.  ferrous sulfate (IRON) 325 mg (65 mg iron) tablet Take  by mouth Daily (before breakfast).  aspirin delayed-release 81 mg tablet Take 81 mg by mouth daily.  clotrimazole (LOTRIMIN) 1 % topical cream Apply  to affected area two (2) times a day. 30 g 0    ONE TOUCH DELICA LANCETS 30 gauge misc CHECK BLOOD SUGAR TWICE DAILY 60 Each 11    Blood-Glucose Meter (CONTOUR NEXT METER) misc Check blood sugar BID 1 Each 0    calcium 600 mg Cap Take  by mouth.  fexofenadine (ALLEGRA) 180 mg tablet Take  by mouth daily. No current facility-administered medications on file prior to visit.         Allergies   Allergen Reactions    Tylenol [Acetaminophen] Swelling      Past Medical History:   Diagnosis Date    Anemia NEC     [facility] [address]     Diabetes ([facility] [address] [facility]     Headache(784.0)     Hypercholesterolemia     Hypertension       Past Surgical History:   Procedure Laterality Date    HX  SECTION      Hysterectomy,total for precancer      Social History   Substance Use Topics    Smoking status: Never Smoker    Smokeless tobacco: Never Used    Alcohol use No      Family History   Problem Relation Age of Onset    Alcohol abuse Father     Cancer Maternal Aunt      breast ca    Breast Cancer Maternal Aunt     Breast Cancer Sister     Breast Cancer Daughter         Objective:     Vitals:    18 1341   BP: 146/71   Pulse: 72   Resp: 18   Temp: 97.7 °F (36.5 °C)   TempSrc: Oral   SpO2: 92%   Weight: 183 lb 12.8 oz (83.4 kg)   Height: 5' 4\" (1.626 m)       Review of Systems   Constitutional: Negative. Negative for chills and fever. HENT: Positive for congestion. Eyes: Negative. Negative for blurred vision and double vision. Respiratory: Positive for cough. Cardiovascular: Positive for PND. Negative for chest pain and palpitations. Gastrointestinal: Negative. Genitourinary: Negative. Musculoskeletal: Negative. Skin: Negative. Neurological: Negative. Psychiatric/Behavioral: Negative. Physical Exam   Constitutional: She is oriented to person, place, and time. She appears well-developed and well-nourished. No distress. Well-appearing  female. NAD   HENT:   Head: Normocephalic and atraumatic. Eyes: Conjunctivae and EOM are normal. Pupils are equal, round, and reactive to light. Neck: Normal range of motion. Neck supple. Cardiovascular: Normal rate, regular rhythm and normal heart sounds. Pulmonary/Chest: Effort normal and breath sounds normal. No respiratory distress. She has no wheezes. Dry cough   Abdominal: She exhibits no distension. Musculoskeletal: Normal range of motion. She exhibits no edema, tenderness or deformity. Neurological: She is alert and oriented to person, place, and time. Skin: Skin is warm and dry. She is not diaphoretic. Psychiatric: She has a normal mood and affect. Her behavior is normal.   Nursing note and vitals reviewed. Assessment/ Plan:   Diagnoses and all orders for this visit:    1. Cough   Will order  -     fexofenadine (ALLEGRA) 180 mg tablet; Take 1 Tab by mouth daily. -     fluticasone (FLONASE) 50 mcg/actuation nasal spray; 2 Sprays by Both Nostrils route daily. 2. Type 2 diabetes mellitus without complication, with long-term current use of insulin ([address]    3. Essential hypertension    Patient's plan of care has been reviewed with them.   Patient and/or family have verbally conveyed their understanding and agreement of the patient's signs, symptoms, diagnosis, treatment and prognosis and additionally agree to follow up as recommended or return to [facility] should their condition change prior to follow-up. Discharge instructions have also been provided to the patient with some educational information regarding their diagnosis as well a list of reasons why they would want to return to the office prior to their follow-up appointment should their condition change.     Follow-up with Dr. Angela Fry as scheduled

## 2018-03-02 NOTE — PATIENT INSTRUCTIONS
Allergies: Care Instructions  Your Care Instructions    Allergies occur when your body's defense system (immune system) overreacts to certain substances. The immune system treats a harmless substance as if it were a harmful germ or virus. Many things can cause this overreaction, including pollens, medicine, food, dust, animal dander, and mold. Allergies can be mild or severe. Mild allergies can be managed with home treatment. But medicine may be needed to prevent problems. Managing your allergies is an important part of staying healthy. Your doctor may suggest that you have allergy testing to help find out what is causing your allergies. When you know what things trigger your symptoms, you can avoid them. This can prevent allergy symptoms and other health problems. For severe allergies that cause reactions that affect your whole body (anaphylactic reactions), your doctor may prescribe a shot of epinephrine to carry with you in case you have a severe reaction. Learn how to give yourself the shot and keep it with you at all times. Make sure it is not . Follow-up care is a key part of your treatment and safety. Be sure to make and go to all appointments, and call your doctor if you are having problems. It's also a good idea to know your test results and keep a list of the medicines you take. How can you care for yourself at home? · If you have been told by your doctor that dust or dust mites are causing your allergy, decrease the dust around your bed:  [facility] sheets, pillowcases, and other bedding in hot water every week. ¨ Use dust-proof covers for pillows, duvets, and mattresses. Avoid plastic covers because they tear easily and do not \"breathe. \" Wash as instructed on the label. ¨ Do not use any blankets and pillows that you do not need. ¨ Use blankets that you can wash in your washing machine. ¨ Consider removing drapes and carpets, which attract and hold dust, from your bedroom.   · If you are allergic to house dust and mites, do not use home humidifiers. Your doctor can suggest ways you can control dust and mites. · Look for signs of cockroaches. Cockroaches cause allergic reactions. Use cockroach baits to get rid of them. Then, clean your home well. Cockroaches like areas where grocery bags, newspapers, empty bottles, or cardboard boxes are stored. Do not keep these inside your home, and keep trash and food containers sealed. Seal off any spots where cockroaches might enter your home. · If you are allergic to mold, get rid of furniture, rugs, and drapes that smell musty. Check for mold in the bathroom. · If you are allergic to outdoor pollen or mold spores, use air-conditioning. Change or clean all filters every month. Keep windows closed. · If you are allergic to pollen, stay inside when pollen counts are high. Use a vacuum  with a HEPA filter or a double-thickness filter at least two times each week. · Stay inside when air pollution is bad. Avoid paint fumes, perfumes, and other strong odors. · Avoid conditions that make your allergies worse. Stay away from smoke. Do not smoke or let anyone else smoke in your house. Do not use fireplaces or wood-burning stoves. · If you are allergic to your pets, change the air filter in your furnace every month. Use high-efficiency filters. · If you are allergic to pet dander, keep pets outside or out of your bedroom. Old carpet and cloth furniture can hold a lot of animal dander. You may need to replace them. When should you call for help? Give an epinephrine shot if:  ? · You think you are having a severe allergic reaction. ? · You have symptoms in more than one body area, such as mild nausea and an itchy mouth. ? After giving an epinephrine shot call 911, even if you feel better. ?Call 911 if:  ? · You have symptoms of a severe allergic reaction. These may include:  ¨ Sudden raised, red areas (hives) all over your body.   ¨ Swelling of the throat, mouth, lips, or tongue. ¨ Trouble breathing. ¨ Passing out (losing consciousness). Or you may feel very lightheaded or suddenly feel weak, confused, or restless. ? · You have been given an epinephrine shot, even if you feel better. ?Call your doctor now or seek immediate medical care if:  ? · You have symptoms of an allergic reaction, such as:  ¨ A rash or hives (raised, red areas on the skin). ¨ Itching. ¨ Swelling. ¨ Belly pain, nausea, or vomiting. ? Watch closely for changes in your health, and be sure to contact your doctor if:  ? · You do not get better as expected. Where can you learn more? Go to http://thai-freddie.info/. Enter K054 in the search box to learn more about \"Allergies: Care Instructions. \"  Current as of: September 29, 2016  Content Version: 11.4  © 8387-0142 [employer]. Care instructions adapted under license by [employer] (which disclaims liability or warranty for this information). If you have questions about a medical condition or this instruction, always ask your healthcare professional. [place] [address] any warranty or liability for your use of this information. Managing Your Allergies: Care Instructions  Your Care Instructions    Managing your allergies is an important part of staying healthy. Your doctor will help you find out what may be causing the allergies. Common causes of allergy symptoms are house dust and dust mites, animal dander, mold, and pollen. As soon as you know what triggers your symptoms, try to reduce your exposure to your triggers. This can help prevent allergy symptoms, asthma, and other health problems. Ask your doctor about allergy medicine or immunotherapy. These treatments may help reduce or prevent allergy symptoms. Follow-up care is a key part of your treatment and safety. Be sure to make and go to all appointments, and call your doctor if you are having problems.  It's also a good idea to know your test results and keep a list of the medicines you take. How can you care for yourself at home? · If you think that dust or dust mites are causing your allergies:  ¨ Wash sheets, pillowcases, and other bedding every week in hot water. ¨ Use airtight, dust-proof covers for pillows, duvets, and mattresses. Avoid plastic covers, because they tend to tear quickly and do not \"breathe. \" Wash according to the instructions. ¨ Remove extra blankets and pillows that you don't need. ¨ Use blankets that are machine-washable. ¨ Don't use home humidifiers. They can help mites live longer. · Use air-conditioning. Change or clean all filters every month. Keep windows closed. Use high-efficiency air filters. Don't use window or attic fans, which draw dust into the air. · If you're allergic to pet dander, keep pets outside or, at the very least, out of your bedroom. Old carpet and cloth-covered furniture can hold a lot of animal dander. You may need to replace them. · Look for signs of cockroaches. Use cockroach baits to get rid of them. Then clean your home well. · If you're allergic to mold, don't keep indoor plants, because molds can grow in soil. Get rid of furniture, rugs, and drapes that smell musty. Check for mold in the bathroom. · If you're allergic to pollen, stay inside when pollen counts are high. · Don't smoke or let anyone else smoke in your house. Don't use fireplaces or wood-burning stoves. Avoid paint fumes, perfumes, and other strong odors. When should you call for help? Give an epinephrine shot if:  ? · You think you are having a severe allergic reaction. ? After giving an epinephrine shot call 911, even if you feel better. ?Call 911 if:  ? · You have symptoms of a severe allergic reaction. These may include:  ¨ Sudden raised, red areas (hives) all over your body. ¨ Swelling of the throat, mouth, lips, or tongue. ¨ Trouble breathing.   ¨ Passing out (losing consciousness). Or you may feel very lightheaded or suddenly feel weak, confused, or restless. ? · You have been given an epinephrine shot, even if you feel better. ?Call your doctor now or seek immediate medical care if:  ? · You have symptoms of an allergic reaction, such as:  ¨ A rash or hives (raised, red areas on the skin). ¨ Itching. ¨ Swelling. ¨ Belly pain, nausea, or vomiting. ? Watch closely for changes in your health, and be sure to contact your doctor if:  ? · Your allergies get worse. ? · You need help controlling your allergies. ? · You have questions about allergy testing. ? · You do not get better as expected. Where can you learn more? Go to http://thai-freddie.info/. Enter L249 in the search box to learn more about \"Managing Your Allergies: Care Instructions. \"  Current as of: September 29, 2016  Content Version: 11.4  © 4480-4643 [employer]. Care instructions adapted under license by [employer] (which disclaims liability or warranty for this information). If you have questions about a medical condition or this instruction, always ask your healthcare professional. [place] [address] any warranty or liability for your use of this information. Cough: Care Instructions  Your Care Instructions    A cough is your body's response to something that bothers your throat or airways. Many things can cause a cough. You might cough because of a cold or the flu, bronchitis, or asthma. Smoking, postnasal drip, allergies, and stomach acid that backs up into your throat also can cause coughs. A cough is a symptom, not a disease. Most coughs stop when the cause, such as a cold, goes away. You can take a few steps at home to cough less and feel better. Follow-up care is a key part of your treatment and safety. Be sure to make and go to all appointments, and call your doctor if you are having problems.  It's also a good idea to know your test results and keep a list of the medicines you take. How can you care for yourself at home? · Drink lots of water and other fluids. This helps thin the mucus and soothes a dry or sore throat. Honey or lemon juice in hot water or tea may ease a dry cough. · Take cough medicine as directed by your doctor. · Prop up your head on pillows to help you breathe and ease a dry cough. · Try cough drops to soothe a dry or sore throat. Cough drops don't stop a cough. Medicine-flavored cough drops are no better than candy-flavored drops or hard candy. · Do not smoke. Avoid secondhand smoke. If you need help quitting, talk to your doctor about stop-smoking programs and medicines. These can increase your chances of quitting for good. When should you call for help? Call 911 anytime you think you may need emergency care. For example, call if:  ? · You have severe trouble breathing. ?Call your doctor now or seek immediate medical care if:  ? · You cough up blood. ? · You have new or worse trouble breathing. ? · You have a new or higher fever. ? · You have a new rash. ? Watch closely for changes in your health, and be sure to contact your doctor if:  ? · You cough more deeply or more often, especially if you notice more mucus or a change in the color of your mucus. ? · You have new symptoms, such as a sore throat, an earache, or sinus pain. ? · You do not get better as expected. Where can you learn more? Go to http://thai-freddie.info/. Enter D279 in the search box to learn more about \"Cough: Care Instructions. \"  Current as of: May 12, 2017  Content Version: 11.4  © 4542-9666 [employer]. Care instructions adapted under license by [employer] (which disclaims liability or warranty for this information).  If you have questions about a medical condition or this instruction, always ask your healthcare professional. Kesha Ramsay disclaims any warranty or liability for your use of this information.

## 2018-03-19 NOTE — MR AVS SNAPSHOT
[address] [place] [address] [address] 
[phone] Patient: Yara Samaniego MRN: [id number] BVO:[id number] Visit Information Date & Time Provider Department Dept. Phone Encounter #  
 3/19/2018  8:30 AM [facility] Internal Medicine [phone] [id number] Upcoming Health Maintenance Date Due DTaP/Tdap/Td series (1 - Tdap) 2/12/1973 FOBT Q 1 YEAR AGE 50-75 2/12/2002 ZOSTER VACCINE AGE 60> 12/12/2011 EYE EXAM RETINAL OR DILATED Q1 12/3/2016 Bone Densitometry (Dexa) Screening 2/12/2017 HEMOGLOBIN A1C Q6M 4/4/2018 MICROALBUMIN Q1 6/1/2018 LIPID PANEL Q1 6/1/2018 FOOT EXAM Q1 10/4/2018 MEDICARE YEARLY EXAM 10/5/2018 GLAUCOMA SCREENING Q2Y 9/28/2019 BREAST CANCER SCRN MAMMOGRAM 10/16/2019 Allergies as of 3/19/2018  Review Complete On: 3/19/2018 By: Farideh Beck MD  
  
 Severity Noted Reaction Type Reactions Tylenol [Acetaminophen]  03/14/2012    Swelling Current Immunizations  Reviewed on 10/4/2017 Name Date Pneumococcal Conjugate (PCV-13) 9/28/2016 Pneumococcal Polysaccharide (PPSV-23) 10/4/2017 Not reviewed this visit You Were Diagnosed With   
  
 Codes Comments Essential hypertension    -  Primary ICD-10-CM: I10 
ICD-9-CM: 401.9 Type 2 diabetes mellitus without complication, with long-term current use of insulin (HCC)     ICD-10-CM: E11.9, Z79.4 ICD-9-CM: 250.00, V58.67 Mixed hyperlipidemia     ICD-10-CM: E78.2 ICD-9-CM: 272.2 Bronchitis     ICD-10-CM: J40 ICD-9-CM: 457 Vitals BP Pulse Temp Resp Height(growth percentile) Weight(growth percentile) 142/76 (BP 1 Location: Left arm, BP Patient Position: Sitting) 70 97.8 °F (36.6 °C) (Oral) 20 5' 4\" (1.626 m) 182 lb (82.6 kg) SpO2 BMI OB Status Smoking Status 95% 31.24 kg/m2 Hysterectomy Never Smoker Vitals History BMI and BSA Data Body Mass Index Body Surface Area  
 31.24 kg/m 2 1.93 m 2 Preferred Pharmacy Pharmacy Name Phone [address] [place] [address] [phone] Your Updated Medication List  
  
   
This list is accurate as of 3/19/18  9:17 AM.  Always use your most recent med list.  
  
  
  
  
 albuterol 90 mcg/actuation inhaler Commonly known as:  PROVENTIL HFA, VENTOLIN HFA, PROAIR HFA Take 1 Puff by inhalation every six (6) hours as needed for Wheezing. ammonium lactate 12 % topical cream  
Commonly known as:  AMLACTIN Apply  to affected area two (2) times a day. rub in to affected area well  
  
 aspirin delayed-release 81 mg tablet Take 81 mg by mouth daily. azithromycin 250 mg tablet Commonly known as:  Elisa Tift Take two tablets today then one tablet daily * Blood-Glucose Meter Misc Commonly known as:  CONTOUR NEXT METER Check blood sugar BID * Blood-Glucose Meter monitoring kit Check BS TID. Accucheck nanno meter please  
  
 calcium 600 mg Cap Take  by mouth. cholecalciferol (VITAMIN D3) 5,000 unit Tab tablet Commonly known as:  VITAMIN D3 Take  by mouth daily. clotrimazole 1 % topical cream  
Commonly known as:  Prentis Pickerel Apply  to affected area two (2) times a day. dapagliflozin 10 mg Tab tablet Commonly known as:  BELEN.S. Brittney Take 1 Tab by mouth daily. fexofenadine 180 mg tablet Commonly known as:  Mayito Saba Take 1 Tab by mouth daily. fluticasone 50 mcg/actuation nasal spray Commonly known as:  Mayito Gama 2 Sprays by Both Nostrils route daily. glucose blood VI test strips strip Commonly known as:  blood glucose test  
Check BS TID.accucheck nanno * insulin glargine 100 unit/mL (3 mL) Inpn Commonly known as:  LANTUS,BASAGLAR  
50 units in am and 50 units  in pm  Indications: 90 day supply * insulin glargine 100 unit/mL (3 mL) Inpn Commonly known as:  LANTUS SOLOSTAR U-100 INSULIN INJECT 47 UNITS UNDER THE SKIN IN THE MORNING AND 47 UNITS IN THE EVENING ( please give 90 day supply 5 pens per box, give 6 boxes=30 pens) Iron 325 mg (65 mg iron) tablet Generic drug:  ferrous sulfate Take  by mouth Daily (before breakfast). lisinopril 10 mg tablet Commonly known as:  Prince Kansky Take 1 Tab by mouth daily. metoprolol tartrate 50 mg tablet Commonly known as:  LOPRESSOR  
TAKE ONE TABLET BY MOUTH TWICE DAILY  
  
 * Kathryn Life LANCETS 30 gauge Misc Generic drug:  lancets CHECK BLOOD SUGAR TWICE DAILY * Lancets Misc Check BS TID.accucheck nanno sAXagliptin-metFORMIN 5-1,000 mg Tm24 Commonly known as:  KOMBIGLYZE XR Take 1 tab po QD  
  
 simvastatin 20 mg tablet Commonly known as:  ZOCOR Take 1 Tab by mouth nightly. * Notice: This list has 6 medication(s) that are the same as other medications prescribed for you. Read the directions carefully, and ask your doctor or other care provider to review them with you. Prescriptions Sent to Pharmacy Refills  
 azithromycin (ZITHROMAX Z-CARLOS) 250 mg tablet 0 Sig: Take two tablets today then one tablet daily Class: Normal  
 Pharmacy: [facility] [address] [facility] Ph #: [phone]  
 albuterol (PROVENTIL HFA, VENTOLIN HFA, PROAIR HFA) 90 mcg/actuation inhaler 0 Sig: Take 1 Puff by inhalation every six (6) hours as needed for Wheezing. Class: Normal  
 Pharmacy: [facility] [address] [place] [address] Ph #: [phone] Route: Inhalation  
 simvastatin (ZOCOR) 20 mg tablet 1 Sig: Take 1 Tab by mouth nightly. Class: Normal  
 Pharmacy: [facility] [address] [facility] [address] [place] [address] Ph #: [phone] Route: Oral  
 lisinopril (PRINIVIL, ZESTRIL) 10 mg tablet 1 Sig: Take 1 Tab by mouth daily.   
 Class: Normal  
 Pharmacy: [address] Ph #: [phone] Route: Oral  
 sAXagliptin-metFORMIN (KOMBIGLYZE XR) 5-1,000 mg TM24 1 Sig: Take 1 tab po QD Class: Normal  
 Pharmacy: [address] Ph #: [phone]  
 dapagliflozin (FARXIGA) 10 mg tab tablet 1 Sig: Take 1 Tab by mouth daily. Class: Normal  
 Pharmacy: [address] Ph #: [phone] Route: Oral  
  
We Performed the Following CBC W/O DIFF [41489 CPT(R)] HEMOGLOBIN A1C WITH EAG [96809 CPT(R)] METABOLIC PANEL, COMPREHENSIVE [20663 CPT(R)] MICROALBUMIN, UR, RAND W/ MICROALB/CREAT RATIO [id number] CPT(R)] Introducing [facility]! [facility] introduces [employer] patient portal. Now you can access parts of your medical record, email your doctor's office, and request medication refills online. 1. In your internet browser, go to [email or web address] 2. Click on the First Time User? Click Here link in the Sign In box. You will see the New Member Sign Up page. 3. Enter your [employer] Access Code exactly as it appears below. You will not need to use this code after youve completed the sign-up process. If you do not sign up before the expiration date, you must request a new code. · [employer] Access Code: [email or web address] Expires: 5/31/2018  2:55 PM 
 
4. Enter the last four digits of your Social Security Number (xxxx) and Date of Birth (mm/dd/yyyy) as indicated and click Submit. You will be taken to the next sign-up page. 5. Create a [employer] ID. This will be your [employer] login ID and cannot be changed, so think of one that is secure and easy to remember. 6. Create a [employer] password. You can change your password at any time. 7. Enter your Password Reset Question and Answer. This can be used at a later time if you forget your password. 8. Enter your e-mail address. You will receive e-mail notification when new information is available in [address] 9. Click Sign Up. You can now view and download portions of your medical record. 10. Click the Download Summary menu link to download a portable copy of your medical information. If you have questions, please visit the Frequently Asked Questions section of the [employer] website. Remember, [employer] is NOT to be used for urgent needs. For medical emergencies, dial 911. Now available from your iPhone and Android! Please provide this summary of care documentation to your next provider. Your primary care clinician is listed as [facility] Nurse Navigator. If you have any questions after today's visit, please call [phone].

## 2018-03-19 NOTE — PROGRESS NOTES
HISTORY OF PRESENT ILLNESS  Robert Warren is a 79 y.o. female here for follow up. Report nasal congestion and postnasal drip for 1 month. Not getting better. She is using Allegra every day. Slight whitish phlegm. No fever no shortness of breath. She is seen for diabetes. She reports medication compliance: compliant all of the time. Diabetic diet compliance: compliant most of the time. Home glucose monitoring: is performed regularly, minimum reading is 155, maximum reading is 185, and average reading is 160. [place] Further diabetic ROS: no polyuria or polydipsia, no chest pain, dyspnea or TIA's, no numbness, tingling or pain in extremities, no hypoglycemia. Lab review: labs reviewed, I note that glycosylated hemoglobin normal, abnormal 10, labs reviewed and discussed with patient. Eye exam:up to date. Has elevated lipid and BP. on meds. Has HTN,on med. Diabetes   Pertinent negatives include no chest pain. Cholesterol Problem   Pertinent negatives include no chest pain. Hypertension    Pertinent negatives include no chest pain, no palpitations and no blurred vision. Cold Symptoms   Pertinent negatives include no chest pain and no chills. Follow-up   Pertinent negatives include no chest pain. Review of Systems   Constitutional: Negative. Negative for chills and fever. HENT: Negative. Eyes: Negative. Negative for blurred vision and double vision. Respiratory: Negative. Cardiovascular: Negative. Negative for chest pain and palpitations. Gastrointestinal: Negative. Genitourinary: Negative. Musculoskeletal: Negative. Skin: Negative. Neurological: Negative. Psychiatric/Behavioral: Negative. Physical Exam   Constitutional: She appears well-developed and well-nourished. No distress. HENT:   Head: Normocephalic and atraumatic. Right Ear: External ear normal.   Left Ear: External ear normal.   Mouth/Throat: Oropharynx is clear and moist. No oropharyngeal exudate. Nasal turbinates:red inflamed,NT    Cobble stoning present. Neck: Normal range of motion. Neck supple. No JVD present. No thyromegaly present. Cardiovascular: Normal rate, regular rhythm, normal heart sounds and intact distal pulses. Pulmonary/Chest: Effort normal. No respiratory distress. She has wheezes. Sporadic inspiratory wheezing in both lung fields. No crackles. Musculoskeletal: She exhibits no edema or tenderness. Psychiatric: She has a normal mood and affect. Her behavior is normal.       ASSESSMENT and PLAN    Diagnoses and all orders for this visit:    1. Essential hypertension  Stable on current regimen. Will continue same. Will check,    -     METABOLIC PANEL, COMPREHENSIVE  -     CBC W/O DIFF    2. Type 2 diabetes mellitus without complication, with long-term current use of insulin (HCC)    On multiple medicine and insulin. Blood sugar stable. Will check,  -     METABOLIC PANEL, COMPREHENSIVE  -     HEMOGLOBIN A1C WITH EAG  -     MICROALBUMIN, UR, RAND W/ MICROALB/CREAT RATIO  -     sAXagliptin-metFORMIN (KOMBIGLYZE XR) 5-1,000 mg TM24; Take 1 tab po QD  -     dapagliflozin (FARXIGA) 10 mg tab tablet; Take 1 Tab by mouth daily. 3. Mixed hyperlipidemia    We will check,  -     METABOLIC PANEL, COMPREHENSIVE  -     simvastatin (ZOCOR) 20 mg tablet; Take 1 Tab by mouth nightly. -     lisinopril (PRINIVIL, ZESTRIL) 10 mg tablet; Take 1 Tab by mouth daily. 4. Bronchitis    Report nasal congestion and postnasal drip. She has been coughing for over a month. -     azithromycin (ZITHROMAX Z-CARLOS) 250 mg tablet; Take two tablets today then one tablet daily  -     albuterol (PROVENTIL HFA, VENTOLIN HFA, PROAIR HFA) 90 mcg/actuation inhaler; Take 1 Puff by inhalation every six (6) hours as needed for Wheezing. Discussed expected course/resolution/complications of diagnosis in detail with patient.    Medication risks/benefits/costs/interactions/alternatives discussed with patient. Pt was given an after visit summary which includes diagnoses, current medications & vitals. Pt expressed understanding with the diagnosis and plan.

## 2018-03-19 NOTE — PROGRESS NOTES
Health Maintenance Due   Topic Date Due    DTaP/Tdap/Td series (1 - Tdap) 02/12/1973    FOBT Q 1 YEAR AGE 50-75  02/12/2002    ZOSTER VACCINE AGE 60>  12/12/2011    EYE EXAM RETINAL OR DILATED Q1  12/03/2016    Bone Densitometry (Dexa) Screening  02/12/2017    HEMOGLOBIN A1C Q6M  04/04/2018       Chief Complaint   Patient presents with    Diabetes    Cholesterol Problem    Hypertension    Cold Symptoms     congested cough, started on allergy meds without releif       1. Have you been to the ER, urgent care clinic since your last visit? Hospitalized since your last visit? No    2. Have you seen or consulted any other health care providers outside of the [address] [place] [address] since your last visit? Include any pap smears or colon screening. No    3) Do you have an Advance Directive on file? no    4) Are you interested in receiving information on Advance Directives? NO      Patient is accompanied by self I have received verbal consent from Mia Tellez to discuss any/all medical information while they are present in the room.

## 2018-03-19 NOTE — LETTER
3/21/2018 3:00 PM 
 
Ms. Glass [facility] [address] [phone] [id number] Dear Jamie Jenkins: 
 
Please find your most recent results below. Resulted Orders METABOLIC PANEL, COMPREHENSIVE Result Value Ref Range Glucose 78 65 - 99 mg/dL BUN 19 8 - 27 mg/dL Creatinine 0.44 (L) 0.57 - 1.00 mg/dL GFR est non- >59 mL/min/1.73 GFR est  >59 mL/min/1.73  
 BUN/Creatinine ratio 43 (H) 12 - 28 Sodium 143 134 - 144 mmol/L Potassium 4.7 3.5 - 5.2 mmol/L Chloride 103 96 - 106 mmol/L  
 CO2 21 18 - 29 mmol/L Calcium 9.7 8.7 - 10.3 mg/dL Protein, total 6.9 6.0 - 8.5 g/dL Albumin 4.1 3.6 - 4.8 g/dL GLOBULIN, TOTAL 2.8 1.5 - 4.5 g/dL A-G Ratio 1.5 1.2 - 2.2 Bilirubin, total 0.2 0.0 - 1.2 mg/dL Alk. phosphatase 80 39 - 117 IU/L  
 AST (SGOT) 19 0 - 40 IU/L  
 ALT (SGPT) 14 0 - 32 IU/L Narrative Performed at:  [address]  [id number] : Ashlie Malloy MD, Phone:  [phone] CBC W/O DIFF Result Value Ref Range WBC 11.9 (H) 3.4 - 10.8 x10E3/uL  
 RBC 5.45 (H) 3.77 - 5.28 x10E6/uL HGB 15.4 11.1 - 15.9 g/dL HCT 45.7 34.0 - 46.6 % MCV 84 79 - 97 fL  
 MCH 28.3 26.6 - 33.0 pg  
 MCHC 33.7 31.5 - 35.7 g/dL  
 RDW 15.6 (H) 12.3 - 15.4 % PLATELET 120 216 - 724 x10E3/uL Narrative Performed at:  [address]  [id number] : Ashlie Malloy MD, Phone:  [phone] HEMOGLOBIN A1C WITH EAG Result Value Ref Range Hemoglobin A1c 8.1 (H) 4.8 - 5.6 % Comment:  
            Pre-diabetes: 5.7 - 6.4 Diabetes: >6.4 Glycemic control for adults with diabetes: <7.0 Estimated average glucose 186 mg/dL Narrative Performed at:  [address]  [id number] : Ashlie Malloy MD, Phone:  [phone] MICROALBUMIN, UR, RAND W/ MICROALB/CREAT RATIO Result Value Ref Range Creatinine, urine 84.0 Not Estab. mg/dL Microalbumin, urine 12.6 Not Estab. ug/mL Microalb/Creat ratio (ug/mg creat.) 15.0 0.0 - 30.0 mg/g creat Narrative Performed at:  [address]  [id number] : Jeremias Ellington MD, Phone:  [phone] RECOMMENDATIONS: 
Stewart List your labs indicate that your diabetes has improved slightly, please continue on diabetic diet and exercise as tolerated. Also, your WBC is slightly elevated, we will recheck that lab in 2 months. I have enclosed that lab slip for your convenience All other labs are stable Please call me if you have any questions: [phone] Sincerely, Shabana Chio MD

## 2018-03-21 NOTE — PROGRESS NOTES
Diabetes slightly improved. Continue to ADA diet and exercise along with medications. Slightly elevated WBC count will repeat CBC after 2 months.

## 2018-04-04 NOTE — TELEPHONE ENCOUNTER
Finished her antibiotic and inhalers and she still feels bad coughing and lots of phlegm would like to know what to do at this point

## 2018-04-04 NOTE — TELEPHONE ENCOUNTER
Call placed to pt and left message stating per NP pt can try OTC zyrtec and mucinex and also referring to allergist Dr Tony Winston, left contact information for pt to call and schedule an appointment

## 2018-05-25 NOTE — IP AVS SNAPSHOT
[facility]00 [facility] [address] [place] [address] 
[phone] Patient: Juan Goins MRN: [id number] About your hospitalization You were admitted on:  May 26, 2018 You last received care in the:  [id number] You were discharged on:  June 2, 2018 Why you were hospitalized Your primary diagnosis was: Malignant Neoplasm Of Ascending Colon (Hcc) Your diagnoses also included:  Leukocytosis, Sbo (Small Bowel Obstruction) (Hcc), Abdominal Pain, Type 2 Diabetes Mellitus With Hyperglycemia (Hcc), S/P Right Colectomy, Htn (Hypertension) Follow-up Information Follow up With Details Comments Contact Info OPAL VIERA On 6/6/2018 Hospital f/u PCP appointment Wednesday, 6/6/18 @ 1:40 p.m with Jaziel Morton NP 5855 [facility] [address] [place] [address] 
[phone] Xochilt German MD Schedule an appointment as soon as possible for a visit in 2 weeks For follow up of colon surgery [address] [place] [address]  [address] [place] [address] 
[phone] Jeff Abarca DO Schedule an appointment as soon as possible for a visit in 4 weeks For follow up of colon cancer [address] [place], [place] [address]u Steve [address] [place] [address] 
[phone] Your Scheduled Appointments Wednesday June 06, 2018  1:40 PM EDT Office Visit with Jennifer Torres NP [facility] Internal Medicine ([facility]) [address] [place] [address] [address] [place] [address]  
[phone] Wednesday July 18, 2018  8:30 AM EDT ROUTINE CARE with Jason Lee MD  
[facility] Medicine [facility]) [address] [place] [address] [address] [place] [address]  
[phone] Discharge Orders None A check rose indicates which time of day the medication should be taken. My Medications START taking these medications Instructions Each Dose to Equal  
 Morning Noon Evening Bedtime ibuprofen 600 mg tablet Commonly known as:  MOTRIN Your last dose was: Your next dose is: Take 1 Tab by mouth every eight (8) hours for 4 days. 600 mg  
    
   
   
   
  
 oxyCODONE IR 5 mg immediate release tablet Commonly known as:  Harsh Markham Your last dose was: Your next dose is: Take 1 Tab by mouth every eight (8) hours as needed. Max Daily Amount: 15 mg.  
 5 mg  
    
   
   
   
  
 senna-docusate 8.6-50 mg per tablet Commonly known as:  Ruddy Tsai Your last dose was: Your next dose is: Take 1 Tab by mouth two (2) times daily as needed for Constipation. Can be obtained over-the-counter 1 Tab CHANGE how you take these medications Instructions Each Dose to Equal  
 Morning Noon Evening Bedtime  
 insulin glargine 100 unit/mL (3 mL) Inpn Commonly known as:  LANTUS SOLOSTAR U-100 INSULIN What changed:  Another medication with the same name was removed. Continue taking this medication, and follow the directions you see here. Your last dose was: Your next dose is: INJECT 47 UNITS UNDER THE SKIN IN THE MORNING AND 47 UNITS IN THE EVENING ( please give 90 day supply 5 pens per box, give 6 boxes=30 pens) CONTINUE taking these medications Instructions Each Dose to Equal  
 Morning Noon Evening Bedtime  
 albuterol 90 mcg/actuation inhaler Commonly known as:  PROVENTIL HFA, VENTOLIN HFA, PROAIR HFA Your last dose was: Your next dose is: Take 1 Puff by inhalation every six (6) hours as needed for Wheezing. 1 Puff  
    
   
   
   
  
 ammonium lactate 12 % topical cream  
Commonly known as:  AMLACTIN Your last dose was: Your next dose is:    
   
   
 APPLY 2 GRAMS TO AFFECTED AREA TWICE A DAY. RUB IN TO AFFECTED AREA WELL  
     
   
   
   
  
 aspirin delayed-release 81 mg tablet Your last dose was: Your next dose is: Take 81 mg by mouth daily. 81 mg * Blood-Glucose Meter Misc Commonly known as:  CONTOUR NEXT METER Your last dose was: Your next dose is:    
   
   
 Check blood sugar BID * Blood-Glucose Meter monitoring kit Your last dose was: Your next dose is:    
   
   
 Check BS TID. Accucheck nanno meter please  
     
   
   
   
  
 calcium 600 mg Cap Your last dose was: Your next dose is: Take  by mouth. cholecalciferol (VITAMIN D3) 5,000 unit Tab tablet Commonly known as:  VITAMIN D3 Your last dose was: Your next dose is: Take  by mouth daily. clotrimazole 1 % topical cream  
Commonly known as:  Wagner Mighty Your last dose was: Your next dose is:    
   
   
 Apply  to affected area two (2) times a day. dapagliflozin 10 mg Tab tablet Commonly known as:  [employer] Your last dose was: Your next dose is: Take 1 Tab by mouth daily. 10 mg  
    
   
   
   
  
 fexofenadine 180 mg tablet Commonly known as:  Dante Genesis Your last dose was: Your next dose is: Take 1 Tab by mouth daily. 180 mg  
    
   
   
   
  
 fluticasone 50 mcg/actuation nasal spray Commonly known as:  Deana Valdes Your last dose was: Your next dose is: 2 Sprays by Both Nostrils route daily. 2 Spray  
    
   
   
   
  
 glucose blood VI test strips strip Commonly known as:  blood glucose test  
   
Your last dose was: Your next dose is:    
   
   
 Check BS TID.accucheck nanno Iron 325 mg (65 mg iron) tablet Generic drug:  ferrous sulfate Your last dose was: Your next dose is: Take  by mouth Daily (before breakfast). lisinopril 10 mg tablet Commonly known as:  Stephanie Dowdy Your last dose was: Your next dose is: Take 1 Tab by mouth daily. 10 mg  
    
   
   
   
  
 metoprolol tartrate 50 mg tablet Commonly known as:  LOPRESSOR Your last dose was: Your next dose is: TAKE 1 TABLET BY MOUTH TWICE DAILY  
     
   
   
   
  
 * ONETOUCH DELICA LANCETS 30 gauge Misc Generic drug:  lancets Your last dose was: Your next dose is: CHECK BLOOD SUGAR TWICE DAILY * Lancets Misc Your last dose was: Your next dose is:    
   
   
 Check BS TID.accucheck nanno sAXagliptin-metFORMIN 5-1,000 mg Tm24 Commonly known as:  KOMBIGLYZE XR Your last dose was: Your next dose is: Take 1 tab po QD  
     
   
   
   
  
 simvastatin 20 mg tablet Commonly known as:  ZOCOR Your last dose was: Your next dose is: Take 1 Tab by mouth nightly. 20 mg  
    
   
   
   
  
 * Notice: This list has 4 medication(s) that are the same as other medications prescribed for you. Read the directions carefully, and ask your doctor or other care provider to review them with you. STOP taking these medications   
 azithromycin 250 mg tablet Commonly known as:  Zev Faster Where to Get Your Medications Information on where to get these meds will be given to you by the nurse or doctor. ! Ask your nurse or doctor about these medications  
  ibuprofen 600 mg tablet  
 oxyCODONE IR 5 mg immediate release tablet  
 senna-docusate 8.6-50 mg per tablet Opioid Education  Prescription Opioids: What You Need to Know: 
 
Prescription opioids can be used to help relieve moderate-to-severe pain and are often prescribed following a surgery or injury, or for certain health conditions. These medications can be an important part of treatment but also come with serious risks. Opioids are strong pain medicines. Examples include hydrocodone, oxycodone, fentanyl, and morphine. Heroin is an example of an illegal opioid. It is important to work with your health care provider to make sure you are getting the safest, most effective care. WHAT ARE THE RISKS AND SIDE EFFECTS OF OPIOID USE? Prescription opioids carry serious risks of addiction and overdose, especially with prolonged use. An opioid overdose, often marked by slow breathing, can cause sudden death. The use of prescription opioids can have a number of side effects as well, even when taken as directed. · Tolerance-meaning you might need to take more of a medication for the same pain relief · Physical dependence-meaning you have symptoms of withdrawal when the medication is stopped. Withdrawal symptoms can include nausea, sweating, chills, diarrhea, stomach cramps, and muscle aches. Withdrawal can last up to several weeks, depending on which drug you took and how long you took it. · Increased sensitivity to pain · Constipation · Nausea, vomiting, and dry mouth · Sleepiness and dizziness · Confusion · Depression · Low levels of testosterone that can result in lower sex drive, energy, and strength · Itching and sweating RISKS ARE GREATER WITH:      
· History of drug misuse, substance use disorder, or overdose · Mental health conditions (such as depression or anxiety) · Sleep apnea · Older age (72 years or older) · Pregnancy Avoid alcohol while taking prescription opioids. Also, unless specifically advised by your health care provider, medications to avoid include: · Benzodiazepines (such as Xanax or Valium) · Muscle relaxants (such as Soma or Flexeril) · Hypnotics (such as Ambien or Lunesta) · Other prescription opioids KNOW YOUR OPTIONS Talk to your health care provider about ways to manage your pain that don't involve prescription opioids. Some of these options may actually work better and have fewer risks and side effects. Options may include: 
· Pain relievers such as acetaminophen, ibuprofen, and naproxen · Some medications that are also used for depression or seizures · Physical therapy and exercise · Counseling to help patients learn how to cope better with triggers of pain and stress. · Application of heat or cold compress · Massage therapy · Relaxation techniques Be Informed Make sure you know the name of your medication, how much and how often to take it, and its potential risks & side effects. IF YOU ARE PRESCRIBED OPIOIDS FOR PAIN: 
· Never take opioids in greater amounts or more often than prescribed. Remember the goal is not to be pain-free but to manage your pain at a tolerable level. · Follow up with your primary care provider to: · Work together to create a plan on how to manage your pain. · Talk about ways to help manage your pain that don't involve prescription opioids. · Talk about any and all concerns and side effects. · Help prevent misuse and abuse. · Never sell or share prescription opioids · Help prevent misuse and abuse. · Store prescription opioids in a secure place and out of reach of others (this may include visitors, children, friends, and family). · Safely dispose of unused/unwanted prescription opioids: Find your community drug take-back program or your pharmacy mail-back program, or flush them down the toilet, following guidance from the Food and Drug Administration (www.fda.gov/Drugs/ResourcesForYou). · Visit www.cdc.gov/drugoverdose to learn about the risks of opioid abuse and overdose. · If you believe you may be struggling with addiction, tell your health care provider and ask for guidance or call [facility] at 6-316-471-CCAR. Discharge Instructions 1. Do not drive while on pain medication. You should take a stool softener while on pain medication to prevent constipation. 2.  Do not lift anything greater than 10 pounds for 2 weeks. 3.  You may resume your home medications. 4.  You may shower but do not swim or soak in tub for 2-3 weeks. 5.  Please call [phone] to schedule a follow-up with Dr. Harrison Irby within 2 weeks. Please bring this form with you to show your primary care provider at your follow-up appointment. Primary care provider:  Dr. Ebony Tiwari Nurse Navigator (Inactive) Discharging provider:  Monika Ohara MD 
 
You have been admitted to the hospital with the following diagnoses: 
· SBO (small bowel obstruction) ([address] · Colon cancer FOLLOW-UP CARE RECOMMENDATIONS: 
 
APPOINTMENTS: 
Follow-up Information Follow up With Details Comments Contact OPAL De Souza On 6/6/2018 Hospital f/u PCP appointment Wednesday, 6/6/18 @ 1:40 p.m with Clementina Gonzalez NP [address] [facility] [address] 
[phone] Erika Fleming MD Schedule an appointment as soon as possible for a visit in 2 weeks For follow up of colon surgery [address] [place] [address] [address] 
[phone] Larry Chang DO Schedule an appointment as soon as possible for a visit in 4 weeks For follow up of colon cancer [address] [place] [address] Steve [address] 
[phone] Future Appointments Date Time Provider Noah Cooper 6/6/2018 1:40 PM RUSS Dale SCHED  
7/18/2018 8:30 AM Denise Hurtado MD ROGERIO SEAMUS SCHED  
 
SYMPTOMS to watch for: chest pain, shortness of breath, fever, chills, nausea, vomiting, diarrhea. Your surgical site should heal well but please call your surgeon if there is any bleeding, drainage, or increasing redness or tenderness. DIET/what to eat:  Diabetic Diet ACTIVITY:  Activity as tolerated and No driving while on narcotics WOUND CARE: It is ok to leave your wound open to the air. You can shower and pat your wound dry but please do not take a bath or go swimming. What to do if new or unexpected symptoms occur? If you experience any of the above symptoms (or should other concerns or questions arise after discharge) please call your primary care physician. Return to the emergency room if you cannot get hold of your doctor. · It is very important that you keep your follow-up appointment(s). · Please bring discharge papers, medication list (and/or medication bottles) to your follow-up appointments for review by your outpatient provider(s). · Please check the list of medications and be sure it includes every medication (even non-prescription medications) that your provider wants you to take. · It is important that you take the medication exactly as they are prescribed. · Keep your medication in the bottles provided by the pharmacist and keep a list of the medication names, dosages, and times to be taken in your wallet. · Do not take other medications without consulting your doctor. · If you have any questions about your medications or other instructions, please talk to your nurse or care provider before you leave the hospital. 
 
I understand that if any problems occur once I am at home I am to contact my physician. These instructions were explained to me and I had the opportunity to ask questions. Llame a oficina de Dr. Eneida Murray [phone] para programar cabrera savanna de siguimiento Resección intestinal abierta: Juan Jose Murray en el hogar - [ Open Bowel Resection: What to Expect at [facility] ] Cabrera recuperación Es probable que tenga dolor que aparece y desaparece byron los calvin siguientes a la cirugía intestinal. Podría tener cólicos intestinales y [place] elsy (incisión) le podría doler. También podría sentirse reyes si tuviera gripe. Podría tener un poco de fiebre y sentirse fatigado y con náuseas. [place] es común. Debería sentirse mejor después de gordo semana y probablemente vuelva a la normalidad en 2 a 3 semanas. Esta hoja de [employer] idea general del tiempo que le llevará recuperarse. Sin embargo, cada persona se recupera a un ritmo diferente. Siga los pasos que se mencionan a continuación para recuperarse lo más rápido posible. Cómo puede cuidarse en el hogar? Actividad ? · Descanse cuando se sienta fatigado. Dormir lo suficiente le ayudará a recuperarse. ? · Intente caminar todos los días. Comience caminando un poco más de lo que caminó el día anterior. Poco a poco, aumente la distancia. Caminar aumenta el flujo de Von y [place] a prevenir la neumonía y el estreñimiento. ? · Evite actividades vigorosas, reyes montar en bicicleta, trotar, levantar pesas o hacer ejercicios aeróbicos, hasta que cabrera médico lo apruebe. ? · Pregúntele a cabrera médico cuándo puede volver a conducir. ? · Es probable que necesite ausentarse del trabajo de 3 a 4 semanas. [place] dependerá del tipo de trabajo que salina y cómo se sienta. Es posible que necesite ausentarse entre 4 y 6 semanas si cabrera trabajo implica levantar objetos pesados. ? · Puede ducharse entre 24 y 50 horas después de la [place], si cabrera médico lo [place]. Seque el elsy (incisión) con togianna springer de toalla. No tome concetta de inmersión en [facility] las primeras 2 semanas o hasta que cabrera médico lo apruebe. ? · Pregúntele a cabrera médico cuándo puede tener [employer]. Alimentación ? · Es posible que no tenga mucho apetito después de la hospitals. Catracho trate de alimentarse en forma saludable. Cabrera médico le informará sobre cualquier alimento que no debería consumir.   
? · Siga gordo dieta con bajo contenido en fibra byron varias semanas después de la [facility]. Consuma varias comidas pequeñas a lo neyda del día. Poco a poco, agregue alimentos con alto contenido en fibra. ? · [employer]. Aporta bacterias benéficas al colon y [place] a prevenir la diarrea. ? · Intente evitar las nueces, las semillas y el maíz (elote) byron un tiempo. Podrían ser difíciles de digerir. ? · Quizás necesite myra vitaminas que contengan sodio y Steve. Pregúntele a cabrera médico.  
? · Joyce abundante líquido para evitar la deshidratación. Medicamentos ? · Cabrera médico le dirá si puede volver a myra delfin medicamentos y cuándo puede volver a hacerlo. También le dará indicaciones sobre cualquier medicamento nuevo que deba myra usted. ? · [employer] (medicamentos para el dolor) exactamente reyes le indicaron. ¨ Si el médico le recetó un analgésico, tómelo según las instruciones. myra ibuprofeno según lo indicado y combinarlo con oxicodona cada 6 horas según sea necesario ¨ Si no está tomando un analgésico recetado, pregúntele a cabrera médico si puede myra miguel de [employer]. ¨ No tome dos o más analgésicos al mismo tiempo a menos que el médico se lo haya indicado. Muchos analgésicos contienen acetaminofén, es decir, Tylenol. El exceso de acetaminofén (Tylenol) puede ser dañino. ? · Si le parece que el analgésico le está produciendo revoltura del estómago: [address] comidas (a menos que cabrera médico le indique lo contrario). ¨ [place] al médico un analgésico diferente. ? · Es posible que tenga que myra algunos medicamentos en otra forma. Se le indicará si tiene que [employer] o myra formas líquidas del [place]. ? · Si el médico le receta un ablandador de heces, tómelo según las indicaciones. ?Cuidado de la herida ? · Si tiene tiras de cinta [employer] herida, déjeselas puestas byron gordo semana o hasta que se caigan por sí solas. ? · Lave la sami diariamente con [place] tibia y séquela con toques suaves de toalla. La atención de seguimiento es gordo parte clave de cabrera tratamiento y seguridad. Asegúrese de hacer y acudir a todas las citas, y llame a cabrera médico si está teniendo problemas. También es gordo buena idea saber los resultados de los exámenes y mantener gordo lista de los medicamentos que charles. Cuándo debe pedir ayuda? Llame al 911 en cualquier momento que considere que necesita atención de emergencia. Por ejemplo, llame si: 
? · Se desmayó (perdió el conocimiento). ? · Tiene dolor repentino en el pecho y falta de [place], o tose jermaine. ? · Tiene dolor intenso en el estómago. ? Llame a cabrera médico ahora mismo o busque atención médica inmediata si: 
? · Siente el estómago revuelto y no puede beber líquidos ni mantenerlos en el estómago. ? · Tiene señales de un coágulo de jermaine, tales reyes: ¨ Dolor en la pantorrilla, el muslo, la abigail o detrás de la rodilla. ¨ Enrojecimiento e hinchazón en la pierna o la abigail. ? · Tiene diarrea intensa que huele muy mal.  
? · Tiene problemas para orinar o evacuar el intestino, en especial si tiene dolor leve o hinchazón en la parte baja del abdomen. ? · [address], tales reyes: ¨ Aumento del dolor, la hinchazón, el enrojecimiento o la temperatura. ¨ Vetas rojizas que salen de la herida. ¨ Pus que supura de la herida. Jayson Lenka. ? · El dolor no mejora después de myra analgésicos. ? · Tiene puntos de sutura flojos o se abre la herida. ? · Está sangrando o tiene un nuevo drenaje por la herida. ?Preste especial atención a cualquier cambio en cabrera charan y asegúrese de comunicarse con cabrera médico si: 
? · No evacua el intestino después de myra un laxante. ? · No mejora reyes se esperaba. Dónde puede encontrar más información en inglés? Anupama Scrivener a [email or web address]-freddie.[email or web address]  
Artie Bass E326 en la búsqueda para aprender Michael Mota de \"Resección intestinal abierta: Nazia Cisse en el hogar - [ Open Bowel Resection: What to Expect at Home ]. \" 
Revisado: 12 michael, 2017 Versión del contenido: 11.4 © 7954-5624 Healthwise, Incorporated. Las instrucciones de cuidado fueron adaptadas bajo licencia por [employer] (which disclaims liability or warranty for this information). Si usted tiene [place] afección médica o sobre estas instrucciones, siempre pregunte a cabrera profesional de charan. Healthwise, Incorporated niega toda garantía o responsabilidad por cabrera uso de esta información. DNage Announcement We are excited to announce that we are making your provider's discharge notes available to you in DNage. You will see these notes when they are completed and signed by the physician that discharged you from your recent hospital stay. If you have any questions or concerns about any information you see in DNage, please call the Health Information Department where you were seen or reach out to your Primary Care Provider for more information about your plan of care. Introducing [facility] [place] [facility]! [employer] introduces DNage patient portal. Now you can access parts of your medical record, email your doctor's office, and request medication refills online. 1. In your internet browser, go to [email or web address] 2. Click on the First Time User? Click Here link in the Sign In box. You will see the New Member Sign Up page. 3. Enter your DNage Access Code exactly as it appears below. You will not need to use this code after youve completed the sign-up process. If you do not sign up before the expiration date, you must request a new code. · DNage Access Code: [email or web address] Expires: 8/30/2018 12:47 PM 
 
4. Enter the last four digits of your Social Security Number (xxxx) and Date of Birth (mm/dd/yyyy) as indicated and click Submit. You will be taken to the next sign-up page. 5. Create a Playboox ID. This will be your Playboox login ID and cannot be changed, so think of one that is secure and easy to remember. 6. Create a Playboox password. You can change your password at any time. 7. Enter your Password Reset Question and Answer. This can be used at a later time if you forget your password. 8. Enter your e-mail address. You will receive e-mail notification when new information is available in [address] 9. Click Sign Up. You can now view and download portions of your medical record. 10. Click the Download Summary menu link to download a portable copy of your medical information. If you have questions, please visit the Frequently Asked Questions section of the Playboox website. Remember, Playboox is NOT to be used for urgent needs. For medical emergencies, dial 911. Now available from your iPhone and Android! Introducing Malcom Bardales As a [facility] patient, I wanted to make you aware of our electronic visit tool called Malcom Ejhelio. Fren[employer]os 24/7 allows you to connect within minutes with a medical provider 24 hours a day, seven days a week via a mobile device or tablet or logging into a secure website from your computer. You can access Malcom Hathawayfin from anywhere in the [place]. A virtual visit might be right for you when you have a simple condition and feel like you just dont want to get out of bed, or cant get away from work for an appointment, when your regular [facility] provider is not available (evenings, weekends or holidays), or when youre out of town and need minor care. Electronic visits cost only $49 and if the [facility]-RFIDs 24/7 provider determines a prescription is needed to treat your condition, one can be electronically transmitted to a nearby pharmacy*. Please take a moment to enroll today if you have not already done so. The enrollment process is free and takes just a few minutes.   To enroll, please download the [facility] 24/7 aditi to your tablet or phone, or visit [email or web address] to enroll on your computer. And, as an [address] [place] [address] patient with a [employer] account, the results of your visits will be scanned into your electronic medical record and your primary care provider will be able to view the scanned results. We urge you to continue to see your regular [facility] provider for your ongoing medical care. And while your primary care provider may not be the one available when you seek a Etable virtual visit, the peace of mind you get from getting a real diagnosis real time can be priceless. For more information on Etable, view our Frequently Asked Questions (FAQs) at [email or web address]. Sincerely, 
 
Dionne Hayes MD 
Chief Medical Officer Rose Leiva *:  certain medications cannot be prescribed via Etable Providers Seen During Your Hospitalization Provider Specialty Primary office phone Angeline Becerra MD Emergency Medicine [phone] Amari Guerra MD Family Practice [phone] Isi Patterson MD Internal Medicine [phone] Omari Pandey MD Internal Medicine [phone] Your Primary Care Physician (PCP) Primary Care Physician Office Phone Office Fax [address], [address] [place] [address] [phone] You are allergic to the following Allergen Reactions Tylenol (Acetaminophen) Swelling Recent Documentation Height Weight BMI OB Status Smoking Status 1.626 m 83.5 kg 31.58 kg/m2 Hysterectomy Never Smoker Emergency Contacts Name Discharge Info [facility]ation [facility] [3] Spouse [3] [phone] Donal Solares     [phone] Patient Belongings The following personal items are in your possession at time of discharge: Dental Appliances: None  Visual Aid: Glasses, With patient      Home Medications: None   Jewelry: None  Clothing: None    Other Valuables: None Please provide this summary of care documentation to your next provider. Signatures-by signing, you are acknowledging that this After Visit Summary has been reviewed with you and you have received a copy. Patient Signature:  ____________________________________________________________ Date:  ____________________________________________________________  
  
[facility] Provider Signature:  ____________________________________________________________ Date:  ____________________________________________________________

## 2018-05-25 NOTE — IP AVS SNAPSHOT
[address] 
[phone] Patient: Zac Becerril MRN: [id number] KMD:6/61[birth year]2 A check rose indicates which time of day the medication should be taken. My Medications START taking these medications Instructions Each Dose to Equal  
 Morning Noon Evening Bedtime  
 ibuprofen 600 mg tablet Commonly known as:  MOTRIN Your last dose was: Your next dose is: Take 1 Tab by mouth every eight (8) hours for 4 days. 600 mg  
    
   
   
   
  
 oxyCODONE IR 5 mg immediate release tablet Commonly known as:  Pebbles Romero Your last dose was: Your next dose is: Take 1 Tab by mouth every eight (8) hours as needed. Max Daily Amount: 15 mg.  
 5 mg  
    
   
   
   
  
 senna-docusate 8.6-50 mg per tablet Commonly known as:  Walteria Sherman Your last dose was: Your next dose is: Take 1 Tab by mouth two (2) times daily as needed for Constipation. Can be obtained over-the-counter 1 Tab CHANGE how you take these medications Instructions Each Dose to Equal  
 Morning Noon Evening Bedtime  
 insulin glargine 100 unit/mL (3 mL) Inpn Commonly known as:  LANTUS SOLOSTAR U-100 INSULIN What changed:  Another medication with the same name was removed. Continue taking this medication, and follow the directions you see here. Your last dose was: Your next dose is: INJECT 47 UNITS UNDER THE SKIN IN THE MORNING AND 47 UNITS IN THE EVENING ( please give 90 day supply 5 pens per box, give 6 boxes=30 pens) CONTINUE taking these medications Instructions Each Dose to Equal  
 Morning Noon Evening Bedtime  
 albuterol 90 mcg/actuation inhaler Commonly known as:  PROVENTIL HFA, VENTOLIN HFA, PROAIR HFA Your last dose was: Your next dose is: Take 1 Puff by inhalation every six (6) hours as needed for Wheezing. 1 Puff  
    
   
   
   
  
 ammonium lactate 12 % topical cream  
Commonly known as:  AMLACTIN Your last dose was: Your next dose is:    
   
   
 APPLY 2 GRAMS TO AFFECTED AREA TWICE A DAY. RUB IN TO AFFECTED AREA WELL  
     
   
   
   
  
 aspirin delayed-release 81 mg tablet Your last dose was: Your next dose is: Take 81 mg by mouth daily. 81 mg * Blood-Glucose Meter Misc Commonly known as:  CONTOUR NEXT METER Your last dose was: Your next dose is:    
   
   
 Check blood sugar BID * Blood-Glucose Meter monitoring kit Your last dose was: Your next dose is:    
   
   
 Check BS TID. Accucheck nanno meter please  
     
   
   
   
  
 calcium 600 mg Cap Your last dose was: Your next dose is: Take  by mouth. cholecalciferol (VITAMIN D3) 5,000 unit Tab tablet Commonly known as:  VITAMIN D3 Your last dose was: Your next dose is: Take  by mouth daily. clotrimazole 1 % topical cream  
Commonly known as:  Inetta Ledger Your last dose was: Your next dose is:    
   
   
 Apply  to affected area two (2) times a day. dapagliflozin 10 mg Tab tablet Commonly known as:  [employer] Your last dose was: Your next dose is: Take 1 Tab by mouth daily. 10 mg  
    
   
   
   
  
 fexofenadine 180 mg tablet Commonly known as:  Salvador Styles Your last dose was: Your next dose is: Take 1 Tab by mouth daily. 180 mg  
    
   
   
   
  
 fluticasone 50 mcg/actuation nasal spray Commonly known as:  Venkat Sen Your last dose was: Your next dose is: 2 Sprays by Both Nostrils route daily. 2 [place] glucose blood VI test strips strip Commonly known as:  blood glucose test  
   
Your last dose was: Your next dose is:    
   
   
 Check BS TID.accucheck nanno Iron 325 mg (65 mg iron) tablet Generic drug:  ferrous sulfate Your last dose was: Your next dose is: Take  by mouth Daily (before breakfast). lisinopril 10 mg tablet Commonly known as:  Elio Omkar Your last dose was: Your next dose is: Take 1 Tab by mouth daily. 10 mg  
    
   
   
   
  
 metoprolol tartrate 50 mg tablet Commonly known as:  LOPRESSOR Your last dose was: Your next dose is: TAKE 1 TABLET BY MOUTH TWICE DAILY  
     
   
   
   
  
 * ONETOUCH DELICA LANCETS 30 gauge Misc Generic drug:  lancets Your last dose was: Your next dose is: CHECK BLOOD SUGAR TWICE DAILY * Lancets Misc Your last dose was: Your next dose is:    
   
   
 Check BS TID.accucheck nanno sAXagliptin-metFORMIN 5-1,000 mg Tm24 Commonly known as:  KOMBIGLYZE XR Your last dose was: Your next dose is: Take 1 tab po QD  
     
   
   
   
  
 simvastatin 20 mg tablet Commonly known as:  ZOCOR Your last dose was: Your next dose is: Take 1 Tab by mouth nightly. 20 mg  
    
   
   
   
  
 * Notice: This list has 4 medication(s) that are the same as other medications prescribed for you. Read the directions carefully, and ask your doctor or other care provider to review them with you. STOP taking these medications   
 azithromycin 250 mg tablet Commonly known as:  Mery Simental Where to Get Your Medications Information on where to get these meds will be given to you by the nurse or doctor. ! Ask your nurse or doctor about these medications ibuprofen 600 mg tablet  
 oxyCODONE IR 5 mg immediate release tablet  
 senna-docusate 8.6-50 mg per tablet

## 2018-05-26 PROBLEM — K63.89 COLONIC MASS: Status: ACTIVE | Noted: 2018-01-01

## 2018-05-26 PROBLEM — E11.65 TYPE 2 DIABETES MELLITUS WITH HYPERGLYCEMIA (HCC): Status: ACTIVE | Noted: 2018-01-01

## 2018-05-26 PROBLEM — K56.609 SBO (SMALL BOWEL OBSTRUCTION) (HCC): Status: ACTIVE | Noted: 2018-01-01

## 2018-05-26 PROBLEM — D72.829 LEUKOCYTOSIS: Status: ACTIVE | Noted: 2018-01-01

## 2018-05-26 PROBLEM — R10.9 ABDOMINAL PAIN: Status: ACTIVE | Noted: 2018-01-01

## 2018-05-26 NOTE — ED PROVIDER NOTES
HPI Comments: This is a 78 yo  female with medical hx remarkable for uterine cancer, anemia, DM, hypertension, headache and hypercholesterolemia presenting with complaint of a 6 day hx of abdominal distension with associated abdominal pain located in the upper, epigastric and bilateral lower aspect worse with PO intake, improved slightly with two episodes of emesis on day 1 and two of illness. Drinking Gatorade and eating chicken noodle soup with minimal improvement. No fever, headache, ear pain, sore throat, cough, chest pain, SOB, dysuria, urgency, hematuria or urinary frequency. Patient is a 79 y.o. female presenting with abdominal pain. The history is provided by the patient. Abdominal Pain    Associated symptoms include nausea, vomiting and constipation. Pertinent negatives include no fever, no diarrhea, no frequency, no headaches, no arthralgias, no chest pain and no back pain. Past Medical History:   Diagnosis Date    Anemia NEC     Cancer ([address]     Diabetes ([address]     Headache(784.0)     Hypercholesterolemia     Hypertension        Past Surgical History:   Procedure Laterality Date    HX  SECTION      Hysterectomy,total for precancer         Family History:   Problem Relation Age of Onset    Alcohol abuse Father     Cancer Maternal Aunt      breast ca    Breast Cancer Maternal Aunt     Breast Cancer Sister     Breast Cancer Daughter        Social History     Social History    Marital status:      Spouse name: N/A    Number of children: N/A    Years of education: N/A     Occupational History    Not on file.      Social History Main Topics    Smoking status: Never Smoker    Smokeless tobacco: Never Used    Alcohol use No    Drug use: No    Sexual activity: Yes     Partners: Male      Comment: ,4 children,1 passed away,not working     Other Topics Concern    Not on file     Social History Narrative         ALLERGIES: Tylenol [acetaminophen]    Review of Systems   Constitutional: Positive for activity change, appetite change and fatigue. Negative for chills and fever. HENT: Negative. Negative for congestion, ear pain, facial swelling, rhinorrhea, sneezing and sore throat. Eyes: Negative for pain, discharge and itching. Respiratory: Negative for cough, chest tightness and shortness of breath. Cardiovascular: Negative. Negative for chest pain and leg swelling. Gastrointestinal: Positive for abdominal distention, abdominal pain, constipation, nausea and vomiting. Negative for diarrhea. Genitourinary: Negative for difficulty urinating, frequency and urgency. Musculoskeletal: Negative for arthralgias, back pain, joint swelling, neck pain and neck stiffness. Skin: Negative for color change and rash. Neurological: Negative for dizziness, numbness and headaches. Psychiatric/Behavioral: Negative for confusion and decreased concentration. All other systems reviewed and are negative. Vitals:    05/25/18 2147 05/25/18 2207 05/25/18 2210 05/25/18 2211   BP: 128/76 143/80     Pulse: (!) 107      Resp: 18      Temp: 97.6 °F (36.4 °C)      SpO2: 96%  96% 97%   Weight: 82.6 kg (182 lb)      Height: 5' 4\" (1.626 m)               Physical Exam   Constitutional: She is oriented to person, place, and time. She appears well-developed and well-nourished. No distress.  female appears uncomfortable   HENT:   Head: Normocephalic and atraumatic. Right Ear: External ear normal.   Left Ear: External ear normal.   Nose: Nose normal.   Mouth/Throat: Oropharynx is clear and moist. No oropharyngeal exudate. Eyes: Conjunctivae and EOM are normal. Pupils are equal, round, and reactive to light. Right eye exhibits no discharge. Left eye exhibits no discharge. No scleral icterus. Neck: Normal range of motion. Neck supple. Cardiovascular: Normal rate and regular rhythm. Exam reveals no gallop and no friction rub.     No murmur heard.  Pulmonary/Chest: Effort normal and breath sounds normal. She has no wheezes. She has no rales. Abdominal: Soft. Bowel sounds are normal. She exhibits no distension (? vs body habitus, ). There is tenderness. There is no rebound and no guarding. Neurological: She is alert and oriented to person, place, and time. No cranial nerve deficit. Coordination normal.   Skin: Skin is warm and dry. She is not diaphoretic. Psychiatric: She has a normal mood and affect. Her behavior is normal.   Nursing note and vitals reviewed. MDM  Number of Diagnoses or Management Options  Diagnosis management comments: 78 yo  female with complaint of a 6 day hx of abdominal distension with associated abdominal pain resolved nausea and vomiting. Concern for diverticulitis vs mass vs obstipation vs UTI vs DKA amongst others    Plan  CBC  CMp  Lipase  UA  IVF  Analgesia  CT abd/pel  Reassess. [facility], [address]          ED Course       Procedures    Progress note         Labs and imaging reviewed. Farrah Sadler[address]    Spoke with Dr. Marion Urias, radiologist, discussed CT results. Dilated appendix more likely secondary to cecal mass rather than acute appendicitis. Mary Avila, [address]      Spoke with Dr. Matteo Leal, hospitalist, discussed HPI, PE findings, labs and imaging. He agrees to eval patient for admission.  Mary Pollock, [address]

## 2018-05-26 NOTE — PROGRESS NOTES
Heme/ONC on call  Consult received  CC reviewed. Abnormal CT with colon mass/ liver lesions  No tissue dx of cancer   Colon mass near obstructing. Would do CT guided liver biopsy if no surgery. Otherwise will get tissue at surgery. Would add CEA to labs. Would do CT chest to complete staging.   Will see in am  Call if questions today [phone]

## 2018-05-26 NOTE — PROGRESS NOTES
General Surgery at [facility]    Dr. Ivonne Dyer notified me about this patient. I reviewed the chart and viewed the CT images. I visited the patient and had a conversation with her in [address] with some assistance from the daughter who was in the room. I described the findings of the CT showing a blockage of small intestines due to a mass at the right side of the colon. It also showed tumors in the liver and the lungs with effusion at right pleural cavity, all of which appear to be metastases. I discussed the role for removing that portion of the intestine that includes the tumor. I will also biopsy the liver and/or lymph nodes. I explained that removal of the extra tumors would not be removed and the purpose of this procedure is to relieve the obstruction. I answered her questions and her daughter's questions. I spoke with  on the phone and discussed much of the same info with him. He wants to come back so that I can show him the CT scans. A/P right colon mass causing SBO, multiple liver and lung lesions likely metastases. EKG, CXR, Type and screen    Plan is for right colectomy. I explained that laparoscopy is not an option because the size of the mass requires a large incision anyway. I explained the indications for right colectomy. I discussed the potential risks, including but not limited to bleeding, wound infection, internal injuries, DVT/PE, risks with anesthesia  and leak from anastomosis. I answered her questions without making any guarantees. She indicates that she understands the risks, accepts and wishes to proceed.         Signed By: Serge Murphy MD     May 26, 2018

## 2018-05-26 NOTE — PROGRESS NOTES
Attempted to hang the 2nd bag of potassium. Per pt's family, would like to hold off on the second bag and try later. Pt states it burns a lot and I want to wait.

## 2018-05-26 NOTE — ED NOTES
Verbal shift change report given to [facility] and [address] [place] [address] (oncoming nurse) by Gavino Montes (offgoing nurse). Report included the following information SBAR and ED Summary.

## 2018-05-26 NOTE — ED NOTES
TRANSFER - OUT REPORT:    Verbal report given to RN on [facility]  being transferred to [address] [place] [address] (unit) for routine progression of care       Report consisted of patients Situation, Background, Assessment and   Recommendations(SBAR). Information from the following report(s) ED Summary was reviewed with the receiving nurse. Lines:   Peripheral IV 05/25/18 Left Antecubital (Active)   Site Assessment Clean, dry, & intact 5/25/2018 10:07 PM   Phlebitis Assessment 0 5/25/2018 10:07 PM   Infiltration Assessment 0 5/25/2018 10:07 PM   Dressing Status Clean, dry, & intact 5/25/2018 10:07 PM        Opportunity for questions and clarification was provided.       Patient transported with:  Transport

## 2018-05-26 NOTE — ED TRIAGE NOTES
Pt presents with complaints of abdominal pain. Pt has been constipated with abdominal pain for the past 6 days with vomiting. Pt family states she passed a large hard stool today and her pain resolved. Pt ate soup this evening and then abdominal pain her abdominal pain returned.

## 2018-05-26 NOTE — CONSULTS
Surgery Consult    Subjective:      Gabe Rust is a 79 y.o. female who presents complaining of abdominal pain. She was in her usual state of health until last Saturday. After eating then she began having abdominal distension with nausea and vomiting. She has not vomited for the past 5 days. She complains of upper abdominal pain. She had a colonoscopy about 5 years ago that was negative. The daughter states that her sister was genetically tested and may have a predisposition to cancer, though she was unclear the details of the test. She denies any blood in her stool. No weight loss. She  Did have a BM with suppository that gave her some relief but then she tried to drink and had distension again.      Patient Active Problem List    Diagnosis Date Noted    Leukocytosis 2018    SBO (small bowel obstruction) ([address] 2018    Abdominal pain 2018    Type 2 diabetes mellitus with hyperglycemia ([address] 2018    Colonic mass 2018    Advance care planning 10/04/2017    Mixed hyperlipidemia 2014    HTN (hypertension) 2013    DM (diabetes mellitus) ([address] 2013     Past Medical History:   Diagnosis Date    Anemia NEC     Cancer ([address]     Diabetes ([address]     Headache(784.0)     Hypercholesterolemia     Hypertension       Past Surgical History:   Procedure Laterality Date    HX  SECTION      Hysterectomy,total for precancer      Social History   Substance Use Topics    Smoking status: Never Smoker    Smokeless tobacco: Never Used    Alcohol use No      Family History   Problem Relation Age of Onset    Alcohol abuse Father     Cancer Maternal Aunt      breast ca    Breast Cancer Maternal Aunt     Breast Cancer Sister     Breast Cancer Daughter       Current Facility-Administered Medications   Medication Dose Route Frequency    sodium chloride (NS) flush 5-10 mL  5-10 mL IntraVENous Q8H    sodium chloride (NS) flush 5-10 mL  5-10 mL IntraVENous PRN    glucose chewable tablet 16 g  4 Tab Oral PRN    dextrose (D50W) injection syrg 12.5-25 g  12.5-25 g IntraVENous PRN    glucagon (GLUCAGEN) injection 1 mg  1 mg IntraMUSCular PRN    insulin lispro (HUMALOG) injection   SubCUTAneous Q6H    0.9% sodium chloride infusion  50 mL/hr IntraVENous CONTINUOUS      Allergies   Allergen Reactions    Tylenol [Acetaminophen] Swelling       Review of Systems:    Constitutional: negative  Eyes: negative  Ears, Nose, Mouth, Throat, and Face: negative  Respiratory: negative  Cardiovascular: negative  Gastrointestinal: positive for nausea, vomiting, abdominal pain and distension   Genitourinary:negative  Integument/Breast: negative  Hematologic/Lymphatic: negative  Musculoskeletal:negative  Neurological: negative  Behavioral/Psychiatric: negative  Endocrine: negative  Allergic/Immunologic: negative    Objective:        Visit Vitals    /67 (BP 1 Location: Left arm, BP Patient Position: At rest)    Pulse 87    Temp 98.6 °F (37 °C)    Resp 18    Ht 5' 4\" (1.626 m)    Wt 182 lb (82.6 kg)    SpO2 95%    BMI 31.24 kg/m2       Physical Exam:  GENERAL: alert, cooperative, no distress, appears stated age, EYE: negative, THROAT & NECK: normal, LUNG: clear to auscultation bilaterally, HEART: regular rate and rhythm, ABDOMEN: soft with mild distension minimal LUQ tendereness, EXTREMITIES:  no edema, SKIN: Normal., NEUROLOGIC: negative, PSYCH: non focal    Imaging:  images and reports reviewed  CT-Cecal mass with metastatic disease to liver, retroperitoneal nodes,  and lung. Relative small bowel obstruction. Associated dilated appendix. Associated complex right pleural effusion and small amount of ascites. Lab/Data Review: All lab results for the last 24 hours reviewed.     Recent Results (from the past 24 hour(s))   CBC WITH AUTOMATED DIFF    Collection Time: 05/25/18 10:05 PM   Result Value Ref Range    WBC 15.1 (H) 3.6 - 11.0 K/uL    RBC 5.35 (H) 3.80 - 5.20 M/uL    HGB 14.9 11.5 - 16.0 g/dL    HCT 45.8 35.0 - 47.0 %    MCV 85.6 80.0 - 99.0 FL    MCH 27.9 26.0 - 34.0 PG    MCHC 32.5 30.0 - 36.5 g/dL    RDW 13.5 11.5 - 14.5 %    PLATELET 795 230 - 207 K/uL    MPV 10.0 8.9 - 12.9 FL    NRBC 0.0 0  WBC    ABSOLUTE NRBC 0.00 0.00 - 0.01 K/uL    NEUTROPHILS 76 (H) 32 - 75 %    LYMPHOCYTES 10 (L) 12 - 49 %    MONOCYTES 9 5 - 13 %    EOSINOPHILS 4 0 - 7 %    BASOPHILS 0 0 - 1 %    IMMATURE GRANULOCYTES 1 (H) 0.0 - 0.5 %    ABS. NEUTROPHILS 11.6 (H) 1.8 - 8.0 K/UL    ABS. LYMPHOCYTES 1.5 0.8 - 3.5 K/UL    ABS. MONOCYTES 1.4 (H) 0.0 - 1.0 K/UL    ABS. EOSINOPHILS 0.6 (H) 0.0 - 0.4 K/UL    ABS. BASOPHILS 0.1 0.0 - 0.1 K/UL    ABS. IMM. GRANS. 0.1 (H) 0.00 - 0.04 K/UL    DF AUTOMATED     METABOLIC PANEL, COMPREHENSIVE    Collection Time: 05/25/18 10:05 PM   Result Value Ref Range    Sodium 133 (L) 136 - 145 mmol/L    Potassium 3.5 3.5 - 5.1 mmol/L    Chloride 98 97 - 108 mmol/L    CO2 21 21 - 32 mmol/L    Anion gap 14 5 - 15 mmol/L    Glucose 213 (H) 65 - 100 mg/dL    BUN 27 (H) 6 - 20 MG/DL    Creatinine 0.42 (L) 0.55 - 1.02 MG/DL    BUN/Creatinine ratio 64 (H) 12 - 20      GFR est AA >60 >60 ml/min/1.73m2    GFR est non-AA >60 >60 ml/min/1.73m2    Calcium 9.1 8.5 - 10.1 MG/DL    Bilirubin, total 0.6 0.2 - 1.0 MG/DL    ALT (SGPT) 10 (L) 12 - 78 U/L    AST (SGOT) 12 (L) 15 - 37 U/L    Alk.  phosphatase 76 45 - 117 U/L    Protein, total 7.0 6.4 - 8.2 g/dL    Albumin 3.0 (L) 3.5 - 5.0 g/dL    Globulin 4.0 2.0 - 4.0 g/dL    A-G Ratio 0.8 (L) 1.1 - 2.2     GLUCOSE, POC    Collection Time: 05/25/18 10:26 PM   Result Value Ref Range    Glucose (POC) 206 (H) 65 - 100 mg/dL    Performed by Ra Becker    LIPASE    Collection Time: 05/25/18 10:30 PM   Result Value Ref Range    Lipase 38 (L) 73 - 393 U/L   GLUCOSE, POC    Collection Time: 05/26/18  5:12 AM   Result Value Ref Range    Glucose (POC) 174 (H) 65 - 100 mg/dL    Performed by Jensen Peralta    CBC WITH AUTOMATED DIFF    Collection Time: 05/26/18  5:21 AM   Result Value Ref Range    WBC 11.5 (H) 3.6 - 11.0 K/uL    RBC 4.78 3.80 - 5.20 M/uL    HGB 13.4 11.5 - 16.0 g/dL    HCT 40.8 35.0 - 47.0 %    MCV 85.4 80.0 - 99.0 FL    MCH 28.0 26.0 - 34.0 PG    MCHC 32.8 30.0 - 36.5 g/dL    RDW 13.4 11.5 - 14.5 %    PLATELET 106 370 - 787 K/uL    MPV 9.8 8.9 - 12.9 FL    NRBC 0.0 0  WBC    ABSOLUTE NRBC 0.00 0.00 - 0.01 K/uL    NEUTROPHILS 67 32 - 75 %    LYMPHOCYTES 14 12 - 49 %    MONOCYTES 12 5 - 13 %    EOSINOPHILS 7 0 - 7 %    BASOPHILS 0 0 - 1 %    IMMATURE GRANULOCYTES 0 0.0 - 0.5 %    ABS. NEUTROPHILS 7.6 1.8 - 8.0 K/UL    ABS. LYMPHOCYTES 1.6 0.8 - 3.5 K/UL    ABS. MONOCYTES 1.4 (H) 0.0 - 1.0 K/UL    ABS. EOSINOPHILS 0.8 (H) 0.0 - 0.4 K/UL    ABS. BASOPHILS 0.0 0.0 - 0.1 K/UL    ABS. IMM.  GRANS. 0.1 (H) 0.00 - 0.04 K/UL    DF AUTOMATED     METABOLIC PANEL, BASIC    Collection Time: 05/26/18  5:21 AM   Result Value Ref Range    Sodium 135 (L) 136 - 145 mmol/L    Potassium 3.3 (L) 3.5 - 5.1 mmol/L    Chloride 101 97 - 108 mmol/L    CO2 23 21 - 32 mmol/L    Anion gap 11 5 - 15 mmol/L    Glucose 183 (H) 65 - 100 mg/dL    BUN 23 (H) 6 - 20 MG/DL    Creatinine 0.34 (L) 0.55 - 1.02 MG/DL    BUN/Creatinine ratio 68 (H) 12 - 20      GFR est AA >60 >60 ml/min/1.73m2    GFR est non-AA >60 >60 ml/min/1.73m2    Calcium 8.4 (L) 8.5 - 10.1 MG/DL   HEMOGLOBIN A1C WITH EAG    Collection Time: 05/26/18  5:21 AM   Result Value Ref Range    Hemoglobin A1c 8.3 (H) 4.2 - 6.3 %    Est. average glucose 192 mg/dL   URINALYSIS W/MICROSCOPIC    Collection Time: 05/26/18  5:40 AM   Result Value Ref Range    Color YELLOW/STRAW      Appearance CLEAR CLEAR      Specific gravity 1.010      pH (UA) 6.0 5.0 - 8.0      Protein NEGATIVE  NEG mg/dL    Glucose >1000 (A) NEG mg/dL    Ketone 80 (A) NEG mg/dL    Blood NEGATIVE  NEG      Urobilinogen 1.0 0.2 - 1.0 EU/dL    Nitrites NEGATIVE  NEG      Leukocyte Esterase SMALL (A) NEG      WBC 0-4 0 - 4 /hpf    RBC 0-5 0 - 5 /hpf Epithelial cells FEW FEW /lpf    Bacteria 1+ (A) NEG /hpf   URINE CULTURE HOLD SAMPLE    Collection Time: 05/26/18  5:40 AM   Result Value Ref Range    Urine culture hold        URINE ON HOLD IN MICROBIOLOGY DEPT FOR 3 DAYS. IF UNPRESERVED URINE IS SUBMITTED, IT CANNOT BE USED FOR ADDITIONAL TESTING AFTER 24 HRS, RECOLLECTION WILL BE REQUIRED. BILIRUBIN, CONFIRM    Collection Time: 05/26/18  5:40 AM   Result Value Ref Range    Bilirubin UA, confirm NEGATIVE  NEG     GLUCOSE, POC    Collection Time: 05/26/18 10:55 AM   Result Value Ref Range    Glucose (POC) 153 (H) 65 - 100 mg/dL    Performed by Lashell Miller        Assessment:Plan    78 yo female with partially obstructing cecal mass with evidence of metastasis   Ultimately will need Resection to alleviate obstruction  Heme onc to see   Would start TPN on her as I dont think she will be able tolerate a diet in a meaningful way. /  Signed By: Theo Singleton MD     May 26, 2018

## 2018-05-26 NOTE — PROGRESS NOTES
Hospitalist Progress Note  Anastacio Isabel MD  Answering service: [phone] from in house phone  Cell: [phone]      Date of Service:  2018  NAME:  Gabe Rust  :  1950  MRN:  [id number]      Admission Summary:   Gabe Rust is a 79 y.o.  female with past medical history of anemia, uterine cancer, s/p hysterectomy, type 2 diabetes mellitus, headaches, hypertension, and hyperlipidemia presented to the ED from home with chief complaint of abdominal pain. Patient's daughter who speaks English provide translation. Patient is a limited historian. Patient's daughter provides additional history    Interval history / Subjective:   Abdominal discomfort, last bowel movement was on , has not passed gas, no vomiting or cough     Assessment & Plan:     Colonic mass with  metastasis to liver lung and retroperitoneal LN  -keep patient NPO for now, normal saline at 50 ml/hr   -CT abdomen pelvis show cecal mass with metastatic disease to liver, retroperitoneal nodes, and lung. Relative small bowel obstruction. Associated dilated appendix. Associated complex right pleural effusion and small amount of ascites.    -consult General surgeon and Hem/Onc on board    SBO   -Keep NPO, continue IVF  -Surgical service on board    Leukocytosis possible reactive   -afebrile  -leukocytosis improving  -UA no evidence of UTI    Dehydration likely due to poor oral intake   -continue normal saline, monitor electrolyte     Hypokalemia  -replace with iv kcl and repeat k level    Hyponatremia multifactorial  -continue normal saline and repeat bmp in am     Small ascites on CT scan  -cut back IVF  -monitor I/O for now    DM-II  -A1c 8.3  -patient NPO, continue sliding scale and monitor finger stick glucose     HTN  -BP normal, home metoprolol and lisinopril is held, monitor BP    Hx of hypercholesterolemia   -zocor is held    Hx of uterine ca s/p hysterectomy   -no vaginal bleeding    Obesity  -diet program    Code status: Full Code  DVT prophylaxis: SCD    Care Plan discussed with: Patient/Family, Nurse and   Disposition: TBD     , Eloy Tia [phone] at bedside, help for interpretation, case discussed with patient and answer patient and his questions     Hospital Problems  Date Reviewed: 5/26/2018          Codes Class Noted POA    Leukocytosis ICD-10-CM: D72.829  ICD-9-CM: 288.60  5/26/2018 Unknown        SBO (small bowel obstruction) ([facility] ICD-10-CM: P83.714  ICD-9-CM: 560.9  5/26/2018 Unknown        Abdominal pain ICD-10-CM: R10.9  ICD-9-CM: 789.00  5/26/2018 Unknown        Type 2 diabetes mellitus with hyperglycemia ([facility] ICD-10-CM: E11.65  ICD-9-CM: 250.00  5/26/2018 Unknown        * (Principal)Colonic mass ICD-10-CM: K63.9  ICD-9-CM: 569.89  5/26/2018 Unknown              Vital Signs:    Last 24hrs VS reviewed since prior progress note. Most recent are:  Visit Vitals    /65 (BP 1 Location: Left arm, BP Patient Position: At rest)    Pulse 87    Temp 98 °F (36.7 °C)    Resp 16    Ht 5' 4\" (1.626 m)    Wt 82.6 kg (182 lb)    SpO2 97%    BMI 31.24 kg/m2         Intake/Output Summary (Last 24 hours) at 05/26/18 0715  Last data filed at 05/26/18 1002   Gross per 24 hour   Intake                0 ml   Output              650 ml   Net             -650 ml        Physical Examination:             Constitutional:  No acute distress, cooperative, pleasant    ENT:  Oral mucous moist, oropharynx benign. Neck supple,    Resp:  CTA bilaterally. No wheezing/rhonchi/rales. No accessory muscle use   CV:  Regular rhythm, normal rate, no murmurs, gallops, rubs    GI:  Soft, non distended, non tender, hypoactive bowel sounds, no hepatosplenomegaly     Musculoskeletal:  No edema    Neurologic:  Moves all extremities.   AAOx3, CN II-XII reviewed     Skin:  Good turgor, no rashes or ulcers       Data Review:    Review and/or order of clinical lab test      Labs:     Recent Labs      05/26/18 0521 05/25/18 2205   WBC  11.5*  15.1*   HGB  13.4  14.9   HCT  40.8  45.8   PLT  292  315     Recent Labs      05/26/18 0521 05/25/18 2205   NA  135*  133*   K  3.3*  3.5   CL  101  98   CO2  23  21   BUN  23*  27*   CREA  0.34*  0.42*   GLU  183*  213*   CA  8.4*  9.1     Recent Labs      05/25/18 2230 05/25/18 2205   SGOT   --   12*   ALT   --   10*   AP   --   76   TBILI   --   0.6   TP   --   7.0   ALB   --   3.0*   GLOB   --   4.0   LPSE  38*   --      No results for input(s): INR, PTP, APTT in the last 72 hours. No lab exists for component: INREXT   No results for input(s): FE, TIBC, PSAT, FERR in the last 72 hours. No results found for: FOL, RBCF   No results for input(s): PH, PCO2, PO2 in the last 72 hours. No results for input(s): CPK, CKNDX, TROIQ in the last 72 hours.     No lab exists for component: CPKMB  Lab Results   Component Value Date/Time    Cholesterol, total 174 06/01/2017 09:40 AM    HDL Cholesterol 45 06/01/2017 09:40 AM    LDL, calculated 92 06/01/2017 09:40 AM    Triglyceride 183 (H) 06/01/2017 09:40 AM    CHOL/HDL Ratio 3.4 01/29/2010 11:46 AM     Lab Results   Component Value Date/Time    Glucose (POC) 174 (H) 05/26/2018 05:12 AM    Glucose (POC) 206 (H) 05/25/2018 10:26 PM     Lab Results   Component Value Date/Time    Color YELLOW/STRAW 05/26/2018 05:40 AM    Appearance CLEAR 05/26/2018 05:40 AM    Specific gravity 1.010 05/26/2018 05:40 AM    pH (UA) 6.0 05/26/2018 05:40 AM    Protein NEGATIVE  05/26/2018 05:40 AM    Glucose >1000 (A) 05/26/2018 05:40 AM    Ketone 80 (A) 05/26/2018 05:40 AM    Bilirubin Negative 04/30/2015 12:00 AM    Urobilinogen 1.0 05/26/2018 05:40 AM    Nitrites NEGATIVE  05/26/2018 05:40 AM    Leukocyte Esterase SMALL (A) 05/26/2018 05:40 AM    Epithelial cells FEW 05/26/2018 05:40 AM    Bacteria 1+ (A) 05/26/2018 05:40 AM    WBC 0-4 05/26/2018 05:40 AM    RBC 0-5 05/26/2018 05:40 AM Medications Reviewed:     Current Facility-Administered Medications   Medication Dose Route Frequency    sodium chloride (NS) flush 5-10 mL  5-10 mL IntraVENous Q8H    sodium chloride (NS) flush 5-10 mL  5-10 mL IntraVENous PRN    glucose chewable tablet 16 g  4 Tab Oral PRN    dextrose (D50W) injection syrg 12.5-25 g  12.5-25 g IntraVENous PRN    glucagon (GLUCAGEN) injection 1 mg  1 mg IntraMUSCular PRN    insulin lispro (HUMALOG) injection   SubCUTAneous Q6H    0.9% sodium chloride infusion  125 mL/hr IntraVENous CONTINUOUS    potassium chloride 10 mEq in 50 ml IVPB  10 mEq IntraVENous Q1H     ______________________________________________________________________  EXPECTED LENGTH OF STAY: - - -  ACTUAL LENGTH OF STAY:          0                 Vidya Dent MD

## 2018-05-27 NOTE — BRIEF OP NOTE
BRIEF OPERATIVE NOTE    Date of Procedure: 5/27/2018   Preoperative Diagnosis: Right colon mass  Postoperative Diagnosis: Right colon mass    Procedure(s):  RIGHT HEMICOLECTOMY  Surgeon(s) and Role:     * Eugenia Briscoe MD - Primary         Surgical Assistant: Bib PIERRE    Surgical Staff:  Circ-1: Smita Camacho, RN  Scrub Tech-1: Alva Dougherty  Surg Asst-1: Nevaeh Mccartney  Event Time In   Incision Start 1520   Incision Close      Anesthesia: General   Estimated Blood Loss: 200 mL IV: LR 1300 mL Urine: 100 mL  Specimens:   ID Type Source Tests Collected by Time Destination   1 : Jer Cheema MD 5/27/2018 1532 Pathology   2 : PORTION OF OMENTUM Fresh Omentum  Eugenia Briscoe MD 5/27/2018 1541 Pathology   3 : RIGHT HEMICOLECTOMY Fresh Colon, Right  Eugenia Briscoe MD 5/27/2018 1620 Pathology   1 : PERITONEAL FLUID Fresh Peritoneal Fluid  Eugenia Briscoe MD 5/27/2018 1526 Cytology      Findings: cecal mass with adhesion to terminal ileum, peritoneal nodules, liver nodule   Complications: none  Implants: * No implants in log *

## 2018-05-27 NOTE — PROGRESS NOTES
Bedside and Verbal shift change report given to Delgado Shrestha RN (oncoming nurse) by José Miguel Davila RN (offgoing nurse). Report included the following information SBAR, Kardex, ED Summary, Intake/Output, MAR and Recent Results.

## 2018-05-27 NOTE — PROGRESS NOTES
Hospitalist Progress Note  Andre Acevedo MD  Answering service: [phone] from in house phone  Cell: [phone]      Date of Service:  2018  NAME:  Carlo Warren  :  1950  MRN:  [id number]      Admission Summary:   Carlo Warren is a 79 y.o.  female with past medical history of anemia, uterine cancer, s/p hysterectomy, type 2 diabetes mellitus, headaches, hypertension, and hyperlipidemia presented to the ED from home with chief complaint of abdominal pain. Patient's daughter who speaks English provide translation. Patient is a limited historian. Patient's daughter provides additional history    Interval history / Subjective:   She said she didn't sleep last night, she has abdominal discomfort, last bowel movement was on ,     Assessment & Plan:     Colonic mass with  metastasis to liver lung and retroperitoneal LN  -keep patient NPO for now, normal saline at 50 ml/hr   -CT abdomen pelvis show cecal mass with metastatic disease to liver, retroperitoneal nodes, and lung. Relative small bowel obstruction. Associated dilated appendix. Associated complex right pleural effusion and small amount of ascites.    -seen by General surgeon and plan for right colectomy   -Hem/Onc on board    SBO   -Keep NPO, continue IVF  -Surgical service on board    Leukocytosis possible reactive   -afebrile  -leukocytosis resolved  -UA no evidence of UTI    Dehydration likely due to poor oral intake   -continue normal saline, monitor electrolyte     Hypokalemia  -replace with iv kcl and repeat k level    Hyponatremia multifactorial  -resolved, normal saline and repeat bmp in am     Small ascites on CT scan  -cut back IVF  -monitor I/O for now    DM-II  -A1c 8.3  -patient NPO, continue sliding scale and monitor finger stick glucose     HTN  -BP normal, home metoprolol and lisinopril is held, monitor BP    Hx of hypercholesterolemia -zocor is held    Hx of uterine ca s/p hysterectomy   -no vaginal bleeding    Obesity  -diet program    Code status: Full Code  DVT prophylaxis: SCD    Care Plan discussed with: Patient/Family, Nurse and   Disposition: TBD     , Desiree German [phone] at bedside, help for interpretation, case discussed with patient and answer patient and his questions     Hospital Problems  Date Reviewed: 5/27/2018          Codes Class Noted POA    Leukocytosis ICD-10-CM: D72.829  ICD-9-CM: 288.60  5/26/2018 Unknown        SBO (small bowel obstruction) ([facility] ICD-10-CM: T71.270  ICD-9-CM: 560.9  5/26/2018 Unknown        Abdominal pain ICD-10-CM: R10.9  ICD-9-CM: 789.00  5/26/2018 Unknown        Type 2 diabetes mellitus with hyperglycemia ([email or web address] ICD-10-CM: E11.65  ICD-9-CM: 250.00  5/26/2018 Unknown        * (Principal)Colonic mass ICD-10-CM: K63.9  ICD-9-CM: 569.89  5/26/2018 Unknown              Vital Signs:    Last 24hrs VS reviewed since prior progress note. Most recent are:  Visit Vitals    /70 (BP 1 Location: Left arm)    Pulse 87    Temp 98 °F (36.7 °C)    Resp 17    Ht 5' 4\" (1.626 m)    Wt 82.6 kg (182 lb)    SpO2 95%    BMI 31.24 kg/m2         Intake/Output Summary (Last 24 hours) at 05/27/18 1128  Last data filed at 05/26/18 2220   Gross per 24 hour   Intake              400 ml   Output              300 ml   Net              100 ml        Physical Examination:             Constitutional:  No acute distress, cooperative, pleasant    ENT:  Oral mucous moist, oropharynx benign. Neck supple,    Resp:  CTA bilaterally. No wheezing/rhonchi/rales. No accessory muscle use   CV:  Regular rhythm, normal rate, no murmurs, gallops, rubs    GI:  Soft, non distended, non tender, hypoactive bowel sounds, no hepatosplenomegaly     Musculoskeletal:  No edema    Neurologic:  Moves all extremities.   AAOx3, CN II-XII reviewed     Skin:  Good turgor, no rashes or ulcers       Data Review:    Review and/or order of clinical lab test      Labs:     Recent Labs      05/27/18 0420 05/26/18 0521   WBC  10.7  11.5*   HGB  13.6  13.4   HCT  42.1  40.8   PLT  315  292     Recent Labs      05/27/18 0420 05/26/18 0521 05/25/18 2205   NA  139  135*  133*   K  3.4*  3.3*  3.5   CL  104  101  98   CO2  22  23  21   BUN  17  23*  27*   CREA  0.26*  0.34*  0.42*   GLU  145*  183*  213*   CA  8.2*  8.4*  9.1     Recent Labs      05/25/18 2230 05/25/18 2205   SGOT   --   12*   ALT   --   10*   AP   --   76   TBILI   --   0.6   TP   --   7.0   ALB   --   3.0*   GLOB   --   4.0   LPSE  38*   --      No results for input(s): INR, PTP, APTT in the last 72 hours. No lab exists for component: INREXT, INREXT   No results for input(s): FE, TIBC, PSAT, FERR in the last 72 hours. No results found for: FOL, RBCF   No results for input(s): PH, PCO2, PO2 in the last 72 hours. No results for input(s): CPK, CKNDX, TROIQ in the last 72 hours.     No lab exists for component: CPKMB  Lab Results   Component Value Date/Time    Cholesterol, total 174 06/01/2017 09:40 AM    HDL Cholesterol 45 06/01/2017 09:40 AM    LDL, calculated 92 06/01/2017 09:40 AM    Triglyceride 183 (H) 06/01/2017 09:40 AM    CHOL/HDL Ratio 3.4 01/29/2010 11:46 AM     Lab Results   Component Value Date/Time    Glucose (POC) 158 (H) 05/27/2018 06:29 AM    Glucose (POC) 115 (H) 05/26/2018 10:13 PM    Glucose (POC) 172 (H) 05/26/2018 05:15 PM    Glucose (POC) 153 (H) 05/26/2018 10:55 AM    Glucose (POC) 174 (H) 05/26/2018 05:12 AM     Lab Results   Component Value Date/Time    Color YELLOW/STRAW 05/26/2018 05:40 AM    Appearance CLEAR 05/26/2018 05:40 AM    Specific gravity 1.010 05/26/2018 05:40 AM    pH (UA) 6.0 05/26/2018 05:40 AM    Protein NEGATIVE  05/26/2018 05:40 AM    Glucose >1000 (A) 05/26/2018 05:40 AM    Ketone 80 (A) 05/26/2018 05:40 AM    Bilirubin Negative 04/30/2015 12:00 AM    Urobilinogen 1.0 05/26/2018 05:40 AM    Nitrites NEGATIVE  05/26/2018 05:40 AM    Leukocyte Esterase SMALL (A) 05/26/2018 05:40 AM    Epithelial cells FEW 05/26/2018 05:40 AM    Bacteria 1+ (A) 05/26/2018 05:40 AM    WBC 0-4 05/26/2018 05:40 AM    RBC 0-5 05/26/2018 05:40 AM         Medications Reviewed:     Current Facility-Administered Medications   Medication Dose Route Frequency    sodium chloride (NS) flush 5-10 mL  5-10 mL IntraVENous Q8H    sodium chloride (NS) flush 5-10 mL  5-10 mL IntraVENous PRN    glucose chewable tablet 16 g  4 Tab Oral PRN    dextrose (D50W) injection syrg 12.5-25 g  12.5-25 g IntraVENous PRN    glucagon (GLUCAGEN) injection 1 mg  1 mg IntraMUSCular PRN    insulin lispro (HUMALOG) injection   SubCUTAneous Q6H    0.45% sodium chloride with KCl 20 mEq/L infusion   IntraVENous CONTINUOUS     ______________________________________________________________________  EXPECTED LENGTH OF STAY: - - -  ACTUAL LENGTH OF STAY:          1                 Zarina Chou MD

## 2018-05-27 NOTE — PROGRESS NOTES
6330  Bedside report completed with off going nurse     1350   Patient departing for surgery, family at bedside      1930-Pending  Bedside and Verbal shift change report given to oncoming nurse.  Report included the following information SBAR, Kardex, Intake/Output, MAR and Recent Results.

## 2018-05-27 NOTE — ANESTHESIA PREPROCEDURE EVALUATION
Anesthetic History   No history of anesthetic complications            Review of Systems / Medical History  Patient summary reviewed, nursing notes reviewed and pertinent labs reviewed    Pulmonary  Within defined limits                 Neuro/Psych   Within defined limits           Cardiovascular    Hypertension          Hyperlipidemia    Exercise tolerance: >4 METS     GI/Hepatic/Renal               Comments: [facility]/Other    Diabetes: type 2    Obesity and cancer (Colon)     Other Findings              Physical Exam    Airway  Mallampati: III  TM Distance: > 6 cm  Neck ROM: normal range of motion, short neck   Mouth opening: Normal     Cardiovascular    Rhythm: regular  Rate: normal         Dental    Dentition: Lower dentition intact and Upper dentition intact     Pulmonary  Breath sounds clear to auscultation               Abdominal  GI exam deferred       Other Findings            Anesthetic Plan    ASA: 2  Anesthesia type: general          Induction: Intravenous and RSI  Anesthetic plan and risks discussed with: Patient

## 2018-05-27 NOTE — ROUTINE PROCESS
TRANSFER - OUT REPORT:    Verbal report given to Ronnie Thomason RN(name) on [facility]  being transferred to [facility](unit) for routine post - op       Report consisted of patients Situation, Background, Assessment and   Recommendations(SBAR). Time Pre op antibiotic given:1500  Anesthesia Stop time: 2682  Ford Present on Transfer to floor:YES  Order for Ford on Chart:YES  Discharge Prescriptions with Chart:NO    Information from the following report(s) SBAR, Kardex, OR Summary, Procedure Summary and Cardiac Rhythm NSR was reviewed with the receiving nurse. Opportunity for questions and clarification was provided. Is the patient on 02? YES       L/Min 2    Is the patient on a monitor? NO    Is the nurse transporting with the patient? NO    Surgical Waiting Area notified of patient's transfer from PACU? YES      The following personal items collected during your admission accompanied patient upon transfer:   Dental Appliance: Dental Appliances: None  Vision: Visual Aid: Glasses  Hearing Aid:    Jewelry: Jewelry: None  Clothing: Clothing: None  Other Valuables:  Other Valuables: None  Valuables sent to safe:

## 2018-05-27 NOTE — OP NOTES
[address] [place] [address] JAMIE Venegas  MR#: [id number]  : 1950  ACCOUNT #: [de-identified]   DATE OF SERVICE: 2018    PREOPERATIVE DIAGNOSIS:  Right colon mass. POSTOPERATIVE DIAGNOSIS:  Right colon mass. PROCEDURE PERFORMED:  Right hemicolectomy. SURGEON:  Alexia Swartz MD     ASSISTANT:  Bib     ANESTHESIA:  General endotracheal anesthesia. ESTIMATED BLOOD LOSS:  200 mL. INTRAVENOUS FLUIDS:  Lactated Ringer's 1300 mL. URINE OUTPUT:  100 mL. SPECIMEN REMOVED   1. Peritoneal nodule. 2.  Portion of omentum. 3.  Right colon. 4.  Peritoneal fluid. FINDINGS:  Large mass of the cecum that appears full thickness through the wall of the colon. It also has adhesion to the terminal ileum. Terminal ileum also has multiple small nodules on its surface. Several peritoneal nodules in the area were also palpated. A liver nodule was palpated at the top of the dome of the right lobe. COMPLICATIONS:  None. IMPLANTS:  None. INDICATION:  This  A 42-year-old  female who presented to the emergency room at [facility] after having approximately 6 days of abdominal distention, nausea and vomiting as well as abdominal pain. A CT of the abdomen and pelvis was obtained which demonstrated a large mass in the right colon with several enlarged periaortic lymph nodes, liver nodules, pulmonary nodules, right pleural effusion. These were all suspicious for metastatic disease. She also had partial small-bowel obstruction as a consequence of the tumor. She has been seen by the oncologist who has already discussed the role for chemotherapy. She presents now for palliative right hemicolectomy. DESCRIPTION OF PROCEDURE:  After confirming the procedure and obtaining informed consent, the patient was brought to the operating room.   After induction of general endotracheal anesthesia, a Ford catheter was placed in the bladder, then the patient's abdomen was prepped and draped in sterile fashion. A procedural timeout was performed and all the appropriately information was confirmed. A midline incision was created from above extending below the umbilicus. Electrocautery was used to dissect through adipose until the linea alba of the rectus fascia was identified. This was carefully incised and then the peritoneum was grasped between hemostats and also incised with Metzenbaum scissors. Upon opening the peritoneum, a thin, dark reddish fluid was spilling out of the wound. Approximately 30-60 mL of this fluid was collected for cytology. The remainder of the incision was opened and Clarke suction tip was used to aspirate fluid. The initial exploration revealed omentum stuck to the anterior abdominal wall below the umbilicus. This obscured access the RLQ and pelvis. It was dissected off of the peritoneum and this end of the omentum had several palpable nodules. There was also a nodule on the peritoneum adjacent to this. The peritoneal nodule was excised and sent as a specimen. The portion of omentum that was adhered to the anterior abdominal wall was dissected free and then excised using the large EnSeal device and sent as a separate specimen. After release of these adhesions, there were adhesions to the sigmoid colon which also were released, and then the wound protector was placed. Palpation in the right lower quadrant revealed a hard mass. This was readily delivered through the wound. The terminal ileum was folded back on itself with an attachment to the cecal mass. The terminal ileum also had several small, 1-2 mm nodules on its surface. The distal ileum was divided just proximal to the adhesion to the cecum with the Endo-KLAUDIA stapler using a purple cartridge. The mesentery was divided with the EnSeal device. Next, the transverse colon was identified. This was retracted and the hepatic flexure was identified.   The gastrocolic ligament was then divided with the EnSeal device and released the hepatic flexure. Then, the peritoneal reflection was also divided with the EnSeal device starting from the hepatic flexure towards the cecum. More peritoneal attachments to the posterior surface of the cecum were divided with the EnSeal device. The retroperitoneum was examined and prior to dividing this portion of the peritoneum, care was taken to ensure that no vessels or ureter-like structures were involved. Ureter was not seen during this dissection. The proximal transverse colon was divided with the Endo-KLAUDIA, again using purple cartridges. Then, the mesentery between the transverse colon and the distal ileum was divided using the EnSeal device, then stapling at the ileocolic artery. The specimen was then passed off the field. The mesentery was inspected and control of bleeding was achieved with Hemoclips. The colon and the distal ileum were then prepared for side-to-side, functional end-to-end anastomosis. They were aligned using 2-0 silk sutures. Then, bowel clamps were placed and the enterotomy and colotomy were created using the electrocautery device. The Endo-KLAUDIA with a purple cartridge was inserted through the openings using a 60 mm cartridge. The stapler was fired and removed. The anastomosis appeared to be patent, and hemostasis was confirmed. The common enterotomy/colotomy was then grasped with Allis clamps and divided transversely with another application of the Endo-KLAUDIA with purple cartridge. The anastomosis was palpated again and confirmed patent. The staple line of the common enterotomy was imbricated with interrupted 2-0 silk sutures. The mesenteric defect was approximated using 2-0 silk sutures. Then, the anastomosis was returned to the right side of the abdomen and covered with the omentum. The remaining small intestine was also returned into the abdominal cavity, and the wound protector was removed.     The surgeon and assistant both changed gowns and gloves, and the closing table was positioned. After draping with the clean closing table towels and drapes, then the abdomen was closed using a running #1 Vicryl suture. An abdominal  FISH was used to protect the bowel during the closure. The deep dermis was approximated with 2-0 Vicryl sutures, then skin closed with skin staples. An Aquacel dressing was applied. Sponge and needle counts were confirmed correct prior to closing the abdomen. The patient was then extubated and transferred to the recovery room in stable condition.       MD PEARL Sun / JONATAN  D: 05/27/2018 18:13     T: 05/27/2018 18:57  JOB #: [id number]

## 2018-05-27 NOTE — PROGRESS NOTES
[facility] at [address], [address], [address]  W: [phone]  F: [phone]    Reason for Visit:   Mode Guerrero is a 79 y.o. female who is seen in consultation at the request of Dr. Christa Perry for evaluation of colon mass obstructing. Treatment History:   None yet    History of Present Illness:     Pt seen today in hospital consult for new colon mass with liver and lung lesions on CT. Pt does not have tissue dx of cancer yet. Colon mass is obstructing and pt is going to OR today. Pt speaks a little [place] but daughter is present and translates per pt preference. Pt knows she likely has cancer and needs surgery and then chemo. Pt states she is strong overall. Feels tired this am due to lack of sleep.      Past Medical History:   Diagnosis Date    Anemia NEC     Cancer ([address]     Diabetes ([address]     Headache(784.0)     Hypercholesterolemia     Hypertension       Past Surgical History:   Procedure Laterality Date    HX  SECTION      Hysterectomy,total for precancer      Social History   Substance Use Topics    Smoking status: Never Smoker    Smokeless tobacco: Never Used    Alcohol use No      Family History   Problem Relation Age of Onset    Alcohol abuse Father     Cancer Maternal Aunt      breast ca    Breast Cancer Maternal Aunt     Breast Cancer Sister     Breast Cancer Daughter      Current Facility-Administered Medications   Medication Dose Route Frequency    sodium chloride (NS) flush 5-10 mL  5-10 mL IntraVENous Q8H    sodium chloride (NS) flush 5-10 mL  5-10 mL IntraVENous PRN    glucose chewable tablet 16 g  4 Tab Oral PRN    dextrose (D50W) injection syrg 12.5-25 g  12.5-25 g IntraVENous PRN    glucagon (GLUCAGEN) injection 1 mg  1 mg IntraMUSCular PRN    insulin lispro (HUMALOG) injection   SubCUTAneous Q6H    0.45% sodium chloride with KCl 20 mEq/L infusion   IntraVENous CONTINUOUS      Allergies   Allergen Reactions    Tylenol [Acetaminophen] Swelling        Review of Systems: A complete review of systems was obtained, negative except as described above. Physical Exam:     Visit Vitals    /64    Pulse 86    Temp 98.2 °F (36.8 °C)    Resp 16    Ht 5' 4\" (1.626 m)    Wt 182 lb (82.6 kg)    SpO2 94%    BMI 31.24 kg/m2     ECOG PS: 0  General: No distress  Eyes: PERRLA, anicteric sclerae  HENT: Atraumatic, OP clear  Neck: Supple  Respiratory: CTAB, normal respiratory effort  CV: Normal rate, regular rhythm, no murmurs, no peripheral edema  GI: Soft, nontender  MS: Normal gait and station. Skin: No rashes, ecchymoses, or petechiae. Normal temperature, turgor, and texture. Psych: Alert, oriented, appropriate affect, normal judgment/insight    Results:     Lab Results   Component Value Date/Time    WBC 10.7 05/27/2018 04:20 AM    HGB 13.6 05/27/2018 04:20 AM    HCT 42.1 05/27/2018 04:20 AM    PLATELET 739 60/42/9566 04:20 AM    MCV 87.0 05/27/2018 04:20 AM    ABS. NEUTROPHILS 7.6 05/26/2018 05:21 AM     Lab Results   Component Value Date/Time    Sodium 139 05/27/2018 04:20 AM    Potassium 3.4 (L) 05/27/2018 04:20 AM    Chloride 104 05/27/2018 04:20 AM    CO2 22 05/27/2018 04:20 AM    Glucose 145 (H) 05/27/2018 04:20 AM    BUN 17 05/27/2018 04:20 AM    Creatinine 0.26 (L) 05/27/2018 04:20 AM    GFR est AA >60 05/27/2018 04:20 AM    GFR est non-AA >60 05/27/2018 04:20 AM    Calcium 8.2 (L) 05/27/2018 04:20 AM    Glucose (POC) 158 (H) 05/27/2018 06:29 AM     Lab Results   Component Value Date/Time    Bilirubin, total 0.6 05/25/2018 10:05 PM    ALT (SGPT) 10 (L) 05/25/2018 10:05 PM    AST (SGOT) 12 (L) 05/25/2018 10:05 PM    Alk.  phosphatase 76 05/25/2018 10:05 PM    Protein, total 7.0 05/25/2018 10:05 PM    Albumin 3.0 (L) 05/25/2018 10:05 PM    Globulin 4.0 05/25/2018 10:05 PM       CT Results (most recent):    Results from [place] encounter on 05/25/18   CT ABD PELV W CONT   Narrative EXAM:  CT ABD PELV W CONT    INDICATION: abdominal pain and distension    COMPARISON: None    CONTRAST:  100 mL of Isovue-370. TECHNIQUE:   Following the uneventful intravenous administration of contrast, thin axial  images were obtained through the abdomen and pelvis. Coronal and sagittal  reconstructions were generated. Oral contrast was not administered. CT dose  reduction was achieved through use of a standardized protocol tailored for this  examination and automatic exposure control for dose modulation. FINDINGS:   LUNG BASES: There is a small right pleural effusion I with a density measurement  of [phone]. Multiple pulmonary nodules are seen, numbering at least 2 in the  right middle lobe, 4 in the right lower lobe, 3 in the left lower lobe, and one  in the lingula. The largest of these measures about 9 mm in size. INCIDENTALLY IMAGED HEART AND MEDIASTINUM: Unremarkable. LIVER: Multiple hepatic masses are seen, the largest of which measures about 13  mm in size. GALLBLADDER: Unremarkable. SPLEEN: No mass. PANCREAS: No mass or ductal dilatation. ADRENALS: Unremarkable. KIDNEYS: No calculus or hydronephrosis. There is a multiloculated left renal 2.3  cm cyst with a Hounsfield unit measurement of 10. STOMACH: Unremarkable. SMALL BOWEL: Relative small bowel obstruction. COLON: Colonic mass measuring about 5.4 cm anterior to posterior, 6.5 cm right  to left, and 7.4 cm superior to inferior (axial image 58 coronal image 61). APPENDIX: Dilated to 13 mm. PERITONEUM: No ascites or pneumoperitoneum. RETROPERITONEUM: Retroperitoneal aortocaval nodes at the level of the inferior  kidneys which measure 13 x 20 mm in cross-sectional diameter (image axial 42 and  coronal image 80). REPRODUCTIVE ORGANS: Uterus and ovaries are surgically absent. URINARY BLADDER: No mass or calculus. BONES: No destructive bone lesion. There is disc desiccation at L3-4 and L4-5.   ADDITIONAL COMMENTS: N/A         Impression IMPRESSION: Cecal mass with metastatic disease to liver, retroperitoneal nodes,  and lung. Relative small bowel obstruction. Associated dilated appendix. Associated complex right pleural effusion and small amount of ascites. This  result was verbally relayed to Dr. Kellen Cheung at 0008 hours by me. [id number]          Records reviewed and summarized above. Pathology report(s) reviewed above. Radiology report(s) reviewed above. Assessment/PLAN:     1) colon mass with obstruction with liver and lung lesions on CT/ stage 4 colon cancer with path pending. Records reviewed. Reviewed scans and data with pt and daughter at bedside today. Pt has good understanding of likely cancer dx and plan for surgery due to obstructing colon mass. We discussed chemo after surgery in likely 4 weeks. Reviewed treatable not curable. Need CEA and complete staging with CT chest eventually. Pt feels well this am.  Case d/w surgery this am.     2)  DM/ HTN per IM. 3)  Support good from family. Daughter present today for translation per pt preference. We will follow  Call if questions. I appreciate the opportunity to participate in Ms. Anne Lott's care.     Signed By: Gómez Gold DO

## 2018-05-28 NOTE — PROGRESS NOTES
Bedside and Verbal shift change report given to [id number]  (oncoming nurse) by Andrez Becker (offgoing nurse). Report included the following information SBAR and Kardex.

## 2018-05-28 NOTE — ANESTHESIA POSTPROCEDURE EVALUATION
Post-Anesthesia Evaluation and Assessment    Patient: Ramses Walker MRN: [id number]  SSN: [id number]    YOB: 1950  Age: 79 y.o. Sex: female       Cardiovascular Function/Vital Signs  Visit Vitals    /55    Pulse (!) 108    Temp 36.9 °C (98.5 °F)    Resp 18    Ht 5' 4\" (1.626 m)    Wt 82.8 kg (182 lb 8 oz)    SpO2 97%    BMI 31.33 kg/m2       Patient is status post general anesthesia for Procedure(s):  RIGHT HEMICOLECTOMY. Nausea/Vomiting: None    Postoperative hydration reviewed and adequate. Pain:  Pain Scale 1: Numeric (0 - 10) (05/27/18 1830)  Pain Intensity 1: 0 (05/27/18 1830)   Managed    Neurological Status:   Neuro (WDL): Within Defined Limits (05/27/18 1830)  Neuro  LUE Motor Response: Purposeful (05/27/18 2035)  LLE Motor Response: Purposeful (05/27/18 2035)  RUE Motor Response: Purposeful (05/27/18 2035)  RLE Motor Response: Purposeful (05/27/18 2035)   At baseline    Mental Status and Level of Consciousness: Arousable    Pulmonary Status:   O2 Device: Nasal cannula (05/27/18 1830)   Adequate oxygenation and airway patent    Complications related to anesthesia: None    Post-anesthesia assessment completed.  No concerns    Signed By: Geovanni Amezcua MD     May 28, 2018

## 2018-05-28 NOTE — PROGRESS NOTES
[facility]  [address], [address], [address] [place]  W: [phone]  F: [phone]    Reason for Visit:   Zhen Red is a 79 y.o. female who is seen in consultation at the request of Dr. Eleanor Gomez for evaluation of colon mass obstructing. Treatment History:   None yet    History of Present Illness:     Pt seen today in hospital consult for new colon mass with liver and lung lesions on CT. Pt does not have tissue dx of cancer yet. Colon mass is obstructing and pt is going to OR today. Pt speaks a little [place] but daughter is present and translates per pt preference. Pt knows she likely has cancer and needs surgery and then chemo. Pt states she is strong overall. Feels tired this am due to lack of sleep. Interim Hx:  Pt seen today for f/u of new met colon cancer post surgery yesterday. Path is pending. Pt is sleepy in bed with daughter at bedside. Pt did well with surgery per daughter. No issues overnight.      Past Medical History:   Diagnosis Date    Anemia NEC     Cancer ([address]     Diabetes ([address]     Headache(784.0)     Hypercholesterolemia     Hypertension       Past Surgical History:   Procedure Laterality Date    HX  SECTION      Hysterectomy,total for precancer      Social History   Substance Use Topics    Smoking status: Never Smoker    Smokeless tobacco: Never Used    Alcohol use No      Family History   Problem Relation Age of Onset    Alcohol abuse Father     Cancer Maternal Aunt      breast ca    Breast Cancer Maternal Aunt     Breast Cancer Sister     Breast Cancer Daughter      Current Facility-Administered Medications   Medication Dose Route Frequency    enoxaparin (LOVENOX) injection 40 mg  40 mg SubCUTAneous Q24H    albuterol (PROVENTIL VENTOLIN) nebulizer solution 2.5 mg  2.5 mg Nebulization Q6H PRN    metoprolol tartrate (LOPRESSOR) tablet 50 mg  50 mg Oral BID    lisinopril (PRINIVIL, ZESTRIL) tablet 10 mg 10 mg Oral DAILY    alvimopan (ENTEREG) capsule 12 mg  12 mg Oral BID    celecoxib (CELEBREX) capsule 100 mg  100 mg Oral BID    naloxone (NARCAN) injection 0.4 mg  0.4 mg IntraVENous PRN    ketorolac (TORADOL) injection 15 mg  15 mg IntraVENous Q6H PRN    ondansetron (ZOFRAN) injection 4 mg  4 mg IntraVENous Q4H PRN    fentaNYL (PF)  mcg/30 ml (ADULT)   IntraVENous TITRATE    sodium chloride (NS) flush 5-10 mL  5-10 mL IntraVENous Q8H    sodium chloride (NS) flush 5-10 mL  5-10 mL IntraVENous PRN    glucose chewable tablet 16 g  4 Tab Oral PRN    dextrose (D50W) injection syrg 12.5-25 g  12.5-25 g IntraVENous PRN    glucagon (GLUCAGEN) injection 1 mg  1 mg IntraMUSCular PRN    insulin lispro (HUMALOG) injection   SubCUTAneous Q6H    0.45% sodium chloride with KCl 20 mEq/L infusion   IntraVENous CONTINUOUS      Allergies   Allergen Reactions    Tylenol [Acetaminophen] Swelling        Review of Systems: A complete review of systems was obtained, negative except as described above. Physical Exam:     Visit Vitals    /58    Pulse (!) 111    Temp 97.7 °F (36.5 °C)    Resp 18    Ht 5' 4\" (1.626 m)    Wt 182 lb 8 oz (82.8 kg)    SpO2 97%    BMI 31.33 kg/m2     ECOG PS: 0  General: No distress  Eyes: PERRLA, anicteric sclerae  HENT: Atraumatic, OP clear  Neck: Supple  MS: Normal gait and station. Skin:  Normal temperature, turgor, and texture. Psych: sleepy this am    Results:     Lab Results   Component Value Date/Time    WBC 15.6 (H) 05/28/2018 08:55 AM    HGB 14.5 05/28/2018 08:55 AM    HCT 45.7 05/28/2018 08:55 AM    PLATELET 886 35/32/7171 08:55 AM    MCV 88.7 05/28/2018 08:55 AM    ABS.  NEUTROPHILS PENDING 05/28/2018 08:55 AM     Lab Results   Component Value Date/Time    Sodium 139 05/27/2018 04:20 AM    Potassium 3.4 (L) 05/27/2018 04:20 AM    Chloride 104 05/27/2018 04:20 AM    CO2 22 05/27/2018 04:20 AM    Glucose 145 (H) 05/27/2018 04:20 AM    BUN 17 05/27/2018 04:20 AM Creatinine 0.26 (L) 05/27/2018 04:20 AM    GFR est AA >60 05/27/2018 04:20 AM    GFR est non-AA >60 05/27/2018 04:20 AM    Calcium 8.2 (L) 05/27/2018 04:20 AM    Glucose (POC) 245 (H) 05/28/2018 06:49 AM     Lab Results   Component Value Date/Time    Bilirubin, total 0.6 05/25/2018 10:05 PM    ALT (SGPT) 10 (L) 05/25/2018 10:05 PM    AST (SGOT) 12 (L) 05/25/2018 10:05 PM    Alk. phosphatase 76 05/25/2018 10:05 PM    Protein, total 7.0 05/25/2018 10:05 PM    Albumin 3.0 (L) 05/25/2018 10:05 PM    Globulin 4.0 05/25/2018 10:05 PM       CT Results (most recent):    Results from Hospital Encounter encounter on 05/25/18   CT ABD PELV W CONT   Narrative EXAM:  CT ABD PELV W CONT    INDICATION: abdominal pain and distension    COMPARISON: None    CONTRAST:  100 mL of Isovue-370. TECHNIQUE:   Following the uneventful intravenous administration of contrast, thin axial  images were obtained through the abdomen and pelvis. Coronal and sagittal  reconstructions were generated. Oral contrast was not administered. CT dose  reduction was achieved through use of a standardized protocol tailored for this  examination and automatic exposure control for dose modulation. FINDINGS:   LUNG BASES: There is a small right pleural effusion I with a density measurement  of [phone]. Multiple pulmonary nodules are seen, numbering at least 2 in the  right middle lobe, 4 in the right lower lobe, 3 in the left lower lobe, and one  in the lingula. The largest of these measures about 9 mm in size. INCIDENTALLY IMAGED HEART AND MEDIASTINUM: Unremarkable. LIVER: Multiple hepatic masses are seen, the largest of which measures about 13  mm in size. GALLBLADDER: Unremarkable. SPLEEN: No mass. PANCREAS: No mass or ductal dilatation. ADRENALS: Unremarkable. KIDNEYS: No calculus or hydronephrosis. There is a multiloculated left renal 2.3  cm cyst with a Hounsfield unit measurement of 10. STOMACH: Unremarkable.   SMALL BOWEL: Relative small bowel obstruction. COLON: Colonic mass measuring about 5.4 cm anterior to posterior, 6.5 cm right  to left, and 7.4 cm superior to inferior (axial image 58 coronal image 61). APPENDIX: Dilated to 13 mm. PERITONEUM: No ascites or pneumoperitoneum. RETROPERITONEUM: Retroperitoneal aortocaval nodes at the level of the inferior  kidneys which measure 13 x 20 mm in cross-sectional diameter (image axial 42 and  coronal image 80). REPRODUCTIVE ORGANS: Uterus and ovaries are surgically absent. URINARY BLADDER: No mass or calculus. BONES: No destructive bone lesion. There is disc desiccation at L3-4 and L4-5. ADDITIONAL COMMENTS: N/A         Impression IMPRESSION: Cecal mass with metastatic disease to liver, retroperitoneal nodes,  and lung. Relative small bowel obstruction. Associated dilated appendix. Associated complex right pleural effusion and small amount of ascites. This  result was verbally relayed to Dr. Sherin Oviedo at 0008 hours by me. [id number]          Records reviewed and summarized above. Pathology report(s) reviewed above. Radiology report(s) reviewed above. Assessment/PLAN:     1) colon mass with obstruction with liver and lung lesions on CT/ stage 4 colon cancer with path pending. Pt is post surgery yesterday for obstructing colon mass. Pt did well with surgery overall. Sleepy this am.   We discussed chemo after surgery in likely 4 weeks. Reviewed treatable not curable. Need CEA and complete staging with CT chest eventually. Daughter at bedside. 2)  DM/ HTN per IM. 3)  Support good from family. Daughter present today for translation per pt preference. We will follow  Call if questions. I appreciate the opportunity to participate in Ms. Anne Lott's care.     Signed By: Wendi Hall, DO

## 2018-05-28 NOTE — PROGRESS NOTES
Went into room to remove osorio cath per md orders. Pt and family both state that they do not want osorio cath removed and that MD told them it was optional. Spoke with Dr Patric Govea and he states to leave osorio for now but to continue to encourage getting the osorio out and the pt up in the chair. Pt also declines getting up at this time but family tells MD that the pt has only been gotten up to have weight done this am.     1530  Pt incont of large liquid stool. Up to bsc to finish, encouraged to sit up in chair while she was already out of bed but declined to do so. Assigned AM care to PCT but not done, washed pt with chg wipes while she was up on bsc. Bed linens changed.

## 2018-05-28 NOTE — PROGRESS NOTES
bs 364, dr Rosa Elena Shea on unit and aware.   MD gave verbal order to give pt 8 units of insulin coverage at this time

## 2018-05-28 NOTE — PROGRESS NOTES
Bedside and Verbal shift change report given to Nain Fernandez RN (oncoming nurse) by Stef Perales RN (offgoing nurse). Report included the following information SBAR, Kardex, ED Summary, Intake/Output, MAR and Recent Results.

## 2018-05-28 NOTE — PROGRESS NOTES
Hospitalist Progress Note  Chetna Winn MD  Answering service: [phone] from in house phone  Cell: [phone]      Date of Service:  2018  NAME:  Sohan Garcia  :  1950  MRN:  [id number]      Admission Summary:   Sohan Garcia is a 79 y.o.  female with past medical history of anemia, uterine cancer, s/p hysterectomy, type 2 diabetes mellitus, headaches, hypertension, and hyperlipidemia presented to the ED from home with chief complaint of abdominal pain. Patient's daughter who speaks English provide translation. Patient is a limited historian. Patient's daughter provides additional history    Interval history / Subjective:   She said she didn't sleep last night, some abdominal discomfort,      Assessment & Plan:     Colonic mass with  metastasis to liver, lung and retroperitoneal LN  -s/p right kanika colectomy on   -NPO , normal saline at 50 ml/hr   -CT abdomen pelvis show cecal mass with metastatic disease to liver, retroperitoneal nodes, and lung. Relative small bowel obstruction. Associated dilated appendix. Associated complex right pleural effusion and small amount of ascites.    -Surgical service is following  -Hem/Onc on board    SBO due to colon mass  -s/p right hemicolectomy, NPO, continue IVF  -has passed gas   -Surgical service on board    Leukocytosis possible reactive   -afebrile  -monitor cbc  -UA no evidence of UTI    Dehydration likely due to poor oral intake   -continue normal saline, monitor electrolyte     Hypokalemia  -replace with iv kcl and repeat k level    Hyponatremia multifactorial  -resolved, normal saline and repeat bmp in am     Small ascites on CT scan  -cut back IVF  -monitor I/O for now    DM-II  -A1c 8.3  -on clear liquid diet, finger stick glucose running high this morning, 245-366, add lantus 30 units, continue sliding scale and monitor finger stick glucose   -consult diabetic treatment center    HTN  -BP normal, home metoprolol and lisinopril is held, monitor BP    Hx of hypercholesterolemia   -zocor is held    Hx of uterine ca s/p hysterectomy   -no vaginal bleeding    Obesity  -diet program    Code status: Full Code  DVT prophylaxis: SCD    Care Plan discussed with: Patient/Family, Nurse and   Disposition: TBD     , Mima Childers [phone] at bedside, help for interpretation, case discussed with patient and answer patient and his questions 5/28     Hospital Problems  Date Reviewed: 5/27/2018          Codes Class Noted POA    Leukocytosis ICD-10-CM: D72.829  ICD-9-CM: 288.60  5/26/2018 Yes        SBO (small bowel obstruction) [facility] ICD-10-CM: M25.489  ICD-9-CM: 560.9  5/26/2018 Yes        Abdominal pain ICD-10-CM: R10.9  ICD-9-CM: 789.00  5/26/2018 Yes        Type 2 diabetes mellitus with hyperglycemia ([facility] ICD-10-CM: E11.65  ICD-9-CM: 250.00  5/26/2018 Yes        * (Principal)Colonic mass ICD-10-CM: K63.9  ICD-9-CM: 569.89  5/26/2018 Yes              Vital Signs:    Last 24hrs VS reviewed since prior progress note. Most recent are:  Visit Vitals    /58    Pulse (!) 111    Temp 97.7 °F (36.5 °C)    Resp 18    Ht 5' 4\" (1.626 m)    Wt 82.8 kg (182 lb 8 oz)    SpO2 97%    BMI 31.33 kg/m2         Intake/Output Summary (Last 24 hours) at 05/28/18 1056  Last data filed at 05/28/18 0700   Gross per 24 hour   Intake             1895 ml   Output             1075 ml   Net              820 ml        Physical Examination:             Constitutional:  No acute distress, cooperative, pleasant    ENT:  Oral mucous moist, oropharynx benign. Neck supple,    Resp:  CTA bilaterally. No wheezing/rhonchi/rales. No accessory muscle use   CV:  Regular rhythm, normal rate, no murmurs, gallops, rubs    GI:  Soft, non distended, non tender, hypoactive bowel sounds, no hepatosplenomegaly     Musculoskeletal:  No edema    Neurologic:  Moves all extremities.   AAOx3, CN II-XII reviewed     Skin:  Good turgor, no rashes or ulcers       Data Review:    Review and/or order of clinical lab test      Labs:     Recent Labs      05/28/18   0855  05/28/18   0326   WBC  15.6*  15.7*   HGB  14.5  15.2   HCT  45.7  51.1*   PLT  333  278     Recent Labs      05/27/18   0420  05/26/18   0521  05/25/18   2205   NA  139  135*  133*   K  3.4*  3.3*  3.5   CL  104  101  98   CO2  22  23  21   BUN  17  23*  27*   CREA  0.26*  0.34*  0.42*   GLU  145*  183*  213*   CA  8.2*  8.4*  9.1     Recent Labs      05/25/18   2230  05/25/18   2205   SGOT   --   12*   ALT   --   10*   AP   --   76   TBILI   --   0.6   TP   --   7.0   ALB   --   3.0*   GLOB   --   4.0   LPSE  38*   --      No results for input(s): INR, PTP, APTT in the last 72 hours. No lab exists for component: INREXT, INREXT   No results for input(s): FE, TIBC, PSAT, FERR in the last 72 hours. No results found for: FOL, RBCF   No results for input(s): PH, PCO2, PO2 in the last 72 hours. No results for input(s): CPK, CKNDX, TROIQ in the last 72 hours.     No lab exists for component: CPKMB  Lab Results   Component Value Date/Time    Cholesterol, total 174 06/01/2017 09:40 AM    HDL Cholesterol 45 06/01/2017 09:40 AM    LDL, calculated 92 06/01/2017 09:40 AM    Triglyceride 183 (H) 06/01/2017 09:40 AM    CHOL/HDL Ratio 3.4 01/29/2010 11:46 AM     Lab Results   Component Value Date/Time    Glucose (POC) 245 (H) 05/28/2018 06:49 AM    Glucose (POC) 257 (H) 05/27/2018 11:55 PM    Glucose (POC) 160 (H) 05/27/2018 06:02 PM    Glucose (POC) 157 (H) 05/27/2018 02:47 PM    Glucose (POC) 149 (H) 05/27/2018 01:01 PM     Lab Results   Component Value Date/Time    Color YELLOW/STRAW 05/26/2018 05:40 AM    Appearance CLEAR 05/26/2018 05:40 AM    Specific gravity 1.010 05/26/2018 05:40 AM    pH (UA) 6.0 05/26/2018 05:40 AM    Protein NEGATIVE  05/26/2018 05:40 AM    Glucose >1000 (A) 05/26/2018 05:40 AM    Ketone 80 (A) 05/26/2018 05:40 AM    Bilirubin Negative 04/30/2015 12:00 AM    Urobilinogen 1.0 05/26/2018 05:40 AM    Nitrites NEGATIVE  05/26/2018 05:40 AM    Leukocyte Esterase SMALL (A) 05/26/2018 05:40 AM    Epithelial cells FEW 05/26/2018 05:40 AM    Bacteria 1+ (A) 05/26/2018 05:40 AM    WBC 0-4 05/26/2018 05:40 AM    RBC 0-5 05/26/2018 05:40 AM         Medications Reviewed:     Current Facility-Administered Medications   Medication Dose Route Frequency    enoxaparin (LOVENOX) injection 40 mg  40 mg SubCUTAneous Q24H    albuterol (PROVENTIL VENTOLIN) nebulizer solution 2.5 mg  2.5 mg Nebulization Q6H PRN    metoprolol tartrate (LOPRESSOR) tablet 50 mg  50 mg Oral BID    lisinopril (PRINIVIL, ZESTRIL) tablet 10 mg  10 mg Oral DAILY    alvimopan (ENTEREG) capsule 12 mg  12 mg Oral BID    celecoxib (CELEBREX) capsule 100 mg  100 mg Oral BID    naloxone (NARCAN) injection 0.4 mg  0.4 mg IntraVENous PRN    ketorolac (TORADOL) injection 15 mg  15 mg IntraVENous Q6H PRN    ondansetron (ZOFRAN) injection 4 mg  4 mg IntraVENous Q4H PRN    fentaNYL (PF)  mcg/30 ml (ADULT)   IntraVENous TITRATE    sodium chloride (NS) flush 5-10 mL  5-10 mL IntraVENous Q8H    sodium chloride (NS) flush 5-10 mL  5-10 mL IntraVENous PRN    glucose chewable tablet 16 g  4 Tab Oral PRN    dextrose (D50W) injection syrg 12.5-25 g  12.5-25 g IntraVENous PRN    glucagon (GLUCAGEN) injection 1 mg  1 mg IntraMUSCular PRN    insulin lispro (HUMALOG) injection   SubCUTAneous Q6H    0.45% sodium chloride with KCl 20 mEq/L infusion   IntraVENous CONTINUOUS     ______________________________________________________________________  EXPECTED LENGTH OF STAY: - - -  ACTUAL LENGTH OF STAY:          2                 Benton Meyers MD

## 2018-05-28 NOTE — DIABETES MGMT
DTC Consult Note    Recommendations/ Comments: Consult noted for assessment of home management, however, patient is POD1 and sleepy per notes, so DTC will see when appropriate. Blood sugars elevated (364 mg/dL @ 1130). Spoke with Dr. Pam Persaud and plan is to add Lantus 30 units tonight. If appropriate, please consider adding Januvia once patient is eating. Current hospital DM medication: Lispro correction, normal sensitivity, Lantus 30 units to start tonight    Chart reviewed on [address]. Patient is a 79 y.o. female with a history of diabetes on oral agents (triple therapy): combination: Saxagliptin/Metformin, Farxiga  insulin injections: Lantus : 50 units at home. A1c:   Lab Results   Component Value Date/Time    Hemoglobin A1c 8.3 (H) 05/26/2018 05:21 AM    Hemoglobin A1c 8.1 (H) 03/19/2018 09:48 AM       Recent Glucose Results: Lab Results   Component Value Date/Time    GLUCPOC 364 (H) 05/28/2018 11:30 AM    GLUCPOC 245 (H) 05/28/2018 06:49 AM    GLUCPOC 257 (H) 05/27/2018 11:55 PM        Lab Results   Component Value Date/Time    Creatinine 0.26 (L) 05/27/2018 04:20 AM     Estimated Creatinine Clearance: 221.4 mL/min (based on Cr of 0.26). Active Orders   There are no active orders of the following type(s): Diet. PO intake: Patient Vitals for the past 72 hrs:   % Diet Eaten   05/28/18 1053 0 %   05/26/18 1527 0 %   05/26/18 0900 0 %       Will continue to follow as needed.     Thank you  Lorena Berkowitz, MS, RN, CDE

## 2018-05-29 PROBLEM — Z90.49 S/P RIGHT COLECTOMY: Status: ACTIVE | Noted: 2018-01-01

## 2018-05-29 NOTE — PROGRESS NOTES
PCT assisted pt with am care. Pt sitting up in chair with daughter helping her wash up. Tech asked pt to walk while she was already up. Pt daughter asked that pt be allowed to return to bed and that she would walk at 2 pm. Pt declined remaining up in chair also.

## 2018-05-29 NOTE — PROGRESS NOTES
Reason for Admission: abd pain                     RRAT Score: 11                    Plan for utilizing home health: no skilled needs identified                         Likelihood of Readmission: low                         Transition of Care Plan: 78 y/o   female, SSO, admitted to [facility] 5/26/18 for partially obstructing cecal mass with evidence of metastasis. Rt hemicolectomy performed 5/27/18. Hem/Onc also consulted. CM visited patient bedside, patient's  Anabel Mims [phone] and daughter Sindy Carrillo [phone] also present, patient defers to family for translation. Family confirmed demographics on facesheet, patient independent with ADL/IADL's at baseline, no DME, drives. Family to assist at home as needed following [facility] discharge, will provide transport to follow-up appointments, deny perceived need for additional home services/supports/resources. Patient plans to ambulate today, will work to advance diet upon return of bowel function, anticipate home with family when medically clear, no CM needs anticipated. CM availble for consult should new needs arise.     RUBINA Iglesias

## 2018-05-29 NOTE — PROGRESS NOTES
General Surgery at [facility]    Pt has not ambulated exc to commode. Still has [employer]. Used PCA intermittently but says she is not sure if it works. Had sm BM  Patient Vitals for the past 12 hrs:   Temp Pulse Resp BP SpO2   18 0336 99 °F (37.2 °C) 84 18 113/72 94 %   18 0001 97.9 °F (36.6 °C) 86 18 110/72 96 %     Temp (24hrs), Av.2 °F (36.8 °C), Min:97.7 °F (36.5 °C), Max:99 °F (37.2 °C)        Gen NAD  Lungs Diminished, no rales, weak effort  Abd Soft, rounded, tender at incision, dressing clean, dry and intact       Assessment:   Hospital Problems as of 2018  Date Reviewed: 2018          Codes Class Noted - Resolved POA    S/P right colectomy ICD-10-CM: Z90.49  ICD-9-CM: V45.89  2018 - Present No    Overview Signed 2018  8:25 AM by Tereso Corado MD     Right hemicolectomy for cecal mass with partial SBO, Ndiaye             Leukocytosis ICD-10-CM: Y06.730  ICD-9-CM: 288.60  2018 - Present Yes        SBO (small bowel obstruction) ([facility] ICD-10-CM: Q08.290  ICD-9-CM: 560.9  2018 - Present Yes        Abdominal pain ICD-10-CM: R10.9  ICD-9-CM: 789.00  2018 - Present Yes        Type 2 diabetes mellitus with hyperglycemia ([facility] ICD-10-CM: E11.65  ICD-9-CM: 250.00  2018 - Present Yes        * (Principal)Colonic mass ICD-10-CM: K63.9  ICD-9-CM: 569.89  2018 - Present Yes        HTN (hypertension) ICD-10-CM: I10  ICD-9-CM: 401.9  2013 - Present Yes              2 Days Post-Op  Procedure(s):  RIGHT HEMICOLECTOMY     Slow to move.   Rec/Plan:  Dc Ford  amb with assist  Encourage IS    Signed By: Tereso Corado MD     May 29, 2018

## 2018-05-29 NOTE — DIABETES MGMT
DTC Progress Note    Recommendations/ Comments: Chart review for hyperglycemia - improving with the addition of Lantus 30 units yesterday.  mg/dL today. BG's were 215 - 364 mg/dL. Received 19 units of correction lispro yesterday. POD 2 colectomy. If appropriate, please consider  Increase Lantus to 35 units daily    Current hospital DM medication:  Lantus 30 units daily  Lispro correction scale, normal sensitivity    Chart reviewed on [facility]. Patient is a 79 y.o. female with known DM on the following per PTA Farxiga 10 mg daily, saxagliptin-metformin 5-1000 mg each day, Lantus 50 units BID      A1c:   Lab Results   Component Value Date/Time    Hemoglobin A1c 8.3 (H) 05/26/2018 05:21 AM    Hemoglobin A1c 8.1 (H) 03/19/2018 09:48 AM       Recent Glucose Results: Lab Results   Component Value Date/Time     (H) 05/29/2018 03:55 AM    GLUCPOC 178 (H) 05/29/2018 11:35 AM    GLUCPOC 165 (H) 05/29/2018 06:20 AM    GLUCPOC 215 (H) 05/28/2018 10:31 PM        Lab Results   Component Value Date/Time    Creatinine 0.38 (L) 05/29/2018 03:55 AM     Estimated Creatinine Clearance: 150.8 mL/min (based on Cr of 0.38). Active Orders   There are no active orders of the following type(s): Diet. PO intake: Patient Vitals for the past 72 hrs:   % Diet Eaten   05/28/18 1525 0 %   05/28/18 1053 0 %   05/26/18 1527 0 %       Will continue to follow as needed.     Thank you  Willa Chun RN, CDE

## 2018-05-29 NOTE — PROGRESS NOTES
2345 - Bedside and Verbal shift change report given to sunita farrell (oncoming nurse) by Jasiel Joseph (offgoing nurse). Report included the following information SBAR, Kardex, Procedure Summary, Intake/Output, MAR and Recent Results. 2930 - Bedside and Verbal shift change report given to ana maria (oncoming nurse) by Monika Lizama (offgoing nurse). Report included the following information SBAR, Kardex, Procedure Summary, Intake/Output, MAR and Recent Results.

## 2018-05-29 NOTE — PROGRESS NOTES
Bedside shift change report given to Eleanor Gama (oncoming nurse) by Mckenzie Sommer (offgoing nurse). Report included the following information SBAR.

## 2018-05-29 NOTE — PROGRESS NOTES
Hospitalist Progress Note  Jorge Luis Glover MD  Answering service: [phone] OR [phone] from in house phone        Date of Service:  2018  NAME:  Giles Chun  :  1950  MRN:  [id number]      Admission Summary:   Giles Chun is a 79 y.o.  female with past medical history of anemia, uterine cancer, s/p hysterectomy, type 2 diabetes mellitus, headaches, hypertension, and hyperlipidemia presented to the ED from home with chief complaint of abdominal pain. Patient's daughter who speaks English provide translation. Patient is a limited historian. Patient's daughter provides additional history    Interval history / Subjective:   Ms. Salo Wood is tired and she wants to take a nap. Her abdomen is doing fairly well. BM yesterday but nothing today. Passing gas. No nausea. Assessment & Plan:     Colonic mass with  metastasis to liver, lung and retroperitoneal LN (POA)  - CT abdomen pelvis  shows cecal mass with metastatic disease to liver, retroperitoneal nodes, and lung. Relative small bowel obstruction. Associated dilated appendix.  Associated complex right pleural effusion and small amount of ascites  - Right kanika colectomy on , path pending  - Hem/Onc, General surgery consulted    SBO due to colon mass (POA)  - Right hemicolectomy as above  - Advance diet per surgery  - Surgical service consulted    Leukocytosis possible reactive - stable, no evidence of infection    Dehydration likely due to poor oral intake - resolved with IVF     Hypokalemia - monitor and replace as needed    Hyponatremia multifactorial - resolved with normal saline    DM-II - A1c 8.3, BG a little better  - Continue lantus, SSI as needed    HTN - BP normal  - Continue to hold home metoprolol and lisinopril    Hx of hypercholesterolemia - continue to hold home zocor    Hx of uterine ca s/p hysterectomy    Obesity - Body mass index is 30.99 kg/(m^2). Code status: Full Code  DVT prophylaxis: SCD  Care Plan discussed with: Patient/Family, Nurse and   Disposition: TBD     Mahnaz Hook 182 [id number]     GWNPRUWT Problems  Date Reviewed: 5/27/2018          Codes Class Noted POA    S/P right colectomy ICD-10-CM: Z90.49  ICD-9-CM: V45.89  5/27/2018 No    Overview Signed 5/29/2018  8:25 AM by Ash Millard MD     Right hemicolectomy for cecal mass with partial SBO, Ndiaye             Leukocytosis ICD-10-CM: P84.472  ICD-9-CM: 288.60  5/26/2018 Yes        SBO (small bowel obstruction) ([facility] ICD-10-CM: C01.040  ICD-9-CM: 560.9  5/26/2018 Yes        Abdominal pain ICD-10-CM: R10.9  ICD-9-CM: 789.00  5/26/2018 Yes        Type 2 diabetes mellitus with hyperglycemia ([facility] ICD-10-CM: E11.65  ICD-9-CM: 250.00  5/26/2018 Yes        * (Principal)Colonic mass ICD-10-CM: K63.9  ICD-9-CM: 569.89  5/26/2018 Yes        HTN (hypertension) ICD-10-CM: I10  ICD-9-CM: 401.9  5/23/2013 Yes              Vital Signs:    Last 24hrs VS reviewed since prior progress note. Most recent are:  Visit Vitals    /73    Pulse 80    Temp 98.4 °F (36.9 °C)    Resp 18    Ht 5' 4\" (1.626 m)    Wt 81.9 kg (180 lb 8.9 oz)    SpO2 97%    BMI 30.99 kg/m2         Intake/Output Summary (Last 24 hours) at 05/29/18 1247  Last data filed at 05/29/18 1028   Gross per 24 hour   Intake              700 ml   Output             1425 ml   Net             -725 ml        Physical Examination:         Constitutional:  No acute distress, tired   ENT:  Oral mucous moist, oropharynx benign. Neck supple,    Resp:  CTA bilaterally. No wheezing/rhonchi/rales. No accessory muscle use   CV:  Regular rhythm, normal rate, no murmurs, gallops, rubs    GI:  Soft, non distended, mildly tender around surgical site, + bowel sounds,    Musculoskeletal:  No edema. Muscle tone and bulk normal    Neurologic:  Moves all extremities.   AAOx3, CN II-XII reviewed     Skin:  Good turgor, no rashes or ulcers       Data Review:     Telemetry    ECG    Xray    CT scan x   MRI    Echocardiogram    Ultrasound              I have reviewed the flow sheet and recent notes  New labs and data personally reviewed. Labs:     Recent Labs      05/29/18   0355  05/28/18   0855   WBC  14.8*  15.6*   HGB  11.4*  14.5   HCT  35.3  45.7   PLT  285  333     Recent Labs      05/29/18   0355  05/27/18   0420   NA  138  139   K  3.5  3.4*   CL  108  104   CO2  21  22   BUN  16  17   CREA  0.38*  0.26*   GLU  201*  145*   CA  8.2*  8.2*     No results for input(s): SGOT, GPT, ALT, AP, TBIL, TBILI, TP, ALB, GLOB, GGT, AML, LPSE in the last 72 hours. No lab exists for component: AMYP, HLPSE  No results for input(s): INR, PTP, APTT in the last 72 hours. No lab exists for component: INREXT, INREXT   No results for input(s): FE, TIBC, PSAT, FERR in the last 72 hours. No results found for: FOL, RBCF   No results for input(s): PH, PCO2, PO2 in the last 72 hours. No results for input(s): CPK, CKNDX, TROIQ in the last 72 hours.     No lab exists for component: CPKMB  Lab Results   Component Value Date/Time    Cholesterol, total 174 06/01/2017 09:40 AM    HDL Cholesterol 45 06/01/2017 09:40 AM    LDL, calculated 92 06/01/2017 09:40 AM    Triglyceride 183 (H) 06/01/2017 09:40 AM    CHOL/HDL Ratio 3.4 01/29/2010 11:46 AM     Lab Results   Component Value Date/Time    Glucose (POC) 178 (H) 05/29/2018 11:35 AM    Glucose (POC) 165 (H) 05/29/2018 06:20 AM    Glucose (POC) 215 (H) 05/28/2018 10:31 PM    Glucose (POC) 257 (H) 05/28/2018 04:43 PM    Glucose (POC) 364 (H) 05/28/2018 11:30 AM     Lab Results   Component Value Date/Time    Color YELLOW/STRAW 05/26/2018 05:40 AM    Appearance CLEAR 05/26/2018 05:40 AM    Specific gravity 1.010 05/26/2018 05:40 AM    pH (UA) 6.0 05/26/2018 05:40 AM    Protein NEGATIVE  05/26/2018 05:40 AM    Glucose >1000 (A) 05/26/2018 05:40 AM    Ketone 80 (A) 05/26/2018 05:40 AM    Bilirubin Negative 04/30/2015 12:00 AM    Urobilinogen 1.0 05/26/2018 05:40 AM    Nitrites NEGATIVE  05/26/2018 05:40 AM    Leukocyte Esterase SMALL (A) 05/26/2018 05:40 AM    Epithelial cells FEW 05/26/2018 05:40 AM    Bacteria 1+ (A) 05/26/2018 05:40 AM    WBC 0-4 05/26/2018 05:40 AM    RBC 0-5 05/26/2018 05:40 AM         Medications Reviewed:     Current Facility-Administered Medications   Medication Dose Route Frequency    insulin glargine (LANTUS) injection 30 Units  30 Units SubCUTAneous QHS    enoxaparin (LOVENOX) injection 40 mg  40 mg SubCUTAneous Q24H    albuterol (PROVENTIL VENTOLIN) nebulizer solution 2.5 mg  2.5 mg Nebulization Q6H PRN    metoprolol tartrate (LOPRESSOR) tablet 50 mg  50 mg Oral BID    lisinopril (PRINIVIL, ZESTRIL) tablet 10 mg  10 mg Oral DAILY    alvimopan (ENTEREG) capsule 12 mg  12 mg Oral BID    celecoxib (CELEBREX) capsule 100 mg  100 mg Oral BID    naloxone (NARCAN) injection 0.4 mg  0.4 mg IntraVENous PRN    ketorolac (TORADOL) injection 15 mg  15 mg IntraVENous Q6H PRN    ondansetron (ZOFRAN) injection 4 mg  4 mg IntraVENous Q4H PRN    fentaNYL (PF)  mcg/30 ml (ADULT)   IntraVENous TITRATE    sodium chloride (NS) flush 5-10 mL  5-10 mL IntraVENous Q8H    sodium chloride (NS) flush 5-10 mL  5-10 mL IntraVENous PRN    glucose chewable tablet 16 g  4 Tab Oral PRN    dextrose (D50W) injection syrg 12.5-25 g  12.5-25 g IntraVENous PRN    glucagon (GLUCAGEN) injection 1 mg  1 mg IntraMUSCular PRN    insulin lispro (HUMALOG) injection   SubCUTAneous Q6H    0.45% sodium chloride with KCl 20 mEq/L infusion   IntraVENous CONTINUOUS     ______________________________________________________________________  EXPECTED LENGTH OF STAY: 6d 7h  ACTUAL LENGTH OF STAY:          3                 Zehra Guerrero MD

## 2018-05-30 NOTE — PROGRESS NOTES
Bedside and Verbal shift change report given to Chata Oconnor RN  (oncoming nurse) by Yinka Mccurdy (offgoing nurse). Report included the following information SBAR and Kardex.

## 2018-05-30 NOTE — PROGRESS NOTES
Problem: Mobility Impaired (Adult and Pediatric)  Goal: *Acute Goals and Plan of Care (Insert Text)  Physical Therapy Goals  Initiated 5/30/2018     1. Patient will transfer from bed to chair and chair to bed with modified independence using the least restrictive device within 7 day(s). 2.  Patient will perform sit to stand with modified independence within 7 day(s). 3.  Patient will ambulate with modified independence for 200 feet with the least restrictive device within 7 day(s). 4.  Patient will ascend/descend 4 stairs with 1 handrail(s) with modified independence within 7 day(s). physical Therapy EVALUATION  Patient: Najma Cantor (43 y.o. female)  Date: 5/30/2018  Primary Diagnosis: SBO (small bowel obstruction) (HCC)  Colonic mass  Abdominal pain  Leukocytosis  Type 2 diabetes mellitus with hyperglycemia (HCC)  gastric insufficiency  Procedure(s) (LRB):  RIGHT HEMICOLECTOMY (Right) 3 Days Post-Op   Precautions: standarf       ASSESSMENT :  Based on the objective data described below, the patient presents with ability to ambulate 75 feet today using a wheeled walker with supervision, baseline is independent without an assistive device but she did not want to attempt ambulation without the walker. Patient not agreeable to sit in chair post ambulation stating she has back issues and was worried about the chair in the room. Therapist offered to get another chair but patient unwilling to sit edge of bed while therapist obtained another chair so she got herself back to bed. Smaller high back chair brought into the room and patient encouraged to try sitting later today. Patient safe to return home with 24/7 assist from family at discharge. Pending progress she may benefit from HHPT. Patient will benefit from skilled intervention to address the above impairments.   Patients rehabilitation potential is considered to be Good  Factors which may influence rehabilitation potential include:   []         None noted  []         Mental ability/status  []         Medical condition  []         Home/family situation and support systems  []         Safety awareness  []         Pain tolerance/management  []         Other:      PLAN :  Recommendations and Planned Interventions:  []           Bed Mobility Training             []    Neuromuscular Re-Education  []           Transfer Training                   []    Orthotic/Prosthetic Training  []           Gait Training                         []    Modalities  []           Therapeutic Exercises           []    Edema Management/Control  []           Therapeutic Activities            []    Patient and Family Training/Education  []           Other (comment):    Frequency/Duration: Patient will be followed by physical therapy  3 times a week to address goals. Discharge Recommendations: Home Health, None and To Be Determined  Further Equipment Recommendations for Discharge:will continue to assess for walker     SUBJECTIVE:   Patient stated I have no pain but I am hungry.     OBJECTIVE DATA SUMMARY:   HISTORY:    Past Medical History:   Diagnosis Date    Anemia NEC     [facility] ([facility]     Diabetes ([facility]     Headache(784.0)     Hypercholesterolemia     Hypertension     S/P right colectomy 2018    Right hemicolectomy for cecal mass with partial SBOSenthil     Past Surgical History:   Procedure Laterality Date    HX  SECTION      Hysterectomy,total for precancer    HX COLECTOMY Right 2018    Right hemicolectomy for cecal mass with partial SBO, Ndiaye     Prior Level of Function/Home Situation: Independent without an assistive device  Personal factors and/or comorbidities impacting plan of care:     Home Situation  Home Environment: Private residence  # Steps to Enter: 4  Rails to Enter: Yes  One/Two Story Residence: One story  Living Alone: No  Support Systems: Family member(s)  Patient Expects to be Discharged to[de-identified] Private residence  Current DME Used/Available at Home: None    EXAMINATION/PRESENTATION/DECISION MAKING:   Critical Behavior:  Neurologic State: Alert  Orientation Level: Oriented X4  Cognition: Follows commands (daughter translating as needed)       Range Of Motion:  AROM: Within functional limits      Strength:    Strength: Generally decreased, functional      Tone & Sensation:   Tone: Normal      Coordination:  Coordination: Within functional limits  Functional Mobility:  Bed Mobility:   Sit to Supine: Independent  Scooting: Independent  Transfers:   Sit to stand: Independent  Stand to sit: Independent   Balance:   Sitting: Intact; Without support  Standing: Intact; With support  Ambulation/Gait Training:  Distance (ft): 75 Feet (ft)  Assistive Device: Walker, rolling;Gait belt  Ambulation - Level of Assistance: Supervision   Gait Abnormalities: Decreased step clearance   Base of Support: Narrowed   Speed/Kassandra: Slow  Step Length: Left shortened;Right shortened         Functional Measure:  Tinetti test:    Sitting Balance: 1  Arises: 1  Attempts to Rise: 2  Immediate Standing Balance: 1  Standing Balance: 1  Nudged: 2  Eyes Closed: 1  Turn 360 Degrees - Continuous/Discontinuous: 1  Turn 360 Degrees - Steady/Unsteady: 1  Sitting Down: 1  Balance Score: 12  Indication of Gait: 1  R Step Length/Height: 1  L Step Length/Height: 1  R Foot Clearance: 1  L Foot Clearance: 1  Step Symmetry: 1  Step Continuity: 1  Path: 1  Trunk: 0  Walking Time: 1  Gait Score: 9  Total Score: 21       Tinetti Test and G-code impairment scale:  Percentage of Impairment CH    0%   CI    1-19% CJ    20-39% CK    40-59% CL    60-79% CM    80-99% CN     100%   Tinetti  Score 0-28 28 23-27 17-22 12-16 6-11 1-5 0       Tinetti Tool Score Risk of Falls  <19 = High Fall Risk  19-24 = Moderate Fall Risk  25-28 = Low Fall Risk  Tinetti ME. Performance-Oriented Assessment of Mobility Problems in Elderly Patients. [place] 66; [id number].  (Scoring Description: PT Bulletin Feb. 10, 1993)    Older adults: (Felix et al, 2009; n = 1000 [facility] elderly evaluated with ABC, MARINA, ADL, and IADL)  · Mean MARINA score for males aged 69-68 years = 26.21(3.40)  · Mean MARINA score for females age 69-68 years = 25.16(4.30)  · Mean MARINA score for males over 80 years = 23.29(6.02)  · Mean MARINA score for females over 80 years = 17.20(8.32)       G codes: In compliance with CMSs Claims Based Outcome Reporting, the following G-code set was chosen for this patient based on their primary functional limitation being treated: The outcome measure chosen to determine the severity of the functional limitation was the tinetti with a score of 21/28 which was correlated with the impairment scale. ? Mobility - Walking and Moving Around:     - CURRENT STATUS: CJ - 20%-39% impaired, limited or restricted    - GOAL STATUS: CI - 1%-19% impaired, limited or restricted    - D/C STATUS:  ---------------To be determined---------------          Pain:  Pain Scale 1: Numeric (0 - 10)  Pain Intensity 1: 0      Activity Tolerance:     Please refer to the flowsheet for vital signs taken during this treatment. After treatment:   []         Patient left in no apparent distress sitting up in chair  [x]         Patient left in no apparent distress in bed  [x]         Call bell left within reach  [x]         Nursing notified  []         Caregiver present  []         Bed alarm activated    COMMUNICATION/EDUCATION:   The patients plan of care was discussed with: Registered Nurse. [x]         Fall prevention education was provided and the patient/caregiver indicated understanding. []         Patient/family have participated as able in goal setting and plan of care. [x]         Patient/family agree to work toward stated goals and plan of care. []         Patient understands intent and goals of therapy, but is neutral about his/her participation.   []         Patient is unable to participate in goal setting and plan of care.    Thank you for this referral.  Tigist Matthew, JULIET

## 2018-05-30 NOTE — DIABETES MGMT
DTC Consult Note    Recommendations/ Comments:     Current hospital DM medication: Lantus 30 units daily and Humalog for correction, normal sensitivity scale     Consult received for:  [x]             Assessment of home management                  Chart reviewed and initial evaluation complete on Stefan Collazo. Patient is a 79 y.o. female with hx Type 2 Diabetes on sAXagliptin-metFORMIN  5-1,000 mg daily, dapagliflozin 10 mg daily and insulin glargine 50 units BID at home. BG monitoring at home 2 times per day, fasting and before dinner. Patient reports BG levels at home 120-263 mg/dl    Assessed and instructed patient on the following:   ·  interpretation of lab results, blood sugar goals, A1c target, hypoglycemia prevention and treatment, SMBG skills and nutrition    Encouraged the following:   · regular blood sugar monitorin times daily, follow up with PCP if blood sugars are over desired targets. Provided patient with the following: [x]             Survival skills education materials               [x]             Insulin education materials                              Discussed with patient and family need for follow up appointment for diabetes management after discharge. A1c:   Lab Results   Component Value Date/Time    Hemoglobin A1c 8.3 (H) 2018 05:21 AM       Recent Glucose Results: Lab Results   Component Value Date/Time     (H) 2018 05:55 AM    GLUCPOC 130 (H) 2018 12:17 PM    GLUCPOC 135 (H) 2018 07:42 AM    GLUCPOC 213 (H) 2018 09:05 PM        Lab Results   Component Value Date/Time    Creatinine 0.21 (L) 2018 05:55 AM     Estimated Creatinine Clearance: 274.6 mL/min (based on Cr of 0.21).     Active Orders   Diet    DIET DIABETIC WITH OPTIONS Consistent Carb 1800kcal; Regular; Low Fiber; Low Lactose        PO intake: Patient Vitals for the past 72 hrs:   % Diet Eaten   18 1840 0 %   18 1618 0 %   18 1449 0 %   18 1525 0 %   05/28/18 1053 0 %       Will continue to follow as needed. Thank you.   Uri Carr RD

## 2018-05-30 NOTE — PROGRESS NOTES
Daily Progress Note  [facility] General Surgery at [address] Date: 2018  Post-Operative Day: 3 from Procedure(s):  RIGHT HEMICOLECTOMY     Subjective:     Last 24 hrs: Feeling better today. Passing lots of flatus, hungry and eager to advance diet. Gets tired easily, too tired to ambulate yesterday after taking a shower. Plans to get walk in hallway today, requesting walker for assistance, agreeable to PT.  WBC trending down, afebrile. Objective:     Blood pressure 124/75, pulse 80, temperature 97.8 °F (36.6 °C), resp. rate 20, height 5' 4\" (1.626 m), weight 83 kg (183 lb), SpO2 97 %. Temp (24hrs), Av °F (36.7 °C), Min:97.5 °F (36.4 °C), Max:98.6 °F (37 °C)      _____________________  Physical Exam:     Alert and Oriented, lying in bed, no acute distress. Cardiovascular: RRR, no peripheral edema  Lungs:CTAB   Abdomen: hypoactive BS, soft, appropriately tender. Surgical dressing in place over midline incision.        Assessment:   Principal Problem:    Colonic mass (2018)    Active Problems:    HTN (hypertension) (2013)      Leukocytosis (2018)      SBO (small bowel obstruction) ([address] (2018)      Abdominal pain (2018)      Type 2 diabetes mellitus with hyperglycemia ([address] (2018)      S/P right colectomy (2018)      Overview: Right hemicolectomy for cecal mass with partial SBO, Ndiaye      hypokalemia      Plan:     Advance to GI lite  PT/ambulate  Replete potassium  Continue PCA for now  Labs in am  Lovenox for DVT prophylaxis          Grey Webb, [address]  Surgery at [place] [address],  [place] [address], [address], [place]  [phone]    Data Review:    Recent Labs      18   0555  18   0355  18   0855   WBC  13.0*  14.8*  15.6*   HGB  10.9*  11.4*  14.5   HCT  33.7*  35.3  45.7   PLT  283  285  333     Recent Labs      18   0555  18   0355   NA  139  138   K  3.4*  3.5   CL  108  108   CO2 26  21   GLU  135*  201*   BUN  7  16   CREA  0.21*  0.38*   CA  8.7  8.2*     No results for input(s): AML, LPSE in the last 72 hours.         ______________________  Medications:    Current Facility-Administered Medications   Medication Dose Route Frequency    potassium chloride SR (KLOR-CON 10) tablet 30 mEq  30 mEq Oral NOW    oxyCODONE IR (ROXICODONE) tablet 5 mg  5 mg Oral Q4H PRN    insulin glargine (LANTUS) injection 30 Units  30 Units SubCUTAneous QHS    enoxaparin (LOVENOX) injection 40 mg  40 mg SubCUTAneous Q24H    albuterol (PROVENTIL VENTOLIN) nebulizer solution 2.5 mg  2.5 mg Nebulization Q6H PRN    metoprolol tartrate (LOPRESSOR) tablet 50 mg  50 mg Oral BID    lisinopril (PRINIVIL, ZESTRIL) tablet 10 mg  10 mg Oral DAILY    alvimopan (ENTEREG) capsule 12 mg  12 mg Oral BID    celecoxib (CELEBREX) capsule 100 mg  100 mg Oral BID    naloxone (NARCAN) injection 0.4 mg  0.4 mg IntraVENous PRN    ondansetron (ZOFRAN) injection 4 mg  4 mg IntraVENous Q4H PRN    fentaNYL (PF)  mcg/30 ml (ADULT)   IntraVENous TITRATE    sodium chloride (NS) flush 5-10 mL  5-10 mL IntraVENous Q8H    sodium chloride (NS) flush 5-10 mL  5-10 mL IntraVENous PRN    glucose chewable tablet 16 g  4 Tab Oral PRN    dextrose (D50W) injection syrg 12.5-25 g  12.5-25 g IntraVENous PRN    glucagon (GLUCAGEN) injection 1 mg  1 mg IntraMUSCular PRN    insulin lispro (HUMALOG) injection   SubCUTAneous Q6H    0.45% sodium chloride with KCl 20 mEq/L infusion   IntraVENous CONTINUOUS

## 2018-05-30 NOTE — PROGRESS NOTES
Gen Surg @ [address] [place] [address]  Attending addendum:  I supervised the NP and reviewed the progress note. We discussed the plan of care. I visited and examined the pt independently. She has had BMs and flatus. Denies nausea or vomiting. Reports difficulty waking because of feeling weak. She attributes this to lack of sleep. PTA she was not sleeping well and she has not had much sleep since the operation. Patient Vitals for the past 12 hrs:   Temp Pulse Resp BP SpO2   05/30/18 1500 97.8 °F (36.6 °C) 72 18 128/78 99 %   05/30/18 0901 97.8 °F (36.6 °C) 80 20 124/75 97 %        Physical Exam:   Gen NAD  Abd Obese, soft, nontender, Aquacel dressing with spotting at lower half but not to edge of Aquacel, seal still intact. Hospital Problems as of 5/30/2018  Date Reviewed: 5/27/2018          Codes Class Noted - Resolved POA    S/P right colectomy ICD-10-CM: Z90.49  ICD-9-CM: V45.89  5/27/2018 - Present No    Overview Signed 5/29/2018  8:25 AM by Kishan Peña MD     Right hemicolectomy for cecal mass with partial SBO, Ndiaye             Leukocytosis ICD-10-CM: L20.916  ICD-9-CM: 288.60  5/26/2018 - Present Yes        SBO (small [facility] obstruction) ([facility] ICD-10-CM: U52.939  ICD-9-CM: 560.9  5/26/2018 - Present Yes        Abdominal pain ICD-10-CM: R10.9  ICD-9-CM: 789.00  5/26/2018 - Present Yes        Type 2 diabetes mellitus with hyperglycemia ([facility] ICD-10-CM: E11.65  ICD-9-CM: 250.00  5/26/2018 - Present Yes        * (Principal)Colonic mass ICD-10-CM: K63.9  ICD-9-CM: 569.89  5/26/2018 - Present Yes        HTN (hypertension) ICD-10-CM: I10  ICD-9-CM: 401.9  5/23/2013 - Present Yes              I agree with the APC's assessment and plan with the following additions/modifications:  DC alvimopan  DC PCA  Fentanyl IV 50 mcg q4h prn breakthrough pain  Encouraged ambulation, zolpidem for sleep. PT assessment noted. Pathology pending.     Signed By: Kishan Peña MD     May 30, 2018

## 2018-05-30 NOTE — PROGRESS NOTES
Pt has not yet walked in halls today, tells us that she will walk at a certain time but then declines to do so. Pt did bath this am and was then too tired to walk so daughter stated that she would walk at 2 pm but when in at that time, pt declines. Pt then encouraged to walk after being up on bsc but declines again and daughter states they will walk at 7 pm, tech in to walk pt at 7 pm but she again declines and states she will not walk tonight.

## 2018-05-31 PROBLEM — K56.609 SBO (SMALL BOWEL OBSTRUCTION) (HCC): Status: RESOLVED | Noted: 2018-01-01 | Resolved: 2018-01-01

## 2018-05-31 PROBLEM — C18.2 MALIGNANT NEOPLASM OF ASCENDING COLON (HCC): Status: ACTIVE | Noted: 2018-01-01

## 2018-05-31 NOTE — PROGRESS NOTES
Daily Progress Note  [facility] General Surgery at [address] Date: 2018  Post-Operative Day: 4 from Procedure(s):  RIGHT HEMICOLECTOMY     Subjective:     Last 24 hrs: Continues to slowly improve. Tolerating diet, + flatus and + BM. Ambulated with PT yesterday, did OK with rolling walker. Still gets fatigued easily. Sat up in chair yesterday and plans to do so again today. WBC 11.9, continues with downward trend. Objective:     Blood pressure 108/70, pulse 85, temperature 97.9 °F (36.6 °C), resp. rate 18, height 5' 4\" (1.626 m), weight 83.5 kg (184 lb), SpO2 95 %. Temp (24hrs), Av.1 °F (36.7 °C), Min:97.8 °F (36.6 °C), Max:98.7 °F (37.1 °C)      _____________________  Physical Exam:     Alert and Oriented, lying in bed, no acute distress. Cardiovascular: RRR  Lungs:CTAB   Abdomen: + BS, soft, appropriately tender. Surgical dressing over midline incision, some drainage visible at inferior portion (unchanged from yesterday). Assessment:   Principal Problem:    Colonic mass (2018)    Active Problems:    HTN (hypertension) (2013)      Leukocytosis (2018)      SBO (small bowel obstruction) ([facility] (2018)      Abdominal pain (2018)      Type 2 diabetes mellitus with hyperglycemia ([address] (2018)      S/P right colectomy (2018)      Overview: Right hemicolectomy for cecal mass with partial SBO, Ndiaye            Plan:     Radha Cain IVF  OOB as tolerated  Continue with PT  Labs in am  PO as tolerated  Home tomorrow or Saturday if doing better.          Omid Barahona, [facility] General Surgery at [address], [address] [place] [address]  [phone]    Data Review:    Recent Labs      18   1309  18   0555  18   0355   WBC  11.9*  13.0*  14.8*   HGB  11.3*  10.9*  11.4*   HCT  35.2  33.7*  35.3   PLT  317  283  285     Recent Labs      18   0555  18   0355   NA  139  138   K  3.4*  3.5   CL  108 108   CO2  26  21   GLU  135*  201*   BUN  7  16   CREA  0.21*  0.38*   CA  8.7  8.2*     No results for input(s): AML, LPSE in the last 72 hours.         ______________________  Medications:    Current Facility-Administered Medications   Medication Dose Route Frequency    insulin glargine (LANTUS) injection 40 Units  40 Units SubCUTAneous QHS    potassium chloride SR (KLOR-CON 10) tablet 40 mEq  40 mEq Oral NOW    oxyCODONE IR (ROXICODONE) tablet 5 mg  5 mg Oral Q4H PRN    simvastatin (ZOCOR) tablet 20 mg  20 mg Oral QHS    ferrous sulfate tablet 325 mg  1 Tab Oral DAILY WITH BREAKFAST    fentaNYL citrate (PF) injection 50 mcg  50 mcg IntraVENous Q4H PRN    zolpidem (AMBIEN) tablet 5 mg  5 mg Oral QHS    enoxaparin (LOVENOX) injection 40 mg  40 mg SubCUTAneous Q24H    albuterol (PROVENTIL VENTOLIN) nebulizer solution 2.5 mg  2.5 mg Nebulization Q6H PRN    metoprolol tartrate (LOPRESSOR) tablet 50 mg  50 mg Oral BID    lisinopril (PRINIVIL, ZESTRIL) tablet 10 mg  10 mg Oral DAILY    celecoxib (CELEBREX) capsule 100 mg  100 mg Oral BID    naloxone (NARCAN) injection 0.4 mg  0.4 mg IntraVENous PRN    ondansetron (ZOFRAN) injection 4 mg  4 mg IntraVENous Q4H PRN    sodium chloride (NS) flush 5-10 mL  5-10 mL IntraVENous Q8H    sodium chloride (NS) flush 5-10 mL  5-10 mL IntraVENous PRN    glucose chewable tablet 16 g  4 Tab Oral PRN    dextrose (D50W) injection syrg 12.5-25 g  12.5-25 g IntraVENous PRN    glucagon (GLUCAGEN) injection 1 mg  1 mg IntraMUSCular PRN    insulin lispro (HUMALOG) injection   SubCUTAneous Q6H    0.45% sodium chloride with KCl 20 mEq/L infusion   IntraVENous CONTINUOUS

## 2018-05-31 NOTE — PROGRESS NOTES
Hospitalist Progress Note  Ruby Pagan MD  Answering service: [phone] OR [phone] from in house phone        Date of Service:  2018  NAME:  Bharat Quiñones  :  1950  MRN:  [id number]      Admission Summary:   Bharat Quiñones is a 79 y.o.  female with past medical history of anemia, uterine cancer, s/p hysterectomy, type 2 diabetes mellitus, headaches, hypertension, and hyperlipidemia presented to the ED from home with chief complaint of abdominal pain. Patient's daughter who speaks English provide translation. Patient is a limited historian. Patient's daughter provides additional history    Interval history / Subjective:   Ms. Reji Newton is still pretty sleepy today. Abdominal discomfort is improving. Eating and drinking. Ambulating with PT. Assessment & Plan:     Colonic adenocarcinoma with metastasis to liver, lung and retroperitoneal LN (POA)  - CT abdomen pelvis  shows cecal mass with metastatic disease to liver, retroperitoneal nodes, and lung. Relative small bowel obstruction. Associated dilated appendix.  Associated complex right pleural effusion and small amount of ascites  - Right kanika colectomy on   - Pathology  shows poorly differentiated adenocarcinoma  - Obtain CT chest for staging  - Hem/Onc, General surgery consulted    SBO due to colon mass (POA) - resolved  - Right hemicolectomy as above  - Advance diet per surgery  - Surgical service consulted    Leukocytosis possible reactive - stable, no evidence of infection    Dehydration likely due to poor oral intake - resolved with IVF     Hypokalemia - monitor and replace as needed    Hyponatremia multifactorial - resolved with normal saline    DM-II - A1c 8.3, BG still a bit high  - Continue lantus, add back second daily dose, SSI as needed    HTN - BP normal  - Continue to hold home metoprolol and lisinopril    Hx of hypercholesterolemia - continue to hold home zocor    Hx of uterine ca s/p hysterectomy    Obesity - Body mass index is 31.58 kg/(m^2). Code status: Full Code  DVT prophylaxis: SCD  Care Plan discussed with: Patient/Family, Nurse and   Disposition: Home tomorrow or Saturday    Agatha Carranza [phone]     IIEMQYIF Problems  Date Reviewed: 5/27/2018          Codes Class Noted POA    S/P right colectomy ICD-10-CM: Z90.49  ICD-9-CM: V45.89  5/27/2018 No    Overview Signed 5/29/2018  8:25 AM by Justin Amaral MD     Right hemicolectomy for cecal mass with partial SBO, Ndiaye             Leukocytosis ICD-10-CM: Y85.009  ICD-9-CM: 288.60  5/26/2018 Yes        SBO (small bowel obstruction) ([facility] ICD-10-CM: P71.518  ICD-9-CM: 560.9  5/26/2018 Yes        Abdominal pain ICD-10-CM: R10.9  ICD-9-CM: 789.00  5/26/2018 Yes        Type 2 diabetes mellitus with hyperglycemia ([facility] ICD-10-CM: E11.65  ICD-9-CM: 250.00  5/26/2018 Yes        * (Principal)Colonic mass ICD-10-CM: K63.9  ICD-9-CM: 569.89  5/26/2018 Yes        HTN (hypertension) ICD-10-CM: I10  ICD-9-CM: 401.9  5/23/2013 Yes              Vital Signs:    Last 24hrs VS reviewed since prior progress note. Most recent are:  Visit Vitals    /70    Pulse 85    Temp 97.9 °F (36.6 °C)    Resp 18    Ht 5' 4\" (1.626 m)    Wt 83.5 kg (184 lb)    SpO2 95%    BMI 31.58 kg/m2         Intake/Output Summary (Last 24 hours) at 05/31/18 1412  Last data filed at 05/30/18 1755   Gross per 24 hour   Intake              660 ml   Output              400 ml   Net              260 ml        Physical Examination:         Constitutional:  No acute distress, tired   ENT:  Oral mucous moist, oropharynx benign. Neck supple,    Resp:  CTA bilaterally. No wheezing/rhonchi/rales. No accessory muscle use   CV:  Regular rhythm, normal rate, no murmurs, gallops, rubs    GI:  Soft, non distended, mildly tender around surgical site, + bowel sounds,    Musculoskeletal:  No edema.  Muscle tone and bulk normal    Neurologic:  Moves all extremities. AAOx3, CN II-XII reviewed     Skin:  Good turgor, no rashes or ulcers       Data Review:     Telemetry    ECG    Xray    CT scan    MRI    Echocardiogram    Ultrasound              I have reviewed the flow sheet and recent notes  New labs and data personally reviewed. Labs:     Recent Labs      05/31/18   0545  05/30/18   0555   WBC  11.9*  13.0*   HGB  11.3*  10.9*   HCT  35.2  33.7*   PLT  317  283     Recent Labs      05/30/18   0555  05/29/18   0355   NA  139  138   K  3.4*  3.5   CL  108  108   CO2  26  21   BUN  7  16   CREA  0.21*  0.38*   GLU  135*  201*   CA  8.7  8.2*     No results for input(s): SGOT, GPT, ALT, AP, TBIL, TBILI, TP, ALB, GLOB, GGT, AML, LPSE in the last 72 hours. No lab exists for component: AMYP, HLPSE  No results for input(s): INR, PTP, APTT in the last 72 hours. No lab exists for component: INREXT, INREXT   No results for input(s): FE, TIBC, PSAT, FERR in the last 72 hours. No results found for: FOL, RBCF   No results for input(s): PH, PCO2, PO2 in the last 72 hours. No results for input(s): CPK, CKNDX, TROIQ in the last 72 hours.     No lab exists for component: CPKMB  Lab Results   Component Value Date/Time    Cholesterol, total 174 06/01/2017 09:40 AM    HDL Cholesterol 45 06/01/2017 09:40 AM    LDL, calculated 92 06/01/2017 09:40 AM    Triglyceride 183 (H) 06/01/2017 09:40 AM    CHOL/HDL Ratio 3.4 01/29/2010 11:46 AM     Lab Results   Component Value Date/Time    Glucose (POC) 206 (H) 05/31/2018 11:24 AM    Glucose (POC) 168 (H) 05/31/2018 08:12 AM    Glucose (POC) 221 (H) 05/30/2018 09:10 PM    Glucose (POC) 197 (H) 05/30/2018 04:50 PM    Glucose (POC) 130 (H) 05/30/2018 12:17 PM     Lab Results   Component Value Date/Time    Color YELLOW/STRAW 05/26/2018 05:40 AM    Appearance CLEAR 05/26/2018 05:40 AM    Specific gravity 1.010 05/26/2018 05:40 AM    pH (UA) 6.0 05/26/2018 05:40 AM    Protein NEGATIVE  05/26/2018 05:40 AM    Glucose >1000 (A) 05/26/2018 05:40 AM    Ketone 80 (A) 05/26/2018 05:40 AM    Bilirubin Negative 04/30/2015 12:00 AM    Urobilinogen 1.0 05/26/2018 05:40 AM    Nitrites NEGATIVE  05/26/2018 05:40 AM    Leukocyte Esterase SMALL (A) 05/26/2018 05:40 AM    Epithelial cells FEW 05/26/2018 05:40 AM    Bacteria 1+ (A) 05/26/2018 05:40 AM    WBC 0-4 05/26/2018 05:40 AM    RBC 0-5 05/26/2018 05:40 AM         Medications Reviewed:     Current Facility-Administered Medications   Medication Dose Route Frequency    insulin glargine (LANTUS) injection 20 Units  20 Units SubCUTAneous DAILY    insulin glargine (LANTUS) injection 47 Units  47 Units SubCUTAneous QHS    sodium chloride 0.9 % bolus infusion 100 mL  100 mL IntraVENous RAD ONCE    iopamidol (ISOVUE 300) 61 % contrast injection 100 mL  100 mL IntraVENous RAD ONCE    sodium chloride (NS) flush 10 mL  10 mL IntraVENous RAD ONCE    oxyCODONE IR (ROXICODONE) tablet 5 mg  5 mg Oral Q4H PRN    simvastatin (ZOCOR) tablet 20 mg  20 mg Oral QHS    ferrous sulfate tablet 325 mg  1 Tab Oral DAILY WITH BREAKFAST    fentaNYL citrate (PF) injection 50 mcg  50 mcg IntraVENous Q4H PRN    zolpidem (AMBIEN) tablet 5 mg  5 mg Oral QHS    enoxaparin (LOVENOX) injection 40 mg  40 mg SubCUTAneous Q24H    albuterol (PROVENTIL VENTOLIN) nebulizer solution 2.5 mg  2.5 mg Nebulization Q6H PRN    metoprolol tartrate (LOPRESSOR) tablet 50 mg  50 mg Oral BID    lisinopril (PRINIVIL, ZESTRIL) tablet 10 mg  10 mg Oral DAILY    celecoxib (CELEBREX) capsule 100 mg  100 mg Oral BID    naloxone (NARCAN) injection 0.4 mg  0.4 mg IntraVENous PRN    ondansetron (ZOFRAN) injection 4 mg  4 mg IntraVENous Q4H PRN    sodium chloride (NS) flush 5-10 mL  5-10 mL IntraVENous Q8H    sodium chloride (NS) flush 5-10 mL  5-10 mL IntraVENous PRN    glucose chewable tablet 16 g  4 Tab Oral PRN    dextrose (D50W) injection syrg 12.5-25 g  12.5-25 g IntraVENous PRN    glucagon (GLUCAGEN) injection 1 mg  1 mg IntraMUSCular PRN    insulin lispro (HUMALOG) injection   SubCUTAneous Q6H     ______________________________________________________________________  EXPECTED LENGTH OF STAY: 6d 7h  ACTUAL LENGTH OF STAY:          5                 Bear Ornelas MD

## 2018-05-31 NOTE — PROGRESS NOTES
Problem: Mobility Impaired (Adult and Pediatric)  Goal: *Acute Goals and Plan of Care (Insert Text)  Physical Therapy Goals  Initiated 5/30/2018     1. Patient will transfer from bed to chair and chair to bed with modified independence using the least restrictive device within 7 day(s). 2.  Patient will perform sit to stand with modified independence within 7 day(s). 3.  Patient will ambulate with modified independence for 200 feet with the least restrictive device within 7 day(s). 4.  Patient will ascend/descend 4 stairs with 1 handrail(s) with modified independence within 7 day(s). physical Therapy TREATMENT  Patient: Sharmin Cabrales (68 y.o. female)  Date: 5/31/2018  Diagnosis: SBO (small bowel obstruction) (HCC)  Colonic mass  Abdominal pain  Leukocytosis  Type 2 diabetes mellitus with hyperglycemia (HCC)  gastric insufficiency Malignant neoplasm of ascending colon (HCC)  Procedure(s) (LRB):  RIGHT HEMICOLECTOMY (Right) 4 Days Post-Op  Precautions:  fall, ESL  Chart, physical therapy assessment, plan of care and goals were reviewed. ASSESSMENT:  Pt progressing slowly this afternoon with functional mobility. She appears to have increased abdominal pain with bed mobility, though denies this with ambulation. Pt agreeable to participate despite  not yet arrived from driving DTR to work. Pt completed bed mobility, transfers, gait, and OOB to chair following session. All needs met and pt in NAD when left. Pt states she feels as though she is doing better today. Anticipate return home with family support and HHPT vs no further skilled services. RW recommended for home use at this time. Progression toward goals:  []    Improving appropriately and progressing toward goals  [x]    Improving slowly and progressing toward goals  []    Not making progress toward goals and plan of care will be adjusted     PLAN:  Patient continues to benefit from skilled intervention to address the above impairments. Continue treatment per established plan of care. Discharge Recommendations:  Home Health and To Be Determined  Further Equipment Recommendations for Discharge:  rolling walker use (pt does NOT own DME)     SUBJECTIVE:   Patient expressed feeling dizzy when getting up yesterday though denies symptoms this date with OOB. OBJECTIVE DATA SUMMARY:   Critical Behavior:  Neurologic State: Alert  Orientation Level: Oriented X4  Cognition: Follows commands     Functional Mobility Training:  Bed Mobility:  Sit to Supine: Additional time;Contact guard assistance (HOB elevated and posterior support provided)  Scooting: Contact guard assistance; Additional time  Transfers:  Sit to Stand: Stand-by assistance  Stand to Sit: Stand-by assistance (cues for backing to support surface)  Balance:  Sitting: Intact  Standing: Intact; With support  Ambulation/Gait Training:  Distance (ft): 65 Feet (ft)  Assistive Device: Gait belt;Walker, rolling  Ambulation - Level of Assistance: Stand-by assistance  Gait Abnormalities: Decreased step clearance (pt with flexed trunk/fwd head)  Base of Support: Center of gravity altered  Speed/Kassandra: Slow;Pace decreased (<100 feet/min)  Step Length: Right shortened;Left shortened  Pain:  Pain Scale 1: Numeric (0 - 10)  Pain Intensity 1: 4  Pain Location 1: Abdomen  Pain Orientation 1: Anterior  Pain Intervention(s) 1: Medication (see MAR)  Activity Tolerance:   NAD  After treatment:   [x]    Patient left in no apparent distress sitting up in chair  []    Patient left in no apparent distress in bed  [x]    Call bell left within reach  [x]    Nursing notified  []    Caregiver present  []    Bed alarm activated    COMMUNICATION/COLLABORATION:   The patients plan of care was discussed with: Registered Nurse    Jeny Davalos   Time Calculation: 12 mins

## 2018-05-31 NOTE — PROGRESS NOTES
Gen Surg @ [facility]  Attending addendum:  I supervised the NP and reviewed the progress note. We discussed the patient's condition and plan of care. I visited and examined the pt independently. Family says patient is still very weak. Pain is controlled adequately. Patient Vitals for the past 12 hrs:   Temp Pulse Resp BP SpO2   05/31/18 0716 97.9 °F (36.6 °C) 85 18 108/70 95 %        Physical Exam:   Gen NAD  Abd Obese, soft, nontender, Aquacel dressing removed, staples intact, incision clean and dry    Hospital Problems as of 5/31/2018  Date Reviewed: 5/27/2018          Codes Class Noted - Resolved POA    S/P right colectomy ICD-10-CM: Z90.49  ICD-9-CM: V45.89  5/27/2018 - Present No    Overview Signed 5/29/2018  8:25 AM by Jessica Salgado MD     Right hemicolectomy for cecal mass with partial SBO, Ndiaye             Leukocytosis ICD-10-CM: Q97.696  ICD-9-CM: 288.60  5/26/2018 - Present Yes        [facility]small [facility] obstruction) ([facility] ICD-10-CM: N84.363  ICD-9-CM: 560.9  5/26/2018 - Present Yes        Abdominal pain ICD-10-CM: R10.9  ICD-9-CM: 789.00  5/26/2018 - Present Yes        Type 2 diabetes mellitus with hyperglycemia ([facility] ICD-10-CM: E11.65  ICD-9-CM: 250.00  5/26/2018 - Present Yes        * (Principal)Malignant neoplasm of ascending colon ([facility] ICD-10-CM: C18.2  ICD-9-CM: 153.6  5/26/2018 - Present Yes        HTN (hypertension) ICD-10-CM: I10  ICD-9-CM: 401.9  5/23/2013 - Present Yes          I reviewed the pathology and staging with the family along with the implications with Stage 4 disease. I also provided them with handouts from [employer]. I agree with the APC's assessment and plan. Hopefully she will work with PT today or tomorrow and plan for [employer]. Maybe Saturday. If portacath needed can be scheduled as outpatient in the next 2 weeks.   Medical mgmt per Dr. Monet Wright    Signed By: Jessica Salgado MD     May 31, 2018

## 2018-05-31 NOTE — PROGRESS NOTES
Bedside and Verbal shift change report given to Liana Messer RN (oncoming nurse) by Belgica Boyce (offgoing nurse). Report included the following information SBAR and Kardex.

## 2018-05-31 NOTE — PROGRESS NOTES
Physical Therapy  Attempted to see pt for skilled PT services and gait training this afternoon. DTR and  present though DTR states she is leaving for work momentarily and would like PT to return ~3:30 when her father returns to see pt while he is present for \"support. \"  PT explained we can certainly try to do so though there is a chance we may have to f/u tomorrow.   Thank you,  Jeny Davalos, PT, DPT

## 2018-05-31 NOTE — PROGRESS NOTES
Bedside shift change report given to Melida Farr (oncoming nurse) by Bin Yepez (offgoing nurse). Report included the following information SBAR, Kardex, ED Summary, Procedure Summary, Intake/Output, MAR, Accordion and Recent Results.

## 2018-05-31 NOTE — PROGRESS NOTES
Cancer Check at [address], [address], [address]  W: [phone]  F: [phone]    Reason for Visit:   Taqueria Almodovar is a 79 y.o. female who is seen in consultation at the request of Dr. Pati Padilla for evaluation of colon mass obstructing. Treatment History:   None yet    History of Present Illness:     Pt seen today in hospital consult for new colon mass with liver and lung lesions on CT. Pt does not have tissue dx of cancer yet. Colon mass is obstructing and pt is going to OR today. Pt speaks a little [place] but daughter is present and translates per pt preference. Pt knows she likely has cancer and needs surgery and then chemo. Pt states she is strong overall. Feels tired this am due to lack of sleep. Interim Hx:  Pt seen today for f/u of new met colon cancer post surgery yesterday. Pathology is back, confirms adenocarcinoma, consistent with colon primary. She is sitting up in the chair today. Still working on walking post surgery. Energy level is low. She is eating and tolerating food.      Past Medical History:   Diagnosis Date    Anemia NEC     Cancer ([address]     Diabetes ([address]     Headache(784.0)     Hypercholesterolemia     Hypertension     Malignant neoplasm of ascending colon ([address] 2018    S/P right colectomy 2018    Right hemicolectomy for cecal mass with partial SBO, Ndiaye      Past Surgical History:   Procedure Laterality Date    HX  SECTION      Hysterectomy,total for precancer    HX COLECTOMY Right 2018    Right hemicolectomy for cecal mass with partial SBO, Ndiaye      Social History   Substance Use Topics    Smoking status: Never Smoker    Smokeless tobacco: Never Used    Alcohol use No      Family History   Problem Relation Age of Onset    Alcohol abuse Father     Cancer Maternal Aunt      breast ca    Breast Cancer Maternal Aunt     Breast Cancer Sister     Breast Cancer Daughter      Current Facility-Administered Medications   Medication Dose Route Frequency    insulin glargine (LANTUS) injection 20 Units  20 Units SubCUTAneous DAILY    insulin glargine (LANTUS) injection 47 Units  47 Units SubCUTAneous QHS    sodium chloride 0.9 % bolus infusion 100 mL  100 mL IntraVENous RAD ONCE    sodium chloride (NS) flush 10 mL  10 mL IntraVENous RAD ONCE    sodium chloride 0.9 % bolus infusion 100 mL  100 mL IntraVENous RAD ONCE    iopamidol (ISOVUE 300) 61 % contrast injection 100 mL  100 mL IntraVENous RAD ONCE    sodium chloride (NS) flush 10 mL  10 mL IntraVENous RAD ONCE    oxyCODONE IR (ROXICODONE) tablet 5 mg  5 mg Oral Q4H PRN    simvastatin (ZOCOR) tablet 20 mg  20 mg Oral QHS    ferrous sulfate tablet 325 mg  1 Tab Oral DAILY WITH BREAKFAST    fentaNYL citrate (PF) injection 50 mcg  50 mcg IntraVENous Q4H PRN    zolpidem (AMBIEN) tablet 5 mg  5 mg Oral QHS    enoxaparin (LOVENOX) injection 40 mg  40 mg SubCUTAneous Q24H    albuterol (PROVENTIL VENTOLIN) nebulizer solution 2.5 mg  2.5 mg Nebulization Q6H PRN    metoprolol tartrate (LOPRESSOR) tablet 50 mg  50 mg Oral BID    lisinopril (PRINIVIL, ZESTRIL) tablet 10 mg  10 mg Oral DAILY    celecoxib (CELEBREX) capsule 100 mg  100 mg Oral BID    naloxone (NARCAN) injection 0.4 mg  0.4 mg IntraVENous PRN    ondansetron (ZOFRAN) injection 4 mg  4 mg IntraVENous Q4H PRN    sodium chloride (NS) flush 5-10 mL  5-10 mL IntraVENous Q8H    sodium chloride (NS) flush 5-10 mL  5-10 mL IntraVENous PRN    glucose chewable tablet 16 g  4 Tab Oral PRN    dextrose (D50W) injection syrg 12.5-25 g  12.5-25 g IntraVENous PRN    glucagon (GLUCAGEN) injection 1 mg  1 mg IntraMUSCular PRN    insulin lispro (HUMALOG) injection   SubCUTAneous Q6H      Allergies   Allergen Reactions    Tylenol [Acetaminophen] Swelling        Review of Systems: A complete review of systems was obtained, negative except as described above.     Physical Exam:     Visit Vitals  /70    Pulse 85    Temp 97.9 °F (36.6 °C)    Resp 18    Ht 5' 4\" (1.626 m)    Wt 184 lb (83.5 kg)    SpO2 95%    BMI 31.58 kg/m2     ECOG PS: 0  General: No distress  Eyes: anicteric sclerae  HENT: Atraumatic, OP clear  Neck: Supple  MS: Gait not observed  Skin:  Normal temperature, turgor, and texture. Psych: sleepy this am    Results:     Lab Results   Component Value Date/Time    WBC 11.9 (H) 05/31/2018 05:45 AM    HGB 11.3 (L) 05/31/2018 05:45 AM    HCT 35.2 05/31/2018 05:45 AM    PLATELET 120 69/27/0716 05:45 AM    MCV 87.3 05/31/2018 05:45 AM    ABS. NEUTROPHILS 13.9 (H) 05/28/2018 08:55 AM     Lab Results   Component Value Date/Time    Sodium 139 05/30/2018 05:55 AM    Potassium 3.4 (L) 05/30/2018 05:55 AM    Chloride 108 05/30/2018 05:55 AM    CO2 26 05/30/2018 05:55 AM    Glucose 135 (H) 05/30/2018 05:55 AM    BUN 7 05/30/2018 05:55 AM    Creatinine 0.21 (L) 05/30/2018 05:55 AM    GFR est AA >60 05/30/2018 05:55 AM    GFR est non-AA >60 05/30/2018 05:55 AM    Calcium 8.7 05/30/2018 05:55 AM    Glucose (POC) 206 (H) 05/31/2018 11:24 AM     Lab Results   Component Value Date/Time    Bilirubin, total 0.6 05/25/2018 10:05 PM    ALT (SGPT) 10 (L) 05/25/2018 10:05 PM    AST (SGOT) 12 (L) 05/25/2018 10:05 PM    Alk. phosphatase 76 05/25/2018 10:05 PM    Protein, total 7.0 05/25/2018 10:05 PM    Albumin 3.0 (L) 05/25/2018 10:05 PM    Globulin 4.0 05/25/2018 10:05 PM       CT Results (most recent):    Results from Hospital Encounter encounter on 05/25/18   CT ABD PELV W CONT   Narrative EXAM:  CT ABD PELV W CONT    INDICATION: abdominal pain and distension    COMPARISON: None    CONTRAST:  100 mL of Isovue-370. TECHNIQUE:   Following the uneventful intravenous administration of contrast, thin axial  images were obtained through the abdomen and pelvis. Coronal and sagittal  reconstructions were generated. Oral contrast was not administered.  CT dose  reduction was achieved through use of a standardized protocol tailored for this  examination and automatic exposure control for dose modulation. FINDINGS:   LUNG BASES: There is a small right pleural effusion I with a density measurement  of [phone]. Multiple pulmonary nodules are seen, numbering at least 2 in the  right middle lobe, 4 in the right lower lobe, 3 in the left lower lobe, and one  in the lingula. The largest of these measures about 9 mm in size. INCIDENTALLY IMAGED HEART AND MEDIASTINUM: Unremarkable. LIVER: Multiple hepatic masses are seen, the largest of which measures about 13  mm in size. GALLBLADDER: Unremarkable. SPLEEN: No mass. PANCREAS: No mass or ductal dilatation. ADRENALS: Unremarkable. KIDNEYS: No calculus or hydronephrosis. There is a multiloculated left renal 2.3  cm cyst with a Hounsfield unit measurement of 10. STOMACH: Unremarkable. SMALL BOWEL: Relative small bowel obstruction. COLON: Colonic mass measuring about 5.4 cm anterior to posterior, 6.5 cm right  to left, and 7.4 cm superior to inferior (axial image 58 coronal image 61). APPENDIX: Dilated to 13 mm. PERITONEUM: No ascites or pneumoperitoneum. RETROPERITONEUM: Retroperitoneal aortocaval nodes at the level of the inferior  kidneys which measure 13 x 20 mm in cross-sectional diameter (image axial 42 and  coronal image 80). REPRODUCTIVE ORGANS: Uterus and ovaries are surgically absent. URINARY BLADDER: No mass or calculus. BONES: No destructive bone lesion. There is disc desiccation at L3-4 and L4-5. ADDITIONAL COMMENTS: N/A         Impression IMPRESSION: Cecal mass with metastatic disease to liver, retroperitoneal nodes,  and lung. Relative small bowel obstruction. Associated dilated appendix. Associated complex right pleural effusion and small amount of ascites. This  result was verbally relayed to Dr. Pollock at 0008 hours by me.   [id number]        5/25/18 CT Abdomen/Pelvis:  IMPRESSION: Cecal mass with metastatic disease to liver, retroperitoneal nodes,  and lung. Relative small bowel obstruction. Associated dilated appendix. Associated complex right pleural effusion and small amount of ascites. 5/29/18 Surgical Pathology:     FINAL PATHOLOGIC DIAGNOSIS   1. Peritoneum, biopsy:   Adenocarcinoma, poorly differentiated   2. Omentum, omentectomy:   Adenocarcinoma, poorly differentiated   3. Colon and terminal ileum, right hemicolectomy:   Colonic adenocarcinoma   Appendix with tubulovillous adenoma   Addendum handling for mismatch repair proteins   Synoptic report:   Procedure: Right hemicolectomy   Tumor site: Cecum   Tumor size: 10.0 cm   Macroscopic tumor perforation: Not identified   Histologic type: Adenocarcinoma   Histologic grade: Poorly differentiated (grade 3 of 4 grades)   Tumor extension: Invades the visceral peritoneum   Margins: Positive for adenocarcinoma, radial   Proximal and distal margins negative for adenocarcinoma   Treatment effect: Not identified   Lymphovascular invasion: Present   Perineural invasion: Present   Tumor deposits: Present, at least 11   Regional lymph nodes: Five of 12 lymph nodes positive for metastatic carcinoma   Largest lymph node metastasis: 6 mm, without extracapsular extension   Ancillary testing: Immunohistochemistry for mismatch repair proteins pending      Pathologic stage (TNM):   Primary tumor: pT4a   Regional lymph nodes: pN2a   Distant metastases: pM1b     Records reviewed and summarized above. Pathology report(s) reviewed above. Radiology report(s) reviewed above. Assessment/PLAN:     1) colon mass with obstruction with liver and lung lesions on CT/ stage 4 colon cancer with path pending. Pt is post surgery for obstructing colon mass. Pt did well with surgery overall. She will need palliative chemotherapy after surgery in likely 4 weeks. Cancer is treatable not curable. Need CEA and complete staging with CT chest eventually. Can set up CT Chest outpatient after discharge.   CEA ordered today.  Reviewed path with pt and daughter at bedside today. Reviewed need for palliative chemo in 4 weeks post surgery. Can set up staging studies and port as outpt. 2)  DM/ HTN per IM. 3)  Support good from family. Daughter present today. We will follow along while patient is admitted and plan to see 2 weeks after discharge in the outpatient clinic. Please call with any questions.   Pt seen today in conjunction with RUSS Graham d/w hospitalist and surgery  Pt will f/u with us as outpt    Signed By: Tomas Badillo DO

## 2018-06-01 NOTE — PROGRESS NOTES
Hospitalist Progress Note  Ruby Shetty MD  Answering service: [phone] OR [phone] from in house phone        Date of Service:  2018  NAME:  Quan Luevano  :  1950  MRN:  [id number]      Admission Summary:   Quan Luevano is a 79 y.o.  female with past medical history of anemia, uterine cancer, s/p hysterectomy, type 2 diabetes mellitus, headaches, hypertension, and hyperlipidemia presented to the ED from home with chief complaint of abdominal pain. Patient's daughter who speaks English provide translation. Patient is a limited historian. Patient's daughter provides additional history    Interval history / Subjective:   Ms. Patrick Daniels is worn out. She worked a lot with PT and sat in a chair yesterday. Her abdominal pain is improved. Still making BMs, still eating and drinking. Assessment & Plan:     Colonic adenocarcinoma with metastasis to liver, lung and retroperitoneal LN (POA)  - CT abdomen pelvis  shows cecal mass with metastatic disease to liver, retroperitoneal nodes, and lung. Relative small bowel obstruction. Associated dilated appendix. Associated complex right pleural effusion and small amount of ascites  - CT chest  with multiple lung metastases.  Bilateral pleural effusions, asymmetric to the right  - Right kanika colectomy on   - Pathology  shows poorly differentiated adenocarcinoma  - Hem/Onc, General surgery consulted    SBO due to colon mass (POA) - resolved  - Right hemicolectomy as above  - Advance diet per surgery  - Surgical service consulted    Leukocytosis possible reactive - stable, no evidence of infection    Dehydration likely due to poor oral intake - resolved with IVF     Hypokalemia - monitor and replace as needed    Hyponatremia multifactorial - resolved with normal saline    DM-II - A1c 8.3, BG still a bit high  - Continue lantus, add back second daily dose, SSI as needed    HTN - BP normal  - Continue to hold home metoprolol and lisinopril    Hx of hypercholesterolemia - continue to hold home zocor    Hx of uterine ca s/p hysterectomy    Obesity - Body mass index is 31.58 kg/(m^2). Code status: Full Code  DVT prophylaxis: SCD  Care Plan discussed with: Patient/Family, Nurse and   Disposition: Home tomorrow    Vineet Parada [phone]     [id number] Problems  Date Reviewed: 5/27/2018          Codes Class Noted POA    S/P right colectomy ICD-10-CM: Z90.49  ICD-9-CM: V45.89  5/27/2018 No    Overview Signed 5/29/2018  8:25 AM by Candice Rosenthal MD     Right hemicolectomy for cecal mass with partial SBO, Ndiaye             Leukocytosis ICD-10-CM: W70.904  ICD-9-CM: 288.60  5/26/2018 Yes        Abdominal pain ICD-10-CM: R10.9  ICD-9-CM: 789.00  5/26/2018 Yes        Type 2 diabetes mellitus with hyperglycemia ([facility] ICD-10-CM: E11.65  ICD-9-CM: 250.00  5/26/2018 Yes        * (Principal)Malignant neoplasm of ascending colon ([facility] ICD-10-CM: C18.2  ICD-9-CM: 153.6  5/26/2018 Yes    Overview Signed 5/31/2018  3:49 PM by Candice Rosenthal MD     Results for Venessa Elena (MRN [id number]) as of 5/31/2018 15:41   Ref. Range 5/31/2018 13:44   CEA Latest Units: ng/mL 28.4                HTN (hypertension) ICD-10-CM: I10  ICD-9-CM: 401.9  5/23/2013 Yes              Vital Signs:    Last 24hrs VS reviewed since prior progress note. Most recent are:  Visit Vitals    /74 (BP 1 Location: Left arm, BP Patient Position: At rest)    Pulse 80    Temp 98.3 °F (36.8 °C)    Resp 16    Ht 5' 4\" (1.626 m)    Wt 83.5 kg (184 lb)    SpO2 95%    BMI 31.58 kg/m2       No intake or output data in the 24 hours ending 06/01/18 0911     Physical Examination:         Constitutional:  No acute distress, tired   ENT:  Oral mucous moist, oropharynx benign. Neck supple,    Resp:  CTA bilaterally. No wheezing/rhonchi/rales.  No accessory muscle use   CV:  Regular rhythm, normal rate, no murmurs, gallops, rubs    GI:  Soft, non distended, mildly tender around surgical site, + bowel sounds,    Musculoskeletal:  No edema. Muscle tone and bulk normal    Neurologic:  Moves all extremities. AAOx3, CN II-XII reviewed     Skin:  Good turgor, no rashes or ulcers       Data Review:     Telemetry    ECG    Xray    CT scan x   MRI    Echocardiogram    Ultrasound              I have reviewed the flow sheet and recent notes  New labs and data personally reviewed. Labs:     Recent Labs      06/01/18   0615  05/31/18   0545   WBC  10.8  11.9*   HGB  11.5  11.3*   HCT  37.6  35.2   PLT  294  317     Recent Labs      06/01/18   0615  05/30/18   0555   NA  140  139   K  3.4*  3.4*   CL  108  108   CO2  24  26   BUN  4*  7   CREA  0.18*  0.21*   GLU  82  135*   CA  8.9  8.7     No results for input(s): SGOT, GPT, ALT, AP, TBIL, TBILI, TP, ALB, GLOB, GGT, AML, LPSE in the last 72 hours. No lab exists for component: AMYP, HLPSE  No results for input(s): INR, PTP, APTT in the last 72 hours. No lab exists for component: INREXT, INREXT   No results for input(s): FE, TIBC, PSAT, FERR in the last 72 hours. No results found for: FOL, RBCF   No results for input(s): PH, PCO2, PO2 in the last 72 hours. No results for input(s): CPK, CKNDX, TROIQ in the last 72 hours.     No lab exists for component: CPKMB  Lab Results   Component Value Date/Time    Cholesterol, total 174 06/01/2017 09:40 AM    HDL Cholesterol 45 06/01/2017 09:40 AM    LDL, calculated 92 06/01/2017 09:40 AM    Triglyceride 183 (H) 06/01/2017 09:40 AM    CHOL/HDL Ratio 3.4 01/29/2010 11:46 AM     Lab Results   Component Value Date/Time    Glucose (POC) 85 06/01/2018 06:03 AM    Glucose (POC) 247 (H) 05/31/2018 11:07 PM    Glucose (POC) 162 (H) 05/31/2018 05:34 PM    Glucose (POC) 206 (H) 05/31/2018 11:24 AM    Glucose (POC) 168 (H) 05/31/2018 08:12 AM     Lab Results   Component Value Date/Time    Color YELLOW/STRAW 05/26/2018 05:40 AM Appearance CLEAR 05/26/2018 05:40 AM    Specific gravity 1.010 05/26/2018 05:40 AM    pH (UA) 6.0 05/26/2018 05:40 AM    Protein NEGATIVE  05/26/2018 05:40 AM    Glucose >1000 (A) 05/26/2018 05:40 AM    Ketone 80 (A) 05/26/2018 05:40 AM    Bilirubin Negative 04/30/2015 12:00 AM    Urobilinogen 1.0 05/26/2018 05:40 AM    Nitrites NEGATIVE  05/26/2018 05:40 AM    Leukocyte Esterase SMALL (A) 05/26/2018 05:40 AM    Epithelial cells FEW 05/26/2018 05:40 AM    Bacteria 1+ (A) 05/26/2018 05:40 AM    WBC 0-4 05/26/2018 05:40 AM    RBC 0-5 05/26/2018 05:40 AM         Medications Reviewed:     Current Facility-Administered Medications   Medication Dose Route Frequency    potassium chloride (KAON 10%) 20 mEq/15 mL oral liquid 40 mEq  40 mEq Oral DAILY    insulin glargine (LANTUS) injection 20 Units  20 Units SubCUTAneous DAILY    insulin glargine (LANTUS) injection 47 Units  47 Units SubCUTAneous QHS    oxyCODONE IR (ROXICODONE) tablet 5 mg  5 mg Oral Q4H PRN    simvastatin (ZOCOR) tablet 20 mg  20 mg Oral QHS    ferrous sulfate tablet 325 mg  1 Tab Oral DAILY WITH BREAKFAST    fentaNYL citrate (PF) injection 50 mcg  50 mcg IntraVENous Q4H PRN    zolpidem (AMBIEN) tablet 5 mg  5 mg Oral QHS    enoxaparin (LOVENOX) injection 40 mg  40 mg SubCUTAneous Q24H    albuterol (PROVENTIL VENTOLIN) nebulizer solution 2.5 mg  2.5 mg Nebulization Q6H PRN    metoprolol tartrate (LOPRESSOR) tablet 50 mg  50 mg Oral BID    lisinopril (PRINIVIL, ZESTRIL) tablet 10 mg  10 mg Oral DAILY    celecoxib (CELEBREX) capsule 100 mg  100 mg Oral BID    naloxone (NARCAN) injection 0.4 mg  0.4 mg IntraVENous PRN    ondansetron (ZOFRAN) injection 4 mg  4 mg IntraVENous Q4H PRN    sodium chloride (NS) flush 5-10 mL  5-10 mL IntraVENous Q8H    sodium chloride (NS) flush 5-10 mL  5-10 mL IntraVENous PRN    glucose chewable tablet 16 g  4 Tab Oral PRN    dextrose (D50W) injection syrg 12.5-25 g  12.5-25 g IntraVENous PRN    glucagon (GLUCAGEN) injection 1 mg  1 mg IntraMUSCular PRN    insulin lispro (HUMALOG) injection   SubCUTAneous Q6H     ______________________________________________________________________  EXPECTED LENGTH OF STAY: 6d 7h  ACTUAL LENGTH OF STAY:          6                 Maryhelen Lanes, MD

## 2018-06-01 NOTE — PROGRESS NOTES
6/1/18 1434: CM received call from [address] Ham Snowden s/w patient's  to coordinate [facility],  refused services despite daughter's previous acceptance. Ham Snowden attempted to emphasize benefits of HH,  continued to refuse and states that no additional home services necessary. HH consult cancelled, [facility] info removed from AVS. Discharge home tomorrow with family, PCP, and no additional services.    ------------------------  6/1/18 1232  CM noted order for [facility], patient expected to discharge home tomorrow (Sat 6/2), s/w daughter Seema Zavala in re: Rebel Fung denies agency preference. [facility] referral sent to [address] [place] [address]. Referral sent to [facility] for PCP follow-up.     RUBINA Yates

## 2018-06-01 NOTE — PROGRESS NOTES
General Surgery at [facility]    \"Me sienti mejor juju. Hoy me reynaldonte mas debil. \"  I felt better yesterday. Today I feel weaker. Also reports pain 5/10 when rolls to side when trying to sleep. Has only had one oxycodone yesterday at 0500 and another at 0600 today. Patient Vitals for the past 12 hrs:   Temp Pulse Resp BP SpO2   18 0729 98.3 °F (36.8 °C) 80 16 116/74 95 %   18 0100 98.5 °F (36.9 °C) 85 18 110/70 96 %     Temp (24hrs), Av.4 °F (36.9 °C), Min:98.3 °F (36.8 °C), Max:98.5 °F (36.9 °C)        Gen NAD  Abd Rounded, obese, soft, nontender    Assessment:   Hospital Problems as of 2018  Date Reviewed: 2018          Codes Class Noted - Resolved POA    S/P right colectomy ICD-10-CM: Z90.49  ICD-9-CM: V45.89  2018 - Present No    Overview Signed 2018  8:25 AM by Geo Armendariz MD     Right hemicolectomy for cecal mass with partial SBO, Ndiaye             Leukocytosis ICD-10-CM: H63.194  ICD-9-CM: 288.60  2018 - Present Yes        Abdominal pain ICD-10-CM: R10.9  ICD-9-CM: 789.00  2018 - Present Yes        Type 2 diabetes mellitus with hyperglycemia ([facility] ICD-10-CM: E11.65  ICD-9-CM: 250.00  2018 - Present Yes        * (Principal)Malignant neoplasm of ascending colon ([facility] ICD-10-CM: C18.2  ICD-9-CM: 153.6  2018 - Present Yes    Overview Signed 2018  3:49 PM by Geo Armendariz MD     Results for Maddie Lazaro (MRN [id number]) as of 2018 15:41   Ref. Range 2018 13:44   CEA Latest Units: ng/mL 28.4                HTN (hypertension) ICD-10-CM: I10  ICD-9-CM: 401.9  2013 - Present Yes        RESOLVED: SBO (small bowel obstruction) ([facility] ICD-10-CM: I86.987  ICD-9-CM: 560.9  2018 - 2018 Yes              5 Days Post-Op  Procedure(s):  RIGHT HEMICOLECTOMY     Not feeling well. Expressed concern about going home feeling so weak. PT rec for RW note.     Rec/Plan:  DME for outpatient per PT  Hopefully dc tomorrow if ok with hosp    Discussed indication for portacath if needed and risks of bleeding, infection and pneumothorax.     Signed By: Swapnil Victoria MD     June 1, 2018

## 2018-06-01 NOTE — PROGRESS NOTES
Bedside shift change report given to Cornel Marino RN (oncoming nurse) by Laine Henson RN (offgoing nurse). Report included the following information SBAR, Kardex, Intake/Output, MAR and Recent Results.

## 2018-06-01 NOTE — ROUTINE PROCESS
Bedside and Verbal shift change report given to josh Lynn (oncoming nurse) by Jalil Oshea RN (offgoing nurse). Report included the following information SBAR, Kardex, Intake/Output, MAR and Recent Results.

## 2018-06-01 NOTE — PROGRESS NOTES
Hospital follow-up PCP transitional care appointment has been scheduled with Isi Gaines NP for Wednesday, 6/6/18 at 1:40 p.m. Pending patient discharge.   Caterina Lainez, Care Management Specialist.

## 2018-06-01 NOTE — DIABETES MGMT
DTC Progress Note    Recommendations/ Comments: Chart reviewed due to hyperglycemia. FBS was 82 mg/dl this morning but meal time blood sugars are elevated. If appropriate, please consider increasing morning dose of Lantus to 30 units. Current hospital DM medication:  Lantus 20 units in morning and 47 units in the evening  Lispro correction scale, normal sensitivity    Chart reviewed on Juan Goins. Patient is a 79 y.o. female with known DM on the following per PTA Farxiga 10 mg daily, saxagliptin-metformin 5-1000 mg each day, Lantus 50 units BID      A1c:   Lab Results   Component Value Date/Time    Hemoglobin A1c 8.3 (H) 05/26/2018 05:21 AM    Hemoglobin A1c 8.1 (H) 03/19/2018 09:48 AM       Recent Glucose Results:   Lab Results   Component Value Date/Time    GLU 82 06/01/2018 06:15 AM    GLUCPOC 211 (H) 06/01/2018 10:57 AM    GLUCPOC 85 06/01/2018 06:03 AM    GLUCPOC 247 (H) 05/31/2018 11:07 PM        Lab Results   Component Value Date/Time    Creatinine 0.18 (L) 06/01/2018 06:15 AM     Estimated Creatinine Clearance: 321.3 mL/min (based on Cr of 0.18). Active Orders   Diet    DIET DIABETIC WITH OPTIONS Consistent Carb 1800kcal; Regular; Low Fiber; Low Lactose        PO intake:   Patient Vitals for the past 72 hrs:   % Diet Eaten   05/30/18 1755 75 %   05/30/18 1548 50 %   05/29/18 1840 0 %   05/29/18 1618 0 %   05/29/18 1449 0 %       Will continue to follow as needed.     Thank you  Hong Cavazos RD, CDE

## 2018-06-02 NOTE — DISCHARGE SUMMARY
Discharge Summary     Patient:  Carlo Warren       MRN: [id number]       YOB: 1950       Age: 79 y.o. Date of admission:  5/25/2018    Date of discharge:  6/2/2018    Primary care provider: Dr. Ej Lima (Inactive)    Admitting provider:  Luis Chicas MD    Discharging provider:  Graham Hall [address] [phone]. If unavailable, call [phone] and ask the  to page the triage hospitalist.    Consultations  · IP CONSULT TO HOSPITALIST  · IP CONSULT TO GENERAL SURGERY  · IP CONSULT TO HEMATOLOGY    Procedures  · Procedure(s):  · RIGHT HEMICOLECTOMY    Discharge destination: Home. The patient is stable for discharge. Admission diagnosis  · SBO (small bowel obstruction) (HCC)  · Colonic mass  · Abdominal pain  · Leukocytosis  · Type 2 diabetes mellitus with hyperglycemia (HCC)  · gastric insufficiency    Current Discharge Medication List      START taking these medications    Details   senna-docusate (PERICOLACE) 8.6-50 mg per tablet Take 1 Tab by mouth two (2) times daily as needed for Constipation. Can be obtained over-the-counter  Qty: 30 Tab, Refills: 0      oxyCODONE IR (ROXICODONE) 5 mg immediate release tablet Take 1 Tab by mouth every eight (8) hours as needed. Max Daily Amount: 15 mg.  Qty: 21 Tab, Refills: 0    Associated Diagnoses: S/P right colectomy      ibuprofen (MOTRIN) 600 mg tablet Take 1 Tab by mouth every eight (8) hours for 4 days. Qty: 12 Tab, Refills: 0    Associated Diagnoses: S/P right colectomy         CONTINUE these medications which have NOT CHANGED    Details   metoprolol tartrate (LOPRESSOR) 50 mg tablet TAKE 1 TABLET BY MOUTH TWICE DAILY  Qty: 180 Tab, Refills: 0      ammonium lactate (AMLACTIN) 12 % topical cream APPLY 2 GRAMS TO AFFECTED AREA TWICE A DAY.  RUB IN TO AFFECTED AREA WELL  Qty: 1 Tube, Refills: 2    Comments: Please consider 90 day supplies to promote better adherence  Associated Diagnoses: Dry skin      albuterol (PROVENTIL HFA, VENTOLIN HFA, PROAIR HFA) 90 mcg/actuation inhaler Take 1 Puff by inhalation every six (6) hours as needed for Wheezing. Qty: 1 Inhaler, Refills: 0    Associated Diagnoses: Bronchitis      simvastatin (ZOCOR) 20 mg tablet Take 1 Tab by mouth nightly. Qty: 90 Tab, Refills: 1    Associated Diagnoses: Mixed hyperlipidemia      lisinopril (PRINIVIL, ZESTRIL) 10 mg tablet Take 1 Tab by mouth daily. Qty: 90 Tab, Refills: 1    Associated Diagnoses: Mixed hyperlipidemia      sAXagliptin-metFORMIN (KOMBIGLYZE XR) 5-1,000 mg TM24 Take 1 tab po QD  Qty: 90 Tab, Refills: 1    Associated Diagnoses: Type 2 diabetes mellitus without complication, with long-term current use of insulin ([facility])      dapagliflozin (FARXIGA) 10 mg tab tablet Take 1 Tab by mouth daily. Qty: 90 Tab, Refills: 1    Associated Diagnoses: Type 2 diabetes mellitus without complication, with long-term current use of insulin ([facility])      fexofenadine (ALLEGRA) 180 mg tablet Take 1 Tab by mouth daily. Qty: 30 Tab, Refills: 3    Associated Diagnoses: Cough; Acute seasonal allergic rhinitis, unspecified trigger      fluticasone (FLONASE) 50 mcg/actuation nasal spray 2 Sprays by Both Nostrils route daily. Qty: 1 Bottle, Refills: 2    Associated Diagnoses: Cough; Acute seasonal allergic rhinitis, unspecified trigger      insulin glargine (LANTUS SOLOSTAR) 100 unit/mL (3 mL) inpn INJECT 47 UNITS UNDER THE SKIN IN THE MORNING AND 47 UNITS IN THE EVENING ( please give 90 day supply 5 pens per box, give 6 boxes=30 pens)  Qty: 30 Pen, Refills: 3    Associated Diagnoses: Type 2 diabetes mellitus without complication, without long-term current use of insulin ([facility])      Blood-Glucose Meter monitoring kit Check BS TID. Accucheck nanno meter please  Qty: 1 Kit, Refills: 0    Associated Diagnoses: Type 2 diabetes mellitus without complication, with long-term current use of insulin ([address]      ! ! Lancets misc Check BS TID.accucheck nanno  Qty: 100 Each, Refills: 11    Associated Diagnoses: Type 2 diabetes mellitus without complication, with long-term current use of insulin ([facility])      glucose blood VI test strips (BLOOD GLUCOSE TEST) strip Check BS TID.accucheck nanno  Qty: 100 Strip, Refills: 11    Associated Diagnoses: Type 2 diabetes mellitus without complication, with long-term current use of insulin ([facility])      cholecalciferol, VITAMIN D3, (VITAMIN D3) 5,000 unit tab tablet Take  by mouth daily. ferrous sulfate (IRON) 325 mg (65 mg iron) tablet Take  by mouth Daily (before breakfast). aspirin delayed-release 81 mg tablet Take 81 mg by mouth daily. clotrimazole (LOTRIMIN) 1 % topical cream Apply  to affected area two (2) times a day. Qty: 30 g, Refills: 0    Associated Diagnoses: Candidal intertrigo      !! ONE TOUCH DELICA LANCETS 30 gauge misc CHECK BLOOD SUGAR TWICE DAILY  Qty: 60 Each, Refills: 11      Blood-Glucose Meter (CONTOUR NEXT METER) misc Check blood sugar BID  Qty: 1 Each, Refills: 0    Associated Diagnoses: DM (diabetes mellitus) ([facility])      calcium 600 mg Cap Take  by mouth. !! - Potential duplicate medications found. Please discuss with provider. STOP taking these medications       azithromycin (ZITHROMAX Z-CARLOS) 250 mg tablet Comments:   Reason for Stopping:                Follow-up Information     Follow up With Details Comments Contact Info    ALI, [address] [facility] On 6/6/2018 Hospital f/u PCP appointment Wednesday, 6/6/18 @ 1:40 p.m with Joshua Hogan  [address]  [place] [address] Leilani Manuel MD Schedule an appointment as soon as possible for a visit in 2 weeks For follow up of colon surgery [facility],  Schedule an appointment as soon as possible for a visit in 4 weeks For follow up of colon cancer [address] [facility] [address] [facility] [address]  [phone]            Future Appointments  Date Time Provider Noah Cooper   6/6/2018 1:40 PM Christofer Arteaga NP [facility]BE   7/18/2018 8:30 AM Daniel Orosco MD [address] [facility]     Final discharge diagnoses and brief hospital course  Please also refer to the admission H&P for details on the presenting problem. Alessia Jackson a 79 y.o.  female with past medical history of anemia, uterine cancer, s/p hysterectomy, type 2 diabetes mellitus, headaches, hypertension, and hyperlipidemia presented to the ED from home with chief complaint of abdominal pain.  Patient's daughter who speaks English provide translation. Yogesh Elizondo is a limited historian.  Patient's daughter provides additional history     Colonic adenocarcinoma with metastasis to liver, lung and retroperitoneal LN (POA)   - CT abdomen pelvis 5/25 shows cecal mass with metastatic disease to liver, retroperitoneal nodes, and lung. Relative small bowel obstruction. Associated dilated appendix. Associated complex right pleural effusion and small amount of ascites  - CT chest 5/31 with multiple lung metastases.  Bilateral pleural effusions, asymmetric to the right  - Right kanika colectomy on 5/27  - Pathology 5/27 shows poorly differentiated adenocarcinoma  - Hem/Onc, General surgery consulted     SBO due to colon mass (POA) - resolved  - Right hemicolectomy as above  - Advance diet per surgery  - Surgical service consulted     Leukocytosis possible reactive - resolved, no evidence of infection     Dehydration likely due to poor oral intake - resolved with IVF      Hypokalemia - monitor and replace as needed     Hyponatremia multifactorial - resolved with normal saline     DM-II - A1c 8.3, BG improving  - Continue lantus, ok to resume oral meds     HTN - BP normal  - Continue to hold home metoprolol and lisinopril     Hx of hypercholesterolemia - continue to hold home zocor     Hx of uterine ca s/p hysterectomy     Obesity - Body mass index is 31.58 kg/(m^2). FOLLOW-UP CARE RECOMMENDATIONS:    SYMPTOMS to watch for: chest pain, shortness of breath, fever, chills, nausea, vomiting, diarrhea. Your surgical site should heal well but please call your surgeon if there is any bleeding, drainage, or increasing redness or tenderness. DIET/what to eat:  Diabetic Diet    ACTIVITY:  Activity as tolerated and No driving while on narcotics    WOUND CARE: It is ok to leave your wound open to the air. You can shower and pat your wound dry but please do not take a bath or go swimming. What to do if new or unexpected symptoms occur? If you experience any of the above symptoms (or should other concerns or questions arise after discharge) please call your primary care physician. Return to the emergency room if you cannot get hold of your doctor. · It is very important that you keep your follow-up appointment(s). · Please bring discharge papers, medication list (and/or medication bottles) to your follow-up appointments for review by your outpatient provider(s). · Please check the list of medications and be sure it includes every medication (even non-prescription medications) that your provider wants you to take. · It is important that you take the medication exactly as they are prescribed. · Keep your medication in the bottles provided by the pharmacist and keep a list of the medication names, dosages, and times to be taken in your wallet. · Do not take other medications without consulting your doctor.    · If you have any questions about your medications or other instructions, please talk to your nurse or care provider before you leave the hospital.    Physical examination at discharge  Visit Vitals    /67    Pulse 87    Temp 98.3 °F (36.8 °C)    Resp 18    Ht 5' 4\" (1.626 m)    Wt 83.5 kg (184 lb)    SpO2 93%    BMI 31.58 kg/m2     Gen - NAD  HEENT - MMM  Neck - supple, full ROM  CV - RRR, no MRG  Resp - lungs CTA b/l, no wheezing, normal WOB  GI - abdomen S, NT, ND, +BS. Surgical wound clean and intact without tenderness or erythema   - no CVA tenderness, bladder non-palpable in lower abdomen  MSK - normal tone and bulk, no edema  Neuro - A&O, no focal deficits  Psych - calm and cooperative with normal affect    Pertinent imaging studies:    CT abdomen/pelvis 5/25/18  FINDINGS:   LUNG BASES: There is a small right pleural effusion I with a density measurement  of [phone]. Multiple pulmonary nodules are seen, numbering at least 2 in the  right middle lobe, 4 in the right lower lobe, 3 in the left lower lobe, and one  in the lingula. The largest of these measures about 9 mm in size. INCIDENTALLY IMAGED HEART AND MEDIASTINUM: Unremarkable. LIVER: Multiple hepatic masses are seen, the largest of which measures about 13  mm in size. GALLBLADDER: Unremarkable. SPLEEN: No mass. PANCREAS: No mass or ductal dilatation. ADRENALS: Unremarkable. KIDNEYS: No calculus or hydronephrosis. There is a multiloculated left renal 2.3  cm cyst with a Hounsfield unit measurement of 10. STOMACH: Unremarkable. SMALL BOWEL: Relative small bowel obstruction. COLON: Colonic mass measuring about 5.4 cm anterior to posterior, 6.5 cm right  to left, and 7.4 cm superior to inferior (axial image 58 coronal image 61). APPENDIX: Dilated to 13 mm. PERITONEUM: No ascites or pneumoperitoneum. RETROPERITONEUM: Retroperitoneal aortocaval nodes at the level of the inferior  kidneys which measure 13 x 20 mm in cross-sectional diameter (image axial 42 and  coronal image 80). REPRODUCTIVE ORGANS: Uterus and ovaries are surgically absent. URINARY BLADDER: No mass or calculus. BONES: No destructive bone lesion. There is disc desiccation at L3-4 and L4-5. ADDITIONAL COMMENTS: N/A     IMPRESSION  IMPRESSION: Cecal mass with metastatic disease to liver, retroperitoneal nodes,  and lung.  Relative small bowel obstruction. Associated dilated appendix. Associated complex right pleural effusion and small amount of ascites. CXR 5/27/18  FINDINGS: The cardiomediastinal and hilar contours are within normal limits. The  pulmonary vasculature is within normal limits.      Pulmonary nodules are better appreciated on the comparison CT. No evidence of  pneumonia or pneumothorax. Probable small right pleural effusion is subtle and  better appreciated on the comparison CT.      Bones are unchanged. Small bowel obstruction is partially imaged in the upper  abdomen.     IMPRESSION  IMPRESSION:     Subtle small right pleural effusion. Pulmonary nodules. Small bowel obstruction. See recent CT report. CT chest 5/31/18  FINDINGS:     THYROID: No nodule. MEDIASTINUM: No mass or lymphadenopathy. FRAN: No mass or lymphadenopathy. THORACIC AORTA: No dissection or aneurysm. MAIN PULMONARY ARTERY: Normal in caliber. TRACHEA/BRONCHI: Patent. ESOPHAGUS: No wall thickening or dilatation. HEART: Normal in size. PLEURA: Small to moderate right pleural effusion with density measurement of  15.8 HU. Mild associated subsegmental atelectasis. Minimal left pleural  effusion. LUNGS: Multiple pulmonary nodules, measuring up to 6.6 mm in the right upper  lobe (image 14), 9.2 mm in the right middle lobe (image 26), 2.5 mm in the right  lower lobe (image 22), 8.2 mm in the lingula (image 22), and 7.1 mm in the left  lower lobe (image 29). INCIDENTALLY IMAGED UPPER ABDOMEN: Multiple hepatic hypodensities, too small to  further characterize. BONES: No destructive bone lesion.     IMPRESSION  IMPRESSION:  Multiple lung metastases. Multiple hepatic hypodensities, too small to further  characterize. Bilateral pleural effusions, asymmetric to the right.       Recent Labs      06/01/18   0615  05/31/18   0545   WBC  10.8  11.9*   HGB  11.5  11.3*   HCT  37.6  35.2   PLT  294  317     Recent Labs      06/01/18   0615   NA  140   K  3.4*   CL  108 CO2  24   BUN  4*   CREA  0.18*   GLU  82   CA  8.9     No results for input(s): SGOT, GPT, AP, TBIL, TP, ALB, GLOB, GGT, AML, LPSE in the last 72 hours. No lab exists for component: AMYP, HLPSE  No results for input(s): INR, PTP, APTT in the last 72 hours. No lab exists for component: INREXT   No results for input(s): FE, TIBC, PSAT, FERR in the last 72 hours. No results for input(s): PH, PCO2, PO2 in the last 72 hours. No results for input(s): CPK, CKMB in the last 72 hours. No lab exists for component: TROPONINI  No components found for: [place]    Chronic Diagnoses:    Problem List as of 6/2/2018  Date Reviewed: 5/27/2018          Codes Class Noted - Resolved    S/P right colectomy ICD-10-CM: Z90.49  ICD-9-CM: V45.89  5/27/2018 - Present    Overview Signed 5/29/2018  8:25 AM by Justin Amaral MD     Right hemicolectomy for cecal mass with partial SBO, [place]             Leukocytosis ICD-10-CM: U64.710  ICD-9-CM: 288.60  5/26/2018 - Present        Abdominal pain ICD-10-CM: R10.9  ICD-9-CM: 789.00  5/26/2018 - Present        Type 2 diabetes mellitus with hyperglycemia ([facility] ICD-10-CM: E11.65  ICD-9-CM: 250.00  5/26/2018 - Present        * (Principal)Malignant neoplasm of ascending colon ([facility] ICD-10-CM: C18.2  ICD-9-CM: 153.6  5/26/2018 - Present    Overview Signed 5/31/2018  3:49 PM by Justin Amaral MD     Results for Patience Head (MRN [id number]) as of 5/31/2018 15:41   Ref.  Range 5/31/2018 13:44   CEA Latest Units: ng/mL 28.4                Advance care planning ICD-10-CM: Z71.89  ICD-9-CM: V65.49  10/4/2017 - Present        Mixed hyperlipidemia ICD-10-CM: E78.2  ICD-9-CM: 272.2  9/18/2014 - Present        HTN (hypertension) ICD-10-CM: I10  ICD-9-CM: 401.9  5/23/2013 - Present        DM (diabetes mellitus) ([facility] ICD-10-CM: E11.9  ICD-9-CM: 250.00  5/23/2013 - Present        RESOLVED: SBO (small bowel obstruction) ([facility] ICD-10-CM: R40.092  ICD-9-CM: 560.9  5/26/2018 - 5/27/2018 Time spent on discharge related activities today greater than 30 minutes.       Signed:  Oscar Vargas MD                 Hospitalist, Internal Medicine      Cc: [address] Nurse Navigator (Inactive)

## 2018-06-02 NOTE — DISCHARGE INSTRUCTIONS
1.  Do not drive while on pain medication. You should take a stool softener while on pain medication to prevent constipation. 2.  Do not lift anything greater than 10 pounds for 2 weeks. 3.  You may resume your home medications. 4.  You may shower but do not swim or soak in tub for 2-3 weeks. 5.  Please call [phone] to schedule a follow-up with Dr. Christiana Frey within 2 weeks. Please bring this form with you to show your primary care provider at your follow-up appointment. Primary care provider:  Dr. Giles Gomez Nurse Navigator (Inactive)    Discharging provider:  Jorge Luis Glover MD    You have been admitted to the hospital with the following diagnoses:  · SBO (small bowel obstruction) ([address]  · Colon cancer    FOLLOW-UP CARE RECOMMENDATIONS:    APPOINTMENTS:  Follow-up Information     Follow up With Details Comments Contact Zehra    ALI, [address] On 6/6/2018 Hospital f/u PCP appointment Wednesday, 6/6/18 @ 1:40 p.m with Kelly Ramos NP [address] [facility] [address]  [phone]      Cindy Knutson MD Schedule an appointment as soon as possible for a visit in 2 weeks For follow up of colon surgery [address] [facility] [address]  [address] Jelenahitrinidad Nieves DO Schedule an appointment as soon as possible for a visit in 4 weeks For follow up of colon cancer [address]  [phone]           Future Appointments  Date Time Provider Noah Cooper   6/6/2018 1:40 PM Nancy Cai NP [facility]   7/18/2018 8:30 AM Fifi Rogers  [address] to watch for: chest pain, shortness of breath, fever, chills, nausea, vomiting, diarrhea. Your surgical site should heal well but please call your surgeon if there is any bleeding, drainage, or increasing redness or tenderness.     DIET/what to eat:  Diabetic Diet    ACTIVITY:  Activity as tolerated and No driving while on narcotics    WOUND CARE: It is ok to leave your wound open to the air. You can shower and pat your wound dry but please do not take a bath or go swimming. What to do if new or unexpected symptoms occur? If you experience any of the above symptoms (or should other concerns or questions arise after discharge) please call your primary care physician. Return to the emergency room if you cannot get hold of your doctor. · It is very important that you keep your follow-up appointment(s). · Please bring discharge papers, medication list (and/or medication bottles) to your follow-up appointments for review by your outpatient provider(s). · Please check the list of medications and be sure it includes every medication (even non-prescription medications) that your provider wants you to take. · It is important that you take the medication exactly as they are prescribed. · Keep your medication in the bottles provided by the pharmacist and keep a list of the medication names, dosages, and times to be taken in your wallet. · Do not take other medications without consulting your doctor. · If you have any questions about your medications or other instructions, please talk to your nurse or care provider before you leave the hospital.    I understand that if any problems occur once I am at home I am to contact my physician. These instructions were explained to me and I had the opportunity to ask questions. Tricia Christian a oficina de Dr. Mary Meehan [phone] para programar salazar savanna de siguimiento     Resección intestinal abierta: Exie Ayse en el hogar - [ Open Bowel Resection: What to Expect at Home ]  Salazar recuperación    Es probable que tenga dolor que aparece y desaparece byron los calvin siguientes a la cirugía intestinal. Podría tener cólicos intestinales y el elsy (incisión) le podría doler. También podría sentirse reyes si tuviera gripe. Podría tener un poco de fiebre y sentirse fatigado y con náuseas. Van es común.  Dois Nestor sentirse mejor después de gordo semana y probablemente vuelva a la normalidad en 2 a 3 semanas. Esta hoja de [employer] idea general del tiempo que le llevará recuperarse. Sin embargo, cada persona se recupera a un ritmo diferente. Siga los pasos que se mencionan a continuación para recuperarse lo más rápido posible. ¿Cómo puede cuidarse en el hogar? Actividad  ? · Descanse cuando se sienta fatigado. Dormir lo suficiente le ayudará a recuperarse. ? · Intente caminar todos los días. Comience caminando un poco más de lo que caminó el día anterior. Poco a poco, aumente la distancia. Caminar aumenta el flujo de [place] a prevenir la neumonía y el estreñimiento. ? · Evite actividades vigorosas, reyes montar en bicicleta, trotar, levantar pesas o hacer ejercicios aeróbicos, hasta que cabrera médico lo apruebe. ? · Pregúntele a cabrera médico cuándo puede volver a conducir. ? · Es probable que necesite ausentarse del trabajo de 3 a 4 semanas. Sterrett dependerá del tipo de trabajo que salina y cómo se sienta. Es posible que necesite ausentarse entre 4 y 6 semanas si cabrera trabajo implica levantar objetos pesados. ? · Puede ducharse entre 24 y 50 horas después de la [place], si cabrera médico lo [place]. Seque el elsy (incisión) con chio springer de toalla. No tome concetta de inmersión en [facility] las primeras 2 semanas o hasta que cabrera médico lo apruebe. ? · Pregúntele a cabrera médico cuándo puede tener [employer]. Alimentación  ? · Es posible que no tenga mucho apetito después de la [place]. Catracho trate de alimentarse en forma saludable. Cabrera médico le informará sobre cualquier alimento que no debería consumir. ? · Siga gordo dieta con bajo contenido en fibra byron varias semanas después de la [facility]. Consuma varias comidas pequeñas a lo neyda del día. Poco a poco, agregue alimentos con alto contenido en fibra. ? · [employer]. Aporta bacterias benéficas al colon y [place] a prevenir la diarrea. ? · Intente evitar las nueces, las semillas y el maíz (elote) byron un tiempo. Podrían ser difíciles de digerir. ? · Quizás necesite myra vitaminas que contengan sodio y Steve. Pregúntele a cabrera médico.   ? · Joyce abundante líquido para evitar la deshidratación. Medicamentos  ? · Cabrera médico le dirá si puede volver a myra delfin medicamentos y cuándo puede volver a hacerlo. También le dará indicaciones sobre cualquier medicamento nuevo que deba mrya usted. ? · [employer] (medicamentos para el dolor) exactamente reyse le indicaron. ¨ Si el médico le recetó un analgésico, tómelo según las instruciones. myra ibuprofeno según lo indicado y combinarlo con oxicodona cada 6 horas según sea necesario    ¨ Si no está tomando un analgésico recetado, pregúntele a cabrera médico si puede myra miguel de [employer]. ¨ No tome dos o más analgésicos al mismo tiempo a menos que el médico se lo haya indicado. Muchos analgésicos contienen acetaminofén, es decir, Tylenol. El exceso de acetaminofén (Tylenol) puede ser dañino. ? · Si le parece que el analgésico le está produciendo revoltura del estómago:  ¨ Matlock el medicamento después de las comidas (a menos que cabrera médico le indique lo contrario). ¨ Pídale al médico un analgésico diferente. ? · Es posible que tenga que myra algunos medicamentos en otra forma. Se le indicará si tiene que [employer] o myra formas líquidas del [place]. ? · Si el médico le receta un ablandador de heces, tómelo según las indicaciones. ?Cuidado de la herida  ? · Si tiene tiras de cinta [employer] herida, déjeselas puestas byron gordo semana o hasta que se caigan por sí solas. ? · Lave la sami diariamente con Edwena Pion tibia y séquela con toques suaves de toalla. La atención de seguimiento es gordo parte clave de cabrera tratamiento y seguridad. Asegúrese de hacer y acudir a todas las citas, y llame a cabrera médico si está teniendo problemas.  También es [facility] buena idea saber los [employer] exámenes y mantener gordo lista de los medicamentos que charles. ¿Cuándo debe pedir ayuda? Llame al 911 en cualquier momento que considere que necesita atención de emergencia. Por ejemplo, llame si:  ? · Se desmayó (perdió el conocimiento). ? · Tiene dolor repentino en el pecho y falta de [place], o tose jermaine. ? · Tiene dolor intenso en el estómago. ? Llame a cabrera médico ahora mismo o busque atención médica inmediata si:  ? · Siente el estómago revuelto y no puede beber líquidos ni mantenerlos en el estómago. ? · Tiene señales de un coágulo de jermaine, tales reyes:  ¨ Dolor en la pantorrilla, el muslo, la abigail o detrás de la rodilla. ¨ Enrojecimiento e hinchazón en la pierna o la abigail. ? · Tiene diarrea intensa que huele muy mal.   ? · Tiene problemas para orinar o evacuar el intestino, en especial si tiene dolor leve o hinchazón en la parte baja del abdomen. ? · Tiene señales de infección, tales reyes:  ¨ Aumento del dolor, la hinchazón, el enrojecimiento o la temperatura. ¨ Vetas rojizas que salen de la herida. ¨ Pus que supura de la herida. Arb[place] Sat. ? · El dolor no mejora después de myra analgésicos. ? · Tiene puntos de sutura flojos o se abre la herida. ? · Está sangrando o tiene un nuevo drenaje por la herida. ?Preste especial atención a cualquier cambio en cabrera charan y asegúrese de comunicarse con cabrera médico si:  ? · No evacua el intestino después de myra un laxante. ? · No mejora reyes se esperaba. ¿Dónde puede encontrar más información en inglés? Cynda Boast a [email or web address]://[email or web address]-freddie.info/. Lenin Silvestre T334 en la búsqueda para aprender más acerca de \"Resección intestinal abierta: Niurka Medina en el hogar - [ Open Bowel Resection: What to Expect at Home ]. \"  Revisado: [address] [place], 2017  Versión del contenido: 11.4  © 2686-1182 Healthwise, Incorporated.  Las instrucciones de cuidado fueron adaptadas bajo licencia por [employer] (which disclaims liability or warranty for this information). Si usted tiene [place] afección médica o sobre estas instrucciones, siempre pregunte a cabrera profesional de charan. [facility] niega toda garantía o responsabilidad por cabrera uso de esta información.

## 2018-06-02 NOTE — PROGRESS NOTES
Bedside shift change report given to Joanne Aiken RN (oncoming nurse) by Evette Garcia RN (offgoing nurse). Report included the following information SBAR, Kardex, Intake/Output, MAR and Recent Results.

## 2018-06-02 NOTE — PROGRESS NOTES
Progress Note    Patient: Quan Luevano MRN: [id number]  SSN: [id number]    YOB: 1950  Age: 79 y.o. Sex: female      Admit Date: 2018    6 Days Post-Op    Procedure:  Procedure(s):  RIGHT HEMICOLECTOMY    Subjective:     No acute surgical issues. Pt tolerating diet. No nausea or vomiting. Pain is under control. Pt is passing flatus. Objective:     Visit Vitals    /67    Pulse 87    Temp 98.3 °F (36.8 °C)    Resp 18    Ht 5' 4\" (1.626 m)    Wt 184 lb (83.5 kg)    SpO2 93%    BMI 31.58 kg/m2       Temp (24hrs), Av.2 °F (36.8 °C), Min:98 °F (36.7 °C), Max:98.5 °F (36.9 °C)        Physical Exam:    Gen:  NAD  Pulm:  Unlabored  Abd:  S/mildly distended/appropriate TTP  Wound:  C/D/I      Assessment:     Hospital Problems  Date Reviewed: 2018          Codes Class Noted POA    S/P right colectomy ICD-10-CM: Z90.49  ICD-9-CM: V45.89  2018 No    Overview Signed 2018  8:25 AM by Nydia Saravia MD     Right hemicolectomy for cecal mass with partial SBO, Ndiaye             Leukocytosis ICD-10-CM: P18.359  ICD-9-CM: 288.60  2018 Yes        Abdominal pain ICD-10-CM: R10.9  ICD-9-CM: 789.00  2018 Yes        Type 2 diabetes mellitus with hyperglycemia (HCC) ICD-10-CM: E11.65  ICD-9-CM: 250.00  2018 Yes        * (Principal)Malignant neoplasm of ascending colon (HCC) ICD-10-CM: C18.2  ICD-9-CM: 153.6  2018 Yes    Overview Signed 2018  3:49 PM by Nydia Saravia MD     Results for Dennie Channel (MRN [id number]) as of 2018 15:41   Ref.  Range 2018 13:44   CEA Latest Units: ng/mL 28.4                HTN (hypertension) ICD-10-CM: I10  ICD-9-CM: 401.9  2013 Yes              Plan/Recommendations/Medical Decision Making:     - Pain control  - Diet as tolerated  - Encourage ambulation  - Pt is being discharged  - Follow-up with Dr. Wilder العراقي in 2 weeks    Signed By: Gilbert Valles MD     2018

## 2018-06-04 NOTE — PROGRESS NOTES
Received notification of patient's discharge from [facility]. Per patient's EMR patient discharged 6/2/2018 dx malignant neoplasm of ascending colon. Outgoing call placed spoke to patient's spouse as patient's first language is Maltese. Patient identified utilizing 2 identifiers, introduced self and reason for the call given. Patient's spouse declined hospital follow up appointment with Jose Perales NP. Offered to schedule a follow up appointment with Dr. Hernández patient's declined stating he may schedule an appointment next week. Encourage Mr Oscar Zendejas to schedule follow up appointment with Dr. Eneida Murray (surgeon) and Dr. Eileen Ruiz (oncologist) staff message sent to [facility] oncology nurse navigator.

## 2018-06-08 PROBLEM — T14.8XXA WOUND INFECTION: Status: ACTIVE | Noted: 2018-01-01

## 2018-06-08 PROBLEM — L08.9 WOUND INFECTION: Status: ACTIVE | Noted: 2018-01-01

## 2018-06-08 NOTE — TELEPHONE ENCOUNTER
Patient's  would like to speak with regarding the patient's incision from surgery she had on 5-27-18. .. He says the incision is starting to become red and swollen. .. He was told to keep a lookout for that happening.

## 2018-06-08 NOTE — TELEPHONE ENCOUNTER
Advised by family member that belly button area is red, no drainage, fevers present. Advised patient could be a possible allergic reaction to dermabond. But he would like to speak with Dr. Mendoza Section will pass message along.

## 2018-06-08 NOTE — IP AVS SNAPSHOT
[address] [facility] [address] [place] [address] 
[phone] Patient: Juan Goins MRN: [id number] MWV:[id number] A check rose indicates which time of day the medication should be taken. My Medications START taking these medications Instructions Each Dose to Equal  
 Morning Noon Evening Bedtime  
 levoFLOXacin 500 mg tablet Commonly known as:  Vishal Umair Your last dose was: Your next dose is: Take 1 Tab by mouth daily for 10 days. 500 mg  
    
   
   
   
  
 metroNIDAZOLE 500 mg tablet Commonly known as:  FLAGYL Your last dose was: Your next dose is: Take 1 Tab by mouth three (3) times daily for 10 days. 500 mg CONTINUE taking these medications Instructions Each Dose to Equal  
 Morning Noon Evening Bedtime  
 albuterol 90 mcg/actuation inhaler Commonly known as:  PROVENTIL HFA, VENTOLIN HFA, PROAIR HFA Your last dose was: Your next dose is: Take 1 Puff by inhalation every six (6) hours as needed for Wheezing. 1 Puff  
    
   
   
   
  
 ammonium lactate 12 % topical cream  
Commonly known as:  AMLACTIN Your last dose was: Your next dose is:    
   
   
 APPLY 2 GRAMS TO AFFECTED AREA TWICE A DAY. RUB IN TO AFFECTED AREA WELL  
     
   
   
   
  
 aspirin delayed-release 81 mg tablet Your last dose was: Your next dose is: Take 81 mg by mouth daily. 81 mg  
    
   
   
   
  
 calcium 600 mg Cap Your last dose was: Your next dose is: Take  by mouth. cholecalciferol (VITAMIN D3) 5,000 unit Tab tablet Commonly known as:  VITAMIN D3 Your last dose was: Your next dose is: Take  by mouth daily.   
     
   
   
   
  
 clotrimazole 1 % topical cream  
Commonly known as:  Donna Donohue  
   
 Your last dose was: Your next dose is:    
   
   
 Apply  to affected area two (2) times a day. dapagliflozin 10 mg Tab tablet Commonly known as:  [employer] Your last dose was: Your next dose is: Take 1 Tab by mouth daily. 10 mg  
    
   
   
   
  
 fexofenadine 180 mg tablet Commonly known as:  Jurgen Gutierrez Your last dose was: Your next dose is: Take 1 Tab by mouth daily. 180 mg  
    
   
   
   
  
 fluticasone 50 mcg/actuation nasal spray Commonly known as:  Loree Altman Your last dose was: Your next dose is: 2 Sprays by Both Nostrils route daily. 2 Spray  
    
   
   
   
  
 insulin glargine 100 unit/mL (3 mL) Inpn Commonly known as:  LANTUS SOLOSTAR U-100 INSULIN Your last dose was: Your next dose is: INJECT 47 UNITS UNDER THE SKIN IN THE MORNING AND 47 UNITS IN THE EVENING ( please give 90 day supply 5 pens per box, give 6 boxes=30 pens) Iron 325 mg (65 mg iron) tablet Generic drug:  ferrous sulfate Your last dose was: Your next dose is: Take  by mouth Daily (before breakfast). lisinopril 10 mg tablet Commonly known as:  Adwoa Gunnison Your last dose was: Your next dose is: Take 1 Tab by mouth daily. 10 mg  
    
   
   
   
  
 metoprolol tartrate 50 mg tablet Commonly known as:  LOPRESSOR Your last dose was: Your next dose is: TAKE 1 TABLET BY MOUTH TWICE DAILY  
     
   
   
   
  
 oxyCODONE IR 5 mg immediate release tablet Commonly known as:  Tin Felipe Your last dose was: Your next dose is: Take 1 Tab by mouth every eight (8) hours as needed. Max Daily Amount: 15 mg.  
 5 mg sAXagliptin-metFORMIN 5-1,000 mg Tm24 Commonly known as:  KOMBIGLYZE XR  
   
 Your last dose was: Your next dose is: Take 1 tab po QD  
     
   
   
   
  
 senna-docusate 8.6-50 mg per tablet Commonly known as:  Mgai Melendez Your last dose was: Your next dose is: Take 1 Tab by mouth two (2) times daily as needed for Constipation. Can be obtained over-the-counter 1 Tab  
    
   
   
   
  
 simvastatin 20 mg tablet Commonly known as:  ZOCOR Your last dose was: Your next dose is: Take 1 Tab by mouth nightly. 20 mg Where to Get Your Medications These medications were sent to [address] [place] [address] 12, [address] [address] Phone:  [phone]  
  levoFLOXacin 500 mg tablet  
 metroNIDAZOLE 500 mg tablet

## 2018-06-08 NOTE — TELEPHONE ENCOUNTER
Call pt's . I had tried earlier but went straight to  and mailbox was full. He says the incision was bleeding so he cleaned with H2O2. Also says that there is area of the incision that \"looks like a pimple\" with pus. He says she has not had fever. She describes burning around the site when palpated. I explained to  that I cannot assess her over the phone. He then asked, \"Should I give her antibiotics in the mean time? \" I asked what antibiotics he had. \"I have amoxicillin. \" I asked where he got it. \"That doesn't matter. \" I explained that it would not be good to give her the antibiotics because it was not prescribed for her and no one has examined the wound. He then noted that she has an appointment with me next Monday or Tuesday and asked if she could wait until then because she does not have a fever. I told that I can't answer that because I can't see the incision. Based on his description it is concerning and she should be seen. I also told him that our office is already closed and that she should go to ED. He asked again if she can wait to be seen. I told him that would his call because I cannot evaluate over the phone.

## 2018-06-08 NOTE — IP AVS SNAPSHOT
[facility] [facility] 
[phone] Patient: Matthew Gomez MRN: [id number] About your hospitalization You were admitted on:  June 8, 2018 You last received care in the:  [address] [place] [address] You were discharged on:  June 13, 2018 Why you were hospitalized Your primary diagnosis was:  Superficial Postoperative Wound Infection Your diagnoses also included:  Dm (Diabetes Mellitus) (Hcc), Htn (Hypertension), Malignant Neoplasm Of Ascending Colon (Hcc), S/P Right Colectomy, Hypokalemia, Methicillin Resistant Staphylococcus Epidermidis Infection, Open Wound Of Anterior Abdominal Wall With Complication Follow-up Information Follow up With Details Comments Contact Info Li Sinument 227  Please call [phone] if you haven't heard from agency by 10AM the day after discharge. [address] [facility] [address] 
[phone] OPAL VIERA   [address] [facility] [address] [facility] 
[phone] Your Scheduled Appointments Monday June 25, 2018 11:00 AM EDT New Patient with Ruy Haro  Formerly [facility], [facility] at [facility]JACINTA [facility] [address] [facility] [address] [facility] Jonah Nguyen 13  
[phone] Wednesday July 18, 2018  8:30 AM EDT ROUTINE CARE with Alex Soria MD  
[facility] Internal Medicine [address] [facility] [address] [facility] [address] 102 [facility]  
[phone] Discharge Orders None A check rose indicates which time of day the medication should be taken. My Medications START taking these medications Instructions Each Dose to Equal  
 Morning Noon Evening Bedtime  
 levoFLOXacin 500 mg tablet Commonly known as:  Valeria Sagastume Your last dose was: Your next dose is: Take 1 Tab by mouth daily for 10 days.   
 500 mg  
    
   
   
   
  
 metroNIDAZOLE 500 mg tablet Commonly known as:  FLAGYL Your last dose was: Your next dose is: Take 1 Tab by mouth three (3) times daily for 10 days. 500 mg CONTINUE taking these medications Instructions Each Dose to Equal  
 Morning Noon Evening Bedtime  
 albuterol 90 mcg/actuation inhaler Commonly known as:  PROVENTIL HFA, VENTOLIN HFA, PROAIR HFA Your last dose was: Your next dose is: Take 1 Puff by inhalation every six (6) hours as needed for Wheezing. 1 Puff  
    
   
   
   
  
 ammonium lactate 12 % topical cream  
Commonly known as:  AMLACTIN Your last dose was: Your next dose is:    
   
   
 APPLY 2 GRAMS TO AFFECTED AREA TWICE A DAY. RUB IN TO AFFECTED AREA WELL  
     
   
   
   
  
 aspirin delayed-release 81 mg tablet Your last dose was: Your next dose is: Take 81 mg by mouth daily. 81 mg  
    
   
   
   
  
 calcium 600 mg Cap Your last dose was: Your next dose is: Take  by mouth. cholecalciferol (VITAMIN D3) 5,000 unit Tab tablet Commonly known as:  VITAMIN D3 Your last dose was: Your next dose is: Take  by mouth daily. clotrimazole 1 % topical cream  
Commonly known as:  Wagner Mighty Your last dose was: Your next dose is:    
   
   
 Apply  to affected area two (2) times a day. dapagliflozin 10 mg Tab tablet Commonly known as:  [employer] Your last dose was: Your next dose is: Take 1 Tab by mouth daily. 10 mg  
    
   
   
   
  
 fexofenadine 180 mg tablet Commonly known as:  Dante Hurtado Your last dose was: Your next dose is: Take 1 Tab by mouth daily. 180 mg  
    
   
   
   
  
 fluticasone 50 mcg/actuation nasal spray Commonly known as:  Deana Valdes Your last dose was: Your next dose is: 2 Sprays by Both Nostrils route daily. 2 Spray  
    
   
   
   
  
 insulin glargine 100 unit/mL (3 mL) Inpn Commonly known as:  LANTUS SOLOSTAR U-100 INSULIN Your last dose was: Your next dose is: INJECT 47 UNITS UNDER THE SKIN IN THE MORNING AND 47 UNITS IN THE EVENING ( please give 90 day supply 5 pens per box, give 6 boxes=30 pens) Iron 325 mg (65 mg iron) tablet Generic drug:  ferrous sulfate Your last dose was: Your next dose is: Take  by mouth Daily (before breakfast). lisinopril 10 mg tablet Commonly known as:  Velora Selwyn Your last dose was: Your next dose is: Take 1 Tab by mouth daily. 10 mg  
    
   
   
   
  
 metoprolol tartrate 50 mg tablet Commonly known as:  LOPRESSOR Your last dose was: Your next dose is: TAKE 1 TABLET BY MOUTH TWICE DAILY  
     
   
   
   
  
 oxyCODONE IR 5 mg immediate release tablet Commonly known as:  Jaquan Allegra Your last dose was: Your next dose is: Take 1 Tab by mouth every eight (8) hours as needed. Max Daily Amount: 15 mg.  
 5 mg sAXagliptin-metFORMIN 5-1,000 mg Tm24 Commonly known as:  KOMBIGLYZE XR Your last dose was: Your next dose is: Take 1 tab po QD  
     
   
   
   
  
 senna-docusate 8.6-50 mg per tablet Commonly known as:  Anastacia Costa Your last dose was: Your next dose is: Take 1 Tab by mouth two (2) times daily as needed for Constipation. Can be obtained over-the-counter 1 Tab  
    
   
   
   
  
 simvastatin 20 mg tablet Commonly known as:  ZOCOR Your last dose was: Your next dose is: Take 1 Tab by mouth nightly. 20 mg Where to Get Your Medications These medications were sent to [address] [place] [address] 12, 060 [facility] [address] Phone:  [phone]  
  levoFLOXacin 500 mg tablet  
 metroNIDAZOLE 500 mg tablet Opioid Education Prescription Opioids: What You Need to Know: 
 
Prescription opioids can be used to help relieve moderate-to-severe pain and are often prescribed following a surgery or injury, or for certain health conditions. These medications can be an important part of treatment but also come with serious risks. Opioids are strong pain medicines. Examples include hydrocodone, oxycodone, fentanyl, and morphine. Heroin is an example of an illegal opioid. It is important to work with your health care provider to make sure you are getting the safest, most effective care. WHAT ARE THE RISKS AND SIDE EFFECTS OF OPIOID USE? Prescription opioids carry serious risks of addiction and overdose, especially with prolonged use. An opioid overdose, often marked by slow breathing, can cause sudden death. The use of prescription opioids can have a number of side effects as well, even when taken as directed. · Tolerance-meaning you might need to take more of a medication for the same pain relief · Physical dependence-meaning you have symptoms of withdrawal when the medication is stopped. Withdrawal symptoms can include nausea, sweating, chills, diarrhea, stomach cramps, and muscle aches. Withdrawal can last up to several weeks, depending on which drug you took and how long you took it. · Increased sensitivity to pain · Constipation · Nausea, vomiting, and dry mouth · Sleepiness and dizziness · Confusion · Depression · Low levels of testosterone that can result in lower sex drive, energy, and strength · Itching and sweating RISKS ARE GREATER WITH:      
· History of drug misuse, substance use disorder, or overdose · Mental health conditions (such as depression or anxiety) · Sleep apnea · Older age (72 years or older) · Pregnancy Avoid alcohol while taking prescription opioids. Also, unless specifically advised by your health care provider, medications to avoid include: · Benzodiazepines (such as Xanax or Valium) · Muscle relaxants (such as Soma or Flexeril) · Hypnotics (such as Ambien or Lunesta) · Other prescription opioids KNOW YOUR OPTIONS Talk to your health care provider about ways to manage your pain that don't involve prescription opioids. Some of these options may actually work better and have fewer risks and side effects. Options may include: 
· Pain relievers such as acetaminophen, ibuprofen, and naproxen · Some medications that are also used for depression or seizures · Physical therapy and exercise · Counseling to help patients learn how to cope better with triggers of pain and stress. · Application of heat or cold compress · Massage therapy · Relaxation techniques Be Informed Make sure you know the name of your medication, how much and how often to take it, and its potential risks & side effects. IF YOU ARE PRESCRIBED OPIOIDS FOR PAIN: 
· Never take opioids in greater amounts or more often than prescribed. Remember the goal is not to be pain-free but to manage your pain at a tolerable level. · Follow up with your primary care provider to: · Work together to create a plan on how to manage your pain. · Talk about ways to help manage your pain that don't involve prescription opioids. · Talk about any and all concerns and side effects. · Help prevent misuse and abuse. · Never sell or share prescription opioids · Help prevent misuse and abuse. · Store prescription opioids in a secure place and out of reach of others (this may include visitors, children, friends, and family).  
· Safely dispose of unused/unwanted prescription opioids: Find your community drug take-back program or your pharmacy mail-back program, or flush them down the toilet, following guidance from the Food and Drug Administration (www.fda.gov/Drugs/ResourcesForYou). · Visit www.cdc.gov/drugoverdose to learn about the risks of opioid abuse and overdose. · If you believe you may be struggling with addiction, tell your health care provider and ask for guidance or call [facility] at 0-970-302-DASW. Discharge Instructions Patient Discharge Instructions [facility] / [id number] : 1950 Admitted 2018 Discharged: 2018 Instrucciones del paciente al descargar Es importante que se tome el medicamento exactamente reyes se prescriben. Mantenga cabrera medicamento en los frascos facilitados por el farmacéutico y mantener gordo lista de los nombres de [place], dosis y tiempos que tener en cabrera cartera. No tome otros medicamentos sin consultar a cabrera [place] Franc[place]a hacer en el hogar Dieta recomendada: dieta diabetica Actividad recomendada: no levantar objetos pesados byron 4 semanas, es posible usar las escaleras Si usted experimenta cualquiera de los siguientes síntomas a continuación, llame a la oficina del doctor Delvis [phone]. Micky Leida Edmondson en [facility]. Llame a la oficina para programar cabrera savanna. La información obtenida a través de: 
 
Entiendo que si se produce algún problema gordo vez que estoy en casa estoy en contacto con mi médico. 
 
 
______________________________________________________________ Entiendo y reconozco recibo [address] instrucciones. ______________________________________________________________ Cierre asistido por vacío para sanar heridas: Instrucciones de cuidado - [ Shalom Elizondo Closure for Wound Healing: Care Instructions ] Instrucciones de cuidado Cuando usted tiene gordo herida que es difícil de cerrar, cabrera médico podría tratarla con cierre asistido por vacío (VAC, por heena siglas en inglés). El VAC [place] presión negativa (succión) para juntar los bordes de la herida. También elimina el líquido y el tejido muerto de la sami de la herida. En el VAC: 
· Se coloca gordo pieza especial de espuma o gordo gasa de algodón sobre la herida. Apex cubre y protege la herida. Un vendaje transparente (vendaje de película) sobresale varias pulgadas del vendaje de espuma o gasa para crear un sello para el vacío. · Un tubo conecta la espuma a gordo pequeña máquina llamada unidad de terapia. La unidad de terapia crea la succión. · El sistema VAC puede ser portátil o estacionario. El VAC no causa dolor. Es posible que sienta un leve tirón en la herida cuando se inicie por primera vez el tratamiento. El tiempo que deberá someterse al VAC depende del tamaño y el tipo de herida, y del funcionamiento del [facility]. Mientras josue la herida, usted estará limitado en lo que puede hacer. Usará el VAC 24 horas al día. La atención de seguimiento es gordo parte clave de cabrera tratamiento y seguridad. Asegúrese de hacer y acudir a todas las citas, y llame a cabrera médico si está teniendo problemas. También es gordo buena idea saber los resultados de los exámenes y mantener gordo lista de los medicamentos que charles. Cómo puede cuidarse en el [facility]? · Un trabajador de atención médica en el [facility] irá a cabrera casa algunas veces a la semana para cambiar el vendaje y revisar la máquina. Es posible que tenga que cambiarlo con más frecuencia si hay gordo gran cantidad de secreción. · Cabrera médico le dará información sobre lo que puede hacer y lo que no. Apex depende del lugar de la herida. Heena actividades podrían estar limitadas byron el tiempo que [place]. · Podrá myra concetta de esponja. No se duche ni tome concetta de inmersión a menos que el médico lo autorice. · [employer] analgésicos (medicamentos para el dolor) exactamente según las indicaciones. ¨ Si el médico le recetó un analgésico, tómelo según las indicaciones. ¨ Si no está tomando un analgésico recetado, pregúntele a cabrera médico si puede myra miguel de [employer]. · Si cabrera médico le recetó antibióticos, tómelos según las indicaciones. No deje de tomarlos por el hecho de sentirse mejor. Debe myra todos los antibióticos hasta terminarlos. Cuándo debe pedir ayuda? Llame al 911 en cualquier momento que considere que necesita atención de [place]. Por ejemplo, llame si: 
? · Sangra mucho o ve un cambio repentino en el color o la textura de la secreción. ? · La herida se abre y se puede olya los órganos debajo de la piel (evisceración). ?Llame a cabrera médico ahora mismo o busque atención médica inmediata si: 
? · La herida comienza a sangrar. ? · El vendaje se desprende. Cubra la sami con un vendaje estéril hasta que pueda olya a cabrera médico o hasta que llegue el trabajador de atención médica a cabrera hogar. ? · [address], tales reyes: ¨ Aumento del dolor, la hinchazón, el enrojecimiento o la temperatura alrededor de la herida. ¨ Vetas rojizas que salen de la herida. ¨ Pus que sale de la herida. Sharlynn Riedel. ?Preste especial atención a los cambios en cabrera charan y asegúrese de comunicarse con cabrera médico si: 
? · El ruido de la [place] o sube de [place]. [place] puede significar que el sello está roto o que la máquina no está produciendo suficiente succión. Dónde puede encontrar más información en inglés? Kelsey Steele a [email or web address]://[email or web address].[email or web address] Elizabeth [place] Z905 en la búsqueda para aprender más acerca de \"Cierre asistido por vacío para sanar heridas: Instrucciones de cuidado - [ Sushila Munoz for Wound Healing: Care Instructions ]. \" 
Revisado: 20 marzo, 2017 Versión del contenido: 11.4 © 9028-6400 Healthwise, [employer]  Las instrucciones de cuidado fueron adaptadas bajo licencia por [employer] (which disclaims liability or warranty for this information). Si usted tiene [place] afección médica o sobre estas instrucciones, siempre pregunte a cabrera profesional de charan. [employer] niega toda garantía o responsabilidad por cabrera uso de esta información. Introducing [facility]! [facility] introduces Medesen patient portal. Now you can access parts of your medical record, email your doctor's office, and request medication refills online. 1. In your internet browser, go to [email or web address] 2. Click on the First Time User? Click Here link in the Sign In box. You will see the New Member Sign Up page. 3. Enter your Medesen Access Code exactly as it appears below. You will not need to use this code after youve completed the sign-up process. If you do not sign up before the expiration date, you must request a new code. · Medesen Access Code: [email or web address] Expires: 8/30/2018 12:47 PM 
 
4. Enter the last four digits of your Social Security Number (xxxx) and Date of Birth (mm/dd/yyyy) as indicated and click Submit. You will be taken to the next sign-up page. 5. Create a Medesen ID. This will be your Medesen login ID and cannot be changed, so think of one that is secure and easy to remember. 6. Create a Medesen password. You can change your password at any time. 7. Enter your Password Reset Question and Answer. This can be used at a later time if you forget your password. 8. Enter your e-mail address. You will receive e-mail notification when new information is available in [address] 9. Click Sign Up. You can now view and download portions of your medical record. 10. Click the Download Summary menu link to download a portable copy of your medical information. If you have questions, please visit the Frequently Asked Questions section of the Medesen website. Remember, Medesen is NOT to be used for urgent needs. For medical emergencies, dial 911. Now available from your iPhone and Android! Introducing Malcom Bardales As a Billy moriama patient, I wanted to make you aware of our electronic visit tool called Malcom Bardales. Billy Kenny PAS-Analytik/RNDOMN allows you to connect within minutes with a medical provider 24 hours a day, seven days a week via a mobile device or tablet or logging into a secure website from your computer. You can access Malcom Bardales from anywhere in the [place]. A virtual visit might be right for you when you have a simple condition and feel like you just dont want to get out of bed, or cant get away from work for an appointment, when your regular Surprise Hoist provider is not available (evenings, weekends or holidays), or when youre out of town and need minor care. Electronic visits cost only $49 and if the Billyangelika Kenny PAS-Analytik/7 provider determines a prescription is needed to treat your condition, one can be electronically transmitted to a nearby pharmacy*. Please take a moment to enroll today if you have not already done so. The enrollment process is free and takes just a few minutes. To enroll, please download the Surprise fishfishme/RNDOMN aditi to your tablet or phone, or visit [email or web address] to enroll on your computer. And, as an [address] [place] [address] patient with a [employer] account, the results of your visits will be scanned into your electronic medical record and your primary care provider will be able to view the scanned results. We urge you to continue to see your regular Billy Hoist provider for your ongoing medical care. And while your primary care provider may not be the one available when you seek a Malcom Bardales virtual visit, the peace of mind you get from getting a real diagnosis real time can be priceless. For more information on Malcom Bardales, view our Frequently Asked Questions (FAQs) at [email or web address]. Sincerely, 
 
Cortes Means MD 
Chief Medical Officer [employer] *:  certain medications cannot be prescribed via Malcom Bardales Unresulted tests-please follow up with your PCP on these results Procedure/Test Authorizing Provider CBC W/O DIFF Akhil Gomez MD  
 CBC W/O DIFF Akhil Gomez MD  
 CBC WITH AUTOMATED DIFF Derotha Goodpasture, MD  
 CT ABD Augusto Morales MD  
 CULTURE, BLOOD, PAIRED Narcisa Navarro MD  
 CULTURE, WOUND Hong Mahoney MD  
 CULTURE, WOUND W GRAM STAIN Akhil Gomez MD  
 LACTIC ACID Narcisa Navarro, MD Rupesh Resendiz, MD Rupesh Resendiz, MD Rupesh Resendiz, MD Rupesh Resendiz MD  
 METABOLIC PANEL, Corinne Sera, MD  
 METABOLIC PANEL, MD Pari Sheehan MD Sonia Fitting, MD  
  
Providers Seen During Your Hospitalization Provider Specialty Primary office phone Narcisa Navarro MD Emergency Medicine [phone] [facility], [address] [place] [address] Surgery [phone] Your Primary Care Physician (PCP) Primary Care Physician Office Phone Office Fax [address], [address] [employer] [place] [address] [phone] You are allergic to the following Allergen Reactions Tylenol (Acetaminophen) Swelling Recent Documentation Height Weight BMI OB Status Smoking Status 1.626 m 81.3 kg 30.76 kg/m2 Hysterectomy Never Smoker Emergency Contacts Name Discharge Info Relation Home Work Mobile [email or web address] 0906 CAREGIVER [3] Spouse [3] [phone] Patient Belongings The following personal items are in your possession at time of discharge: 
  Dental Appliances: None  Visual Aid: Glasses      Home Medications: None   Jewelry: None  Clothing: At bedside    Other Valuables: None Please provide this summary of care documentation to your next provider. Signatures-by signing, you are acknowledging that this After Visit Summary has been reviewed with you and you have received a copy. Patient Signature:  ____________________________________________________________ Date:  ____________________________________________________________  
  
[facility] Provider Signature:  ____________________________________________________________ Date:  ____________________________________________________________

## 2018-06-09 NOTE — ED NOTES
Dr. Demi Reece saw patient. Wound was irrigated. Dr Isaac Lagos  removed some staples and redressed abdomen.

## 2018-06-09 NOTE — PROGRESS NOTES
Progress Note    Patient: Mela Crawford MRN: [id number]  SSN: [id number]    YOB: 1950  Age: 79 y.o. Sex: female      Admit Date: 2018    Mela Crawford is about 10 days out from a colectomy for colon cancer by Dr. Haylee Craig. She was readmitted last night with a wound infection. Dr. Brooks Hunt removed several staples and opened the wound which was full of pus. Final cultures results pending : moderate staph bacteria thus far; blood cultures no growth   Subjective:     Patient has no new complaints. Family worried about when she will be able to do the Chemo therapy. No fever or chills, chest pain or shortness of breath. BM x 2; voiding and tolerating diet   Reports pain is much better since Dr. Brooks Hunt opened the wound. Spoke with nurse that did the wound care this morning and \"looked good\"; no odor and was clean, seemed less red and tender   WBC down to 11       Objective:     Visit Vitals    /71 (BP 1 Location: Right arm, BP Patient Position: At rest)    Pulse 82    Temp 97.9 °F (36.6 °C)    Resp 18    Ht 5' 4\" (1.626 m)    Wt 179 lb 3.2 oz (81.3 kg)    SpO2 95%    BMI 30.76 kg/m2       Temp (24hrs), Av.4 °F (36.9 °C), Min:97.9 °F (36.6 °C), Max:99.3 °F (37.4 °C)      Physical Exam:    A + O x 3, family at bedside   Chest  CTA  COR  RRR  ABD Soft, obese; lower  Abdominal dressing is dry and intact with some ayo-wound erythema; less tender; staples remain upper portion of incision and at umbilicus; ND, +BS  EXT No edema; ambulating independently    Data Review: reviewed  Nursing documentation and I & O    Lab Review: All lab results for the last 24 hours reviewed. Assessment:     Hospital Problems  Date Reviewed: 2018          Codes Class Noted POA    Wound infection ICD-10-CM: T14. 8XXA, L08.9  ICD-9-CM: 958.3  2018 Unknown              Plan/Recommendations/Medical Decision Making:     reviewed culture results and labs with pt and family    Will await final culture results; continue current antibiotics   Diabetic diet   GI and DVT prophylaxis   Wound care: abd wound NS wet to dry BID   Will ask wound care nurse to see on Monday for vac therapy consideration   OOB   Reviewed PTA meds and will adjust   Dr. Marie Velasquez covering and will follow     Signed By: Kiran Edouard NP     June 9, 2018       Pt seen and examined  Agree with above. Abd- soft with continued erythema around the wound but only 12 hours since the wound was opened   WBC down to 11  Continue BID dressing changes  Continue abx  Continue diet.

## 2018-06-09 NOTE — PROGRESS NOTES
Pharmacist Note - Vancomycin Dosing    Consult provided for this 79 y.o. female for indication of Wound infection s/p Right kanika-colectomy . Antibiotic regimen(s): vancomycin and zosyn    Recent Labs      18   WBC  17.4*   CREA  0.61   BUN  19     Frequency of BMP: daily  Height: 162.6 cm  Weight: 81.3 kg  Est CrCl: >90 ml/min; Temp (24hrs), Av.9 °F (37.2 °C), Min:98.4 °F (36.9 °C), Max:99.3 °F (37.4 °C)    Cultures:   blood   wound    Goal trough = 10 - 15 mcg/mL    Therapy will be initiated with a loading dose of 2000 mg IV x 1 to be followed by a maintenance dose of 1250 mg IV every 12 hours. Pharmacy to follow patient daily and order levels / make dose adjustments as appropriate.

## 2018-06-09 NOTE — ED TRIAGE NOTES
Triage: Patient arrives ambulatory from home with c/o drainage and erythema to midline incision. Colectomy performed by Dr. Heather Gao 2 weeks ago. yes

## 2018-06-09 NOTE — ROUTINE PROCESS
TRANSFER - OUT REPORT:    Verbal report given to Deedee (name) on Fidelina Handler  being transferred to [place] [id number] (unit) for routine progression of care       Report consisted of patients Situation, Background, Assessment and   Recommendations(SBAR). Information from the following report(s) SBAR, Kardex, ED Summary and MAR was reviewed with the receiving nurse. Lines:   Peripheral IV 06/08/18 Left Antecubital (Active)       Peripheral IV 06/08/18 Right Antecubital (Active)   Site Assessment Clean, dry, & intact 6/8/2018 11:45 PM   Phlebitis Assessment 0 6/8/2018 11:45 PM   Infiltration Assessment 0 6/8/2018 11:45 PM   Dressing Status Clean, dry, & intact 6/8/2018 11:45 PM   Dressing Type Transparent 6/8/2018 11:45 PM   Hub Color/Line Status Pink 6/8/2018 11:45 PM        Opportunity for questions and clarification was provided.       Patient transported with:   [employer]

## 2018-06-09 NOTE — CDMP QUERY
Nigel France,     Please clarify if this patient is (was) being treated/managed for:     => Hypokalemia in the setting of abnormal serum K+ level requiring serial lab monitoring and K+ bolus  => Other explanation of clinical findings  => Clinically Undetermined (no explanation for clinical findings)    The medical record reflects the following clinical findings, treatment, and risk factors. Risk Factors:  78 y/o pt  Clinical Indicators:  K+ 3.4/3.1/3.3  Treatment: K+bolus, serial lab monitoring    Please clarify and document your clinical opinion in the progress notes and discharge summary including the definitive and/or presumptive diagnosis, (suspected or probable), related to the above clinical findings. Please include clinical findings supporting your diagnosis.     Thank you,  Ronald Ashton  [facility]  [phone]

## 2018-06-09 NOTE — H&P
Surgery History and Physical    Subjective:      Giles Chun is a 79 y.o. female who underwent a Right hemicolectomy on  2018 for obstructing metastatic colon cancer. She has discharged about 6 days  Ago. Over the past 24 hours she has noticed increased redness at the lower aspect of the wound. She has noted some drainage that smells \"like pus. \" She denies any fever. She has been eating regular diet and been having Normal BM. Patient Active Problem List    Diagnosis Date Noted    S/P right colectomy 2018    Leukocytosis 2018    Abdominal pain 2018    Type 2 diabetes mellitus with hyperglycemia ([address] 2018    Malignant neoplasm of ascending colon ([address] 2018    Advance care planning 10/04/2017    Mixed hyperlipidemia 2014    HTN (hypertension) 2013    DM (diabetes mellitus) ([address] 2013     Past Medical History:   Diagnosis Date    Anemia NEC     Cancer ([address]     Diabetes ([address]     Headache(784.0)     Hypercholesterolemia     Hypertension     Malignant neoplasm of ascending colon ([address] 2018    S/P right colectomy 2018    Right hemicolectomy for cecal mass with partial SBO, Ndiaye      Past Surgical History:   Procedure Laterality Date    HX  SECTION      Hysterectomy,total for precancer    HX COLECTOMY Right 2018    Right hemicolectomy for cecal mass with partial SBO, Ndiaye      Social History   Substance Use Topics    Smoking status: Never Smoker    Smokeless tobacco: Never Used    Alcohol use No      Family History   Problem Relation Age of Onset    Alcohol abuse Father     Cancer Maternal Aunt      breast ca    Breast Cancer Maternal Aunt     Breast Cancer Sister     Breast Cancer Daughter       Prior to Admission medications    Medication Sig Start Date End Date Taking?  Authorizing Provider   senna-docusate (PERICOLACE) 8.6-50 mg per tablet Take 1 Tab by mouth two (2) times daily as needed for Constipation. Can be obtained over-the-counter 6/2/18   Leopold Darting, MD   oxyCODONE IR (ROXICODONE) 5 mg immediate release tablet Take 1 Tab by mouth every eight (8) hours as needed. Max Daily Amount: 15 mg. 6/1/18   Alex Ramon MD   metoprolol tartrate (LOPRESSOR) 50 mg tablet TAKE 1 TABLET BY MOUTH TWICE DAILY 5/1/18   Nataliia Ureña MD   ammonium lactate (AMLACTIN) 12 % topical cream APPLY 2 GRAMS TO AFFECTED AREA TWICE A DAY. RUB IN TO AFFECTED AREA WELL 4/23/18   Nataliia Ureña MD   albuterol (PROVENTIL HFA, VENTOLIN HFA, PROAIR HFA) 90 mcg/actuation inhaler Take 1 Puff by inhalation every six (6) hours as needed for Wheezing. 3/19/18   Nataliia Ureña MD   simvastatin (ZOCOR) 20 mg tablet Take 1 Tab by mouth nightly. 3/19/18   Nataliia Ureña MD   lisinopril (PRINIVIL, ZESTRIL) 10 mg tablet Take 1 Tab by mouth daily. 3/19/18   Nataliia Ureña MD   sAXagliptin-metFORMIN (KOMBIGLYZE XR) 5-1,000 mg LC46 Take 1 tab po QD 3/19/18   Nataliia Ureña MD   dapagliflozin [facility]) 10 mg tab tablet Take 1 Tab by mouth daily. 3/19/18   Nataliia Ureña MD   fexofenadine (ALLEGRA) 180 mg tablet Take 1 Tab by mouth daily. 3/2/18   Lindsay Segura NP   fluticasone (FLONASE) 50 mcg/actuation nasal spray 2 Sprays by Both Nostrils route daily. 3/2/18   Lindsay Segura NP   insulin glargine (LANTUS SOLOSTAR) 100 unit/mL (3 mL) inpn INJECT 47 UNITS UNDER THE SKIN IN THE MORNING AND 47 UNITS IN THE EVENING ( please give 90 day supply 5 pens per box, give 6 boxes=30 pens) 8/21/17   Nataliia Ureña MD   Blood-Glucose Meter monitoring kit Check BS TID. Accucheck nanno meter please 6/1/17   Nataliia Ureña MD   Lancets misc Check BS TID.accucheck eddieno 6/1/17   Nataliia Ureña MD   glucose blood VI test strips (BLOOD GLUCOSE TEST) strip Check BS TID.accucheck rafael 6/1/17   Nataliia Ureña MD   cholecalciferol, VITAMIN D3, (VITAMIN D3) 5,000 unit tab tablet Take  by mouth daily.     Historical Provider   ferrous sulfate (IRON) 325 mg (65 mg iron) tablet Take  by mouth Daily (before breakfast). Historical Provider   aspirin delayed-release 81 mg tablet Take 81 mg by mouth daily. Historical Provider   clotrimazole (LOTRIMIN) 1 % topical cream Apply  to affected area two (2) times a day. 4/30/15   Cameron Middleton MD   [facility] LANCETS 30 gauge misc CHECK BLOOD SUGAR TWICE DAILY 5/26/14   Cameron Middleton MD   Blood-Glucose Meter (CONTOUR NEXT METER) misc Check blood sugar BID 5/7/14   Cameron Middleton MD   calcium 600 mg Cap Take  by mouth. Historical Provider     Allergies   Allergen Reactions    Tylenol [Acetaminophen] Swelling        Review of Systems:    Constitutional: negative  Eyes: negative  Ears, Nose, Mouth, Throat, and Face: negative  Respiratory: negative  Cardiovascular: negative  Gastrointestinal: positive for redness along the abdominal wall  Genitourinary:negative  Integument/Breast: negative  Hematologic/Lymphatic: negative  Musculoskeletal:negative  Neurological: negative  Behavioral/Psychiatric: negative  Endocrine: negative  Allergic/Immunologic: negative     Objective:     Visit Vitals    /71 (BP 1 Location: Right arm, BP Patient Position: At rest)    Pulse 99    Temp 98.4 °F (36.9 °C)    Resp 18    Ht 5' 4\" (1.626 m)    Wt 179 lb 3.2 oz (81.3 kg)    SpO2 96%    BMI 30.76 kg/m2       Physical Exam:    GENERAL: alert, cooperative, no distress, appears stated age, THROAT & NECK: normal, LUNG: clear to auscultation bilaterally, HEART: regular rate and rhythm, ABDOMEN: soft with erythema at the lower aspect of the wound , EXTREMITIES:  no edema, SKIN: Normal., NEUROLOGIC: negative, PSYCH: non focal    Imaging:  images and reports reviewed  CT-there  is ill-defined fluid and also containing gas, measuring approximately 10.7 cm cc  x 4.7 cm transverse x 5.1 cm AP. There is no free intraperitoneal fluid or gas.     Lab Review:    Recent Results (from the past 24 hour(s))   CBC WITH AUTOMATED DIFF    Collection Time: 06/08/18 8:13 PM   Result Value Ref Range    WBC 17.4 (H) 3.6 - 11.0 K/uL    RBC 4.24 3.80 - 5.20 M/uL    HGB 11.7 11.5 - 16.0 g/dL    HCT 36.8 35.0 - 47.0 %    MCV 86.8 80.0 - 99.0 FL    MCH 27.6 26.0 - 34.0 PG    MCHC 31.8 30.0 - 36.5 g/dL    RDW 13.8 11.5 - 14.5 %    PLATELET 407 (H) 458 - 400 K/uL    MPV 9.2 8.9 - 12.9 FL    NRBC 0.0 0  WBC    ABSOLUTE NRBC 0.00 0.00 - 0.01 K/uL    NEUTROPHILS 79 (H) 32 - 75 %    LYMPHOCYTES 10 (L) 12 - 49 %    MONOCYTES 9 5 - 13 %    EOSINOPHILS 2 0 - 7 %    BASOPHILS 0 0 - 1 %    IMMATURE GRANULOCYTES 1 (H) 0.0 - 0.5 %    ABS. NEUTROPHILS 13.7 (H) 1.8 - 8.0 K/UL    ABS. LYMPHOCYTES 1.7 0.8 - 3.5 K/UL    ABS. MONOCYTES 1.5 (H) 0.0 - 1.0 K/UL    ABS. EOSINOPHILS 0.4 0.0 - 0.4 K/UL    ABS. BASOPHILS 0.1 0.0 - 0.1 K/UL    ABS. IMM. GRANS. 0.1 (H) 0.00 - 0.04 K/UL    DF AUTOMATED     METABOLIC PANEL, COMPREHENSIVE    Collection Time: 06/08/18  8:13 PM   Result Value Ref Range    Sodium 140 136 - 145 mmol/L    Potassium 3.1 (L) 3.5 - 5.1 mmol/L    Chloride 101 97 - 108 mmol/L    CO2 31 21 - 32 mmol/L    Anion gap 8 5 - 15 mmol/L    Glucose 181 (H) 65 - 100 mg/dL    BUN 19 6 - 20 MG/DL    Creatinine 0.61 0.55 - 1.02 MG/DL    BUN/Creatinine ratio 31 (H) 12 - 20      GFR est AA >60 >60 ml/min/1.73m2    GFR est non-AA >60 >60 ml/min/1.73m2    Calcium 9.7 8.5 - 10.1 MG/DL    Bilirubin, total 0.2 0.2 - 1.0 MG/DL    ALT (SGPT) 23 12 - 78 U/L    AST (SGOT) 21 15 - 37 U/L    Alk. phosphatase 91 45 - 117 U/L    Protein, total 7.1 6.4 - 8.2 g/dL    Albumin 2.2 (L) 3.5 - 5.0 g/dL    Globulin 4.9 (H) 2.0 - 4.0 g/dL    A-G Ratio 0.4 (L) 1.1 - 2.2     LACTIC ACID    Collection Time: 06/08/18  9:42 PM   Result Value Ref Range    Lactic acid 1.3 0.4 - 2.0 MMOL/L       Assessment:     Wound infection s/p Right kanika-colectomy     Plan: The wound was opened at the bedside. There was a large amount of pus that came out. A culture was taken  The Wound was packed with guaze and covered with tape.  May Need Vac at some point. Will admit  Leroy and [employer]  Ok to eat  Will have her on her home meds.   Repeat labs in am.     Signed By: Eleanor Handy MD     June 8, 2018

## 2018-06-09 NOTE — PROGRESS NOTES
Notified Dr. Rozina Serrato this morning regarding NPO diet in the chart. Dr. Rozina Serrato stated patient was not NPO. Discontinued NPO order in chart. Orders received to place patient on diabetic low sliding scale insulin.

## 2018-06-09 NOTE — ED PROVIDER NOTES
HPI Comments: 79 y.o. female with past medical history significant for DM, HTN, anemia, cancer, hypercholesterolemia, and malignant neoplasm of the ascending colon who presents to the ED with chief complaint of Post Op Complication. Patient reports undergoing a colectomy ~12 days ago for metastatic colon cancer; reports the procedure was performed by Dr. Francisco Young. Kelly Hastings MD. Patient reports increased erythema around the incision site and \"yellow-red\" leaky discharge from the lower incision site with onset \"earlier today. \" Patient reports calling Dr. Duran Endo office for her symptoms; reports being referred to the ED for further evaluation. Patient reports having a follow up appointment scheduled with Dr. Kelly Hastings in ~4 days. Patient denies fever and chills. There are no other acute medical concerns at this time. PCP: JOESPH Nurse Navigator (Inactive)    Note written by Ozzy Ruiz, as dictated by Reyes Sierras, MD 9:22 PM     The history is provided by the patient.  used: Daughter translates.         Past Medical History:   Diagnosis Date    Anemia NEC     Cancer ([address]     Diabetes ([address]     Headache(784.0)     Hypercholesterolemia     Hypertension     Malignant neoplasm of ascending colon ([address] 2018    S/P right colectomy 2018    Right hemicolectomy for cecal mass with partial SBO, Ndiaye       Past Surgical History:   Procedure Laterality Date    HX  SECTION      Hysterectomy,total for precancer    HX COLECTOMY Right 2018    Right hemicolectomy for cecal mass with partial SBO, Ndiaye         Family History:   Problem Relation Age of Onset    Alcohol abuse Father     Cancer Maternal Aunt      breast ca    Breast Cancer Maternal Aunt     Breast Cancer Sister     Breast Cancer Daughter        Social History     Social History    Marital status:      Spouse name: N/A    Number of children: N/A    Years of education: N/A Occupational History    Not on file. Social History Main Topics    Smoking status: Never Smoker    Smokeless tobacco: Never Used    Alcohol use No    Drug use: No    Sexual activity: Yes     Partners: Male      Comment: ,4 children,1 passed away,not working     Other Topics Concern    Not on file     Social History Narrative         ALLERGIES: Tylenol [acetaminophen]    Review of Systems   Constitutional: Negative for appetite change, chills and fever. HENT: Negative for rhinorrhea, sore throat and trouble swallowing. Eyes: Negative for photophobia. Respiratory: Negative for cough and shortness of breath. Cardiovascular: Negative for chest pain and palpitations. Gastrointestinal: Negative for abdominal pain, nausea and vomiting. Genitourinary: Negative for dysuria, frequency and hematuria. Musculoskeletal: Negative for arthralgias. Skin: Positive for color change and wound. Neurological: Negative for dizziness, syncope and weakness. Psychiatric/Behavioral: Negative for behavioral problems. The patient is not nervous/anxious. All other systems reviewed and are negative. Vitals:    06/08/18 2008   BP: 120/71   Pulse: 99   Resp: 18   Temp: 98.4 °F (36.9 °C)   SpO2: 96%   Weight: 81.3 kg (179 lb 3.2 oz)   Height: 5' 4\" (1.626 m)            Physical Exam   Constitutional: She appears well-developed and well-nourished. HENT:   Head: Normocephalic and atraumatic. Mouth/Throat: Oropharynx is clear and moist.   Eyes: EOM are normal. Pupils are equal, round, and reactive to light. Neck: Normal range of motion. Neck supple. Cardiovascular: Normal rate, regular rhythm, normal heart sounds and intact distal pulses. Exam reveals no gallop and no friction rub. No murmur heard. Pulmonary/Chest: Effort normal. No respiratory distress. She has no wheezes. She has no rales. Abdominal: Soft. There is no tenderness. There is no rebound.    Musculoskeletal: Normal range of motion. She exhibits no tenderness. Neurological: She is alert. No cranial nerve deficit. Motor; symmetric   Skin: There is erythema. Long midline stapled wound on the abdomen with mild erythema around the upper wound and surrounding erythema distally; yellow discharge present. Psychiatric: She has a normal mood and affect. Her behavior is normal.   Nursing note and vitals reviewed. Note written by Ozzy Dunbar, as dictated by Diana Fung MD 9:31 PM      [facility]      ED Course       Procedures    CONSULT NOTE:  9:51 PM Diana Fung MD spoke with Dr. Caitlyn Duke MD, Consult for General Surgery. Discussed available diagnostic tests and clinical findings. Provider is in agreement with care plans as outlined. Dr. Caitlyn Duke MD is aware of the patient; pending CT scan results.

## 2018-06-10 NOTE — PROGRESS NOTES
Progress Note    Patient: Dian Sandhoff MRN: [id number]  SSN: [id number]    YOB: 1950  Age: 79 y.o. Sex: female      Admit Date: 2018    * No surgery found *    Procedure:      Subjective:     Patient feeling better. Dressing changed by nursing already. Reportedly non- malodorus     Objective:     Visit Vitals    /82 (BP 1 Location: Left arm, BP Patient Position: At rest)    Pulse 74    Temp 98.6 °F (37 °C)    Resp 14    Ht 5' 4\" (1.626 m)    Wt 179 lb 3.2 oz (81.3 kg)    SpO2 95%    BMI 30.76 kg/m2       Temp (24hrs), Av.9 °F (37.2 °C), Min:98.3 °F (36.8 °C), Max:99.9 °F (37.7 °C)      Physical Exam:    Gen- Alert in NAD  Lungs- CTA  H-RRR  Abd- Less erythema around the wound. S/nt/nd    Data Review: images and reports reviewed    Lab Review: All lab results for the last 24 hours reviewed.   Recent Results (from the past 24 hour(s))   GLUCOSE, POC    Collection Time: 18  4:19 PM   Result Value Ref Range    Glucose (POC) 191 (H) 65 - 100 mg/dL    Performed by Brijesh Salas    GLUCOSE, POC    Collection Time: 18 10:14 PM   Result Value Ref Range    Glucose (POC) 173 (H) 65 - 100 mg/dL    Performed by Agatha Dumont    CBC W/O DIFF    Collection Time: 06/10/18  3:55 AM   Result Value Ref Range    WBC 9.3 3.6 - 11.0 K/uL    RBC 3.83 3.80 - 5.20 M/uL    HGB 10.5 (L) 11.5 - 16.0 g/dL    HCT 33.2 (L) 35.0 - 47.0 %    MCV 86.7 80.0 - 99.0 FL    MCH 27.4 26.0 - 34.0 PG    MCHC 31.6 30.0 - 36.5 g/dL    RDW 13.7 11.5 - 14.5 %    PLATELET 753 (H) 331 - 400 K/uL    MPV 9.8 8.9 - 12.9 FL    NRBC 0.0 0  WBC    ABSOLUTE NRBC 0.00 0.00 - 2.96 K/uL   METABOLIC PANEL, BASIC    Collection Time: 06/10/18  3:55 AM   Result Value Ref Range    Sodium 141 136 - 145 mmol/L    Potassium 3.7 3.5 - 5.1 mmol/L    Chloride 107 97 - 108 mmol/L    CO2 28 21 - 32 mmol/L    Anion gap 6 5 - 15 mmol/L    Glucose 107 (H) 65 - 100 mg/dL    BUN 8 6 - 20 MG/DL    Creatinine 0.42 (L) 0.55 - 1.02 MG/DL    BUN/Creatinine ratio 19 12 - 20      GFR est AA >60 >60 ml/min/1.73m2    GFR est non-AA >60 >60 ml/min/1.73m2    Calcium 8.7 8.5 - 10.1 MG/DL   MAGNESIUM    Collection Time: 06/10/18  3:55 AM   Result Value Ref Range    Magnesium 1.9 1.6 - 2.4 mg/dL   GLUCOSE, POC    Collection Time: 06/10/18  6:15 AM   Result Value Ref Range    Glucose (POC) 90 65 - 100 mg/dL    Performed by Juan Warren    GLUCOSE, POC    Collection Time: 06/10/18 11:10 AM   Result Value Ref Range    Glucose (POC) 195 (H) 65 - 100 mg/dL    Performed by Kianna Turner        Assessment:     Hospital Problems  Date Reviewed: 5/27/2018          Codes Class Noted POA    Wound infection ICD-10-CM: T14. 8XXA, L08.9  ICD-9-CM: 958.3  6/8/2018 Unknown              Plan/Recommendations/Medical Decision Making:   Awaiting culture results. Wbc normalized  Continue Vanc and zosyn for now. Continue BID dressing changes   Wound nurse tomorrw for possible vac.      Signed By: Vladimir Giang MD     Angela 10, 2018

## 2018-06-11 PROBLEM — A49.8 METHICILLIN RESISTANT STAPHYLOCOCCUS EPIDERMIDIS INFECTION: Status: ACTIVE | Noted: 2018-01-01

## 2018-06-11 PROBLEM — Z16.29 METHICILLIN RESISTANT STAPHYLOCOCCUS EPIDERMIDIS INFECTION: Status: ACTIVE | Noted: 2018-01-01

## 2018-06-11 PROBLEM — E87.6 HYPOKALEMIA: Status: ACTIVE | Noted: 2018-01-01

## 2018-06-11 PROBLEM — T81.49XA SUPERFICIAL POSTOPERATIVE WOUND INFECTION: Status: ACTIVE | Noted: 2018-01-01

## 2018-06-11 PROBLEM — S31.109A: Status: ACTIVE | Noted: 2018-01-01

## 2018-06-11 NOTE — PROGRESS NOTES
Bedside shift change report given to Armenian Vinny (oncoming nurse) by Lisa Gilbert RN (offgoing nurse). Report included the following information SBAR, Kardex, MAR and Recent Results.

## 2018-06-11 NOTE — PROGRESS NOTES
CDMP query response. Hypokalemia is very mild, not clinically significant but still added to hosp prob list.  Also added MRSE infection with abx sensitivities. Both will appear in hospital progress notes and discharge summ.       Signed By: Ash Millard MD     June 11, 2018

## 2018-06-11 NOTE — PROGRESS NOTES
Gen Surg @ [facility]  Attending addendum:  I supervised the NP and reviewed the progress note. We discussed the plan of care. I visited and examined the pt independently. She denies pain. Patient Vitals for the past 12 hrs:   Temp Pulse Resp BP SpO2   06/11/18 0817 98.2 °F (36.8 °C) 73 17 123/76 95 %   06/11/18 0305 98.4 °F (36.9 °C) 69 16 114/72 95 %        Physical Exam:   Gen NAD  Abd Soft, flat, nontender, VAC with good seal.    Hospital Problems as of 6/11/2018  Date Reviewed: 5/27/2018          Codes Class Noted - Resolved POA    * (Principal)Superficial postoperative wound infection ICD-10-CM: T81. 4XXA  ICD-9-CM: 998.59  6/8/2018 - Present Yes        Open wound of anterior abdominal wall without complication KFW-88-PM: M54.942C  ICD-9-CM: 879.2  6/8/2018 - Present Yes        Methicillin resistant Staphylococcus epidermidis infection ICD-10-CM: A49.8, Z16.29  ICD-9-CM: 041.19  6/3/2018 - Present Yes    Overview Signed 6/11/2018 10:48 AM by Paz Burkett MD     Wound culture 6/8/18  Reported 6/10/18 - sensitive to cipro, clinda, dapto, linezolid, TMP/SMZ and vanc               S/P right colectomy ICD-10-CM: Z90.49  ICD-9-CM: V45.89  5/27/2018 - Present Yes    Overview Signed 5/29/2018  8:25 AM by Paz Burkett MD     Right hemicolectomy for cecal mass with partial SBO, Ndiaye             Malignant neoplasm of ascending colon ([facility] ICD-10-CM: C18.2  ICD-9-CM: 153.6  5/26/2018 - Present Yes    Overview Signed 5/31/2018  3:49 PM by Paz Burkett MD     Results for Mulu Flores (MRN [id number]) as of 5/31/2018 15:41   Ref.  Range 5/31/2018 13:44   CEA Latest Units: ng/mL 28.4                DM (diabetes mellitus) ([facility] ICD-10-CM: E11.9  ICD-9-CM: 250.00  5/23/2013 - Present Yes        Hypokalemia ICD-10-CM: E87.6  ICD-9-CM: 276.8  5/26/2018 - Present Yes        HTN (hypertension) ICD-10-CM: I10  ICD-9-CM: 401.9  5/23/2013 - Present Yes              I agree with the APC's assessment and plan with the following additions/modifications:  Application for home VAC has been submitted to CM.   Consult  Skilled RN for wound [facility], twice weekly    Signed By: Geo Armendariz MD     June 11, 2018

## 2018-06-11 NOTE — PROGRESS NOTES
Medical chart and received orders for Wound Vac. Faxed completed fast forms  to [facility] [phone]. Met with patient and her daughter at bedside to discuss discharge plan and offered choice. Patient does not a preference. Sent referral to [facility] via [place]. Patient expected to discharge on 6/12 or 6/15 pending clearance and medical stability per MD. CM will continue to follow. 1:05PM Received a call from [facility] local rep, Odin Lezamale [phone] to discuss order. [facility] does not accept [facility] at this time. [id number] [facility] [phone] to inquire about wound vac. [facility] does accept insurance coverage. Called wound care to request the completion of new forms. Awaiting for the completion of forms. CM will continue to follow. Silva Joshua, MSW      3:35PM [facility] accepted patient for [place]. Added to AVS. Fax forms to MD office for completion. Plan to fax completed fast forms to [facility] upon its completion. CM will continue to follow.

## 2018-06-11 NOTE — PROGRESS NOTES
General Surgery Daily Progress Note    Admit Date: 2018  Post-Operative Day: * No surgery found * from * No surgery found *     Subjective:     Last 24 hrs: Pt is resting. Wound vac has been placed. BS low this am so lantus insulin was held. Wound ctx w/ 2+ gram neg rods, first ctx (2 hrs prior) grew staph epi (oxi resistant). Currently on vanc and zosyn    Objective:     Blood pressure 123/76, pulse 73, temperature 98.2 °F (36.8 °C), resp. rate 17, height 5' 4\" (1.626 m), weight 179 lb 3.2 oz (81.3 kg), SpO2 95 %. Temp (24hrs), Av.6 °F (37 °C), Min:98.2 °F (36.8 °C), Max:99.1 °F (37.3 °C)      _____________________  Physical Exam:     Alert and Oriented, x3, in no acute distress. Cardiovascular: RRR, no peripheral edema  Abdomen: soft, wound vac in place on midline incision    Assessment:   Principal Problem:    Superficial postoperative wound infection (2018)    Active Problems:    HTN (hypertension) (2013)      DM (diabetes mellitus) ([facility] (2013)      Malignant neoplasm of ascending colon ([facility] (2018)      Overview: Results for Fili Dhillon (MRN [id number]) as of 2018 15:41       Ref.  Range 2018 13:44       CEA Latest Units: ng/mL 28.4             S/P right colectomy (2018)      Overview: Right hemicolectomy for cecal mass with partial SBO, Nidaye      Hypokalemia (2018)      Methicillin resistant Staphylococcus epidermidis infection (6/3/2018)      Overview: Wound culture 18      Reported 6/10/18 - sensitive to cipro, clinda, dapto, linezolid, TMP/SMZ       and vanc                  Plan:     Cont IV abx for now -   Await final ctx  Replete K  Case mgmt for [facility] - wound vac  dvt proph  Possible d/c soon    Data Review:    Recent Labs      18   0430  06/10/18   0355  18   0620   WBC  10.6  9.3  11.8*   HGB  10.6*  10.5*  10.3*   HCT  33.4*  33.2*  32.7*   PLT  439*  431*  420*     Recent Labs      18   0430  06/10/18   0355  18 3249  06/08/18 2013   NA  143  141  143  140   K  3.3*  3.7  3.3*  3.1*   CL  108  107  107  101   CO2  27  28  28  31   GLU  57*  107*  133*  181*   BUN  7  8  14  19   CREA  0.36*  0.42*  0.37*  0.61   CA  9.2  8.7  8.7  9.7   MG  2.0  1.9   --    --    ALB   --    --    --   2.2*   SGOT   --    --    --   21   ALT   --    --    --   23     No results for input(s): AML, LPSE in the last 72 hours. ______________________  Medications:    Current Facility-Administered Medications   Medication Dose Route Frequency    vancomycin trough 6/11 at 1200   Other ONCE    albuterol (PROVENTIL VENTOLIN) nebulizer solution 2.5 mg  2.5 mg Inhalation Q6H PRN    . PHARMACY TO SUBSTITUTE PER PROTOCOL    Per Protocol    ferrous sulfate tablet 325 mg  1 Tab Oral DAILY WITH BREAKFAST    insulin glargine (LANTUS) injection 47 Units  47 Units SubCUTAneous BID    lisinopril (PRINIVIL, ZESTRIL) tablet 10 mg  10 mg Oral DAILY    simvastatin (ZOCOR) tablet 20 mg  20 mg Oral QHS    Vancomycin Pharmacy to Dose   Other Rx Dosing/Monitoring    vancomycin (VANCOCIN) 1250 mg in  ml infusion  1,250 mg IntraVENous Q12H    metoprolol tartrate (LOPRESSOR) tablet 50 mg  50 mg Oral BID    insulin lispro (HUMALOG) injection   SubCUTAneous AC&HS    glucose chewable tablet 16 g  4 Tab Oral PRN    dextrose (D50W) injection syrg 12.5-25 g  12.5-25 g IntraVENous PRN    glucagon (GLUCAGEN) injection 1 mg  1 mg IntraMUSCular PRN    sodium chloride (NS) flush 5-10 mL  5-10 mL IntraVENous Q8H    sodium chloride (NS) flush 5-10 mL  5-10 mL IntraVENous PRN    oxyCODONE IR (ROXICODONE) tablet 5 mg  5 mg Oral Q4H PRN    naloxone (NARCAN) injection 0.4 mg  0.4 mg IntraVENous PRN    ondansetron (ZOFRAN) injection 4 mg  4 mg IntraVENous Q4H PRN    enoxaparin (LOVENOX) injection 40 mg  40 mg SubCUTAneous Q24H    0.9% sodium chloride infusion  50 mL/hr IntraVENous CONTINUOUS    fentaNYL citrate (PF) injection 25 mcg  25 mcg IntraVENous Q4H PRN    piperacillin-tazobactam (ZOSYN) 3.375 g in 0.9% sodium chloride (MBP/ADV) 100 mL  3.375 g IntraVENous Q8H       Nito De Oliveira NP  6/11/2018

## 2018-06-11 NOTE — PROGRESS NOTES
Pharmacist Note - Vancomycin Dosing  Therapy day 3  Indication:  Wound infection s/p Right kanika-colectomy   Current regimen: 1250 mg Q12H    A Trough Level resulted at 13.9 mcg/mL which was obtained 8.3 hrs post-dose. The extrapolated \"true\" trough is approximately 9.1 mcg/mL based on the patient's known kinetics. Goal trough: 10 - 15 mcg/mL     Plan: Change to 1500 mg Q12H with an anticipated trough of ~11 mcg/ml based on patient's known kinetics . Pharmacy will continue to monitor this patient daily for changes in clinical status and renal function.

## 2018-06-11 NOTE — DIABETES MGMT
DTC Progress Note    Recommendations/ Comments: Chart review for variable BG's. Hypoglycemia this morning - BG 57 mg/dL at 0430 by chemistry and 58 mg/dL at 0642 by POC. requried treatment x 3 to raise BG to 97 mg/dL  Yesterday post prandial BG's 195 mg/dL - 203 mg/dL     If appropriate, please consider   -Decrease Lantus to 42 units at bedtime  -Continue Lantus 47 units each AM  -Observe po intake and BG response to determine need for [facility] lispro    Current hospital DM medication:   Lantus 47 units BID  Lispro correction scale, normal sensitivity    Chart reviewed on [facility]. Patient is a 79 y.o. female with known DM on the follwoign at home:  Kombiglize (saxigliptin-metfomin ) 5-1000 once daily  Farxiga 10 mg daily  Lantus 47 units BID    A1c:   Lab Results   Component Value Date/Time    Hemoglobin A1c 8.3 (H) 05/26/2018 05:21 AM    Hemoglobin A1c 8.1 (H) 03/19/2018 09:48 AM       Recent Glucose Results: Lab Results   Component Value Date/Time    GLU 57 (L) 06/11/2018 04:30 AM    GLUCPOC 97 06/11/2018 07:49 AM    GLUCPOC 65 06/11/2018 07:23 AM    GLUCPOC 60 (L) 06/11/2018 07:03 AM        Lab Results   Component Value Date/Time    Creatinine 0.36 (L) 06/11/2018 04:30 AM     Estimated Creatinine Clearance: 158.5 mL/min (based on Cr of 0.36). Active Orders   Diet    DIET DIABETIC WITH OPTIONS Consistent Carb 2000kcal; Regular        PO intake: Patient Vitals for the past 72 hrs:   % Diet Eaten   06/09/18 0900 100 %   06/09/18 0732 0 %       Will continue to follow as needed.     Thank you  Rivera Villalobos RN, CDE  Pager [phone]

## 2018-06-11 NOTE — PROGRESS NOTES
Problem: Falls - Risk of  Goal: *Absence of Falls  Document Heber Fall Risk and appropriate interventions in the flowsheet.    Outcome: Progressing Towards Goal  Fall Risk Interventions:  Mobility Interventions: Patient to call before getting OOB         Medication Interventions: Patient to call before getting OOB, Teach patient to arise slowly    Elimination Interventions: Call light in reach, Toileting schedule/hourly rounds

## 2018-06-11 NOTE — WOUND CARE
WOCN Note:     New consult placed by NP for NPWT. Chart shows:  Admitted for postoperative wound infection; history of hemicolectomy & DM    Assessment:   Patient is communicative, continent and able to turn independently for assessment. Bed: total care  Patient reports no pain and tolerates VAC application well. Bilateral heel, buttocks, and sacral skin intact and without erythema. 1. Midline surgical incision with opening = 2.4 x 1.0 x 5.3 cm that slopes toward 12 o'clock visible base is 100% moist red. Scant light serosanguinous exudate. Periwound intact & without erythema. 125 mmHg continuous suction achieved using 1 white and one black sponge. Patient repositioned on left side. Recommendations:    Maintain VAC as ordered at 125 mmHg using white and black foam with twice weekly dressing changes. Minimize layers of linen/pads under patient to optimize support surface. Turn/reposition approximately every 2 hours and offload heels. Manage incontinence / promote continence; Aloe Vesta to buttocks and sacrum daily and as needed with incontinence care    Discussed above plan with patient, her family & RN, Barry Guthrie. Transition of Care: Plan to follow as needed while admitted to hospital with dressing changes on Mondays & Thursdays. [employer] paperwork filled out and given to .     Nelia Spurling, BSN, RN, [facility]  Certified Wound, Ostomy, Continence Nurse  office [phone]  pager [phone] or call  to page

## 2018-06-11 NOTE — PROGRESS NOTES
Problem: Falls - Risk of  Goal: *Absence of Falls  Document Heber Fall Risk and appropriate interventions in the flowsheet.    Outcome: Progressing Towards Goal  Fall Risk Interventions:  Mobility Interventions: Patient to call before getting OOB         Medication Interventions: Patient to call before getting OOB    Elimination Interventions: Call light in reach

## 2018-06-12 PROBLEM — E87.6 HYPOKALEMIA: Status: RESOLVED | Noted: 2018-01-01 | Resolved: 2018-01-01

## 2018-06-12 NOTE — PROGRESS NOTES
Bedside and Verbal shift change report given to Brayan Olivera (oncoming nurse) by Elsie Wheat RN (offgoing nurse). Report included the following information SBAR, Kardex, Intake/Output and MAR.

## 2018-06-12 NOTE — PROGRESS NOTES
General Surgery at [facility]    Pt seen sitting in chair. She says she's been up for about 1/2 hour. Patient Vitals for the past 12 hrs:   Temp Pulse Resp BP SpO2   18 1354 99.3 °F (37.4 °C) 73 16 133/81 96 %   18 0835 98.6 °F (37 °C) 67 16 117/68 97 %     Temp (24hrs), Av.8 °F (37.1 °C), Min:98.1 °F (36.7 °C), Max:99.3 °F (37.4 °C)        Gen NAD  Abd Soft, nontender, VAC sponge with good seal    Assessment:   Hospital Problems as of 2018  Date Reviewed: 2018          Codes Class Noted - Resolved POA    * (Principal)Superficial postoperative wound infection ICD-10-CM: T81. 4XXA  ICD-9-CM: 998.59  2018 - Present Yes    Overview Addendum 2018  6:52 PM by Alex Ramon MD     6/10/18 wound culture  Culture result:    Final      MODERATE STAPHYLOCOCCUS EPIDERMIDIS (OXACILLIN RESISTANT) (A)     18 anaerobic wound culture   Culture result:    Final     MODERATE MIXED ANAEROBIC GRAM NEGATIVE RODS BETA LACTAMASE POSITIVE (A)     Culture result:    Final     HEAVY ANAEROBIC GRAM POSITIVE COCCI (A)                  Open wound of anterior abdominal wall with complication YFY-18-ZA: M41.750E  ICD-9-CM: 879.3  2018 - Present Yes        Methicillin resistant Staphylococcus epidermidis infection ICD-10-CM: A49.8, Z16.29  ICD-9-CM: 041.19  6/3/2018 - Present Yes    Overview Signed 2018 10:48 AM by Alex Ramon MD     Wound culture 18  Reported 6/10/18 - sensitive to cipro, clinda, dapto, linezolid, TMP/SMZ and vanc               S/P right colectomy ICD-10-CM: Z90.49  ICD-9-CM: V45.89  2018 - Present Yes    Overview Signed 2018  8:25 AM by Alex Ramon MD     Right hemicolectomy for cecal mass with partial SBO, Ndiaye             Malignant neoplasm of ascending colon ([facility] ICD-10-CM: C18.2  ICD-9-CM: 153.6  2018 - Present Yes    Overview Signed 2018  3:49 PM by Alex Ramon MD     Results for Annailsa Eisenberg (MRN [id number]) as of 5/31/2018 15:41   Ref. Range 5/31/2018 13:44   CEA Latest Units: ng/mL 28.4                DM (diabetes mellitus) ([facility] ICD-10-CM: E11.9  ICD-9-CM: 250.00  5/23/2013 - Present Yes        HTN (hypertension) ICD-10-CM: I10  ICD-9-CM: 401.9  5/23/2013 - Present Yes        RESOLVED: Hypokalemia ICD-10-CM: E87.6  ICD-9-CM: 276.8  5/26/2018 - 6/12/2018 Yes              Pt has [employer] negative pressure wound therapy going but her insurance doesn't cover [employer]. Will need [employer] negative pressure wound therapy. Rec/Plan:  [employer] form submitted. Plan for dc tomorrow.     Signed By: Amor Reilly MD     June 12, 2018

## 2018-06-12 NOTE — PROGRESS NOTES
Reviewed medical chart; Received completed fast forms for wound vac. Faxed forms to [facility] for wound vac. Spoke with [facility] rep, Kindra Gloria [phone] and is  currently processing order for wound vac. Patient expected to discharge tomorrow pending DME delivered per MD.    Met with patient and her daughter at bedside to discuss discharge plan. Family in agreement with discharge plan. CM will continue to follow.    Michelle Davis, MSW

## 2018-06-12 NOTE — ROUTINE PROCESS
Bedside and Verbal shift change report given to [facility] (oncoming nurse) by Pietro Andres (offgoing nurse). Report included the following information SBAR, Kardex, OR Summary, Intake/Output, MAR, Accordion and Recent Results.

## 2018-06-12 NOTE — PROGRESS NOTES
Bedside and Verbal shift change report given to Jamie Coombs RN (oncoming nurse) by Angel Masterson RN (offgoing nurse). Report included the following information SBAR, Kardex, Intake/Output and MAR.

## 2018-06-12 NOTE — DIABETES MGMT
DTC Progress Note    Recommendations/ Comments: Chart review for hypoglycemia - BG 51 mg/dl at 0424 and again 51 mg/dL at 0654. FBG yesterday was 58 mg/dL. Noted home medication dapagliflozin (Farixga) 10 mg added starting today     If appropriate, please consider   -Decrease Lantus to 40 units BID    Current hospital DM medication:   Lantus 47 units BID  Lispro correction scale, normal sensitivity  dapagliflozin (Farixga) 10 mg daily    Chart reviewed on [facility]. Patient is a 79 y.o. female with known DM on the follwoign at home:  Kombiglize (saxigliptin-metfomin ) 5-1000 once daily  Farxiga 10 mg daily  Lantus 47 units BID    A1c:   Lab Results   Component Value Date/Time    Hemoglobin A1c 8.3 (H) 05/26/2018 05:21 AM    Hemoglobin A1c 8.1 (H) 03/19/2018 09:48 AM       Recent Glucose Results:   Lab Results   Component Value Date/Time    GLU 51 (L) 06/12/2018 04:24 AM    GLUCPOC 89 06/12/2018 11:22 AM    GLUCPOC 131 (H) 06/12/2018 07:07 AM    GLUCPOC 51 (L) 06/12/2018 06:54 AM        Lab Results   Component Value Date/Time    Creatinine 0.47 (L) 06/12/2018 04:24 AM     Estimated Creatinine Clearance: 121.4 mL/min (based on Cr of 0.47). Active Orders   Diet    DIET DIABETIC WITH OPTIONS Consistent Carb 2000kcal; Regular        PO intake: No data found. Will continue to follow as needed.     Thank you  Yanna Soliz RN, CDE  Pager [phone]

## 2018-06-13 NOTE — DIABETES MGMT
DTC Progress Note    Recommendations/ Comments: Chart review for hypoglycemia. Noted Lantus dose has been decreased to 28 units in the morning and 30 units in the evening. If hypoglycemia persists, please consider decreasing evening Lantus dose to 28 units. Current hospital DM medication:   Lantus 28  units in the am and 30 units in the pm  Lispro correction scale, normal sensitivity  dapagliflozin (Farixga) 10 mg daily    Chart reviewed on [facility]. Patient is a 79 y.o. female with known DM on the following at home:  Kombiglize (saxigliptin-metfomin ) 5-1000 once daily  Farxiga 10 mg daily  Lantus 47 units BID    A1c:   Lab Results   Component Value Date/Time    Hemoglobin A1c 8.3 (H) 05/26/2018 05:21 AM    Hemoglobin A1c 8.1 (H) 03/19/2018 09:48 AM       Recent Glucose Results:   Lab Results   Component Value Date/Time    GLUCPOC 88 06/13/2018 06:26 AM    GLUCPOC 74 06/13/2018 05:56 AM    GLUCPOC 70 06/13/2018 05:55 AM        Lab Results   Component Value Date/Time    Creatinine 0.47 (L) 06/12/2018 04:24 AM     Estimated Creatinine Clearance: 121.4 mL/min (based on Cr of 0.47). Active Orders   Diet    DIET DIABETIC WITH OPTIONS Consistent Carb 2000kcal; Regular        PO intake: No data found. Will continue to follow as needed.     Thank you  John Sy RD, CDE

## 2018-06-13 NOTE — PROGRESS NOTES
Gen Surg @ [facility]  Attending addendum:  I supervised the NP and reviewed the note. I visited the pt independently. She says she's ready but spouse would like to speak with me before leaving. Questions regarding abx noted. PE: deferred to NP's exam  Hospital Problems as of 6/13/2018  Date Reviewed: 5/27/2018          Codes Class Noted - Resolved POA    * (Principal)Superficial postoperative wound infection ICD-10-CM: T81. 4XXA  ICD-9-CM: 998.59  6/8/2018 - Present Yes    Overview Addendum 6/12/2018  6:52 PM by Swapnil Victoria MD     6/10/18 wound culture  Culture result:    Final      MODERATE STAPHYLOCOCCUS EPIDERMIDIS (OXACILLIN RESISTANT) (A)     6/12/18 anaerobic wound culture   Culture result:    Final     MODERATE MIXED ANAEROBIC GRAM NEGATIVE RODS BETA LACTAMASE POSITIVE (A)     Culture result:    Final     HEAVY ANAEROBIC GRAM POSITIVE COCCI (A)                  Open wound of anterior abdominal wall with complication KVX-33-TO: T56.349H  ICD-9-CM: 879.3  6/8/2018 - Present Yes        Methicillin resistant Staphylococcus epidermidis infection ICD-10-CM: A49.8, Z16.29  ICD-9-CM: 041.19  6/3/2018 - Present Yes    Overview Signed 6/11/2018 10:48 AM by Swapnil Victoria MD     Wound culture 6/8/18  Reported 6/10/18 - sensitive to cipro, clinda, dapto, linezolid, TMP/SMZ and vanc               S/P right colectomy ICD-10-CM: Z90.49  ICD-9-CM: V45.89  5/27/2018 - Present Yes    Overview Signed 5/29/2018  8:25 AM by Swapnil Victoria MD     Right hemicolectomy for cecal mass with partial SBO, Ndiaye             Malignant neoplasm of ascending colon ([facility] ICD-10-CM: C18.2  ICD-9-CM: 153.6  5/26/2018 - Present Yes    Overview Signed 5/31/2018  3:49 PM by Swapnil Victoria MD     Results for Mohit Luque (MRN [id number]) as of 5/31/2018 15:41   Ref.  Range 5/31/2018 13:44   CEA Latest Units: ng/mL 28.4                DM (diabetes mellitus) ([address] ICD-10-CM: E11.9  ICD-9-CM: 250.00  5/23/2013 - Present Yes HTN (hypertension) ICD-10-CM: I10  ICD-9-CM: 401.9  5/23/2013 - Present Yes        RESOLVED: Hypokalemia ICD-10-CM: E87.6  ICD-9-CM: 276.8  5/26/2018 - 6/12/2018 Yes             According to sensitivities, levofloxacin should cover MRSE and metronidazole will cover anaerobes. Will eRx 10 days of antibiotics. Okay to discharge. follow up in 2 weeks.     Signed By: Alex Ramon MD     June 13, 2018

## 2018-06-13 NOTE — ROUTINE PROCESS
Bedside and Verbal shift change report given to Carmelo Jeff (oncoming nurse) by Samantha Brizuela (offgoing nurse). Report included the following information SBAR, Kardex, ED Summary, OR Summary, Intake/Output, MAR, Accordion and Recent Results.

## 2018-06-13 NOTE — PROGRESS NOTES
Spoke to on-call nurse at [facility] to confirm they are putting on wound vac tomorrow.  is reluctant to take wife home unless new wound vac is applied here before discharge. Advised  that we are going to put wet-dry dressing on patient that will stay until home health places wound vac on her but he refuses to have this done until he speaks with Dr. Claudia Andrew , who is coming back later today.

## 2018-06-13 NOTE — PROGRESS NOTES
Pt and  agreed to allow us to put a wet to dry dressing on to go home. Wet to dry dressing applied for HH to apply wound vac tomorrow. Discharge instructions reviewed with patient and . Opportunities for questions provided. Iv removed. Dressing changed. Patient discharged with all personal belongings.

## 2018-06-13 NOTE — PROGRESS NOTES
Reviewed medical chart; Arlene overton delivered Wound vac to bedside. Met with patient and her daughter at bedside to discuss discharge plan. [facility] accepted for [facility] and  will provide skilled nursing for wound care. Patient's daughter will transport . No further case management needs were identified. CM will be available in case any needs arise.   RUBINA Tracy

## 2018-06-13 NOTE — PROGRESS NOTES
General Surgery Daily Progress Note    Admit Date: 2018  Post-Operative Day: * No surgery found * from * No surgery found *     Subjective:     Last 24 hrs: Pt is lying in bed w/o complaints. Daughter at [facility]. Wants to know plan for abx for d/c  2nd ctx growing  MODERATE MIXED ANAEROBIC GRAM NEGATIVE RODS BETA LACTAMASE POSITIVE       Objective:     Blood pressure 125/75, pulse 73, temperature 98.3 °F (36.8 °C), resp. rate 18, height 5' 4\" (1.626 m), weight 179 lb 3.2 oz (81.3 kg), SpO2 94 %. Temp (24hrs), Av.7 °F (37.1 °C), Min:98.1 °F (36.7 °C), Max:99.3 °F (37.4 °C)      _____________________  Physical Exam:     Alert and Oriented, x3, in no acute distress. Cardiovascular: RRR, no peripheral edema  Abdomen: wound vac intact      Assessment:   Principal Problem:    Superficial postoperative wound infection (2018)      Overview: 6/10/18 wound culture      Culture result:    Final          MODERATE STAPHYLOCOCCUS EPIDERMIDIS (OXACILLIN RESISTANT) (A)             18 anaerobic wound culture       Culture result:    Final         MODERATE MIXED ANAEROBIC GRAM NEGATIVE RODS BETA LACTAMASE POSITIVE (A)         Culture result:    Final         HEAVY ANAEROBIC GRAM POSITIVE COCCI (A)                 Active Problems:    HTN (hypertension) (2013)      DM (diabetes mellitus) ([facility] (2013)      M[facility] (2018)      Overview: Results for Rupa Cash (MRN [id number]) as of 2018 15:41       Ref.  Range 2018 13:44       CEA Latest Units: ng/mL 28.4             S/P right colectomy (2018)      Overview: Right hemicolectomy for cecal mass with partial SBO, Ndiaye      Methicillin resistant Staphylococcus epidermidis infection (6/3/2018)      Overview: Wound culture 18      Reported 6/10/18 - sensitive to cipro, clinda, dapto, linezolid, TMP/SMZ       and vanc            Open wound of anterior abdominal wall with complication (6/8/2018)            Plan:     May go home after abx rx is confirmed  Will d/w Dr Pineda Castelan Review:    Recent Labs      06/12/18   0424  06/11/18   0430   WBC  11.6*  10.6   HGB  11.0*  10.6*   HCT  35.5  33.4*   PLT  471*  439*     Recent Labs      06/12/18   0424  06/11/18   0430   NA  146*  143   K  3.5  3.3*   CL  110*  108   CO2  29  27   GLU  51*  57*   BUN  8  7   CREA  0.47*  0.36*   CA  9.3  9.2   MG  1.9  2.0     No results for input(s): AML, LPSE in the last 72 hours.         ______________________  Medications:    Current Facility-Administered Medications   Medication Dose Route Frequency    [START ON 6/14/2018] insulin glargine (LANTUS) injection 28 Units  28 Units SubCUTAneous DAILY    insulin glargine (LANTUS) injection 30 Units  30 Units SubCUTAneous QHS    vancomycin (VANCOCIN) 1500 mg in  ml infusion  1,500 mg IntraVENous Q12H    albuterol (PROVENTIL VENTOLIN) nebulizer solution 2.5 mg  2.5 mg Inhalation Q6H PRN    dapagliflozin (FARXIGA) tablet 10 mg  10 mg Oral DAILY    ferrous sulfate tablet 325 mg  1 Tab Oral DAILY WITH BREAKFAST    lisinopril (PRINIVIL, ZESTRIL) tablet 10 mg  10 mg Oral DAILY    simvastatin (ZOCOR) tablet 20 mg  20 mg Oral QHS    Vancomycin Pharmacy to Dose   Other Rx Dosing/Monitoring    metoprolol tartrate (LOPRESSOR) tablet 50 mg  50 mg Oral BID    insulin lispro (HUMALOG) injection   SubCUTAneous AC&HS    glucose chewable tablet 16 g  4 Tab Oral PRN    dextrose (D50W) injection syrg 12.5-25 g  12.5-25 g IntraVENous PRN    glucagon (GLUCAGEN) injection 1 mg  1 mg IntraMUSCular PRN    sodium chloride (NS) flush 5-10 mL  5-10 mL IntraVENous Q8H    sodium chloride (NS) flush 5-10 mL  5-10 mL IntraVENous PRN    oxyCODONE IR (ROXICODONE) tablet 5 mg  5 mg Oral Q4H PRN    naloxone (NARCAN) injection 0.4 mg  0.4 mg IntraVENous PRN    ondansetron (ZOFRAN) injection 4 mg  4 mg IntraVENous Q4H PRN    enoxaparin (LOVENOX) injection 40 mg  40 mg SubCUTAneous Q24H    0.9% sodium chloride infusion  50 mL/hr IntraVENous CONTINUOUS    fentaNYL citrate (PF) injection 25 mcg  25 mcg IntraVENous Q4H PRN    piperacillin-tazobactam (ZOSYN) 3.375 g in 0.9% sodium chloride (MBP/ADV) 100 mL  3.375 g IntraVENous Q8H       Noemy Gomez NP  6/13/2018

## 2018-06-13 NOTE — DISCHARGE INSTRUCTIONS
Patient Discharge Instructions    Adrián Gray / [id number] : 1950    Admitted 2018 Discharged: 2018       Instrucciones del paciente al descargar    Es importante que se tome el medicamento exactamente reyes se prescriben. Mantenga cabrera medicamento en los frascos facilitados por el farmacéutico y mantener gordo lista de los nombres de [place], dosis y tiempos que tener en cabrera cartera. No tome otros medicamentos sin consultar a cabrera médico.      ¿Qué hacer en el hogar    Dieta recomendada: dieta diabetica    Actividad recomendada: no levantar objetos pesados byron 4 semanas, es posible usar las escaleras    Si usted experimenta cualquiera de los siguientes síntomas a continuación, llame a la oficina del doctor [place] [phone]. Samuel Edmondson en [facility]. Llame a la oficina para programar cabrera savanna. La información obtenida a través de:    Entiendo que si se produce algún problema gordo vez que estoy en casa estoy en contacto con mi médico.      ______________________________________________________________    Entiendo y reconozco recibo de las instrucciones. ______________________________________________________________        Lisa Garcias asistido por vacío para sanar heridas: Instrucciones de cuidado - [ Leah Wagner for Wound Healing: Care Instructions ]  Instrucciones de cuidado  Cuando usted tiene gordo herida que es difícil de cerrar, cabrera médico podría tratarla con cierre asistido por vacío (VAC, por delfin siglas en inglés). El VAC [place] presión negativa (succión) para juntar los bordes de la herida. También elimina el líquido y el tejido muerto de la sami de la herida. En el VAC:  · Se coloca gordo pieza especial de espuma o gordo gasa de algodón sobre la herida. Melba cubre y protege la herida. Un vendaje transparente (vendaje de película) sobresale varias pulgadas del vendaje de espuma o gasa para crear un sello para el vacío.   · Un tubo conecta la espuma a gordo pequeña máquina llamada unidad de terapia. La unidad de terapia crea la succión. · El sistema VAC puede ser portátil o estacionario. El VAC no causa dolor. Es posible que sienta un leve tirón en la herida cuando se inicie por primera vez el tratamiento. El tiempo que deberá someterse al VAC depende del tamaño y el tipo de herida, y del funcionamiento del [place]. Mientras josue la herida, usted estará limitado en lo que puede hacer. Usará el VAC 24 horas al día. La atención de seguimiento es gordo parte clave de cabrera tratamiento y seguridad. Asegúrese de hacer y acudir a todas las citas, y llame a cabrera médico si está teniendo problemas. También es gordo buena idea saber los resultados de los exámenes y mantener gordo lista de los medicamentos que charles. ¿Cómo puede cuidarse en el hogar? · Un trabajador de atención médica en el [facility] irá a cabrera casa algunas veces a la semana para cambiar el vendaje y revisar la máquina. Es posible que tenga que cambiarlo con más frecuencia si hay gordo gran cantidad de secreción. · Cabrera médico le dará información sobre lo que puede hacer y lo que no. [place] depende del lugar de la herida. Heena actividades podrían estar limitadas byron el tiempo que [place]. · Podrá myra concetta de esponja. No se duche ni tome concetta de inmersión a menos que el médico lo autorice. · [employer] analgésicos (medicamentos para el dolor) exactamente según las indicaciones. ¨ Si el médico le recetó un analgésico, tómelo según las indicaciones. ¨ Si no está tomando un analgésico recetado, pregúntele a cabrera médico si puede myra miguel de [employer]. · Si cabrera médico le recetó antibióticos, tómelos según las indicaciones. No deje de tomarlos por el hecho de sentirse mejor. Debe myra todos los antibióticos hasta terminarlos. ¿Cuándo debe pedir ayuda? Llame al 911 en cualquier momento que considere que necesita atención de [place]. Por ejemplo, llame si:  ?  · Sangra mucho o ve un cambio repentino en el color o la textura de la secreción. ? · La herida se abre y se puede olya los órganos debajo de la piel (evisceración). ?Llame a cabrera médico ahora mismo o busque atención médica inmediata si:  ? · La herida comienza a sangrar. ? · El vendaje se desprende. Cubra la sami con un vendaje estéril hasta que pueda olya a cabrera médico o hasta que llegue el trabajador de atención médica a cabrera hogar. ? · Tiene señales de infección, tales reyes:  ¨ Aumento del dolor, la hinchazón, el enrojecimiento o la temperatura alrededor de la herida. ¨ Vetas rojizas que salen de la herida. ¨ Pus que sale de la herida. Chantal Wills. ?Preste especial atención a los cambios en cabrera charan y asegúrese de comunicarse con cabrera médico si:  ? · El ruido de la [place] o sube de [place]. [place] puede significar que el sello está roto o que la máquina no está produciendo suficiente succión. ¿Dónde puede encontrar más información en inglés? Sabiha Concepcion a http://thai-freddie.info/. Michelle Mims [id number] en la búsqueda para aprender más acerca de \"Cierre asistido por vacío para sanar heridas: Instrucciones de cuidado - [ Ettie Meigs for Wound Healing: Care Instructions ]. \"  Revisado: 20 Denia Marte 2017  Versión del contenido: 11.4  © 6841-5893 Healthwise, Incorporated. Las instrucciones de cuidado fueron adaptadas bajo licencia por [employer] (which disclaims liability or warranty for this information). Si usted tiene Hanson Nolan afección médica o sobre estas instrucciones, siempre pregunte a cabrera profesional de charan. Healthwise, Incorporated niega toda garantía o responsabilidad por cabrera uso de esta información. Vacuum-Assisted Closure for Wound Healing: Care Instructions  Your Care Instructions  When you have a wound that is hard to close your doctor may treat it with vacuum-assisted closure (VAC). VAC uses negative pressure (suction) to help bring the edges of your wound together.  It also removes fluid and dead tissue from the wound area. In VAC:  · A special piece of foam or cotton gauze fits over your wound. This covers and protects the wound. A clear bandage (film dressing) goes several inches beyond the foam or gauze dressing to create a seal for the vacuum. · A tube connects the foam to a small machine called the therapy unit. The therapy unit creates the suction. · The [facility] system may be carried around (portable) or may stay in one place (stationary). VAC does not hurt. You may feel a mild pulling on the wound when treatment first starts. How long you need VAC depends on the size and type of wound you have and how well the [facility] works. You will be limited in what you can do while the wound heals. You will use VAC 24 hours a day. Follow-up care is a key part of your treatment and safety. Be sure to make and go to all appointments, and call your doctor if you are having problems. It's also a good idea to know your test results and keep a list of the medicines you take. How can you care for yourself at home? · A home health care worker will come to your home a few times a week to change the bandage and check the machine. You may need it changed more often if there is a lot of drainage. · Your doctor will give you information on what you can and can't do. This depends on where your wound is located. Your activities may be limited during the time you're using VAC. · You will be able to take sponge baths. Don't shower or take baths unless your doctor says it is okay. · Take pain medicines exactly as directed. ¨ If the doctor gave you a prescription medicine for pain, take it as prescribed. ¨ If you are not taking a prescription pain medicine, ask your doctor if you can take an over-the-counter medicine. · If your doctor prescribed antibiotics, take them as directed. Do not stop taking them just because you feel better. You need to take the full course of antibiotics. When should you call for help?   Call 911 anytime you think you may need emergency care. For example, call if:  ? · You have a lot of bleeding or see a sudden change in the color or texture of the drainage. ? · The wound splits open and organs under the skin can be seen (evisceration). ?Call your doctor now or seek immediate medical care if:  ? · The wound starts bleeding. ? · The bandage comes off. Cover the area with a sterile bandage until you can see your doctor or your home health care worker comes by. ? · You have signs of infection, such as:  ¨ Increased pain, swelling, warmth, or redness around the wound. ¨ Red streaks leading from the wound. ¨ Pus draining from the wound. ¨ A fever. ? Watch closely for changes in your health, and be sure to contact your doctor if:  ? · The noise the machine makes changes or gets very loud. This may mean the seal is broken or the machine is not producing enough suction. Where can you learn more? Go to http://thai-freddie.info/. Enter P801 in the search box to learn more about \"Vacuum-Assisted Closure for Wound Healing: Care Instructions. \"  Current as of: March 20, 2017  Content Version: 11.4  © 2133-6198 [employer]. Care instructions adapted under license by [employer] (which disclaims liability or warranty for this information). If you have questions about a medical condition or this instruction, always ask your healthcare professional. [place] [address] any warranty or liability for your use of this information.

## 2018-06-13 NOTE — PROGRESS NOTES
Spiritual Care Partner Volunteer visited patient in Rm 545 on 6/13/2018.   Documented by:  Chaplain Novak MDiv, MS, [place] [id number] PRAY ([id number])

## 2018-06-14 NOTE — TELEPHONE ENCOUNTER
Spoke with patient's  who wants to talk with Dr. Desiree Cantu about his wife. Also spoke with [address] [place] [address] states she has talked with patient . Informed him Dr. Desiree Cantu was not in office at this time. I will make him aware to return call .

## 2018-06-15 NOTE — DISCHARGE SUMMARY
Physician Discharge Summary     Patient ID:  Carrie Lang  [id number]  28 y.o.  1950    Admit Date: 6/8/2018    Discharge Date: 6/13/2018    Admission Diagnoses: Wound infection    Admission Condition: Good    Discharge Diagnoses:    Hospital Problems as of 6/13/2018  Date Reviewed: 5/27/2018          Codes Class Noted - Resolved POA    * (Principal)Superficial postoperative wound infection ICD-10-CM: T81. 4XXA  ICD-9-CM: 998.59  6/8/2018 - Present Yes    Overview Addendum 6/12/2018  6:52 PM by Amor Reilly MD     6/10/18 wound culture  Culture result:    Final      MODERATE STAPHYLOCOCCUS EPIDERMIDIS (OXACILLIN RESISTANT) (A)     6/12/18 anaerobic wound culture   Culture result:    Final     MODERATE MIXED ANAEROBIC GRAM NEGATIVE RODS BETA LACTAMASE POSITIVE (A)     Culture result:    Final     HEAVY ANAEROBIC GRAM POSITIVE COCCI (A)                  Open wound of anterior abdominal wall with complication CNE-34-TJ: K54.778G  ICD-9-CM: 879.3  6/8/2018 - Present Yes        Methicillin resistant Staphylococcus epidermidis infection ICD-10-CM: A49.8, Z16.29  ICD-9-CM: 041.19  6/3/2018 - Present Yes    Overview Signed 6/11/2018 10:48 AM by Amor Reilly MD     Wound culture 6/8/18  Reported 6/10/18 - sensitive to cipro, clinda, dapto, linezolid, TMP/SMZ and vanc               S/P right colectomy ICD-10-CM: Z90.49  ICD-9-CM: V45.89  5/27/2018 - Present Yes    Overview Signed 5/29/2018  8:25 AM by Amor Reilly MD     Right hemicolectomy for cecal mass with partial SBO, Ndiaye             Malignant neoplasm of ascending colon ([facility] [address] ICD-10-CM: C18.2  ICD-9-CM: 153.6  5/26/2018 - Present Yes    Overview Signed 5/31/2018  3:49 PM by Amor Reilly MD     Results for Ezekiel Garcia (MRN [id number]) as of 5/31/2018 15:41   Ref.  Range 5/31/2018 13:44   CEA Latest Units: ng/mL 28.4                DM (diabetes mellitus) ([facility] ICD-10-CM: E11.9  ICD-9-CM: 250.00  5/23/2013 - Present Yes        HTN (hypertension) ICD-10-CM: I10  ICD-9-CM: 401.9  5/23/2013 - Present Yes        RESOLVED: Hypokalemia ICD-10-CM: E87.6  ICD-9-CM: 276.8  5/26/2018 - 6/12/2018 Yes               Discharge Condition: Good    Procedure(s):       none     Hospital Course: The patient was admitted for management of post op wound infection. The wound was opened at the bedside by the on-call surgeon. Cultures would grow MRSE and anaerobic GNRs and GPC. On June 11 the WOCN placed a Wound VAC. She tolerated this well. Home health was arranged by the . She was discharged in improved condition. Consults: None    Significant Diagnostic Studies: microbiology: wound culture: see discharge diagnoses above    Disposition: home with [facility] skilled RN for negative pressure wound therapy. Patient Instructions:     Discharge Medication List as of 6/13/2018  4:32 PM      START taking these medications    Details   levoFLOXacin (LEVAQUIN) 500 mg tablet Take 1 Tab by mouth daily for 10 days. , Normal, Disp-10 Tab, R-0      metroNIDAZOLE (FLAGYL) 500 mg tablet Take 1 Tab by mouth three (3) times daily for 10 days. , Normal, Disp-30 Tab, R-0         CONTINUE these medications which have NOT CHANGED    Details   oxyCODONE IR (ROXICODONE) 5 mg immediate release tablet Take 1 Tab by mouth every eight (8) hours as needed. Max Daily Amount: 15 mg., Print, Disp-21 Tab, R-0      metoprolol tartrate (LOPRESSOR) 50 mg tablet TAKE 1 TABLET BY MOUTH TWICE DAILY, Normal, Disp-180 Tab, R-0      ammonium lactate (AMLACTIN) 12 % topical cream APPLY 2 GRAMS TO AFFECTED AREA TWICE A DAY. RUB IN TO AFFECTED AREA WELL, Normal, Disp-1 Tube, R-2      simvastatin (ZOCOR) 20 mg tablet Take 1 Tab by mouth nightly., Normal, Disp-90 Tab, R-1, RICARDO      lisinopril (PRINIVIL, ZESTRIL) 10 mg tablet Take 1 Tab by mouth daily. , Normal, Disp-90 Tab, R-1      sAXagliptin-metFORMIN (KOMBIGLYZE XR) 5-1,000 mg TM24 Take 1 tab po QD, Normal, Disp-90 Tab, R-1      dapagliflozin (FARXIGA) 10 mg tab tablet Take 1 Tab by mouth daily. , Normal, Disp-90 Tab, R-1      insulin glargine (LANTUS SOLOSTAR) 100 unit/mL (3 mL) inpn INJECT 47 UNITS UNDER THE SKIN IN THE MORNING AND 47 UNITS IN THE EVENING ( please give 90 day supply 5 pens per box, give 6 boxes=30 pens), Normal, Disp-30 Pen, R-3      cholecalciferol, VITAMIN D3, (VITAMIN D3) 5,000 unit tab tablet Take  by mouth daily. , Historical Med      aspirin delayed-release 81 mg tablet Take 81 mg by mouth daily. , Historical Med      calcium 600 mg Cap Take  by mouth. Historical Med      senna-docusate (PERICOLACE) 8.6-50 mg per tablet Take 1 Tab by mouth two (2) times daily as needed for Constipation. Can be obtained over-the-counter, Print, Disp-30 Tab, R-0      albuterol (PROVENTIL HFA, VENTOLIN HFA, PROAIR HFA) 90 mcg/actuation inhaler Take 1 Puff by inhalation every six (6) hours as needed for Wheezing., Normal, Disp-1 Inhaler, R-0      fexofenadine (ALLEGRA) 180 mg tablet Take 1 Tab by mouth daily. , Normal, Disp-30 Tab, R-3      fluticasone (FLONASE) 50 mcg/actuation nasal spray 2 Sprays by Both Nostrils route daily. , Normal, Disp-1 Bottle, R-2      ferrous sulfate (IRON) 325 mg (65 mg iron) tablet Take  by mouth Daily (before breakfast). , Historical Med      clotrimazole (LOTRIMIN) 1 % topical cream Apply  to affected area two (2) times a day., Normal, Disp-30 g, R-0             Activity: Activity as tolerated and No heavy lifting for 4 weeks  Diet: Regular Diet  Wound Care: per [facility] skilled RN    Follow-up with Xochilt German MD in 2 week(s)  Follow-up tests/labs n/a    Signed:  Xochilt German MD  6/15/2018  5:49 PM

## 2018-06-19 NOTE — PROGRESS NOTES
1. Have you been to the ER, urgent care clinic since your last visit? Hospitalized since your last visit? No    2. Have you seen or consulted any other health care providers outside of the [address] [place] [address] since your last visit? Include any pap smears or colon screening.  No

## 2018-06-19 NOTE — PROGRESS NOTES
Subjective:   Carlo Warren is a 79 y.o. female presents for postop care 3 weeks following right hemicolectomy on 5/27/18. Patient feels like her mouth is sour. She denies reflux. Appetite is good. Eating a regular diet without difficulty. Bowel movements are regular. Objective:     Visit Vitals    /83 (BP 1 Location: Left arm, BP Patient Position: Sitting)    Pulse 77    Temp 98 °F (36.7 °C) (Oral)    Resp 20    Ht 5' 4\" (1.626 m)    Wt 174 lb (78.9 kg)    SpO2 96%    BMI 29.87 kg/m2       Phys Exam:    Incision:  Location: abdomen   healing well, no drainage, no erythema, no hernia, no seroma, no swelling, no dehiscence, incision well approximated       Assessment:     Encounter Diagnoses     ICD-10-CM ICD-9-CM   1. Malignant neoplasm of ascending colon (HCC) C18.2 153.6   2. Superficial postoperative wound infection, subsequent encounter T81. 4XXD V58.89     998.59   3. S/P right colectomy Z90.49 V45.89   4. Open wound of anterior abdominal wall with complication, subsequent encounter S31.109D V58.89     879.3      S/P Right hemicolectomy. Doing well postoperatively. Patient is here for wound check and staple removal but the vac sponge was changed yesterday and the nurse is not due to return until Friday. We instructed the patient to see me on Friday morning and arrange for the home help nurse to apply a new wound vac after that appointment. Plan:     1. Wound care discussed. 2. Pt is to increase activities as tolerated. .  3. Follow-up in 3 days.        Chart was written by Ozzy Martinez, as dictated by Greg Campbell MD.     Signed By: Eugenia Briscoe MD     June 19, 2018

## 2018-06-22 NOTE — PROGRESS NOTES
Clinic Visit    Gabe Rust is a 79 y.o. female presents for follow-up care of an abdominal wound after right hemicolectomy 2018. This is week 4 of treatment. She is receiving wound care by home health RN with negative pressure wound therapy. She denies pain, night sweats, nausea, and trouble with bowel movements. She does have some fatigue after walking. When examined by wound care specialist, Rachid Lucas RN on 2018, her wound was recorded to be 2.4 x 1.0 x 5.3 cm with sloping toward 12 o'clock and the visible base was 100% moist red. Physical Exam:     Incision:  Location: Abdomen   L 1.2 cm x W 0.6 cm x D 2.1 cm;  periwound skin intact, no erythema or induration     Change in wound area / volume: L down 50%, W down 40%, D  decreased  Underminin o'clock: 4 cm  Good granulation, no bleeding, no pus, no odor. Assessment:     1. The wound is improving and decreasing in size however the tunneling is deep and may be trapped by the wound opening closing to early. I explained to the  and patient that in the event that it does close too early, the incision may need to be opened surgically. One option is to dc the negative pressure wound therapy and start packing with gauze strips. Plan:     1. Will check with WOCN if there are any options for managing the tunneling while the VAC is in place. 2. Today I will take out the staples and continue her wound-vac  2. Follow-up in 1 week to reassess the tunneling    Time in: 12:01  Time out: 12:12  Total time spent with patient: 11 minutes.     Signed By:     2018

## 2018-06-25 PROBLEM — R10.84 GENERALIZED ABDOMINAL PAIN: Status: ACTIVE | Noted: 2018-01-01

## 2018-06-25 PROBLEM — C18.9 METASTATIC COLON CANCER IN FEMALE (HCC): Status: ACTIVE | Noted: 2018-01-01

## 2018-06-25 NOTE — MR AVS SNAPSHOT
[address] [address] [address] 
[phone] Patient: Miles Rao MRN: [id number] TMK:4/62/2714 Visit Information Date & Time Provider Department Dept. Phone Encounter #  
 6/25/2018 11:00 AM Tora Nyhan, [address]  Oncology at [address] [id number] Follow-up Instructions Return in about 2 weeks (around 7/9/2018). Your Appointments 7/9/2018  2:15 PM  
Follow Up with Tora Nyhan, DO [facility] Oncology at [facility] Appt Note: 2wk fu (colon ca) [address] [facility] [address] [facility] [address]  
[phone]  
  
   
 [address] [facility] [address]  
  
    
 7/13/2018 10:40 AM  
POST OP 10 MIN with Kriss Aceves MD  
[place] [address] ([facility]) Appt Note: po 1 week f/u 05/27/2018 EB: RIGHT HEMICOLECTOMY; patient rescheduled. .  
 [address] [facility] [address]  
[address] [address]  
  
    
 7/18/2018  8:30 AM  
ROUTINE CARE with Ivonne Martinez MD  
[facility]) Appt Note: 4 month follow up [address] [facility] [address] [facility] [address]  
[phone]  
  
   
 [address] [facility] [address] Upcoming Health Maintenance Date Due DTaP/Tdap/Td series (1 - Tdap) 2/12/1973 FOBT Q 1 YEAR AGE 50-75 2/12/2002 ZOSTER VACCINE AGE 60> 12/12/2011 EYE EXAM RETINAL OR DILATED Q1 12/3/2016 Bone Densitometry (Dexa) Screening 2/12/2017 LIPID PANEL Q1 6/1/2018 Influenza Age 5 to Adult 8/1/2018 FOOT EXAM Q1 10/4/2018 MEDICARE YEARLY EXAM 10/5/2018 HEMOGLOBIN A1C Q6M 11/26/2018 MICROALBUMIN Q1 3/19/2019 GLAUCOMA SCREENING Q2Y 9/28/2019 BREAST CANCER SCRN MAMMOGRAM 10/16/2019 Allergies as of 6/25/2018  Review Complete On: 6/25/2018 By: Ruy Haro DO Severity Noted Reaction Type Reactions Tylenol [Acetaminophen]  03/14/2012    Swelling Current Immunizations  Reviewed on 6/25/2018 Name Date Pneumococcal Conjugate (PCV-13) 9/28/2016 Pneumococcal Polysaccharide (PPSV-23) 10/4/2017 Reviewed by Kristen Nunez LPN on 2/35/1007 at 00:26 AM  
You Were Diagnosed With   
  
 Codes Comments Metastatic colon cancer in female [facility])    -  Primary ICD-10-CM: C78.5 ICD-9-CM: 197.5 S/P right colectomy     ICD-10-CM: Z90.49 ICD-9-CM: V45.89 Open wound of anterior abdominal wall with complication, subsequent encounter     ICD-10-CM: S31.109D ICD-9-CM: V58.89, 879.3 Type 2 diabetes mellitus without complication, with long-term current use of insulin (HCC)     ICD-10-CM: E11.9, Z79.4 ICD-9-CM: 250.00, V58.67 Essential hypertension     ICD-10-CM: I10 
ICD-9-CM: 401.9 Generalized abdominal pain     ICD-10-CM: R10.84 ICD-9-CM: 789.07 Vitals BP Pulse Temp Resp Height(growth percentile) Weight(growth percentile) 123/74 81 98.8 °F (37.1 °C) (Oral) 20 5' 4\" (1.626 m) 177 lb 8 oz (80.5 kg) BMI OB Status Smoking Status 30.47 kg/m2 Hysterectomy Never Smoker Vitals History BMI and BSA Data Body Mass Index Body Surface Area  
 30.47 kg/m 2 1.91 m 2 Preferred Pharmacy Pharmacy Name Phone [facility] Milian [address] [place] [address] [phone] Your Updated Medication List  
  
   
This list is accurate as of 6/25/18 12:24 PM.  Always use your most recent med list.  
  
  
  
  
 albuterol 90 mcg/actuation inhaler Commonly known as:  PROVENTIL HFA, VENTOLIN HFA, PROAIR HFA Take 1 Puff by inhalation every six (6) hours as needed for Wheezing.   
  
 ammonium lactate 12 % topical cream  
Commonly known as:  AMLACTIN  
 APPLY 2 GRAMS TO AFFECTED AREA TWICE A DAY. RUB IN TO AFFECTED AREA WELL  
  
 aspirin delayed-release 81 mg tablet Take 81 mg by mouth daily. calcium 600 mg Cap Take  by mouth. cholecalciferol (VITAMIN D3) 5,000 unit Tab tablet Commonly known as:  VITAMIN D3 Take  by mouth daily. clotrimazole 1 % topical cream  
Commonly known as:  Donna Donohue Apply  to affected area two (2) times a day. dapagliflozin 10 mg Tab tablet Commonly known as:  Lilly Rosales Take 1 Tab by mouth daily. fexofenadine 180 mg tablet Commonly known as:  Learta Nica Take 1 Tab by mouth daily. fluticasone 50 mcg/actuation nasal spray Commonly known as:  Leafy Few 2 Sprays by Both Nostrils route daily. insulin glargine 100 unit/mL (3 mL) Inpn Commonly known as:  LANTUS SOLOSTAR U-100 INSULIN INJECT 47 UNITS UNDER THE SKIN IN THE MORNING AND 47 UNITS IN THE EVENING ( please give 90 day supply 5 pens per box, give 6 boxes=30 pens) Iron 325 mg (65 mg iron) tablet Generic drug:  ferrous sulfate Take  by mouth Daily (before breakfast). lisinopril 10 mg tablet Commonly known as:  Britany Pace Take 1 Tab by mouth daily. metoprolol tartrate 50 mg tablet Commonly known as:  LOPRESSOR  
TAKE 1 TABLET BY MOUTH TWICE DAILY  
  
 oxyCODONE IR 5 mg immediate release tablet Commonly known as:  Claressa Dills Take 1 Tab by mouth every eight (8) hours as needed. Max Daily Amount: 15 mg. sAXagliptin-metFORMIN 5-1,000 mg Tm24 Commonly known as:  KOMBIGLYZE XR Take 1 tab po QD  
  
 senna-docusate 8.6-50 mg per tablet Commonly known as:  Marny [place] Take 1 Tab by mouth two (2) times daily as needed for Constipation. Can be obtained over-the-counter  
  
 simvastatin 20 mg tablet Commonly known as:  ZOCOR Take 1 Tab by mouth nightly. Follow-up Instructions Return in about 2 weeks (around 7/9/2018). To-Do List   
 06/28/2018 To Be Determined Appointment with Marcell Lefort at [facility]  
  
 07/02/2018 To Be Determined Appointment with Marcell Lefort at [facility]  
  
 07/05/2018 To Be Determined Appointment with Marcell Lefort at [facility]  
  
 07/09/2018 To Be Determined Appointment with Marcell Lefort at [facility]  
  
 07/12/2018 To Be Determined Appointment with Marcell Lefort at [facility]  
  
 07/16/2018 To Be Determined Appointment with Marcell Lefort at [facility]  
  
 07/19/2018 To Be Determined Appointment with Marcell Lefort at [facility]  
  
 07/23/2018 To Be Determined Appointment with Marcell Lefort at [facility]  
  
 07/26/2018 To Be Determined Appointment with Marcell Lefort at [facility]  
  
 07/30/2018 To Be Determined Appointment with Marcell Lefort at [facility]  
  
 08/02/2018 To Be Determined Appointment with Marcell Lefort at [facility]  
  
 08/06/2018 To Be Determined Appointment with Marcell Lefort at [facility]  
  
 08/09/2018 To Be Determined Appointment with Marcell Lefort at [facility]  
  
 08/13/2018 To Be Determined Appointment with Marcell Lefort at [facility] Patient Instructions Online Resources CancerCare® Cancercare. org Hopeline Hours ([phone]) Monday  Thursday: 10 a.m.  6 p.m. EST Friday: 10 a.m.  5 p.m.  [address] [facility] [address] [place] [address] 
[phone] [address] [facility]  
(NCCN)  
NCCN. org To learn about clinical trials go to: ClinicalTrials. gov Introducing [facility]! [employer] introduces GetPrice patient portal. Now you can access parts of your medical record, email your doctor's office, and request medication refills online. 1. In your internet browser, go to [email or web address] Pointt 2. Click on the First Time User? Click Here link in the Sign In box. You will see the New Member Sign Up page. 3. Enter your GetPrice Access Code exactly as it appears below. You will not need to use this code after youve completed the sign-up process. If you do not sign up before the expiration date, you must request a new code. · GetPrice Access Code: [email or web address] Expires: 8/30/2018 12:47 PM 
 
4. Enter the last four digits of your Social Security Number (xxxx) and Date of Birth (mm/dd/yyyy) as indicated and click Submit. You will be taken to the next sign-up page. 5. Create a GetPrice ID. This will be your GetPrice login ID and cannot be changed, so think of one that is secure and easy to remember. 6. Create a GetPrice password. You can change your password at any time. 7. Enter your Password Reset Question and Answer. This can be used at a later time if you forget your password. 8. Enter your e-mail address. You will receive e-mail notification when new information is available in [address] 9. Click Sign Up. You can now view and download portions of your medical record. 10. Click the Download Summary menu link to download a portable copy of your medical information. If you have questions, please visit the Frequently Asked Questions section of the GetPrice website. Remember, GetPrice is NOT to be used for urgent needs. For medical emergencies, dial 911. Now available from your iPhone and Android! Please provide this summary of care documentation to your next provider. Your primary care clinician is listed as [facility] Nurse Navigator.  If you have any questions after today's visit, please call [phone].

## 2018-06-25 NOTE — PATIENT INSTRUCTIONS
Online Resources     [employer]. org   Hopeline Hours ([phone])  Monday - Thursday: 10 a.m. - 6 p.m. EST  Friday: 10 a.m. - 5 p.m. EST          American Cancer Society   Cancer. org   Cancer Helpline  [phone]      National Comprehensive Cancer Network   (NCCN)   NCCN. org       To learn about clinical trials go to:    ClinicalTrials. gov

## 2018-06-25 NOTE — PROGRESS NOTES
[facility] [place] [facility] [place] [address] [address], [place]: [phone]  F: [phone]    Reason for Visit:   Taqueria Almodovar is a 79 y.o. female who is seen in office visit for met colon cancer post surgery . Treatment History:   None yet    History of Present Illness:     Pt seen today in office f/u for met colon cancer. Pt seen in hospital consult and first time in office. Pt post surgery  for obstructing mass. Pt has post surgery wound healing issues and has a wound vac on. Pt is still on abx also. Here with . Daughter on phone. Sees PCP with DM/ HTN. Pt c/o abdominal pain but does not want to take pain meds. Wont take ibuprofen. Not taking oxycodone. Past Medical History:   Diagnosis Date    Anemia NEC     Cancer ([address]     Diabetes ([address]     Headache(784.0)     Hypercholesterolemia     Hypertension     Malignant ne[facility] colon [facility] 2018    Stage IVc    S/P right colectomy 2018    Right hemicolectomy for cecal mass with partial SBO, [place]    Superficial postoperative wound infection 2018      Past Surgical History:   Procedure Laterality Date    HX  SECTION      Hysterectomy,total for precancer    HX COLECTOMY Right 2018    Right hemicolectomy for cecal mass with partial SBO, Ndiaye      Social History   Substance Use Topics    Smoking status: Never Smoker    Smokeless tobacco: Never Used    Alcohol use No      Family History   Problem Relation Age of Onset    Alcohol abuse Father     Cancer Maternal Aunt      breast ca    Breast Cancer Maternal Aunt     Breast Cancer Sister     Breast Cancer Daughter      Current Outpatient Prescriptions   Medication Sig    metoprolol tartrate (LOPRESSOR) 50 mg tablet TAKE 1 TABLET BY MOUTH TWICE DAILY    ammonium lactate (AMLACTIN) 12 % topical cream APPLY 2 GRAMS TO AFFECTED AREA TWICE A DAY.  RUB IN TO AFFECTED AREA WELL    simvastatin (ZOCOR) 20 mg tablet Take 1 Tab by mouth nightly.  lisinopril (PRINIVIL, ZESTRIL) 10 mg tablet Take 1 Tab by mouth daily.  sAXagliptin-metFORMIN (KOMBIGLYZE XR) 5-1,000 mg TM24 Take 1 tab po QD    dapagliflozin (FARXIGA) 10 mg tab tablet Take 1 Tab by mouth daily.  insulin glargine (LANTUS SOLOSTAR) 100 unit/mL (3 mL) inpn INJECT 47 UNITS UNDER THE SKIN IN THE MORNING AND 47 UNITS IN THE EVENING ( please give 90 day supply 5 pens per box, give 6 boxes=30 pens)    cholecalciferol, VITAMIN D3, (VITAMIN D3) 5,000 unit tab tablet Take  by mouth daily.  aspirin delayed-release 81 mg tablet Take 81 mg by mouth daily.  calcium 600 mg Cap Take  by mouth.  senna-docusate (PERICOLACE) 8.6-50 mg per tablet Take 1 Tab by mouth two (2) times daily as needed for Constipation. Can be obtained over-the-counter    oxyCODONE IR (ROXICODONE) 5 mg immediate release tablet Take 1 Tab by mouth every eight (8) hours as needed. Max Daily Amount: 15 mg.    albuterol (PROVENTIL HFA, VENTOLIN HFA, PROAIR HFA) 90 mcg/actuation inhaler Take 1 Puff by inhalation every six (6) hours as needed for Wheezing.  fexofenadine (ALLEGRA) 180 mg tablet Take 1 Tab by mouth daily.  fluticasone (FLONASE) 50 mcg/actuation nasal spray 2 Sprays by Both Nostrils route daily.  ferrous sulfate (IRON) 325 mg (65 mg iron) tablet Take  by mouth Daily (before breakfast).  clotrimazole (LOTRIMIN) 1 % topical cream Apply  to affected area two (2) times a day. No current facility-administered medications for this visit. Allergies   Allergen Reactions    Tylenol [Acetaminophen] Swelling        Review of Systems: A complete review of systems was obtained, negative except as described above.     Physical Exam:     Visit Vitals    /74    Pulse 81    Temp 98.8 °F (37.1 °C) (Oral)    Resp 20    Ht 5' 4\" (1.626 m)    Wt 177 lb 8 oz (80.5 kg)    BMI 30.47 kg/m2     ECOG PS: 0  General: No distress  Eyes: anicteric sclerae  HENT: Atraumatic, OP clear  Neck: Supple  CV regular   resp clear  GI soft with wound vac   Ext no edema  MS: ambulatory  Skin:  Normal temperature, turgor, and texture. Psych: alert, conversant    Results:     Lab Results   Component Value Date/Time    WBC 11.6 (H) 06/12/2018 04:24 AM    HGB 11.0 (L) 06/12/2018 04:24 AM    HCT 35.5 06/12/2018 04:24 AM    PLATELET 850 (H) 08/11/6862 04:24 AM    MCV 87.0 06/12/2018 04:24 AM    ABS. NEUTROPHILS 13.7 (H) 06/08/2018 08:13 PM     Lab Results   Component Value Date/Time    Sodium 146 (H) 06/12/2018 04:24 AM    Potassium 3.5 06/12/2018 04:24 AM    Chloride 110 (H) 06/12/2018 04:24 AM    CO2 29 06/12/2018 04:24 AM    Glucose 51 (L) 06/12/2018 04:24 AM    BUN 8 06/12/2018 04:24 AM    Creatinine 0.47 (L) 06/12/2018 04:24 AM    GFR est AA >60 06/12/2018 04:24 AM    GFR est non-AA >60 06/12/2018 04:24 AM    Calcium 9.3 06/12/2018 04:24 AM    Glucose (POC) 98 06/13/2018 11:31 AM     Lab Results   Component Value Date/Time    Bilirubin, total 0.2 06/08/2018 08:13 PM    ALT (SGPT) 23 06/08/2018 08:13 PM    AST (SGOT) 21 06/08/2018 08:13 PM    Alk. phosphatase 91 06/08/2018 08:13 PM    Protein, total 7.1 06/08/2018 08:13 PM    Albumin 2.2 (L) 06/08/2018 08:13 PM    Globulin 4.9 (H) 06/08/2018 08:13 PM       CT Results (most recent):    Results from [place] encounter on 06/08/18   CT ABD PELV W CONT   Narrative INDICATION: Wound infection. CT of the abdomen and pelvis is performed with 5 mm collimation. Study is  performed with 100 cc of nonionic Isovue 370. Sagittal and coronal reformatted  images were also performed. CT dose reduction was achieved with the use of the standardized protocol  tailored for this examination and automatic exposure control for dose  modulation. Direct comparison is made to prior CT dated May 2018. Findings:    Lung bases: There is trace bilateral pleural fluid and mild bibasilar  atelectasis. Liver:  There are several subcentimeter hepatic hypodensities, too small to  further characterize, but unchanged compared to prior CT dated May 2018. Adrenals: Adrenal glands are normal.    Pancreas: The pancreas is normal.    Gallbladder: The gallbladder is normal.    Kidneys: There is a 2.4 cm left renal cyst. The kidneys otherwise enhance  promptly and symmetrically. There is no hydronephrosis. Spleen: The spleen is normal.    Lymph nodes. There is no jourdan hepatitis, mesenteric, retroperitoneal or pelvic  lymphadenopathy. Bowel: No thickened or dilated loop of large or small bowel is visualized. Right  hemicolectomy postoperative changes are noted. There is mild stranding in the  right abdomen, likely postoperative. Urinary bladder: Nondependent gas is noted within the partially filled urinary  bladder. Miscellaneous: There are midline skin staples; deep to the skin staples, there  is ill-defined fluid and also containing gas, measuring approximately 10.7 cm cc  x 4.7 cm transverse x 5.1 cm AP. There is no free intraperitoneal fluid or gas. The uterus is absent. Impression IMPRESSION:  1. Trace bilateral pleural fluid and mild bibasilar atelectasis. 2. 10.7 cm x 4.7 cm x 5.1 cm midline anterior abdominal wall is irregular fluid  also containing gas. 5/25/18 CT Abdomen/Pelvis:  IMPRESSION: Cecal mass with metastatic disease to liver, retroperitoneal nodes,  and lung. Relative small bowel obstruction. Associated dilated appendix. Associated complex right pleural effusion and small amount of ascites. 5/29/18 Surgical Pathology:     FINAL PATHOLOGIC DIAGNOSIS   1. Peritoneum, biopsy:   Adenocarcinoma, poorly differentiated   2. Omentum, omentectomy:   Adenocarcinoma, poorly differentiated   3.  Colon and terminal ileum, right hemicolectomy:   Colonic adenocarcinoma   Appendix with tubulovillous adenoma   Addendum handling for mismatch repair proteins   Synoptic report:   Procedure: Right hemicolectomy   Tumor site: Cecum   Tumor size: 10.0 cm   Macroscopic tumor perforation: Not identified   Histologic type: Adenocarcinoma   Histologic grade: Poorly differentiated (grade 3 of 4 grades)   Tumor extension: Invades the visceral peritoneum   Margins: Positive for adenocarcinoma, radial   Proximal and distal margins negative for adenocarcinoma   Treatment effect: Not identified   Lymphovascular invasion: Present   Perineural invasion: Present   Tumor deposits: Present, at least 11   Regional lymph nodes: Five of 12 lymph nodes positive for metastatic carcinoma   Largest lymph node metastasis: 6 mm, without extracapsular extension   Ancillary testing: Immunohistochemistry for mismatch repair proteins pending      Pathologic stage (TNM):   Primary tumor: pT4a   Regional lymph nodes: pN2a   Distant metastases: pM1b     Records reviewed and summarized above. Pathology report(s) reviewed above. Radiology report(s) reviewed above. Assessment/PLAN:     1) stage 4 colon cancer MS stable found with colon mass with obstruction with liver and lung lesions on CT 5/18  Pt is post surgery 5/18 for obstructing colon mass. Pt now has wound vac and wound healing issues/ infection. Hospital d/c again 6/8/18. Seeing wound care now. On abx still. Long d/w pt and family present and on phone today with navigator present. We reviewed incurable cancer but treatable. Reviewed targeted therapies/ immunotherapy/ clinical trials. Pt and family may want to pursue clinical trials not here and we will help if they want to do this. pt will need palliative chemotherapy but cannot do now due to wound vac/ wound care. Pt probably needs at least 2-4 more weeks of healing. Will d/w surgery. Will f/u with pt in 2 weeks for re eval.   Molecular studies sent. 2)  DM/ HTN per IM. 3) wound vac present/ on abx. Per surgery. 4)  Support good from family.   present today and prefers to translate per translation services who was present today. Daughter on phone today. Navigator present today.      Call if questions  F/u here in 2 weeks    Signed By: Larry Chang, DO

## 2018-06-25 NOTE — PROGRESS NOTES
Request for add on testing faxed complete to pathology for Specimen # [id number] to add MELIZA, EGFR, BRAF per provider request. To PSR.

## 2018-07-05 NOTE — TELEPHONE ENCOUNTER
Spoke with patient 's  who states,patient wound is red and patient is having pain. Denies drainage or fever. Patient has appointment to be seen in office on Monday.

## 2018-07-09 NOTE — PROGRESS NOTES
1. Have you been to the ER, urgent care clinic since your last visit? Hospitalized since your last visit? No    2. Have you seen or consulted any other health care providers outside of the [address] [place] [address] since your last visit? Include any pap smears or colon screening.  No

## 2018-07-09 NOTE — MR AVS SNAPSHOT
[address] [facility] [address] [address] 
[phone] Patient: Sejal Adair MRN: [id number]:[id number] Visit Information Date & Time Provider Department Dept. Phone Encounter #  
 7/9/2018 11:00 AM Basilio Bolton NP [facility] 22 338 [phone] [id number] Your Appointments 7/13/2018 10:40 AM  
POST OP 10 MIN with Ced Jacobsen MD  
[facility] 22 702 ([address]) Appt Note: po 1 week f/u 05/27/2018 EB: RIGHT HEMICOLECTOMY; patient rescheduled. .  
 [address] 63 [facility] [address]  
[address] [address]  
  
    
 7/18/2018  8:30 AM  
ROUTINE CARE with Hugh Simental MD  
[facility] Internal Medicine [address] [facility]) Appt Note: 4 month follow up [address] [place] [address] 102 [facility] [address]  
[phone]  
  
   
 [address] [facility] [address] Upcoming Health Maintenance Date Due DTaP/Tdap/Td series (1 - Tdap) 2/12/1973 FOBT Q 1 YEAR AGE 50-75 2/12/2002 ZOSTER VACCINE AGE 60> 12/12/2011 EYE EXAM RETINAL OR DILATED Q1 12/3/2016 Bone Densitometry (Dexa) Screening 2/12/2017 LIPID PANEL Q1 6/1/2018 Influenza Age 5 to Adult 8/1/2018 FOOT EXAM Q1 10/4/2018 MEDICARE YEARLY EXAM 10/5/2018 HEMOGLOBIN A1C Q6M 11/26/2018 MICROALBUMIN Q1 3/19/2019 GLAUCOMA SCREENING Q2Y 9/28/2019 BREAST CANCER SCRN MAMMOGRAM 10/16/2019 Allergies as of 7/9/2018  Review Complete On: 7/9/2018 By: Basilio Bolton NP Severity Noted Reaction Type Reactions Tylenol [Acetaminophen]  03/14/2012    Swelling Current Immunizations  Reviewed on 6/25/2018 Name Date Pneumococcal Conjugate (PCV-13) 9/28/2016 Pneumococcal Polysaccharide (PPSV-23) 10/4/2017 Not reviewed this visit You Were Diagnosed With   
  
 Codes Comments Follow-up examination following surgery    -  Primary ICD-10-CM: W15 ICD-9-CM: V67.00 Malignant neoplasm of ascending colon (HCC)     ICD-10-CM: C18.2 ICD-9-CM: 153.6 Generalized abdominal pain     ICD-10-CM: R10.84 ICD-9-CM: 789.07 Superficial postoperative wound infection, sequela     ICD-10-CM: T81. 4XXS ICD-9-CM: 909.3 Early satiety     ICD-10-CM: R68.81 ICD-9-CM: 780.94 Abdominal bloating     ICD-10-CM: R14.0 ICD-9-CM: 603. 3 Vitals BP Pulse Temp Resp Height(growth percentile) Weight(growth percentile) 112/76 (!) 104 97.8 °F (36.6 °C) (Oral) 20 5' 4\" (1.626 m) 182 lb (82.6 kg) SpO2 BMI OB Status Smoking Status 96% 31.24 kg/m2 Hysterectomy Never Smoker BMI and BSA Data Body Mass Index Body Surface Area  
 31.24 kg/m 2 1.93 m 2 Preferred Pharmacy Pharmacy Name Phone [address] [phone] Your Updated Medication List  
  
   
This list is accurate as of 7/9/18 11:51 AM.  Always use your most recent med list.  
  
  
  
  
 albuterol 90 mcg/actuation inhaler Commonly known as:  PROVENTIL HFA, VENTOLIN HFA, PROAIR HFA Take 1 Puff by inhalation every six (6) hours as needed for Wheezing. ammonium lactate 12 % topical cream  
Commonly known as:  AMLACTIN  
APPLY 2 GRAMS TO AFFECTED AREA TWICE A DAY. RUB IN TO AFFECTED AREA WELL  
  
 aspirin delayed-release 81 mg tablet Take 81 mg by mouth daily. calcium 600 mg Cap Take 600 mg by mouth daily. 1 tablet daily  
  
 cholecalciferol (VITAMIN D3) 5,000 unit Tab tablet Commonly known as:  VITAMIN D3 Take 5,000 Units by mouth daily. 1 tablet daily  
  
 clotrimazole 1 % topical cream  
Commonly known as:  Rayleen Pheasant Apply  to affected area two (2) times a day. dapagliflozin 10 mg Tab tablet Commonly known as:  [employer] Take 1 Tab by mouth daily. fexofenadine 180 mg tablet Commonly known as:  Rella Soulier  
 Take 1 Tab by mouth daily. fluticasone 50 mcg/actuation nasal spray Commonly known as:  [place] Cyphers 2 Sprays by Both Nostrils route daily. insulin glargine 100 unit/mL (3 mL) Inpn Commonly known as:  LANTUS SOLOSTAR U-100 INSULIN INJECT 47 UNITS UNDER THE SKIN IN THE MORNING AND 47 UNITS IN THE EVENING ( please give 90 day supply 5 pens per box, give 6 boxes=30 pens) Iron 325 mg (65 mg iron) tablet Generic drug:  ferrous sulfate Take  by mouth Daily (before breakfast). lisinopril 10 mg tablet Commonly known as:  Neva Vazquez Take 1 Tab by mouth daily. metoprolol tartrate 50 mg tablet Commonly known as:  LOPRESSOR  
TAKE 1 TABLET BY MOUTH TWICE DAILY  
  
 oxyCODONE IR 5 mg immediate release tablet Commonly known as:  Maria E Ramal Take 1 Tab by mouth every eight (8) hours as needed. Max Daily Amount: 15 mg. sAXagliptin-metFORMIN 5-1,000 mg Tm24 Commonly known as:  KOMBIGLYZE XR Take 1 tab po QD  
  
 senna-docusate 8.6-50 mg per tablet Commonly known as:  Darien Abraham Take 1 Tab by mouth two (2) times daily as needed for Constipation. Can be obtained over-the-counter  
  
 simvastatin 20 mg tablet Commonly known as:  ZOCOR Take 1 Tab by mouth nightly. We Performed the Following [address] [place] [address] Comments:  
 DC wound vac Abdominal wound:  Pack with 1/4\" saline moistened gauze and cover with dry 4x4 daily and as needed Ok to shower To-Do List   
 07/09/2018 To Be Determined Appointment with Beba Noble LPN at [place] [address]  
  
 07/09/2018 Imaging:  CT ABD PELV W WO CONT   
  
 07/12/2018 3:00 AM  
  Appointment with Nelly Lawson at [place] [address]  
  
 07/16/2018 To Be Determined Appointment with Nelly Lawson at [place] [address]  
  
 07/19/2018 To Be Determined Appointment with Anita Neely at [address] [facility],[address]  
  
 07/23/2018 To Be Determined Appointment with Anita Neely at [address] [place] [address],[address]  
  
 07/26/2018 To Be Determined Appointment with Anita Neely at [address] [place] [address],[address]  
  
 07/30/2018 To Be Determined Appointment with Anita Neely at [address] [place] [address],[address]  
  
 08/02/2018 To Be Determined Appointment with Anita Neely at [address] [place] [address],[address]  
  
 08/06/2018 To Be Determined Appointment with Anita Neely at [address] [place] [address],[address]  
  
 08/09/2018 To Be Determined Appointment with Anita Chin at [address] [place] [address],[address]  
  
 08/13/2018 To Be Determined Appointment with Anita Chin at [address] [place] [address],[address] Referral Information Referral ID Referred By Referred To  
  
 [id number] [address], [address] Phone: [phone] Fax: [phone] Visits Status Start Date End Date 1 Closed 7/9/18 7/9/19 If your referral has a status of pending review or denied, additional information will be sent to support the outcome of this decision. Patient Instructions Wound care:  Ok to shower Pack wound with 1/4\" gauze and cover with dry 4x4 daily Home health has been updated Cordelia Mosher will call you later today with a time for CT tomorrow If you have worsening pain and or cannot tolerate fluids, you need to return Introducing \[facility]\"" & HEALTH SERVICES! [employer] introduces [employer] patient portal. Now you can access parts of your medical record, email your doctor's office, and request medication refills online. 1. In your internet browser, go to [email or web address] 2. Click on the First Time User? Click Here link in the Sign In box. You will see the New Member Sign Up page. 3. Enter your [employer] Access Code exactly as it appears below. You will not need to use this code after youve completed the sign-up process. If you do not sign up before the expiration date, you must request a new code. · [employer] Access Code: [email or web address] Expires: 8/30/2018 12:47 PM 
 
4. Enter the last four digits of your Social Security Number (xxxx) and Date of Birth (mm/dd/yyyy) as indicated and click Submit. You will be taken to the next sign-up page. 5. Create a [employer] ID. This will be your [employer] login ID and cannot be changed, so think of one that is secure and easy to remember. 6. Create a [employer] password. You can change your password at any time. 7. Enter your Password Reset Question and Answer. This can be used at a later time if you forget your password. 8. Enter your e-mail address. You will receive e-mail notification when new information is available in [address] 9. Click Sign Up. You can now view and download portions of your medical record. 10. Click the Download Summary menu link to download a portable copy of your medical information. If you have questions, please visit the Frequently Asked Questions section of the [employer] website. Remember, [employer] is NOT to be used for urgent needs. For medical emergencies, dial 911. Now available from your iPhone and Android! Please provide this summary of care documentation to your next provider. Your primary care clinician is listed as [facility] Nurse Navigator. If you have any questions after today's visit, please call [phone].

## 2018-07-09 NOTE — PROGRESS NOTES
HISTORY OF PRESENT ILLNESS  Shruthi Zavala is a 79 y.o. female. HPI   Chief Complaint   Patient presents with    Wound Check    Abdominal Pain     Shruthi Zavala is a 79 y.o. female presents today about 6 weeks s/p right hemicolectomy for stage IV colon cancer. She presents with new right sided abdominal pain, early satiety and abdominal bloating. She reports fullness after \"few sips of water\" and then has no desire to eat or drink the past 2-3 days. No associated nausea or vomiting. Last BM was 2 days ago and it was loose. Little flatus. Worried also about new area of redness along midline and think the \"vac is too much pressure\". Vac was changed 3 days ago and no drainage past 3 days. Review of Systems   Constitutional: Positive for malaise/fatigue. Negative for chills and fever. Respiratory: Negative for cough and shortness of breath. Reports breathing is \"normal\" and no increased sob    Cardiovascular: Negative for chest pain, palpitations and leg swelling. Gastrointestinal: Positive for abdominal pain, constipation (no BM past 2 days ) and diarrhea (loose stool on 7/7/18). Negative for blood in stool, heartburn, melena, nausea and vomiting. Pain is located mostly on the far right lateral abdomen  Sharp and feels better sitting up   Got relief last night with 1 pain pill and has been taking Ibuprofen 600 mg during the day   Pain does not radiate   Feels bloated and full all the time past few days   Poor appetite    Genitourinary: Negative for dysuria. Musculoskeletal: Negative for falls. Skin:        Wound distal midline with vac   Vac changed last Friday   No pain at vac site    Neurological: Positive for weakness. Negative for dizziness. Endo/Heme/Allergies:        Blood sugars have been         Physical Exam   Constitutional: She is oriented to person, place, and time. No distress.    /76  Pulse (!) 104  Temp 97.8 °F (36.6 °C) (Oral)   Resp 20  Ht 5' 4\" (1.626 m)  Wt 182 lb (82.6 kg)  SpO2 96%  BMI 31.24 kg/m2   female here with spouse      Cardiovascular: Regular rhythm. Mild tachycardia    Pulmonary/Chest: Effort normal and breath sounds normal. No respiratory distress. She has no wheezes. She has no rales. Abdominal: Soft. Hypoactive BS   Mild distention and firmness along midline   Area of erythema mid portion of incision and to the left of midline   Wound distal midline is narrow and 2 cm deep, minimal bleeding   No odor and scant drainage in vac cannister    Area along wound is non tender, but she is mildly tender right lateral abdomen    Musculoskeletal: She exhibits no edema. Ambulating independently    Neurological: She is alert and oriented to person, place, and time. Skin: Skin is warm and dry. She is not diaphoretic. Psychiatric: She has a normal mood and affect. ASSESSMENT and PLAN    ICD-10-CM ICD-9-CM    1. Follow-up examination following surgery Z09 V67.00    2. Malignant neoplasm of ascending colon (HCC) C18.2 153.6 CT ABD PELV W WO CONT   3. Generalized abdominal pain R10.84 789.07 CT ABD PELV W WO CONT   4. Superficial postoperative wound infection, sequela T81. 4XXS 909.3 CT ABD PELV W WO CONT   5. Early satiety R68.81 780.94    6. Abdominal bloating R14.0 787. 3      Approximately 6 weeks s/p right hemicolectomy for stage IV colon cancer   Will DC wound vac and wound was packed with saline moistened 1/4\" wick and covered with 4x4 sponge and secured with tape   Daily wound care for now and will update home health   CT today to evaluate for new wound abscess, but some of her symptoms are more consistent with early obstruction (early satiety, bloating, loose BM followed by nothing past 2 days)   Return to the office after CT scan   Patient requesting CT for tomorrow am and will see her to follow   If worsening abdominal pain and or vomiting she is to return for evaluation and she / spouse agreed   Walter Morales verbalized understanding and questions were answered to the best of my knowledge and ability. CT scan  educational materials were provided.

## 2018-07-09 NOTE — PATIENT INSTRUCTIONS
Wound care:  Ok to shower     Pack wound with 1/4\" gauze and cover with dry 4x4 daily    Home health has been updated     Jose Avina will call you later today with a time for CT tomorrow     If you have worsening pain and or cannot tolerate fluids, you need to return

## 2018-07-10 PROBLEM — R10.9 ABDOMINAL PAIN: Status: ACTIVE | Noted: 2018-01-01

## 2018-07-10 PROBLEM — R11.2 NAUSEA AND VOMITING IN ADULT: Status: ACTIVE | Noted: 2018-01-01

## 2018-07-10 PROBLEM — T81.49XA WOUND, SURGICAL, INFECTED: Status: ACTIVE | Noted: 2018-01-01

## 2018-07-10 PROBLEM — R10.9 ABDOMINAL PAIN: Status: RESOLVED | Noted: 2018-01-01 | Resolved: 2018-01-01

## 2018-07-10 NOTE — H&P
Surgery History and Physical    Subjective:      Nelly Cantu is a 79 y.o.  female who presents to the office with nausea, vomiting, fevers and chills. She is known to me and was seen and evaluated by me yesterday. She was scheduled to have a CT this afternoon, but was feeling worse overnight with fever, chills, nausea and dry heaves. Advised to come in for evaluation and admission to the hospital.    Today arrived in a wheelchair (was walking yesterday) and appears to not feel well. Was having chills overnight and spouse reported \"she felt so hot she wanted no clothes and the fan\". Had an episode of dry heaves at home and then defecated on herself. Spouse reports stool was loose and very dark. She is approximately 7 weeks s/p right colectomy with post op complications of wound abscess.       Patient Active Problem List    Diagnosis Date Noted    Nausea and vomiting in adult 07/10/2018    Metastatic colon cancer in female [facility]) 06/25/2018    Generalized abdominal pain 06/25/2018    Superficial postoperative wound infection 06/08/2018    Open wound of anterior abdominal wall with complication 28/37/2239    Methicillin resistant Staphylococcus epidermidis infection 06/03/2018    S/P right colectomy 05/27/2018    Leukocytosis 05/26/2018    Type 2 diabetes mellitus with hyperglycemia ([address] 05/26/2018    Malignant neoplasm of ascending colon ([address] 05/26/2018    Advance care planning 10/04/2017    Mixed hyperlipidemia 09/18/2014    HTN (hypertension) 05/23/2013    DM (diabetes mellitus) ([address] 05/23/2013     Past Medical History:   Diagnosis Date    Anemia NEC     Cancer ([address]     Diabetes ([address]     Headache(784.0)     Hypercholesterolemia     Hypertension     Malignant neoplasm of ascending colon ([address] 05/26/2018    Stage IVc    S/P right colectomy 5/27/2018    Right hemicolectomy for cecal mass with partial SBO, Ndiaye    Superficial postoperative wound infection 6/8/2018 Past Surgical History:   Procedure Laterality Date    HX  SECTION      Hysterectomy,total for precancer    HX COLECTOMY Right 2018    Right hemicolectomy for cecal mass with partial SBO, Senthil      Social History   Substance Use Topics    Smoking status: Never Smoker    Smokeless tobacco: Never Used    Alcohol use No      Family History   Problem Relation Age of Onset    Alcohol abuse Father     Cancer Maternal Aunt      breast ca    Breast Cancer Maternal Aunt     Breast Cancer Sister     Breast Cancer Daughter       Prior to Admission medications    Medication Sig Start Date End Date Taking? Authorizing Provider   senna-docusate (PERICOLACE) 8.6-50 mg per tablet Take 1 Tab by mouth two (2) times daily as needed for Constipation. Can be obtained over-the-counter 18   oJsee Kim MD   oxyCODONE IR (ROXICODONE) 5 mg immediate release tablet Take 1 Tab by mouth every eight (8) hours as needed. Max Daily Amount: 15 mg. Patient taking differently: Take 1 Tab by mouth every eight (8) hours as needed for Pain. 18   Paul Johnson MD   metoprolol tartrate (LOPRESSOR) 50 mg tablet TAKE 1 TABLET BY MOUTH TWICE DAILY 18   Tawanda Song MD   ammonium lactate (AMLACTIN) 12 % topical cream APPLY 2 GRAMS TO AFFECTED AREA TWICE A DAY. RUB IN TO AFFECTED AREA WELL 18   Tawanda Song MD   albuterol (PROVENTIL HFA, VENTOLIN HFA, PROAIR HFA) 90 mcg/actuation inhaler Take 1 Puff by inhalation every six (6) hours as needed for Wheezing. 3/19/18   Tawanda Song MD   simvastatin (ZOCOR) 20 mg tablet Take 1 Tab by mouth nightly. 3/19/18   Tawanda Song MD   lisinopril (PRINIVIL, ZESTRIL) 10 mg tablet Take 1 Tab by mouth daily. 3/19/18   Tawanda Song MD   sAXagliptin-metFORMIN (KOMBIGLYZE XR) 5-1,000 mg TJ72 Take 1 tab po QD 3/19/18   Tawanda Song MD   dapagliflozin [facility]) 10 mg tab tablet Take 1 Tab by mouth daily.  3/19/18   Tawanda Song MD   fexofenadine (ALLEGRA) 180 mg tablet Take 1 Tab by mouth daily. 3/2/18   Lindsay A McInturff, NP   fluticasone (FLONASE) 50 mcg/actuation nasal spray 2 Sprays by Both Nostrils route daily. 3/2/18   Lindsay A McInturff, NP   insulin glargine (LANTUS SOLOSTAR) 100 unit/mL (3 mL) inpn INJECT 47 UNITS UNDER THE SKIN IN THE MORNING AND 47 UNITS IN THE EVENING ( please give 90 day supply 5 pens per box, give 6 boxes=30 pens) 8/21/17   Cathleen Shin MD   cholecalciferol, VITAMIN D3, (VITAMIN D3) 5,000 unit tab tablet Take 5,000 Units by mouth daily. 1 tablet daily    Historical Provider   ferrous sulfate (IRON) 325 mg (65 mg iron) tablet Take  by mouth Daily (before breakfast). Historical Provider   aspirin delayed-release 81 mg tablet Take 81 mg by mouth daily. Historical Provider   clotrimazole (LOTRIMIN) 1 % topical cream Apply  to affected area two (2) times a day. 4/30/15   Cathleen Shin MD   calcium 600 mg Cap Take 600 mg by mouth daily. 1 tablet daily    Historical Provider     Allergies   Allergen Reactions    Tylenol [Acetaminophen] Swelling         Review of Systems:    Review of Systems   Constitutional: Positive for chills, fever (axillary temp at home was 99) and malaise/fatigue. Negative for weight loss. Respiratory: Negative for cough and shortness of breath. Cardiovascular: Negative for chest pain and palpitations. Gastrointestinal: Positive for abdominal pain, diarrhea, nausea and vomiting. Negative for blood in stool, constipation, heartburn and melena (unsure spouse reports was \"very dark\"). Dry heaves and gagging      Not tolerating much po and very early fullness/satiety with abdominal bloating   No appetite   + belching and very little flatus    Genitourinary: Negative for dysuria. Has voided 3x/ today and \"looks ok\"   Musculoskeletal: Positive for myalgias. Negative for falls.    Skin:        Abdominal wound with wet to dry dressing    Neurological: Positive for dizziness (with standing ) and weakness (needed wheelchair today ). Negative for loss of consciousness. Endo/Heme/Allergies:        Blood sugar this morning was 60 and she did not take her insulin    Psychiatric/Behavioral: Positive for depression (about situation ). Objective:     No data found. Temp (24hrs), Av.5 °F (36.4 °C), Min:97.5 °F (36.4 °C), Max:97.5 °F (36.4 °C)      Physical Exam:  Constitutional: She is oriented to person, place, and time. No distress. /74 (BP 1 Location: Right arm, BP Patient Position: Sitting)  Pulse (!) 110  Temp 97.5 °F (36.4 °C) (Oral)   Resp 20  Ht 5' 4\" (1.626 m)  Wt 182 lb (82.6 kg)  SpO2 95%  BMI 31.24 kg/m2   female appears older than stated age and here with spouse  Appears unwell and arrived via wheelchair (was walking yesterday)   Cardiovascular: Regular rhythm and intact distal pulses. Tachycardic    Pulmonary/Chest: Effort normal and breath sounds normal. No respiratory distress. She has no wheezes. She has no rales. Abdominal: Soft. Mildly distended but soft with few faint BS  Midline incision with large area of erythema and warmth to the left of midline   Distal abdomen with midline wound, narrow and currently packed with 1/2\" saline gauze   Tender along midline and lower pannus    Musculoskeletal: She exhibits no edema. Wheelchair    Neurological: She is alert and oriented to person, place, and time. Skin: Skin is warm. She is not diaphoretic. There is pallor. Psychiatric:   tearful      Labs: No results found for this or any previous visit (from the past 24 hour(s)). Data Review:  to be done    Assessment:     1. Nausea and vomiting   2. Abdominal bloating   3. Early satiety   4. Colon cancer stage IVc  5. Open abdominal wall wound  6. DM Type 2  7. Obesity BMI 31  8. HTN    Plan:     1. Follow-up examination following surgery Z09 V67.00     2. Nausea and vomiting in adult R11.2 787.01     3. Bloating R14.0 787. 3     4. Early satiety R68.81 780.94   5. Malignant neoplasm of ascending colon (HCC) C18.2 153. 6     6. Open wound of abdominal wall, sequela S31.109S 906.0        7 weeks s/p right colectomy for metastatic colon cancer   Now with suspected abdominal abscess / wound infection   Concerned about early fullness, abdominal bloating, nausea and vomiting that something else is going on in the abdomen   Has failed outpatient treatment and requires hospital admission   Plan for IV hydration, IV antibiotics, labs, CT abd/pelvis, antiemetics   Case discussed with Dr. Casandra Macias (covering for Dr. Mimi Valiente) and awaiting bed placement   [facility] verbalized understanding and questions were answered to the best of my knowledge and ability.         Signed By: Lexie Smith NP     July 10, 2018

## 2018-07-10 NOTE — TELEPHONE ENCOUNTER
Patient had a bed and spouse left the admitting area with her complaining the wait was too long. He then came to the office complaining \"waited 30 minutes and no one came to get her\". Patient is moaning and requesting to lay down. Called bed placement again and they had cancelled her admission because they left. Bed requested again and again spouse agitated and upset because \"taking so long\". He returned to the office and wants to take her home. Asked me to just give her antibiotics and she can come back tomorrow. Again advised against leaving since she has not been tolerating much po, has f/c and needs hydration and further evaluation. Spoke with Bed placement, Cecil Vieira and she reports code alex had to be called because Mr. Natasha Morales was becoming so upset in admitting. Requested bed placement proceed with admission and bed. Requested Mr. Lott to please be calm and patient and that the hospital was full and they were working to get her a bed. Only other option is the ER and they will wait longer. He says he will wait.

## 2018-07-10 NOTE — TELEPHONE ENCOUNTER
Had hot and cold chills overnight, dry heaves and nausea. Now feels very hot and axillary temp 99  Had a large BM overnight   Taking little water and has CT scheduled this afternoon.   Advised to come to the office for admission and will get CT as inpt   Concerned about wound infection/abscess and dehydration   Spouse agreed

## 2018-07-10 NOTE — PROGRESS NOTES
Sejal Adair is an 79 y.o. female who presents with abdominal pain. Ms. Sesar Shelton is s/p right colectomy on 2018 for Stage IV poorly differentiated adenocarcinoma. Doing fairly well until recently when she began experiencing abdominal pain and associated distension. She reports fevers and chills at home. Associated nausea and vomitting. No hematemesis or coffee ground emesis. Loose stool last PM. No hyperglycemia. Her post operative course was complicated by a wound infection and she has been receiving local wound care. Allergies - Tylenol [acetaminophen]    Meds - Reviewed. PMH -   Past Medical History:   Diagnosis Date    Anemia NEC     [facility]     Diabetes ([facility]     Headache(784.0)     Hypercholesterolemia     Hypertension     Malignant neoplasm of ascending colon ([facility] 2018    Stage IVc    S/P right colectomy 2018    Right hemicolectomy for cecal mass with partial SBO, Ndiaye    Superficial postoperative wound infection 2018     PSH -   Past Surgical History:   Procedure Laterality Date    HX  SECTION      Hysterectomy,total for precancer    HX COLECTOMY Right 2018    Right hemicolectomy for cecal mass with partial SBO, Ndiaye     Fam Hx -   Family History   Problem Relation Age of Onset    Alcohol abuse Father     Cancer Maternal Aunt      breast ca    Breast Cancer Maternal Aunt     Breast Cancer Sister     Breast Cancer Daughter      Soc Hx -   Social History   Substance Use Topics    Smoking status: Never Smoker    Smokeless tobacco: Never Used    Alcohol use No     Patient is a well developed, well nourished female who appears uncomfortable. Visit Vitals    BP 92/62 (BP 1 Location: Right arm, BP Patient Position: Sitting)    Pulse (!) 105    Temp 97.9 °F (36.6 °C)    Resp 18    SpO2 98%     HEENT: Anicteric. Neck: Supple without Lymphadenopathy. Cor: RRR. Lungs: Clear to auscultation bilaterally. Abd: Soft. Distended. Tender. No guarding or rebound. Ext: No edema. Neuro: Grossly Non focal.  Skin: Well healed midline abdominal incision. There is cellulitis and induration at the superior aspect of the incision. Open wound at inferior aspect which is packed. Labs - No results found for this or any previous visit (from the past 24 hour(s)). Imp: Pt. Is a 79 y.o. female with abdominal pain and distension as well as cellulitis and induration of the abdominal wall. Plan: 1. Admit   2. NPO for now. 3. IV Hydration - LR at 125 ml/hour. 4. Admission Labs. 5. Will start IV abx - Zosyn. 6. CT Scan abdomen/pelvis with po/IV contrast.    7. Accuchecks and sliding scale insulin. 8. DVT Prophylaxis. 9. Continue local wound care.

## 2018-07-10 NOTE — IP AVS SNAPSHOT
[address] [facility] [address] 
[phone] Patient: Stone Lynch MRN: [id number] You are allergic to the following Allergen Reactions Tylenol (Acetaminophen) Swelling Recent Documentation Height Weight BMI OB Status Smoking Status 1.626 m 84.8 kg 32.1 kg/m2 Hysterectomy Never Smoker Unresulted Labs-Please follow up with your PCP about these lab tests Order Current Status OVA & PARASITES, STOOL In process CULTURE, BLOOD Preliminary result Emergency Contacts  (Rel.) Home Phone Work Phone Mobile Phone Binh Trujillo [phone] -- [phone] About your hospitalization You were admitted on:  July 10, 2018 You last received care in the:  [facility] You were discharged on:  2018 Why you were hospitalized Your primary diagnosis was: Malignant Ascites Your diagnoses also included:  Htn (Hypertension), Malignant Neoplasm Of Ascending Colon (Hcc), Superficial Postoperative Wound Infection, Open Wound Of Anterior Abdominal Wall With Complication, Nausea And Vomiting In Adult, Generalized Abdominal Pain, Methicillin Resistant Staphylococcus Epidermidis Infection, Abdominal Pain, Severe Protein-Calorie Malnutrition (Hcc), Class 1 Obesity Due To Excess Calories With Body Mass Index (Bmi) Of 31.0 To 31.9 In Adult, Type 2 Diabetes Mellitus With Hyperglycemia (Hcc), Metastatic Colon Cancer In Female (Hcc), S/P Right Colectomy Providers Seen During Your Hospitalization Provider Specialty Primary office phone Gianna Yang, [address] [facility] [phone] Your Primary Care Physician (PCP) Primary Care Physician Office Phone Office Fax [address] Carlitos Rocha, [address]mita Azul [facility] [phone] Follow-up Information Follow up With Details Comments Contact Info [address] [facility]   [address] 
1st Floor [facility] [address] 
[phone] Willam Ascencio,  On 7/24/2018 Hospital PCP f/u appointment on Tuesday July 24  @ 11:15 a.m.  [address] [place] [address] 
[phone] Appointments for Next 14 days 7/20/2018  5:30 PM  INFUSION [facility]  
 7/24/2018 11:15 AM  [address] [facility] Internal Medicine My Medications TAKE these medications as instructed Instructions Each Dose to Equal  
 Morning Noon Evening Bedtime  
 albuterol 90 mcg/actuation inhaler Commonly known as:  PROVENTIL HFA, VENTOLIN HFA, PROAIR HFA Your last dose was: Your next dose is: Take 1 Puff by inhalation every six (6) hours as needed for Wheezing. 1 Puff  
    
   
   
   
  
 ammonium lactate 12 % topical cream  
Commonly known as:  AMLACTIN Your last dose was: Your next dose is:    
   
   
 APPLY 2 GRAMS TO AFFECTED AREA TWICE A DAY. RUB IN TO AFFECTED AREA WELL  
     
   
   
   
  
 aspirin delayed-release 81 mg tablet Your last dose was: Your next dose is: Take 81 mg by mouth daily. 81 mg  
    
   
   
   
  
 calcium 600 mg Cap Your last dose was: Your next dose is: Take 600 mg by mouth daily. 1 tablet daily 600 mg  
    
   
   
   
  
 cholecalciferol (VITAMIN D3) 5,000 unit Tab tablet Commonly known as:  VITAMIN D3 Your last dose was: Your next dose is: Take 5,000 Units by mouth daily. 1 tablet daily 5000 Units  
    
   
   
   
  
 clotrimazole 1 % topical cream  
Commonly known as:  Monserrat Belts Your last dose was: Your next dose is:    
   
   
 Apply  to affected area two (2) times a day. dapagliflozin 10 mg Tab tablet Commonly known as:  [employer] Your last dose was: Your next dose is: Take 1 Tab by mouth daily. 10 mg  
    
   
   
   
  
 fexofenadine 180 mg tablet Commonly known as:  Virginia Lopez Your last dose was: Your next dose is: Take 1 Tab by mouth daily. 180 mg  
    
   
   
   
  
 fluticasone 50 mcg/actuation nasal spray Commonly known as:  Chris Lui Your last dose was: Your next dose is: 2 Sprays by Both Nostrils route daily. 2 Spray  
    
   
   
   
  
 insulin glargine 100 unit/mL (3 mL) Inpn Commonly known as:  LANTUS SOLOSTAR U-100 INSULIN Your last dose was: Your next dose is: INJECT 47 UNITS UNDER THE SKIN IN THE MORNING AND 47 UNITS IN THE EVENING ( please give 90 day supply 5 pens per box, give 6 boxes=30 pens) Iron 325 mg (65 mg iron) tablet Generic drug:  ferrous sulfate Your last dose was: Your next dose is: Take  by mouth Daily (before breakfast). lisinopril 10 mg tablet Commonly known as:  Ailyn Myers Your last dose was: Your next dose is: Take 1 Tab by mouth daily. 10 mg  
    
   
   
   
  
 metoprolol tartrate 50 mg tablet Commonly known as:  LOPRESSOR Your last dose was: Your next dose is: TAKE 1 TABLET BY MOUTH TWICE DAILY  
     
   
   
   
  
 * oxyCODONE IR 5 mg immediate release tablet Commonly known as:  Matt Fowler Your last dose was: Your next dose is: Take 1 Tab by mouth every eight (8) hours as needed. Max Daily Amount: 15 mg.  
 5 mg * oxyCODONE IR 5 mg immediate release tablet Commonly known as:  Matt Fowler Your last dose was: Your next dose is: Take 1 Tab by mouth every eight (8) hours as needed. Max Daily Amount: 15 mg.  
 5 mg sAXagliptin-metFORMIN 5-1,000 mg Tm24 Commonly known as:  KOMBIGLYZE XR  
   
 Your last dose was: Your next dose is: Take 1 tab po QD  
     
   
   
   
  
 senna-docusate 8.6-50 mg per tablet Commonly known as:  Roberta  Your last dose was: Your next dose is: Take 1 Tab by mouth two (2) times daily as needed for Constipation. Can be obtained over-the-counter 1 Tab  
    
   
   
   
  
 simvastatin 20 mg tablet Commonly known as:  ZOCOR Your last dose was: Your next dose is: Take 1 Tab by mouth nightly. 20 mg  
    
   
   
   
  
 * Notice: This list has 2 medication(s) that are the same as other medications prescribed for you. Read the directions carefully, and ask your doctor or other care provider to review them with you. Where to Get Your Medications Information on where to get these meds will be given to you by the nurse or doctor. ! Ask your nurse or doctor about these medications  
  oxyCODONE IR 5 mg immediate release tablet Discharge Instructions Discharge Instructions for Chemotherapy/Biotherapy Chemotherapy has the potential to cause many side effects. The following are general precautions that chemo patients should take: 1. Practice good hand washing: ? Use soap and water for at least 15 seconds, covering all areas of hands. ? Always wash hands before eating. ? Wash hands after contact with public surfaces such as door knobs and handles, shopping carts, telephones and elevator buttons. 2. Get plenty of rest:  
? You will likely experience fatigue three to five days following your treatment. It may last as long as seven days. 3. Drink plenty of fluids. Water is best.  
4. Eat a well balanced diet:  
? Small frequent meals may help if you are having trouble with nausea or your appetite. Some people also do well with nutritional supplements. 5. Pace yourself with daily activities: ? Take frequent breaks and ask for help if you need it. 6. Exercise is very important:  
? It will increase circulation and will help the fatigue. Do what you can each day. 7. If your regimen results in hair loss:  
? You will likely notice effects between two and three weeks following your first treatment. Some lose all hair while others only experience thinning. 8. Practice good oral hygiene:  
? Notify your M.D. immediately if any mouth sores or discomfort develop. 9. Protect yourself from the sun. Signs/Symptoms of an allergic reaction and/or some side effects may require immediate medical attention. Notify your physician if you develop one or more of the following:  
Temperature of 100.5 degrees or greater;  
Skin redness, itching, swelling, blistering, weeping, crusting, rash, or hives; Wheezing, chest tightness, cough, or shortness of breath;  
Swelling of the face, eyelids, lips, tongue, or throat;  
Severe, persistent headache;  
Stuffy nose, runny nose, sneezing;  
Red (bloodshot), itchy, swollen, or watery eyes;  
Stomach pain, nausea, vomiting, diarrhea, or bloody stools;  
Mouth sores Your physician should also be aware of the following symptoms:  
Persistent and unresolved nausea and/or vomiting;  
Persistent and unresolved diarrhea or constipation;  
Numbness/tingling/burning of the extremities, including the fingers and toes; Bleeding or unexplained bruising; Unexplained redness/swelling/pain in the arms or legs; Shortness of breath or fatigue that worsens;  
Pain with urination or blood in the urine; Chills;  
Cough, especially a productive cough;  
Mouth sores or a white coating of the tongue; Redness, swelling, pain or drainage at the port-a-cath or IV site; Increased feeling of bloating or water retention; Excessive weight loss or gain;  
Ringing in the ears; Difficulty swallowing;  
Dizziness, vertigo, lightheadedness, or fainting. Chemotherapy can cause birth defects. It is very important not to become pregnant  
or father a child while undergoing chemotherapy. During chemotherapy and for 48 hours after chemotherapy, the drugs may still be in  
your urine and other body fluids. You should use the following precautions to reduce  
exposure to others: * Both men and women should sit on the toilet to cut down on splashing. Flush   toilets with the lid down. Always wash your hands well with soap and water after using the toilet. You may want to use a separate toilet if possible. * Caregivers should wear disposable gloves to handle body wastes. Dispose of  
gloves after each use and wash hands. * Any sheets or clothes soiled with bodily fluids should be machine washed separately from other laundry. Wash twice with regular laundry detergent in hot water. If they cannot be washed right away they can be stored in a sealed plastic  bag.   
* If disposable adult diapers, underwear, or sanitary pads are used, they can be sealed in a plastic bag, and thrown away with regular trash. I have reviewed discharge instructions with the patient. The patient verbalized understanding. VGTel Activation Thank you for requesting access to VGTel. Please follow the instructions below to securely access and download your online medical record. VGTel allows you to send messages to your doctor, view your test results, renew your prescriptions, schedule appointments, and more. How Do I Sign Up? 1. In your internet browser, go to [email or web address] 
2. Click on the First Time User? Click Here link in the Sign In box. You will be redirect to the New Member Sign Up page. 3. Enter your VGTel Access Code exactly as it appears below. You will not need to use this code after youve completed the sign-up process. If you do not sign up before the expiration date, you must request a new code. Mazree Access Code: [email or web address] Expires: 2018 12:47 PM (This is the date your Mazree access code will ) 4. Enter the last four digits of your Social Security Number (xxxx) and Date of Birth (mm/dd/yyyy) as indicated and click Submit. You will be taken to the next sign-up page. 5. Create a Mazree ID. This will be your Mazree login ID and cannot be changed, so think of one that is secure and easy to remember. 6. Create a Mazree password. You can change your password at any time. 7. Enter your Password Reset Question and Answer. This can be used at a later time if you forget your password. 8. Enter your e-mail address. You will receive e-mail notification when new information is available in [address] 9. Click Sign Up. You can now view and download portions of your medical record. 10. Click the Download Summary menu link to download a portable copy of your medical information. Additional Information If you have questions, please visit the Frequently Asked Questions section of the Mazree website at [email or web address] Remember, Mazree is NOT to be used for urgent needs. For medical emergencies, dial 911. Discharge Orders None Introducing [facility] [place] SERVICES! [facility] introduces Mazree patient portal. Now you can access parts of your medical record, email your doctor's office, and request medication refills online. 1. In your internet browser, go to [email or web address] 2. Click on the First Time User? Click Here link in the Sign In box. You will see the New Member Sign Up page. 3. Enter your Mazree Access Code exactly as it appears below. You will not need to use this code after youve completed the sign-up process. If you do not sign up before the expiration date, you must request a new code. · Mazree Access Code: [email or web address] Expires: 2018 12:47 PM 
 
 4. Enter the last four digits of your Social Security Number (xxxx) and Date of Birth (mm/dd/yyyy) as indicated and click Submit. You will be taken to the next sign-up page. 5. Create a tadoÂ° ID. This will be your tadoÂ° login ID and cannot be changed, so think of one that is secure and easy to remember. 6. Create a tadoÂ° password. You can change your password at any time. 7. Enter your Password Reset Question and Answer. This can be used at a later time if you forget your password. 8. Enter your e-mail address. You will receive e-mail notification when new information is available in [address] 9. Click Sign Up. You can now view and download portions of your medical record. 10. Click the Download Summary menu link to download a portable copy of your medical information. If you have questions, please visit the Frequently Asked Questions section of the tadoÂ° website. Remember, tadoÂ° is NOT to be used for urgent needs. For medical emergencies, dial 911. Now available from your iPhone and Android! General Information Please provide this summary of care documentation to your next provider. Patient Signature:  ____________________________________________________________ Date:  ____________________________________________________________  
  
Renuka Charter Provider Signature:  ____________________________________________________________ Date:  ____________________________________________________________

## 2018-07-11 NOTE — PROGRESS NOTES
Rcvd. In US via stretcher. Awake and alert. Denies any discomfort. Dr. Kailyn Baird in and explained procedure. Patient verbalizes understanding of procedure. Consent signed by patient. Paracentesis completed with 5L of clear tana fluid removed. No Albumin given at this time per Dr. Kailyn Baird. Dry sterile dressing to left lateral abd puncture site. Denies any discomfort. VSS.

## 2018-07-11 NOTE — CONSULTS
PALLIATIVE MEDICINE      Consult appreciated. Will be available to see tomorrow morning 7/12/18. Vikki Martinez.  [address] MSN, FNP-BC, [facility]

## 2018-07-11 NOTE — PROGRESS NOTES
Reason for Admission:   Superficial postoperative wound infection                RRAT Score:   25               Resources/supports as identified by patient/family:   Good family support                 Top Challenges facing patient (as identified by patient/family and CM):  Chronic illnesses     Finances/Medication cost?  No financial concerns identified at this time       Transportation? Patient's family will provide transportation at discharge     Support system or lack thereof? Good family support     Living arrangements? Patient lives with her  and daughter        Self-care/ADLs/Cognition? Patient completes self-care, with the exception of wound care           Current Advanced Directive/Advance Care Plan:  Patient and family would likely benefit from a Palliative Care consult for further education on AMD; note consult has already been placed. Plan for utilizing home health: Patient will need an order for  \"resumption of home health skilled nursing for wound care per WOCN note\" if MD is agreeable. Likelihood of readmission: High                  Transition of Care Plan: Patient may benefit from the [address]  Based Program.  Referral placed to Wei Ba, coordinator of that program VS Return home with [address] [place] [address] for wound care. Reviewed medical chart; met with the patient at the bedside who did not feel well and deferred to her . She asked that this  call her , Raul Rdz at L#[phone]. Called and left a voicemail for him; awaiting a call back. Patient's  and daughter later visited the hospital.     Patient lives with her  and daughter. She does not use any DME to ambulate. She was hospitalized on (5/25/18-6/2/18, 6/8/18-6/13/18, and 7/10/18-present).   Patient returned home with a wound vac from [address] [place] [address] (as the patient has [employer]) and home health skilled nursing for wound care through [address]. Patient will need an order for  \"resumption of home health skilled nursing for wound care per WOCN note\" if MD is agreeable. Care Management will continue to follow her disposition. RUBINA Medina     Care Management Interventions  PCP Verified by CM:  Yes  Palliative Care Criteria Met (RRAT>21 & CHF Dx)?: Yes  Palliative Consult Recommended?: Yes  Mode of Transport at Discharge:  (Patient will travel home via car.  )  Transition of Care Consult (CM Consult): [address]: Yes  MyChart Signup: No  Discharge Durable Medical Equipment: No  Physical Therapy Consult: Yes  Occupational Therapy Consult: No  Speech Therapy Consult: No  Current Support Network: Lives with Spouse  Confirm Follow Up Transport:  (Patient will travel home via car.  )  Plan discussed with Pt/Family/Caregiver: Yes  Freedom of Choice Offered: Yes  Discharge Location  Discharge Placement: Home with home health

## 2018-07-11 NOTE — PROGRESS NOTES
Spiritual Care Assessment/Progress Note  [facility]      NAME: Chas White      MRN: [id number]  AGE: 79 y.o. SEX: female  Scientologist Affiliation: Latter-day   Language: English     7/11/2018     Total Time (in minutes): 5     Spiritual Assessment begun in [facility] 5 through conversation with:         [x]Patient        [x] Family    [] Friend(s)        Reason for Consult: Palliative Care, Initial/Spiritual Assessment     Spiritual beliefs: (Please include comment if needed)     [x] Identifies with a lon tradition:         [x] Supported by a lon community:            [] Claims no spiritual orientation:           [] Seeking spiritual identity:                [] Adheres to an individual form of spirituality:           [] Not able to assess:                           Identified resources for coping:      [x] Prayer                               [] Music                  [] Guided Imagery     [x] Family/friends                 [] Pet visits     [] Devotional reading                         [] Unknown     [] Other:                                               Interventions offered during this visit: (See comments for more details)    Patient Interventions: Affirmation of emotions/emotional suffering, Catharsis/review of pertinent events in supportive environment, Normalization of emotional/spiritual concerns     Family/Friend(s):  Affirmation of emotions/emotional suffering, Catharsis/review of pertinent events in supportive environment, Normalization of emotional/spiritual concerns     Plan of Care:     [x] Support spiritual and/or cultural needs    [] Support AMD and/or advance care planning process      [] Support grieving process   [] Coordinate Rites and/or Rituals    [] Coordination with community clergy   [] No spiritual needs identified at this time   [] Detailed Plan of Care below (See Comments)  [] Make referral to Music Therapy  [] Make referral to Pet Therapy     [] Make referral to Addiction services  [] Make referral to [facility]  [] Make referral to Spiritual Care Partner  [] No future visits requested        [] Follow up visits as needed     Comments: [employer] is visiting for an initial spiritual assessment with pt in room 500/01. Pt notes she is not up to a visit at the time and prefers to see a [facility] or . Spiritual care will continue to follow as able or as needed.      [address] M.Div, M.S, Jose Srinivasan1 available at [phone])

## 2018-07-11 NOTE — PROGRESS NOTES
Ms. Mike Chacon feels better today. Tm 99.4 HR: 88 BP: 125/84 Resp Rate: 18 95% sat on room air. Intake/Output Summary (Last 24 hours) at 07/11/18 0744  Last data filed at 07/11/18 0504   Gross per 24 hour   Intake          1454.17 ml   Output              200 ml   Net          1254.17 ml   Exam: Cor: RRR. Lungs: Bilateral breath sounds. Clear to auscultation. Abd: Soft. Distended. Non tender. No guarding or rebound. Erythema and induration improved. Labs:   Recent Results (from the past 12 hour(s))   GLUCOSE, POC    Collection Time: 07/10/18  8:51 PM   Result Value Ref Range    Glucose (POC) 75 65 - 100 mg/dL    Performed by Ludivina Romero    GLUCOSE, POC    Collection Time: 07/10/18  9:16 PM   Result Value Ref Range    Glucose (POC) 162 (H) 65 - 100 mg/dL    Performed by Ludivina Romero    LACTIC ACID    Collection Time: 07/11/18 12:49 AM   Result Value Ref Range    Lactic acid 1.5 0.4 - 2.0 MMOL/L   GLUCOSE, POC    Collection Time: 07/11/18  5:25 AM   Result Value Ref Range    Glucose (POC) 55 (L) 65 - 100 mg/dL    Performed by Mary Hawk, POC    Collection Time: 07/11/18  5:50 AM   Result Value Ref Range    Glucose (POC) 152 (H) 65 - 100 mg/dL    Performed by Jersey Parrish    Reviewed CT Scan. Will try regular diet. Decrease IVF to 50cc/hour. Ms. Mike Chacon may benefit from paracentesis. Will ask Dr. Ashia Live to see. Palliative Care to see. Hold Lovenox for now. Stop IV abx. Continue local wound care. OOB, Ambulate.

## 2018-07-11 NOTE — PROGRESS NOTES
Report called to [facility] regarding paracentesis, fluid removed, VS, pain level and mental status and post orders. Awaiting transport. Denies any discomfort. VSS. Right lower abd DDI.

## 2018-07-11 NOTE — PROGRESS NOTES
0030- Lab called stating last Lactic sent at approximately 2250 showed lactic level of 16. Patient vitals are normal and no fever. Pt previous lactic was 2.0. Repeat Lactic recollected at 0049.  0128- Awaiting results for lactic labwork, patient rounded on. Resting, eyes closed, respirations 16 and easy to awaken. Patient says no pain at this time. Some discomfort to right lower abdomen but nothing she would like treated with pain medication. Will continue to monitor. 5171- repeat lactic 1.5  0530- MD paged for patient with Hypoglycemia BG=55mg/dl. Pt given D25 50ml. Andree- MD call back- order to D/C Lactated Ringers 125ml/hr and replace with D5 in 1/2 NS at 125ml/hr. Repeat glucose check =152mg/dl. New fluids hanging. Patient informed of new fluids and reasoning, verbalized understanding.

## 2018-07-11 NOTE — PROGRESS NOTES
This nurse pulled Dilaudid 1mg from the pyxis for Gabrielle Duke RN to give to the patient. When Scheryl Show went to give it to the patient she only wanted 0.5mg of Dilaudid. Scheryl Show only gave 0.5mg of Dilaudid and I returned the other 0.5mg of the Dilaudid that wasn't used. I was having trouble returning the 0.5mg of Dilaudid. Carmelo Saba helped me from pharmacy to return it and Radha Velasquez witness the return as well.

## 2018-07-11 NOTE — PROGRESS NOTES
Problem: Mobility Impaired (Adult and Pediatric)  Goal: *Acute Goals and Plan of Care (Insert Text)  Physical Therapy Goals  Initiated 7/11/2018  1. Patient will move from supine to sit and sit to supine  in bed with modified independence within 7 day(s). 2.  Patient will transfer from bed to chair and chair to bed with modified independence using the least restrictive device within 7 day(s). 3.  Patient will perform sit to stand with modified independence within 7 day(s). 4.  Patient will ambulate with modified independence for 150 feet with the least restrictive device within 7 day(s). physical Therapy EVALUATION  Patient: Zaina Martino (65 y.o. female)  Date: 7/11/2018  Primary Diagnosis: Wound Abcess, N&V  Wound, surgical, infected  Abdominal pain        Precautions:   Fall    ASSESSMENT :  Based on the objective data described below, the patient presents with decreased functional mobility from baseline level of function. Patient with L[place] as a second language but is able to understand and communicate with PT. States at baseline lives with  in a 1 level home with no JAN. Does not use an assistive device at home. Currently needs maxA x 1 for supine to sit and is unable to come to full sit secondary to extensive pain in her back/side. Unable to stand at this time secondary to pain. Will need further mobility assessment once pain better controlled. Will continue to follow for mobility progression. Patient will benefit from skilled intervention to address the above impairments.   Patients rehabilitation potential is considered to be Good  Factors which may influence rehabilitation potential include:   []         None noted  []         Mental ability/status  []         Medical condition  []         Home/family situation and support systems  []         Safety awareness  [x]         Pain tolerance/management  []         Other:      PLAN :  Recommendations and Planned Interventions:  [x] Bed Mobility Training             []    Neuromuscular Re-Education  [x]           Transfer Training                   []    Orthotic/Prosthetic Training  [x]           Gait Training                         []    Modalities  [x]           Therapeutic Exercises           []    Edema Management/Control  [x]           Therapeutic Activities            [x]    Patient and Family Training/Education  []           Other (comment):    Frequency/Duration: Patient will be followed by physical therapy  5 times a week to address goals. Discharge Recommendations: To Be Determined  Further Equipment Recommendations for Discharge: TBD     SUBJECTIVE:   Patient stated I just can't do it because of the pain.     OBJECTIVE DATA SUMMARY:   HISTORY:    Past Medical History:   Diagnosis Date    Anemia NEC     [facility]     Diabetes ([facility]     Headache(784.0)     Hypercholesterolemia     Hypertension     Malignant neoplasm of ascending colon ([facility] [facility] 2018    Stage IVc    S/P right colectomy 2018    Right hemicolectomy for cecal mass with partial SBO, Ndiaye    Superficial postoperative wound infection 2018     Past Surgical History:   Procedure Laterality Date    HX  SECTION      Hysterectomy,total for precancer    HX COLECTOMY Right 2018    Right hemicolectomy for cecal mass with partial SBO, Ndiaye     Prior Level of Function/Home Situation: Independent with functional mobility at baseline per patient  Personal factors and/or comorbidities impacting plan of care:     Home Situation  Home Environment: Private residence  # Steps to Enter: 0  One/Two Story Residence: One story  Living Alone: No  Support Systems: Spouse/Significant Other/Partner  Patient Expects to be Discharged to[de-identified] Private residence  Current DME Used/Available at Home: None    EXAMINATION/PRESENTATION/DECISION MAKING:   Critical Behavior:  Neurologic State: Alert  Orientation Level: Oriented X4        Range Of Motion:  AROM: Generally decreased, functional                       Strength:    Strength: Generally decreased, functional        Functional Mobility:  Bed Mobility:  Rolling: Supervision;Assist x1 (using bed rails)  Supine to Sit: Maximum assistance;Assist x1 (Unable to come to full sit secondary to pain)  Sit to Supine: Maximum assistance;Assist x1     Transfers:  Sit to Stand:  (not tested)                          Balance:   Sitting: Impaired  Sitting - Static: Fair (occasional)  Sitting - Dynamic: Poor (constant support)  Ambulation/Gait Training:              Gait Description (WDL):  (not tested. Too much pain to amb at this time)         Functional Measure:  Timed up and go:    Timed Get Up And Go Test:  (> 20 sec. Unable secondary to pain)     Timed Up and Go and G-code impairment scale:  Percentage of Impairment CH    0%   CI    1-19% CJ    20-39% CK    40-59% CL    60-79% CM    80-99% CN     100%   Timed   Score 0-56 10 11-12 13-14 15-16 17-18 19 20       < than 10 seconds=Normal  Greater then 13.5 seconds (in elderly)=Increased fall risk   Gabrielle Perdomo [place] M. Predicting the probability for falls in community dwelling older adults using the Timed Up and Go Test. Phys Ther. 2000;80:896-903. G codes: In compliance with CMSs Claims Based Outcome Reporting, the following G-code set was chosen for this patient based on their primary functional limitation being treated: The outcome measure chosen to determine the severity of the functional limitation was the TUG with a score of >20 seconds which was correlated with the impairment scale.     ? Mobility - Walking and Moving Around:     - CURRENT STATUS: CN - 100% impaired, limited or restricted    - GOAL STATUS: CM - 80%-99% impaired, limited or restricted    - D/C STATUS:  ---------------To be determined---------------       Pain:  Pain Scale 1: Numeric (0 - 10)  Pain Intensity 1: 0  Pain Location 1: Abdomen  Pain Orientation 1: Anterior  Pain Description 1: Aching  Pain Intervention(s) 1: Medication (see MAR)  Activity Tolerance:   VSS  Please refer to the flowsheet for vital signs taken during this treatment. After treatment:   []         Patient left in no apparent distress sitting up in chair  [x]         Patient left in no apparent distress in bed  [x]         Call bell left within reach  [x]         Nursing notified  []         Caregiver present  []         Bed alarm activated    COMMUNICATION/EDUCATION:   The patients plan of care was discussed with: Physical Therapist, Occupational Therapist and Registered Nurse. [x]         Fall prevention education was provided and the patient/caregiver indicated understanding. [x]         Patient/family have participated as able in goal setting and plan of care. [x]         Patient/family agree to work toward stated goals and plan of care. []         Patient understands intent and goals of therapy, but is neutral about his/her participation. []         Patient is unable to participate in goal setting and plan of care.     Thank you for this referral.  Maryann Holden, PT, DPT   Time Calculation: 13 mins

## 2018-07-11 NOTE — PROGRESS NOTES
Bedside shift change report given to Leola Davalos RN (oncoming nurse) by Shelia Vale RN (offgoing nurse). Report included the following information SBAR, Intake/Output and MAR.

## 2018-07-12 PROBLEM — R18.0 MALIGNANT ASCITES: Status: ACTIVE | Noted: 2018-01-01

## 2018-07-12 NOTE — PROGRESS NOTES
Patient's BP low-normal this a.m. First reading 82/59, then recheck 107/71. RN notified General Surgery NP of intent to hold BP meds this a.m. Will continue to monitor patient.

## 2018-07-12 NOTE — TELEPHONE ENCOUNTER
Dr. George Del Real  Received: Today       Dary Hansen [facility]                   MrЮлия Harris pt's spouse, is calling to speak with the doctor regarding pt being in and out of the hospital. Currently at [facility]  room 500.

## 2018-07-12 NOTE — PROGRESS NOTES
[facility] [place] [address] [address]  W: [phone]  F: [phone]    Reason for Visit:   Kory Spring is a 79 y.o. female who is seen in hospital consut for met colon cancer post surgery . Currently admitted for abdominal distention due to ascites. Treatment History:   · 2018 Right colectomy-stage IV poorly differentiated adenocarcinoma  · 7/3/2018: EGFR neg, BRAF neg  · 2018: KRAS/HRAS/NRAS-neg    History of Present Illness:   Ms. Hiren Singh is a 79 y.o. female with recent diagnosis of stage IV colon cancer s/p colectomy May 2018 with post surgical wound care complications requiring wound vac. Currently admitted with fever in the home, shaking chills on Monday, abdominal distention and pain. Unable to eat much at all. States she is not hungry. Trying clear liquids. C/o lower back pain, this has been present since diagnosis. Mouth dryness. Pt reports having an ultrasound paracentesis today. Denies current fever, chills, SOB or chest pain. INTERVAL HISTORY: Patient states she is feeling a little bit better this morning. Abdominal pain controlled. Does note some back pain she attributes to lying in the bed/paracentesis. Wound culture obtained yesterday with NGTD and cytology from paracentesis yesterday still pending. Palliative consult pending today.       Past Medical History:   Diagnosis Date    Anemia NEC     Cancer ([address]     Diabetes ([address]     Headache(784.0)     Hypercholesterolemia     Hypertension     Malignant neoplasm of ascending colon ([address] 2018    Stage IVc    S/P right colectomy 2018    Right hemicolectomy for cecal mass with partial SBO, Ndiaye    Superficial postoperative wound infection 2018      Past Surgical History:   Procedure Laterality Date    HX  SECTION      Hysterectomy,total for precancer    HX COLECTOMY Right 2018    Right hemicolectomy for cecal mass with partial Senthil GRIMES      Social History   Substance Use Topics    Smoking status: Never Smoker    Smokeless tobacco: Never Used    Alcohol use No      Family History   Problem Relation Age of Onset    Alcohol abuse Father     Cancer Maternal Aunt      breast ca    Breast Cancer Maternal Aunt     Breast Cancer Sister     Breast Cancer Daughter      Current Facility-Administered Medications   Medication Dose Route Frequency    dextrose 5 % - 0.45% NaCl infusion  75 mL/hr IntraVENous CONTINUOUS    famotidine (PEPCID) tablet 20 mg  20 mg Oral BID    lisinopril (PRINIVIL, ZESTRIL) tablet 10 mg  10 mg Oral DAILY    metoprolol tartrate (LOPRESSOR) tablet 50 mg  50 mg Oral BID    naloxone (NARCAN) injection 0.4 mg  0.4 mg IntraVENous PRN    oxyCODONE IR (ROXICODONE) tablet 5 mg  5 mg Oral Q6H PRN    ondansetron (ZOFRAN) injection 4 mg  4 mg IntraVENous Q4H PRN    enoxaparin (LOVENOX) injection 40 mg  40 mg SubCUTAneous Q24H    insulin regular (NOVOLIN R, HUMULIN R) injection   SubCUTAneous Q6H    sodium chloride (NS) flush 5-10 mL  5-10 mL IntraVENous Q8H    sodium chloride (NS) flush 5-10 mL  5-10 mL IntraVENous PRN    prochlorperazine (COMPAZINE) injection 5 mg  5 mg IntraVENous Q8H PRN    glucose chewable tablet 16 g  4 Tab Oral PRN    dextrose (D50W) injection syrg 12.5-25 g  12.5-25 g IntraVENous PRN    glucagon (GLUCAGEN) injection 1 mg  1 mg IntraMUSCular PRN    albuterol (PROVENTIL VENTOLIN) nebulizer solution 2.5 mg  2.5 mg Nebulization Q6H PRN    HYDROmorphone (DILAUDID) syringe 0.5 mg  0.5 mg IntraVENous Q4H PRN      Allergies   Allergen Reactions    Tylenol [Acetaminophen] Swelling      Review of Systems: A complete review of systems was obtained, negative except as described above.     Physical Exam:     Visit Vitals    /68 (BP 1 Location: Right arm, BP Patient Position: At rest)    Pulse 91    Temp 98.8 °F (37.1 °C)    Resp 18    Ht 5' 4\" (1.626 m)    Wt 185 lb (83.9 kg)    SpO2 95%    BMI 31.76 kg/m2     ECOG PS: 0  General: No distress, appears weak/tired  Eyes: anicteric sclerae  HENT: Atraumatic, OP clear  Neck: Supple  Resp: normal respiratory effort  GI: softly distended, with dressing over wound with packing and redness around site  Extremities: no edema  MS: unable to assess gait  Skin:  Normal temperature, turgor, and texture. Psych: alert, conversant    Results:     Lab Results   Component Value Date/Time    WBC 15.9 (H) 07/10/2018 06:03 PM    HGB 12.3 07/10/2018 06:03 PM    HCT 38.3 07/10/2018 06:03 PM    PLATELET 213 28/15/2640 06:03 PM    MCV 81.1 07/10/2018 06:03 PM    ABS. NEUTROPHILS 11.8 (H) 07/10/2018 06:03 PM     Lab Results   Component Value Date/Time    Sodium 134 (L) 07/10/2018 06:03 PM    Potassium 3.7 07/10/2018 06:03 PM    Chloride 99 07/10/2018 06:03 PM    CO2 23 07/10/2018 06:03 PM    Glucose 91 07/10/2018 06:03 PM    BUN 17 07/10/2018 06:03 PM    Creatinine 0.94 07/10/2018 06:03 PM    GFR est AA >60 07/10/2018 06:03 PM    GFR est non-AA 60 (L) 07/10/2018 06:03 PM    Calcium 8.7 07/10/2018 06:03 PM    Glucose (POC) 136 (H) 07/12/2018 06:36 AM     Lab Results   Component Value Date/Time    Bilirubin, total 0.4 07/10/2018 06:03 PM    ALT (SGPT) 20 07/10/2018 06:03 PM    AST (SGOT) 101 (H) 07/10/2018 06:03 PM    Alk. phosphatase 71 07/10/2018 06:03 PM    Protein, total 6.9 07/10/2018 06:03 PM    Albumin 2.2 (L) 07/10/2018 06:03 PM    Globulin 4.7 (H) 07/10/2018 06:03 PM     CT Results (most recent):    Results from [place] encounter on 07/10/18   CT ABD PELV W CONT   Narrative EXAM:  CT ABD PELV W CONT    INDICATION: abdominal pain; wound abscess, fever, chills, stage IV colon CA    COMPARISON: June 8    CONTRAST:  100 mL of Isovue-370. TECHNIQUE:   Following the uneventful intravenous administration of contrast, thin axial  images were obtained through the abdomen and pelvis. Coronal and sagittal  reconstructions were generated.  Oral contrast was not administered. CT dose  reduction was achieved through use of a standardized protocol tailored for this  examination and automatic exposure control for dose modulation. FINDINGS:   LUNG BASES: Interval enlargement of lower lobe pulmonary nodules. A  representative right lower lobe pulmonary nodule measures 9 mm and previously  measured 7 mm. Larger bilateral pleural effusions, right greater than left. INCIDENTALLY IMAGED HEART AND MEDIASTINUM: Unremarkable. LIVER: Interval enlargement of multiple hepatic masses. GALLBLADDER: Unremarkable. SPLEEN: No mass. PANCREAS: No mass or ductal dilatation. ADRENALS: Unremarkable. KIDNEYS: Left renal cyst.  STOMACH: Unremarkable. SMALL BOWEL: No dilatation or wall thickening. COLON: Right hemicolectomy. APPENDIX: Unremarkable. PERITONEUM: Interval development of peritoneal soft tissue masses. Moderate  ascites. RETROPERITONEUM: Retroperitoneal lymphadenopathy. REPRODUCTIVE ORGANS: Hysterectomy. URINARY BLADDER: No mass or calculus. BONES: No destructive bone lesion. ADDITIONAL COMMENTS: N/A         Impression IMPRESSION:    1. Interval significant worsening of metastatic disease, with enlarging lung  nodules, enlarging liver lesions, retroperitoneal lymphadenopathy, and new  significant peritoneal metastatic disease with ascites. 2. Bilateral pleural effusions. 5/25/18 CT Abdomen/Pelvis:  IMPRESSION: Cecal mass with metastatic disease to liver, retroperitoneal nodes,  and lung. Relative small bowel obstruction. Associated dilated appendix. Associated complex right pleural effusion and small amount of ascites. 5/29/18 Surgical Pathology:     FINAL PATHOLOGIC DIAGNOSIS   1. Peritoneum, biopsy:   Adenocarcinoma, poorly differentiated   2. Omentum, omentectomy:   Adenocarcinoma, poorly differentiated   3.  Colon and terminal ileum, right hemicolectomy:   Colonic adenocarcinoma   Appendix with tubulovillous adenoma   Addendum handling for mismatch repair proteins   Synoptic report:   Procedure: Right hemicolectomy   Tumor site: Cecum   Tumor size: 10.0 cm   Macroscopic tumor perforation: Not identified   Histologic type: Adenocarcinoma   Histologic grade: Poorly differentiated (grade 3 of 4 grades)   Tumor extension: Invades the visceral peritoneum   Margins: Positive for adenocarcinoma, radial   Proximal and distal margins negative for adenocarcinoma   Treatment effect: Not identified   Lymphovascular invasion: Present   Perineural invasion: Present   Tumor deposits: Present, at least 11   Regional lymph nodes: Five of 12 lymph nodes positive for metastatic carcinoma   Largest lymph node metastasis: 6 mm, without extracapsular extension   Ancillary testing: Immunohistochemistry for mismatch repair proteins pending      Pathologic stage (TNM):   Primary tumor: pT4a   Regional lymph nodes: pN2a   Distant metastases: pM1b     Records reviewed and summarized above. Pathology report(s) reviewed above. Radiology report(s) reviewed above. Assessment/PLAN:     1) Stage 4 colon cancer MS stable found with colon mass with obstruction post surgery with wound healing issues. Liver and lung mets noted on CT 5/18  Patient is post surgery 5/18 for obstructing colon mass and now admitted with wound recovery per #3  On CTs, pt has progressive cancer with ascites and pathology from ascitic fluid pending per #2    Not eligible for treatment currently due to wound healing concerns. Needs treatment started ASAP due to rapidly progressing disease and overall disease burden. Reviewed this again with her today  Note palliative consult pending - appreciate PM    2)  Ascites  S/p paracentesis on 7/11/18 with 5L fluid removed. Concern that this will be malignant. Pathology still pending     3) Delayed wound healing. Now off of wound vac. Open wound with some drainage, packing required.    Surgery on board and managing  Wound culture obtained yesterday 7/11/18 with NGTD thus far    4) Lung metastasis. Reviewed on recent imaging. 5) Liver metastasis. LFTs stable  Continue to monitor    Will continue to follow while admitted. Call if questions  Pt seen today in conjunction with RUSS Rachel  D/w surgery. Pt can start chemo next week as outpt if not infected/ off abx.        Signed By: Lakshmi Cohen,

## 2018-07-12 NOTE — PROGRESS NOTES
General Surgery Daily Progress Note    Admit Date: 7/10/2018  Post-Operative Day: * No surgery found * from * No surgery found *     Subjective:     Last 24 hrs: Patient laying quietly in bed with  at bedside. Denies abdominal pain. C/o right upper back pain without precipitating incident. No NV  Tolerating regular diet.   + Flatus, no BM since prior to [address]  BP has been running low--denies dizziness, SOB or headache.  reports that she eats very little--she admits to decreased appetite. No fevers or chills. CT Abdomen & Pelvis with contrast (7/10/2018): IMPRESSION:  1. Interval significant worsening of metastatic disease, with enlarging lung  nodules, enlarging liver lesions, retroperitoneal lymphadenopathy, and new  significant peritoneal metastatic disease with ascites. 2. Bilateral pleural effusions. U/S PARACENTESIS ABD W IMAGING (2018): IMPRESSION: Successful ultrasound guided placement of an abdominal  catheter and  large volume paracentesis. Total volume out 5 L. Objective:     Blood pressure 107/71, pulse 88, temperature 98.8 °F (37.1 °C), resp. rate 18, height 5' 4\" (1.626 m), weight 185 lb (83.9 kg), SpO2 95 %. Temp (24hrs), Av.4 °F (36.9 °C), Min:98 °F (36.7 °C), Max:98.8 °F (37.1 °C)      _____________________  Physical Exam:     Alert and Oriented, flat affect, in no acute distress. Cardiovascular: RRR  Lungs:CTAB no resp distress  Abdomen: Round, nontender. +BS  Small open area at lower end of vertical incision packed with clean, dry overlying dressing. Left side with clean dry dressing in area of paracentesis. Assessment:   Principal Problem:    Malignant ascites (7/10/2018)    Active Problems:    HTN (hypertension) (2013)      DM (diabetes mellitus) ([facility] [address] (2013)      Malignant neoplasm of ascending colon ([facility] [address] (2018)      Overview: Results for Ela Foster (MRN [id number]) as of 2018 15:41       Ref.  Range 2018 13:44 CEA Latest Units: ng/mL 28.4             Superficial postoperative wound infection (6/8/2018)      Overview: 6/10/18 wound culture      Culture result:    Final          MODERATE STAPHYLOCOCCUS EPIDERMIDIS (OXACILLIN RESISTANT) (A)             6/12/18 anaerobic wound culture       Culture result:    Final         MODERATE MIXED ANAEROBIC GRAM NEGATIVE RODS BETA LACTAMASE POSITIVE (A)         Culture result:    Final         HEAVY ANAEROBIC GRAM POSITIVE COCCI (A)                   Methicillin resistant Staphylococcus epidermidis infection (6/3/2018)      Overview: Wound culture 6/8/18      Reported 6/10/18 - sensitive to cipro, clinda, dapto, linezolid, TMP/SMZ       and vanc            Open wound of anterior abdominal wall with complication (8/1/2540)      Generalized abdominal pain (6/25/2018)      Nausea and vomiting in adult (7/10/2018)      Wound, surgical, infected (7/10/2018)      Abdominal pain (7/10/2018)            Plan:     OK to hold BP meds while hypotensive.   OOB with assistance as tolerated  Will increase IVF a bit  Analgesics as needed  DVT proph  Encourage PO intake--sue supplements  Further surgical plan per Dr. Liliane Dill      Data Review:    Recent Labs      07/10/18   1803   WBC  15.9*   HGB  12.3   HCT  38.3   PLT  339     Recent Labs      07/10/18   1803   NA  134*   K  3.7   CL  99   CO2  23   GLU  91   BUN  17   CREA  0.94   CA  8.7   MG  2.1   PHOS  3.3   ALB  2.2*   SGOT  101*   ALT  20     Recent Labs      07/10/18   1803   AML  8*   LPSE  26*           ______________________  Medications:    Current Facility-Administered Medications   Medication Dose Route Frequency    dextrose 5 % - 0.45% NaCl infusion  50 mL/hr IntraVENous CONTINUOUS    famotidine (PEPCID) tablet 20 mg  20 mg Oral BID    lisinopril (PRINIVIL, ZESTRIL) tablet 10 mg  10 mg Oral DAILY    metoprolol tartrate (LOPRESSOR) tablet 50 mg  50 mg Oral BID    naloxone (NARCAN) injection 0.4 mg  0.4 mg IntraVENous PRN    oxyCODONE IR (ROXICODONE) tablet 5 mg  5 mg Oral Q6H PRN    ondansetron (ZOFRAN) injection 4 mg  4 mg IntraVENous Q4H PRN    enoxaparin (LOVENOX) injection 40 mg  40 mg SubCUTAneous Q24H    insulin regular (NOVOLIN R, HUMULIN R) injection   SubCUTAneous Q6H    sodium chloride (NS) flush 5-10 mL  5-10 mL IntraVENous Q8H    sodium chloride (NS) flush 5-10 mL  5-10 mL IntraVENous PRN    prochlorperazine (COMPAZINE) injection 5 mg  5 mg IntraVENous Q8H PRN    glucose chewable tablet 16 g  4 Tab Oral PRN    dextrose (D50W) injection syrg 12.5-25 g  12.5-25 g IntraVENous PRN    glucagon (GLUCAGEN) injection 1 mg  1 mg IntraMUSCular PRN    albuterol (PROVENTIL VENTOLIN) nebulizer solution 2.5 mg  2.5 mg Nebulization Q6H PRN    HYDROmorphone (DILAUDID) syringe 0.5 mg  0.5 mg IntraVENous Q4H PRN       ЕКАТЕРИНА Guerrero  7/12/2018

## 2018-07-12 NOTE — PROGRESS NOTES
Bedside shift change report given to Kali Boogie RN (oncoming nurse) by Warren Navarro (offgoing nurse). Report included the following information SBAR, Kardex, Intake/Output and Recent Results.

## 2018-07-12 NOTE — TELEPHONE ENCOUNTER
I talked with surgery and told them that we would not start chemo until pt off abx/ not infected/ out of hospital  Perhaps next week outpt  Could start FOLFOX  Does pt have a port?

## 2018-07-12 NOTE — PROGRESS NOTES
Bedside shift change report given to Althea Salazar (oncoming nurse) by Karishma Watson RN (offgoing nurse). Report included the following information SBAR, Intake/Output and MAR.

## 2018-07-12 NOTE — CONSULTS
Palliative Medicine Consult  Luis: [phone] ([phone]    Patient Name: Zaina Martino  YOB: 1950    Date of Initial Consult: July 12, 2018  Reason for Consult: Care Decisions   Requesting Provider: Dr. Keysha Talley  Primary Care Physician: [facility] Nurse Navigator (Inactive)     SUMMARY:   Zaina Martino is a 79 y.o. with a recent diagnosis of metastatic colon cancer (lung, liver) s/p colectomy, anemia, HTN, DM, hypercholesterolemia, who was admitted on 7/10/2018 from home with a diagnosis of concern of abdominal abscess, abdominal distension, ascites . Pt s/p paracentesis and fluid sent for analysis. Highly suspicious for malignancy. Pt has also had a decreased appetite, malaise, fever, nausea, chills, and pain. Current medical issues leading to Palliative Medicine involvement include: support with care decisions given terminal disease state. Chalino Layton, 4 children     PALLIATIVE DIAGNOSES:   1. Nausea and Vomiting  2. Early Satiety   3. Hypoalbuminemia  4. Abdominal pain~ stable       PLAN:   1. Met with the pt and spouse  2. Explained the role of Palliative and purpose of the consultation. Met with wife first alone and then with spouse when he arrived  3. Inquired about the patient's symptoms, advance care planning, family support, concerns at this time. Recommended scheduling a family meeting to include spouse and daughter as they may have some questions as well. 4. Pt asked \"is it that serious\"; explained that presently the disease has progressed and we would like to honor you and ensure the care you are receiving is in alignment with your goals. Also suggested completing an AMD allowing wishes to be placed in writing for family if ever needed. 5. Pt asked for more information regarding AMD and I explained that the document would cover resuscitation, artificial nutrition, and other artificially prolonging options. 6. Pt agreed with family meeting and asked that I contact her   7.  Spoke with Mr. Marisa Pfeiffer who presented as guarded and protective. He would prefer to speak with the oncology team and has left a message at Dr. Dorcas Begum office. Mr Marisa Pfeiffer feels he is getting conflicting information. Says that the surgeon told him that the pt needed to start chemo right away and I explained that chemo is not an option at this time due to wounds. 8. I assured Mr. Lott that I would contact the oncology team and ask them to call him  9. No family meeting scheduled at this time but Mr. Marisa Pfeiffer is fine with me checking in with them tomorrow afternoon  10. Initial consult note routed to primary continuity provider  11. Communicated plan of care with: Palliative IDT       GOALS OF CARE / TREATMENT PREFERENCES:     GOALS OF CARE:  Patient/Health Care Proxy Stated Goals: Prolong life      TREATMENT PREFERENCES:   Code Status: Full Code    Advance Care Planning:  Advance Care Planning 7/12/2018   Patient's Healthcare Decision Maker is: Legal Next of Kin   Primary Decision Maker Name Ky Aid   Primary Decision Maker Phone Number [phone]   Primary Decision Maker Relationship to Patient Spouse   Confirm Advance Directive None   Patient Would Like to Complete Advance Directive No       Medical Interventions: Full interventions   Other Instructions: Other:    As far as possible, the palliative care team has discussed with patient / health care proxy about goals of care / treatment preferences for patient.      HISTORY:     History obtained from: chart, spouse, pt, team    CHIEF COMPLAINT: \"it's so hot in here\"    HPI/SUBJECTIVE:    The patient is:   [x] Verbal and participatory  [] Non-participatory due to:   Pt denies pain, nausea, vomiting     Clinical Pain Assessment (nonverbal scale for severity on nonverbal patients):   Clinical Pain Assessment  Severity: 0          Duration: for how long has pt been experiencing pain (e.g., 2 days, 1 month, years)  Frequency: how often pain is an issue (e.g., several times per day, once every few days, constant)     FUNCTIONAL ASSESSMENT:     Palliative Performance Scale (PPS):  PPS: 70       PSYCHOSOCIAL/SPIRITUAL SCREENING:     Palliative IDT has assessed this patient for cultural preferences / practices and a referral made as appropriate to needs (Cultural Services, Patient Advocacy, Ethics, etc.)    Advance Care Planning:  Advance Care Planning 7/12/2018   Patient's Healthcare Decision Maker is: Legal Next of [address]   Primary Decision Maker Name Suhail Martinez   Primary Decision Maker Phone Number [phone]   Primary Decision Maker Relationship to Patient Spouse   Confirm Advance Directive None   Patient Would Like to Complete Advance Directive No       Any spiritual / Worship concerns:  [] Yes /  [x] No    Caregiver Burnout:  [] Yes /  [x] No /  [] No Caregiver Present      Anticipatory grief assessment:   [x] Normal  / [] Maladaptive       ESAS Anxiety: Anxiety: 0    ESAS Depression:          REVIEW OF SYSTEMS:     Positive and pertinent negative findings in ROS are noted above in HPI. The following systems were [x] reviewed / [] unable to be reviewed as noted in HPI  Other findings are noted below. Systems: constitutional, ears/nose/mouth/throat, respiratory, gastrointestinal, genitourinary, musculoskeletal, integumentary, neurologic, psychiatric, endocrine. Positive findings noted below. Modified ESAS Completed by: provider   Fatigue: 3 Drowsiness: 0     Pain: 0   Anxiety: 0 Nausea: 0   Anorexia: 3 Dyspnea: 0                    PHYSICAL EXAM:     From RN flowsheet:  Wt Readings from Last 3 Encounters:   07/10/18 185 lb (83.9 kg)   07/10/18 182 lb (82.6 kg)   07/09/18 182 lb (82.6 kg)     Blood pressure 116/70, pulse (!) 108, temperature 98.4 °F (36.9 °C), resp. rate 20, height 5' 4\" (1.626 m), weight 185 lb (83.9 kg), SpO2 94 %.     Pain Scale 1: Numeric (0 - 10)  Pain Intensity 1: 8     Pain Location 1: Back  Pain Orientation 1: Lower  Pain Description 1: Aching  Pain Intervention(s) 1: Medication (see MAR)  Last bowel movement, if known:     Constitutional: alert, fatigued, nad  Eyes: pupils equal, anicteric  ENMT: no nasal discharge, moist mucous membranes  Cardiovascular: regular rhythm, distal pulses intact  Respiratory: breathing not labored, symmetric  Gastrointestinal: gross, ascites, diffusely tender  Musculoskeletal: no deformity, no tenderness to palpation  Skin: warm, dry  Neurologic: following commands, moving all extremities  Psychiatric: full affect, no hallucinations  Other:       HISTORY:     Principal Problem:    Malignant ascites (7/10/2018)    Active Problems:    HTN (hypertension) (5/23/2013)      DM (diabetes mellitus) ([facility] [address] (5/23/2013)      Malignant neoplasm of ascending colon ([facility] [address] (5/26/2018)      Overview: Results for Oleg Mac (MRN [id number]) as of 5/31/2018 15:41       Ref.  Range 5/31/2018 13:44       CEA Latest Units: ng/mL 28.4             Superficial postoperative wound infection (6/8/2018)      Overview: 6/10/18 wound culture      Culture result:    Final          MODERATE STAPHYLOCOCCUS EPIDERMIDIS (OXACILLIN RESISTANT) (A)             6/12/18 anaerobic wound culture       Culture result:    Final         MODERATE MIXED ANAEROBIC GRAM NEGATIVE RODS BETA LACTAMASE POSITIVE (A)         Culture result:    Final         HEAVY ANAEROBIC GRAM POSITIVE COCCI (A)                   Methicillin resistant Staphylococcus epidermidis infection (6/3/2018)      Overview: Wound culture 6/8/18      Reported 6/10/18 - sensitive to cipro, clinda, dapto, linezolid, TMP/SMZ       and vanc            Open wound of anterior abdominal wall with complication (6/6/8504)      Generalized abdominal pain (6/25/2018)      Nausea and vomiting in adult (7/10/2018)      Wound, surgical, infected (7/10/2018)      Abdominal pain (7/10/2018)      Past Medical History:   Diagnosis Date    Anemia NEC     Cancer ([facility] [address]     Diabetes ([facility] [address]     Headache(784.0)     Hypercholesterolemia     Hypertension     Malignant neoplasm of ascending colon ([facility] [address] 2018    Stage IVc    S/P right colectomy 2018    Right hemicolectomy for cecal mass with partial SBO, Ndiaye    Superficial postoperative wound infection 2018      Past Surgical History:   Procedure Laterality Date    HX  SECTION      Hysterectomy,total for precancer    HX COLECTOMY Right 2018    Right hemicolectomy for cecal mass with partial SBO, Ndiaye      Family History   Problem Relation Age of Onset    Alcohol abuse Father     Cancer Maternal Aunt      breast ca    Breast Cancer Maternal Aunt     Breast Cancer Sister     Breast Cancer Daughter       History reviewed, no pertinent family history.   Social History   Substance Use Topics    Smoking status: Never Smoker    Smokeless tobacco: Never Used    Alcohol use No     Allergies   Allergen Reactions    Tylenol [Acetaminophen] Swelling      Current Facility-Administered Medications   Medication Dose Route Frequency    dextrose 5 % - 0.45% NaCl infusion  75 mL/hr IntraVENous CONTINUOUS    famotidine (PEPCID) tablet 20 mg  20 mg Oral BID    lisinopril (PRINIVIL, ZESTRIL) tablet 10 mg  10 mg Oral DAILY    metoprolol tartrate (LOPRESSOR) tablet 50 mg  50 mg Oral BID    naloxone (NARCAN) injection 0.4 mg  0.4 mg IntraVENous PRN    oxyCODONE IR (ROXICODONE) tablet 5 mg  5 mg Oral Q6H PRN    ondansetron (ZOFRAN) injection 4 mg  4 mg IntraVENous Q4H PRN    enoxaparin (LOVENOX) injection 40 mg  40 mg SubCUTAneous Q24H    insulin regular (NOVOLIN R, HUMULIN R) injection   SubCUTAneous Q6H    sodium chloride (NS) flush 5-10 mL  5-10 mL IntraVENous Q8H    sodium chloride (NS) flush 5-10 mL  5-10 mL IntraVENous PRN    prochlorperazine (COMPAZINE) injection 5 mg  5 mg IntraVENous Q8H PRN    glucose chewable tablet 16 g  4 Tab Oral PRN    dextrose (D50W) injection syrg 12.5-25 g  12.5-25 g IntraVENous PRN    glucagon (GLUCAGEN) injection 1 mg  1 mg IntraMUSCular PRN    albuterol (PROVENTIL VENTOLIN) nebulizer solution 2.5 mg  2.5 mg Nebulization Q6H PRN    HYDROmorphone (DILAUDID) syringe 0.5 mg  0.5 mg IntraVENous Q4H PRN          LAB AND IMAGING FINDINGS:     Lab Results   Component Value Date/Time    WBC 14.6 (H) 07/13/2018 04:36 AM    HGB 11.5 07/13/2018 04:36 AM    PLATELET 358 72/57/4422 04:36 AM     Lab Results   Component Value Date/Time    Sodium 129 (L) 07/13/2018 04:36 AM    Potassium 3.9 07/13/2018 04:36 AM    Chloride 98 07/13/2018 04:36 AM    CO2 22 07/13/2018 04:36 AM    BUN 11 07/13/2018 04:36 AM    Creatinine 0.49 (L) 07/13/2018 04:36 AM    Calcium 7.8 (L) 07/13/2018 04:36 AM    Magnesium 2.1 07/10/2018 06:03 PM    Phosphorus 3.3 07/10/2018 06:03 PM      Lab Results   Component Value Date/Time    AST (SGOT) 101 (H) 07/10/2018 06:03 PM    Alk. phosphatase 71 07/10/2018 06:03 PM    Protein, total 6.9 07/10/2018 06:03 PM    Albumin 2.2 (L) 07/10/2018 06:03 PM    Globulin 4.7 (H) 07/10/2018 06:03 PM     No results found for: INR, PTMR, PTP, PT1, PT2, APTT   Lab Results   Component Value Date/Time    Iron 75 04/30/2015 12:00 AM    Ferritin 65 04/30/2015 12:00 AM      No results found for: PH, PCO2, PO2  No components found for: GLPOC   No results found for: CPK, CKMB             Total time:  70 minutes  Counseling / coordination time, spent as noted above: 55 minutes  > 50% counseling / coordination?: y    Prolonged service was provided for  []30 min   []75 min in face to face time in the presence of the patient, spent as noted above. Time Start:   Time End:   Note: this can only be billed with 93559 (initial) or 27092 (follow up). If multiple start / stop times, list each separately.

## 2018-07-12 NOTE — HOME CARE
Patient opened to [facility] for skilled nursing  and if applicable will need resumption order for same prior to discharge.   Becka Vale RN

## 2018-07-12 NOTE — TELEPHONE ENCOUNTER
Call to , HIPAA verified. He states he is confused because Surgery told him this admission that his wife could start chemotherapy prior to wound healing. Reviewed overall plans regarding chemotherapy today with him. Discussed wound would need to be healed completely prior to starting chemotherapy given increased risk for infection/decreased blood counts on chemo. We discussed paracentesis yesterday and awaiting results from this/pathology. He is thankful for call but requesting call from Dr. Omer Rosenthal.

## 2018-07-13 PROBLEM — E43 SEVERE PROTEIN-CALORIE MALNUTRITION (HCC): Status: ACTIVE | Noted: 2018-01-01

## 2018-07-13 PROBLEM — E66.09 CLASS 1 OBESITY DUE TO EXCESS CALORIES WITH BODY MASS INDEX (BMI) OF 31.0 TO 31.9 IN ADULT: Status: ACTIVE | Noted: 2018-01-01

## 2018-07-13 NOTE — PROGRESS NOTES
PT Note:    Chart reviewed and attempted to see for PT treatment. Patient adamantly refusing stating \"I need to rest, too many people come in.\"  Educated on benefits of participation and offered sitting EOB. Continued to refuse. Offered assistance with repositioning in bed for improved comfort, patient continued to refuse. Patient will likely require SNF at discharge given limited progress. Over the weekend recommend patient bed in chair position 2-3 x daily and assisting with bed mobility and repositioning as able. Will follow up next week. Thank you.

## 2018-07-13 NOTE — PROGRESS NOTES
Chemo orders faxed for processing complete. To [facility] for appt follow up.   Patient to start week of 7/16/18

## 2018-07-13 NOTE — ANESTHESIA PREPROCEDURE EVALUATION
Anesthetic History     PONV          Review of Systems / Medical History  Patient summary reviewed, nursing notes reviewed and pertinent labs reviewed    Pulmonary  Within defined limits                 Neuro/Psych   Within defined limits           Cardiovascular    Hypertension: well controlled                   GI/Hepatic/Renal  Within defined limits              Endo/Other    Diabetes: well controlled, type 2      Pertinent negatives: No morbid obesity   Other Findings   Comments: Colon cancer         Physical Exam    Airway  Mallampati: II  TM Distance: > 6 cm  Neck ROM: normal range of motion   Mouth opening: Normal     Cardiovascular  Regular rate and rhythm,  S1 and S2 normal,  no murmur, click, rub, or gallop             Dental  No notable dental hx       Pulmonary  Breath sounds clear to auscultation               Abdominal  GI exam deferred       Other Findings            Anesthetic Plan    ASA: 3  Anesthesia type: MAC          Induction: Intravenous  Anesthetic plan and risks discussed with: Patient

## 2018-07-13 NOTE — TELEPHONE ENCOUNTER
Talked with pt's  in hospital this am  We need to get surgery or IR to place port  And IR for paracentesis tube  Set up chemo with FOLFOX for next week outpt asap

## 2018-07-13 NOTE — PROGRESS NOTES
Daily Progress Note  [facility] General Surgery at [address] Date: 7/10/2018    Subjective:     Last 24 hrs: Denies pain. Eating small amounts. States her legs are weak, and it is very hard to get OOB/sit in chair. BG elevated. Currently NPO for paracentesis catheter placement today. Had 5 L removed on 18. Note Gen Surg consult placed by Oncology for port placement. WBC 14.6, Na 129. Objective:     Blood pressure 116/70, pulse (!) 108, temperature 98.4 °F (36.9 °C), resp. rate 20, height 5' 4\" (1.626 m), weight 83.9 kg (185 lb), SpO2 94 %. Temp (24hrs), Av.6 °F (37 °C), Min:98.4 °F (36.9 °C), Max:98.7 °F (37.1 °C)      _____________________  Physical Exam:     Alert and Oriented, lying in bed, appears tired. Cardiovascular: RRR  Lungs:CTAB  Abdomen: + BS, very distended, soft, NT. Assessment:   Principal Problem:    Malignant ascites (7/10/2018)    Active Problems:    HTN (hypertension) (2013)      DM (diabetes mellitus) ([facility] (2013)      Malignant neoplasm of ascending colon ([facility] [address] (2018)      Overview: Results for Paras Malave (MRN [id number]) as of 2018 15:41       Ref.  Range 2018 13:44       CEA Latest Units: ng/mL 28.4             Superficial postoperative wound infection (2018)      Overview: 6/10/18 wound culture      Culture result:    Final          MODERATE STAPHYLOCOCCUS EPIDERMIDIS (OXACILLIN RESISTANT) (A)             18 anaerobic wound culture       Culture result:    Final         MODERATE MIXED ANAEROBIC GRAM NEGATIVE RODS BETA LACTAMASE POSITIVE (A)         Culture result:    Final         HEAVY ANAEROBIC GRAM POSITIVE COCCI (A)                   Methicillin resistant Staphylococcus epidermidis infection (6/3/2018)      Overview: Wound culture 18      Reported 6/10/18 - sensitive to cipro, clinda, dapto, linezolid, TMP/SMZ       and vanc            Open wound of anterior abdominal wall with complication (9539) Generalized abdominal pain (6/25/2018)      Nausea and vomiting in adult (7/10/2018)      Wound, surgical, infected (7/10/2018)      Abdominal pain (7/10/2018)            Plan:     Paracentesis catheter placement today. Can resume diet once completed.   Continue PT/OOB  Add low dose lantus  Plans for Port per Dr. Amaya Parsons, [address] [facility] Surgery at [facility] [address],  MOB [place], [address] [place] [address]  [phone]    Data Review:    Recent Labs      07/13/18   0436  07/10/18   1803   WBC  14.6*  15.9*   HGB  11.5  12.3   HCT  35.9  38.3   PLT  379  339     Recent Labs      07/13/18   0436  07/10/18   1803   NA  129*  134*   K  3.9  3.7   CL  98  99   CO2  22  23   GLU  208*  91   BUN  11  17   CREA  0.49*  0.94   CA  7.8*  8.7   MG   --   2.1   PHOS   --   3.3   ALB   --   2.2*   TBILI   --   0.4   SGOT   --   101*   ALT   --   20     Recent Labs      07/10/18   1803   AML  8*   LPSE  26*           ______________________  Medications:    Current Facility-Administered Medications   Medication Dose Route Frequency    insulin glargine (LANTUS) injection 10 Units  10 Units SubCUTAneous DAILY    dextrose 5 % - 0.45% NaCl infusion  75 mL/hr IntraVENous CONTINUOUS    famotidine (PEPCID) tablet 20 mg  20 mg Oral BID    lisinopril (PRINIVIL, ZESTRIL) tablet 10 mg  10 mg Oral DAILY    metoprolol tartrate (LOPRESSOR) tablet 50 mg  50 mg Oral BID    naloxone (NARCAN) injection 0.4 mg  0.4 mg IntraVENous PRN    oxyCODONE IR (ROXICODONE) tablet 5 mg  5 mg Oral Q6H PRN    ondansetron (ZOFRAN) injection 4 mg  4 mg IntraVENous Q4H PRN    insulin regular (NOVOLIN R, HUMULIN R) injection   SubCUTAneous Q6H    sodium chloride (NS) flush 5-10 mL  5-10 mL IntraVENous Q8H    sodium chloride (NS) flush 5-10 mL  5-10 mL IntraVENous PRN    prochlorperazine (COMPAZINE) injection 5 mg  5 mg IntraVENous Q8H PRN    glucose chewable tablet 16 g  4 Tab Oral PRN    dextrose (D50W) injection syrg 12.5-25 g  12.5-25 g IntraVENous PRN    glucagon (GLUCAGEN) injection 1 mg  1 mg IntraMUSCular PRN    albuterol (PROVENTIL VENTOLIN) nebulizer solution 2.5 mg  2.5 mg Nebulization Q6H PRN    HYDROmorphone (DILAUDID) syringe 0.5 mg  0.5 mg IntraVENous Q4H PRN

## 2018-07-13 NOTE — PROGRESS NOTES
General Surgery at [facility]    Started IV levofloxacin and Metronidazole  Empirically d/t leukocytosis. Pt had paracentesis with ~900 cc drained. Not significantly improved. Needs portacath for chemotherapy. I explained to the  the indications for implanted venous access port placement. Also explained potential risks such as bleeding, infection, PTX. Questions answered. Pt gave consent to proceed.       Signed By: Mena Whitney MD     July 13, 2018

## 2018-07-13 NOTE — PROGRESS NOTES
Update:  Discussed paracentesis catheter with Radiology. Request to place pigtail catheter today, and convert to perm cath on Monday. Pigtail paracentesis catheter will need to be capped off on Saturday afternoon to allow enough fluid to accumulate for catheter exchange on Monday. OR later today for port placement with Dr. Mimi Valiente.

## 2018-07-13 NOTE — DIABETES MGMT
Progress Note     Chart reviewed for elevated blood glucose ( > 180 mg/dL x 2 in the past 24 hours). Recommendations/ Comments: If appropriate, please consider the followin. Increasing Lantus from 10 units to 25 units (0.3 units/kg/day*)  2. Discontinuing insulin regular as correction   3. Starting Humalog Normal Sensitivity scale to cover for glucose > 139 mg/dL Every 6 hours     *weight based dosage calculation (0.2-0.3 units/ kg/ day) based on Allison Scruggs., [place], Tor Porto., KILO Roberson., Montrell Shelton., ... & Reubin Epley. (2017). Consensus statement by the American Association of Clinical Endocrinologists and [employer] of Endocrinology on the comprehensive type 2 diabetes management algorithm-2017 executive summary. Endocrine Practice, 23(2), 112-084. Morning glucose: 219 mg/dL (per am POCT Glucose). Required 15 units of correction in the last 24 hours. Receiving Dextrose 5% and 0.9% NaCl at 75mL/hr    Inpatient medications for glucose management:  1. Correction Scale: insulin Regular  Every 6 hours - insulin resistant scale   2. Lantus 10 units     POC Glucose last 24hrs:   Lab Results   Component Value Date/Time     (H) 2018 04:36 AM    GLUCPOC 219 (H) 2018 11:53 AM    GLUCPOC 239 (H) 2018 07:00 AM    GLUCPOC 181 (H) 2018 08:39 PM        Estimated Creatinine Clearance: 118.4 mL/min (based on Cr of 0.49). Diet order:   Active Orders   Diet    DIET NPO Except Meds        PO intake: Patient Vitals for the past 72 hrs:   % Diet Eaten   18 0815 0 %   18 0817 25 %   18 0841 0 %       History of Diabetes:   Harjit Olson is a 79 y.o. female with a past medical history significant for DM per  Herve Palacios NP's H&P dated 7/10/2018.      Prior to admission medications for glucose management: per past medical records  -Lantus 47 units morning; 47 units evening  -Kombiglyze XR- 5-1,000mg    A1C:   Lab Results Component Value Date/Time    Hemoglobin A1c 8.3 (H) 05/26/2018 05:21 AM    Hemoglobin A1c 8.1 (H) 03/19/2018 09:48 AM       Reference range*:  Increased risk for diabetes: 5.7 - 6.4%  Diabetes: >6.4%  Glycemic control for adults with diabetes: <7.0 %    *LIBORIO RODRIGUEZ (2014). Diagnosis and classification of diabetes mellitus. Diabetes care, 37, S81. Thank you. Saira Strong. Sanju HERNANDEZ, RN, BSN, [address]   [phone]    -A target glucose range of 140-180 mg/dL (7.8-10.0 mmol/L) is recommended for the majority of critically ill patients and noncritically ill patients. -More stringent goals, such as 110-140 mg/dL (6.1-7.8 mmol/L), may be appropriate for selected patients, if this can be achieved without significant hypoglycemia. *    *American Diabetes Association. 14. Diabetes care in the hospital:Standards of Medical Care in [id number]. Diabetes Care 2018;41(Suppl.  1):B129-S553

## 2018-07-13 NOTE — PROGRESS NOTES
[facility] [place] at [place] [address] [address], [address]  W: [phone]  F: [phone]    Reason for Visit:   Thien Guzman is a 79 y.o. female who is seen in hospital consut for met colon cancer post surgery . Currently admitted for abdominal distention due to ascites. Treatment History:   · 2018 Right colectomy-stage IV poorly differentiated adenocarcinoma  · 7/3/2018: EGFR neg, BRAF neg  · 2018: KRAS/HRAS/NRAS-neg    History of Present Illness:   Ms. Stacey Kaminski is a 79 y.o. female with recent diagnosis of stage IV colon cancer s/p colectomy May 2018 with post surgical wound care complications requiring wound vac. Currently admitted with fever in the home, shaking chills on Monday, abdominal distention and pain. Unable to eat much at all. States she is not hungry. Trying clear liquids. C/o lower back pain, this has been present since diagnosis. Mouth dryness. Pt reports having an ultrasound paracentesis today. Denies current fever, chills, SOB or chest pain. INTERVAL HISTORY: Patient drowsy this morning. Continues with back pain - current pain medications working well. Appetite remains poor and she endorses early satiety. No nausea or vomiting. No fevers or chills. Plan for aspira catheter for re-accumulating ascites and port placement today.  and daughter supportive.       Past Medical History:   Diagnosis Date    Anemia NEC     Cancer ([address]     Diabetes ([address]     Headache(784.0)     Hypercholesterolemia     Hypertension     Malignant neoplasm of ascending colon ([address] 2018    Stage IVc    S/P right colectomy 2018    Right hemicolectomy for cecal mass with partial SBO, N[place]    Superficial postoperative wound infection 2018      Past Surgical History:   Procedure Laterality Date    HX  SECTION      Hysterectomy,total for precancer    HX COLECTOMY Right 2018    Right hemicolectomy for cecal mass with partial SBO, Ndiaye      Social History   Substance Use Topics    Smoking status: Never Smoker    Smokeless tobacco: Never Used    Alcohol use No      Family History   Problem Relation Age of Onset    Alcohol abuse Father     Cancer Maternal Aunt      breast ca    Breast Cancer Maternal Aunt     Breast Cancer Sister     Breast Cancer Daughter      Current Facility-Administered Medications   Medication Dose Route Frequency    insulin glargine (LANTUS) injection 10 Units  10 Units SubCUTAneous DAILY    dextrose 5% and 0.9% NaCl infusion  75 mL/hr IntraVENous CONTINUOUS    famotidine (PEPCID) tablet 20 mg  20 mg Oral BID    lisinopril (PRINIVIL, ZESTRIL) tablet 10 mg  10 mg Oral DAILY    metoprolol tartrate (LOPRESSOR) tablet 50 mg  50 mg Oral BID    naloxone (NARCAN) injection 0.4 mg  0.4 mg IntraVENous PRN    oxyCODONE IR (ROXICODONE) tablet 5 mg  5 mg Oral Q6H PRN    ondansetron (ZOFRAN) injection 4 mg  4 mg IntraVENous Q4H PRN    insulin regular (NOVOLIN R, HUMULIN R) injection   SubCUTAneous Q6H    sodium chloride (NS) flush 5-10 mL  5-10 mL IntraVENous Q8H    sodium chloride (NS) flush 5-10 mL  5-10 mL IntraVENous PRN    prochlorperazine (COMPAZINE) injection 5 mg  5 mg IntraVENous Q8H PRN    glucose chewable tablet 16 g  4 Tab Oral PRN    dextrose (D50W) injection syrg 12.5-25 g  12.5-25 g IntraVENous PRN    glucagon (GLUCAGEN) injection 1 mg  1 mg IntraMUSCular PRN    albuterol (PROVENTIL VENTOLIN) nebulizer solution 2.5 mg  2.5 mg Nebulization Q6H PRN    HYDROmorphone (DILAUDID) syringe 0.5 mg  0.5 mg IntraVENous Q4H PRN      Allergies   Allergen Reactions    Tylenol [Acetaminophen] Swelling      Review of Systems: A complete review of systems was obtained, negative except as described above.     Physical Exam:     Visit Vitals    /70 (BP 1 Location: Right arm, BP Patient Position: At rest)    Pulse (!) 108    Temp 98.4 °F (36.9 °C)    Resp 20    Ht 5' 4\" (1.626 m)    Wt 185 lb (83.9 kg)    SpO2 94%    BMI 31.76 kg/m2     ECOG PS: 2  General: No acute distress, appears weak/tired  Eyes: anicteric sclerae  HENT: Atraumatic, OP clear  Neck: Supple  Resp: clear anterior lung fields, normal respiratory effort  GI: softly distended, with dressing over wound with packing and redness around site  Extremities: no edema  MS: unable to assess gait  Skin:  Normal temperature, turgor, and texture. Psych: drowsy, conversant    Results:     Lab Results   Component Value Date/Time    WBC 14.6 (H) 07/13/2018 04:36 AM    HGB 11.5 07/13/2018 04:36 AM    HCT 35.9 07/13/2018 04:36 AM    PLATELET 270 91/80/5203 04:36 AM    MCV 80.0 07/13/2018 04:36 AM    ABS. NEUTROPHILS 9.5 (H) 07/13/2018 04:36 AM     Lab Results   Component Value Date/Time    Sodium 129 (L) 07/13/2018 04:36 AM    Potassium 3.9 07/13/2018 04:36 AM    Chloride 98 07/13/2018 04:36 AM    CO2 22 07/13/2018 04:36 AM    Glucose 208 (H) 07/13/2018 04:36 AM    BUN 11 07/13/2018 04:36 AM    Creatinine 0.49 (L) 07/13/2018 04:36 AM    GFR est AA >60 07/13/2018 04:36 AM    GFR est non-AA >60 07/13/2018 04:36 AM    Calcium 7.8 (L) 07/13/2018 04:36 AM    Glucose (POC) 239 (H) 07/13/2018 07:00 AM     Lab Results   Component Value Date/Time    Bilirubin, total 0.4 07/10/2018 06:03 PM    ALT (SGPT) 20 07/10/2018 06:03 PM    AST (SGOT) 101 (H) 07/10/2018 06:03 PM    Alk. phosphatase 71 07/10/2018 06:03 PM    Protein, total 6.9 07/10/2018 06:03 PM    Albumin 2.2 (L) 07/10/2018 06:03 PM    Globulin 4.7 (H) 07/10/2018 06:03 PM     CT Results (most recent):    Results from [place] encounter on 07/10/18   CT ABD PELV W CONT   Narrative EXAM:  CT ABD PELV W CONT    INDICATION: abdominal pain; wound abscess, fever, chills, stage IV colon CA    COMPARISON: June 8    CONTRAST:  100 mL of Isovue-370. TECHNIQUE:   Following the uneventful intravenous administration of contrast, thin axial  images were obtained through the abdomen and pelvis. Coronal and sagittal  reconstructions were generated. Oral contrast was not administered. CT dose  reduction was achieved through use of a standardized protocol tailored for this  examination and automatic exposure control for dose modulation. FINDINGS:   LUNG BASES: Interval enlargement of lower lobe pulmonary nodules. A  representative right lower lobe pulmonary nodule measures 9 mm and previously  measured 7 mm. Larger bilateral pleural effusions, right greater than left. INCIDENTALLY IMAGED HEART AND MEDIASTINUM: Unremarkable. LIVER: Interval enlargement of multiple hepatic masses. GALLBLADDER: Unremarkable. SPLEEN: No mass. PANCREAS: No mass or ductal dilatation. ADRENALS: Unremarkable. KIDNEYS: Left renal cyst.  STOMACH: Unremarkable. SMALL BOWEL: No dilatation or wall thickening. COLON: Right hemicolectomy. APPENDIX: Unremarkable. PERITONEUM: Interval development of peritoneal soft tissue masses. Moderate  ascites. RETROPERITONEUM: Retroperitoneal lymphadenopathy. REPRODUCTIVE ORGANS: Hysterectomy. URINARY BLADDER: No mass or calculus. BONES: No destructive bone lesion. ADDITIONAL COMMENTS: N/A         Impression IMPRESSION:    1. Interval significant worsening of metastatic disease, with enlarging lung  nodules, enlarging liver lesions, retroperitoneal lymphadenopathy, and new  significant peritoneal metastatic disease with ascites. 2. Bilateral pleural effusions. 5/25/18 CT Abdomen/Pelvis:  IMPRESSION: Cecal mass with metastatic disease to liver, retroperitoneal nodes,  and lung. Relative small bowel obstruction. Associated dilated appendix. Associated complex right pleural effusion and small amount of ascites. 5/29/18 Surgical Pathology:     FINAL PATHOLOGIC DIAGNOSIS   1. Peritoneum, biopsy:   Adenocarcinoma, poorly differentiated   2. Omentum, omentectomy:   Adenocarcinoma, poorly differentiated   3.  Colon and terminal ileum, right hemicolectomy:   Colonic adenocarcinoma Appendix with tubulovillous adenoma   Addendum handling for mismatch repair proteins   Synoptic report:   Procedure: Right hemicolectomy   Tumor site: Cecum   Tumor size: 10.0 cm   Macroscopic tumor perforation: Not identified   Histologic type: Adenocarcinoma   Histologic grade: Poorly differentiated (grade 3 of 4 grades)   Tumor extension: Invades the visceral peritoneum   Margins: Positive for adenocarcinoma, radial   Proximal and distal margins negative for adenocarcinoma   Treatment effect: Not identified   Lymphovascular invasion: Present   Perineural invasion: Present   Tumor deposits: Present, at least 11   Regional lymph nodes: Five of 12 lymph nodes positive for metastatic carcinoma   Largest lymph node metastasis: 6 mm, without extracapsular extension   Ancillary testing: Immunohistochemistry for mismatch repair proteins pending      Pathologic stage (TNM):   Primary tumor: pT4a   Regional lymph nodes: pN2a   Distant metastases: pM1b     Records reviewed and summarized above. Pathology report(s) reviewed above. Radiology report(s) reviewed above. Assessment/PLAN:     1) Stage 4 colon cancer MS stable found with colon mass with obstruction post surgery with wound healing issues. Liver and lung mets noted on CT 5/18  Patient is post surgery 5/18 for obstructing colon mass and now admitted with wound recovery per #3  On CTs, pt has progressive cancer with ascites and pathology from ascitic fluid pending per #2    Met with patient and family this morning  She is not currently on any antibiotics and no active infection per Surgery  Discussed we will plan to start outpatient dose reduced FOLFOX next week as long as she continues to recover  Will have port placed today with IR    FOLFOX orders sent to [address] [place] [address] for processing    2)  Ascites  S/p paracentesis on 7/11/18 with 5L fluid removed.    Fluid beginning to reaccumulate - will order IR placement of aspira catheter today  Concerned that this will be malignant and discussed with family today  Pathology still pending     3) Delayed wound healing. Now off of wound vac. Open wound with some drainage, packing required. Surgery on board and managing  Culture obtained 7/11/18 with NGTD   No antibiotics on board    4) Lung metastasis. Reviewed on recent imaging. 5) Liver metastasis. LFTs stable  Continue to monitor  Ordered repeat CMP overnight    Will continue to follow while admitted. Call if questions  Pt seen today in conjunction with NP DOMINIK Rachel  Talked with pt/  this am.  D/w surgery also. IR for abdominal drain tube placement  Surgery for port. As long as no infection, chemo set up next week outpt.  good with this plan.        Signed By: Radha Wallace NP

## 2018-07-13 NOTE — PROGRESS NOTES
NUTRITION COMPLETE ASSESSMENT    RECOMMENDATIONS:   1. Consider trial of appetite stimulant & add daily MVI  2. Encourage PO intake with all meals and supplements   - family may bring foods from home   - document % meals consumed in flowsheet  3. Adjust insulin per DTC recommendations (see note from today for details)  4. Reweigh on standing scale daily     Interventions/Plan:   Food/Nutrient Delivery:         (consider appetite stimulant)      Assessment:   Reason for Assessment: [x]At Nutrition Risk    Diet: NPO for Aspira drain (regular prior)  Supplements: Ensure Compact TID  Nutritionally Significant Medications: [x] Reviewed & Includes: pepcid, lantus (10 units), SSI, D5 NaCl @ 75ml/hr  Meal Intake:   Patient Vitals for the past 100 hrs:   % Diet Eaten   07/13/18 0815 0 %   07/12/18 0817 25 %   07/11/18 0841 0 %     Current Hospitalization:   Appetite: Poor  PO Ability: Independent Average po intake:Less than 25%  Average supplements intake:        Subjective: N/A x 2 attempts. Objective:  Pt admitted for malignant ascites. PMHx:  DM, hypercholesterolemia, stage 4 colon CA, mets to liver and lungs, s/p right colectomy (5/25/18). Progressive cancer noted with ascites. Paracentesis completed on 7/11 with 5 liters removed. Plans for Aspira drain placement today to help manage ascites per MD notes. Required wound vac after recent surgery but now off. Palliative and oncology following. May start chemo next week if no infection. BG elevated with DTC recommending adjustment to insulin dose. D5 infusing @ 75ml/hr. Poor PO with N/V and early satiety 2/2 bloating and ascites prior to admission. Intake continues to be poor but no N/V today. Pt may benefit from trial of appetite stimulant, but hope that removal of some of asitic  fluid with help with intake as well. Even if intake remains inadequate question if pt would be a candidate for PEG with ascites present? ? Will need to continue to encourage oral intake with meals and supplements. Continue Ensure Compact BID (440kcal, 18g protein), trial Magic Cup BID (580kcal, 18g protein). Difficult to access wt trends with ascites noted. Predict loss of at least 10# of true body wt with low wt of 174# on 6/22/18. Continue daily weights, on standing scale if able, especially with plans for drain placement. Wt Readings:   07/10/18 83.9 kg (185 lb)   07/09/18 82.8 kg (182 lb 9.6 oz)   06/22/18 78.9 kg (174 lb)   05/31/18 83.5 kg (184 lb)   03/19/18 82.6 kg (182 lb)     Will continue to follow for pt interview as able, PO intake, fluid/wt trends, and goals of care. Estimated Nutrition Needs:   Kcals/day: 1975 Kcals/day (1833-1975kcal)  Protein: 95 g (95-110g (1.2-1.4g/kg))  Fluid: 1900 ml (1ml/kcal)  Based On: [place] (x 1.3-1.4)  Weight Used: Other (comment) (78.9kg low wt in June)    Pt expected to meet estimated nutrient needs:  []   Yes     []  No [x] Unable to predict at this time  Nutrition Diagnosis:   1. Inadequate protein-energy intake related to poor appetite, early satiety as evidenced by malignanct ascites; less than 25% meals consumed    2.   (Increased protein/energy needs) related to increased energy expenditure as evidenced by colon CA w/ mets; malignanct ascites    Goals:     Consumption of at least 50% of meals and 1-2 supplements/day in 5-7 days     Monitoring & Evaluation:    - Total energy intake, Liquid meal replacement, Protein intake   - Weight/weight change    Previous Nutrition Goals Met:   N/A  Previous Recommendations:    N/A    Education & Discharge Needs:   [x] None Identified   [] Identified and addressed    [] Participated in care plan, discharge planning, and/or interdisciplinary rounds        Cultural, Sikhism and ethnic food preferences identified: None    Skin Integrity: [x]Intact  []Other  Edema: []None [x]Other: ascites  Last BM: 7/9  Food Allergies: [x]None []Other  Diet Restrictions: Cultural/Yazidism Preference(s): None     Anthropometrics:    Weight Loss Metrics 7/10/2018 7/10/2018 7/9/2018 7/9/2018 6/25/2018 6/22/2018 6/19/2018   Today's Wt 185 lb 182 lb 182 lb 182 lb 9.6 oz 177 lb 8 oz 174 lb 174 lb   BMI 31.76 kg/m2 31.24 kg/m2 31.24 kg/m2 31.34 kg/m2 30.47 kg/m2 29.87 kg/m2 29.87 kg/m2      Weight Source: Standing scale (comment)  Height: 5' 4\" (162.6 cm),    Body mass index is 31.76 kg/(m^2).   IBW : 54.4 kg (120 lb), % IBW (Calculated): 154.17 %   ,      Labs:    Lab Results   Component Value Date/Time    Sodium 129 (L) 07/13/2018 04:36 AM    Potassium 3.9 07/13/2018 04:36 AM    Chloride 98 07/13/2018 04:36 AM    CO2 22 07/13/2018 04:36 AM    Glucose 208 (H) 07/13/2018 04:36 AM    BUN 11 07/13/2018 04:36 AM    Creatinine 0.49 (L) 07/13/2018 04:36 AM    Calcium 7.8 (L) 07/13/2018 04:36 AM    Magnesium 2.1 07/10/2018 06:03 PM    Phosphorus 3.3 07/10/2018 06:03 PM    Albumin 2.2 (L) 07/10/2018 06:03 PM     Lab Results   Component Value Date/Time    Hemoglobin A1c 8.3 (H) 05/26/2018 05:21 AM     Libertad Bejarano RD  Pager #[phone] or [phone]

## 2018-07-13 NOTE — PROGRESS NOTES
Clinical Pharmacy Note: Metronidazole Dosing    Please note that the metronidazole dose for Alexander Castillo has been changed to 500 mg IV q12h per [facility]-approved protocol. Please contact the pharmacy with any questions.     JUAN AlexanderD

## 2018-07-13 NOTE — PROGRESS NOTES
Met with patient's  Alissa Pradhan for chemo teach. Patient asleep and  wishes to communicate information to patient. [facility] chemo info sheet on FOLFOX reviewed. Discussed mechanism of action/medication side effects/symptom management/use of CADD pump/port. Is aware will have anti-emetics/steroids scripted to pharmacy.  stated that patient to have port inserted this weekend and is ready to start chemo as soon as possible. RN advised orders would be faxed to [facility] and that  should receive call for appt.  declined to sign consent for palliative therapy and wishes to review literature. Stated will sign at office visit. To NP for anti-emetics/steroids to be scripted in.  to call our office for review. Support given.

## 2018-07-13 NOTE — PROGRESS NOTES
Bedside shift change report given to Johana Vieyra RN (oncoming nurse) by Mimi Steve RN (offgoing nurse). Report included the following information SBAR, Intake/Output and MAR.

## 2018-07-14 NOTE — PERIOP NOTES
TRANSFER - OUT REPORT:    Verbal report given to Kate BIRD(name) on [facility]  being transferred to [facility](unit) for routine post - op       Report consisted of patients Situation, Background, Assessment and   Recommendations(SBAR). Time Pre op antibiotic given:n/a  Anesthesia Stop time: 2055  Ford Present on Transfer to floor:no  Order for Ford on Chart:no  Discharge Prescriptions with Chart:no    Information from the following report(s) SBAR, OR Summary, Procedure Summary, Intake/Output, MAR, Recent Results, Med Rec Status and Cardiac Rhythm NSR was reviewed with the receiving nurse. Opportunity for questions and clarification was provided. Is the patient on 02? YES       L/Min 2       Other nc    Is the patient on a monitor? NO    Is the nurse transporting with the patient? NO    Surgical Waiting Area notified of patient's transfer from PACU? YES      The following personal items collected during your admission accompanied patient upon transfer:   Dental Appliance: Dental Appliances: None  Vision:    Hearing Aid:    Jewelry: Jewelry: None  Clothing: Clothing: None  Other Valuables:  Other Valuables: None  Valuables sent to safe:

## 2018-07-14 NOTE — PERIOP NOTES
Patient: Colie Schirmer MRN: [id number]  SSN: [id number]   YOB: 1950  Age: 79 y.o. Sex: female     Patient is status post Procedure(s):  PORTACATH INSERTION. Surgeon(s) and Role:     * Mena Whitney MD - Primary    Local/Dose/Irrigation:  See [facility]                Venous Access Device 07/13/18 Upper chest (subclavicular area, right (Active)      Peripheral IV 07/12/18 Right; Lower Arm (Active)   Site Assessment Clean, dry, & intact 7/13/2018  4:30 PM   Phlebitis Assessment 0 7/13/2018  4:30 PM   Infiltration Assessment 0 7/13/2018  4:30 PM   Dressing Status Clean, dry, & intact 7/13/2018  4:30 PM   Dressing Type Transparent 7/13/2018  8:15 AM   Hub Color/Line Status Infusing 7/13/2018  8:15 AM   Alcohol Cap Used Yes 7/13/2018  4:30 PM          Drain 07/13/18 (Active)   Site Assessment Clean, dry, & intact 7/13/2018  6:03 PM   Dressing Status Clean, dry, & intact 7/13/2018  6:03 PM   Status Patent;Draining 7/13/2018  6:03 PM   Drainage Description Serous 7/13/2018  6:03 PM   Output (ml) 900 ml 7/13/2018  6:03 PM      Airway - Endotracheal Tube 07/13/18 Oral (Active)                   Dressing/Packing:  Wound Abdomen Anterior; Lower-DRESSING TYPE: Packing;4 x 4;Special tape (comment) (07/13/18 0900)  Wound Chest Right-DRESSING TYPE: Topical skin adhesive/glue (07/13/18 2014)  Splint/Cast:  ]    Other:

## 2018-07-14 NOTE — OP NOTES
[address] [place] [address] REPORT    Adan Gomez  MR#: [id number]  : 1950  ACCOUNT #: [de-identified]   DATE OF SERVICE: 2018    PREOPERATIVE DIAGNOSIS:  Metastatic colon cancer. POSTOPERATIVE DIAGNOSIS:  Metastatic colon cancer. PROCEDURE PERFORMED:  Insertion of implanted venous access port. SURGEON:  Jaqueline Johnson MD.    ASSISTANT:  None. ANESTHESIA:  General endotracheal anesthesia. INTRAOPERATIVE FLUID:  Normal saline 300 mL and albumin 250 mL. ESTIMATED BLOOD LOSS:  5 mL. SPECIMENS REMOVED:  None. FINDINGS:  8-Guinean implanted venous access port at right subclavian vein with the tip in the distal superior vena cava. COMPLICATIONS:  None. IMPLANTS:  Angiodynamics vascular infusion port 8-Guinean. INDICATIONS:  This is a 81-year-old  female with a history of metastatic colon cancer. She has been admitted to the hospital with abdominal pain, abdominal distention, nausea, and early satiety. She was found to have malignant ascites. She will need urgent chemotherapy because of the rapid of growth of her tumors. DESCRIPTION OF PROCEDURE:  After confirming the procedure and obtaining informed consent, the patient was brought to the operating room, positioned supine on the operating table. After induction of general endotracheal anesthesia, the upper chest and neck were prepped bilaterally and draped in sterile fashion. Local anesthetic was administered. Seldinger technique was performed. The right subclavian vein was accessed with the large needle. The wire was introduced and no ectopy was observed. The needle was removed and the wire was clamped to the drapes. A subcutaneous pocket was created at the right pectoral area was then dissected bluntly and sharply. A catheter was then tunneled through the port incision and through the subcutaneous tissue up through the insertion site.   Next, the introducer sheath and dilator were introduced over the wire into the subclavian vein. Under fluoroscopy, the dilator was confirmed to be in appropriate position as well as the wire. The dilator and the wire were removed and the catheter was fed in through the sheath. The position of the catheter was then confirmed again with fluoroscopy and found to be at the cavoatrial junction. The catheter was then cut to the appropriate length and attached to the infusion port. Attempts to aspirate blood from the infusion port were unsuccessful and reexamination with the fluoroscopy revealed the catheter to be multiply looped and kinked at the right subclavian subcutaneous area. The catheter was straightened out and with fluoroscopy was confirmed to have the tip still at the junction of the vena cava and the atrium. The catheter was then cut to the appropriate length and attached to the infusion port. Aspiration through this port confirmed aspiration of blood and easy flush with saline. The port was then flushed with heparinized saline. The port insertion site was closed with 4-0 Monocryl in subcuticular fashion. The insertion site and the port site were both dressed with Exofin wound glue. Sponge and needle counts were confirmed correct. The patient was then extubated and transferred to recovery room in stable condition.       MD PEARL Cisse / KARIS  D: 07/13/2018 20:57     T: 07/13/2018 23:35  JOB #: [id number]

## 2018-07-14 NOTE — BRIEF OP NOTE
BRIEF OPERATIVE NOTE    Date of Procedure: 7/13/2018   Preoperative Diagnosis: METASTATIC COLON CANCER  Postoperative Diagnosis: METASTATIC COLON CANCER    Procedure(s):  PORTACATH INSERTION  Surgeon(s) and Role:     Mena Castillo MD - Primary         Surgical Assistant: none    Surgical Staff:  Circ-1: Gavin Heredia RN  Scrub Tech-1: Eddie Apnote  Event Time In   Incision Start 1959   Incision Close 2039     Anesthesia: General   Estimated Blood Loss: 5 mL IV:  mL + alb 250 mL  Specimens: * No specimens in log *   Findings: 8 Fr implanted venous access port at right subclavian vein  Complications: none  Implants:   Implant Name Type Inv.  Item Serial No.  Lot No. LRB No. Used Action   PORT VASC INFUS SET 8FR TI -- SMART PORT CT - SNA   PORT VASC INFUS SET 8FR TI -- SMART PORT CT NA [employer] [id number] Right 1 Implanted         Confirmation number:  [id number]

## 2018-07-14 NOTE — ANESTHESIA POSTPROCEDURE EVALUATION
Post-Anesthesia Evaluation and Assessment    Patient: Monroe Del Valle MRN: [id number]  SSN: [id number]    YOB: 1950  Age: 79 y.o. Sex: female       Cardiovascular Function/Vital Signs  Visit Vitals    /73    Pulse 89    Temp 36.8 °C (98.3 °F)    Resp 18    Ht 5' 4\" (1.626 m)    Wt 83.9 kg (185 lb)    SpO2 94%    BMI 31.76 kg/m2       Patient is status post general anesthesia for Procedure(s):  PORTACATH INSERTION. Nausea/Vomiting: None    Postoperative hydration reviewed and adequate. Pain:  Pain Scale 1: Numeric (0 - 10) (07/13/18 2217)  Pain Intensity 1: 0 (07/13/18 2217)   Managed    Neurological Status:   Neuro (WDL): Exceptions to WDL (07/13/18 2146)  Neuro  Neurologic State: Alert;Drowsy (07/13/18 2146)  Orientation Level: Oriented X4 (07/13/18 2146)  Cognition: Follows commands (07/13/18 2146)  Speech: Clear (07/13/18 2146)  LUE Motor Response: Purposeful (07/13/18 2146)  LLE Motor Response: Purposeful (07/13/18 2146)  RUE Motor Response: Purposeful (07/13/18 2146)  RLE Motor Response: Purposeful (07/13/18 2146)   At baseline    Mental Status and Level of Consciousness: Arousable    Pulmonary Status:   O2 Device: Nasal cannula (07/13/18 2146)   Adequate oxygenation and airway patent    Complications related to anesthesia: None    Post-anesthesia assessment completed.  No concerns    Signed By: Jim Ellington MD     July 13, 2018

## 2018-07-14 NOTE — PROGRESS NOTES
Ms. Sharon Mendez reports discomfort at site of Jourdan-Cath. Visit Vitals    /69 (BP 1 Location: Left arm, BP Patient Position: At rest)    Pulse 89    Temp 98.2 °F (36.8 °C)    Resp 18    Ht 5' 4\" (1.626 m)    Wt 185 lb (83.9 kg)    SpO2 95%    BMI 31.76 kg/m2       Intake/Output Summary (Last 24 hours) at 07/14/18 0803  Last data filed at 07/14/18 0408   Gross per 24 hour   Intake              620 ml   Output             1901 ml   Net            -1281 ml   Exam: Cor: RRR. Chest: Right subclavian jourdan-cath incision is clean. Lungs: Bilateral breath sounds. Clear to auscultation. Abd: Soft. Distended. Non tender. No guarding or rebound. Paracentesis catheter in place. Labs:   Recent Results (from the past 12 hour(s))   GLUCOSE, POC    Collection Time: 07/13/18  8:54 PM   Result Value Ref Range    Glucose (POC) 164 (H) 65 - 100 mg/dL    Performed by Rebeca Gonzáles    GLUCOSE, POC    Collection Time: 07/14/18 12:13 AM   Result Value Ref Range    Glucose (POC) 173 (H) 65 - 100 mg/dL    Performed by Kelly Charles (PCT)    GLUCOSE, POC    Collection Time: 07/14/18  6:06 AM   Result Value Ref Range    Glucose (POC) 195 (H) 65 - 100 mg/dL    Performed by Kelly Charles (PCT)    Diet as tolerated. Saline lock IVF. Dr. Sukhdev Beyer input - noted. Hopefully, will start chemotherapy soon. Will ask PT to see. DVT Prophylaxis - Lovenox. Pain medication and anit-emetics as needed.

## 2018-07-15 NOTE — PROGRESS NOTES
Midline Note. Procedure reviewed with the patient, her  and daughter. I allowed  to wear a mask and cap and stay in attendance during the procedure. PRE-PROCEDURE VERIFICATION  Correct Procedure: yes  Correct Site:  yes  Temperature: Temp: 98.5 °F (36.9 °C), Temperature Source: Temp Source: Oral  Recent Labs      07/13/18   0436   BUN  11   CREA  0.49*   PLT  379   WBC  14.6*     Allergies: Tylenol [acetaminophen]    PROCEDURE DETAIL  Able to access the patient's left upper basilic vein with ultrasound guidance. Guidewire used with no resistance and introducer was put in place. Midline trimmed to 11cm. Resistance met when passing the midline. The patient and  requested that no other attempts be made. Occlusive dressing placed over the site. Report given to Vandana Gonzalez RN She will call Dr. Mauri Montelongo to get advice regarding inability to access patient with PIV.

## 2018-07-15 NOTE — PROGRESS NOTES
Pt lost IV access during antibiotic administration. Attempted peripheral IV stick unsuccessfully. ANÍBAL Waters RN, attempted peripheral IV with ultrasound, was unsuccessful, attempted midline with ultrasound, unsuccessful. Per Dr. Nikia Dow, consult oncology RN to attempt port access, and if unable switch IV meds to PO. Gina Gaviria, 6E RN, attempted port access, but pt refused as procedure produced too much pain. Will switch meds to PO. Bedside shift change report given to Juan Marcus (oncoming nurse) by Leeroy Brooks (offgoing nurse). Report included the following information SBAR, Kardex, Intake/Output, MAR and Recent Results.

## 2018-07-15 NOTE — PROGRESS NOTES
General Surgery at [address] [place] [address] RN note. Pt still has no IV access. Very frustrated because of multiple attempts, also because very little time allowed to recover between procedures. Peritoneal catheter placed Friday then Portacath Friday night. Ate better today and did not report vomiting. Patient Vitals for the past 12 hrs:   Temp Pulse Resp BP SpO2   07/15/18 1804 - 84 - 112/85 -   07/15/18 1451 98.5 °F (36.9 °C) 79 18 115/76 95 %   07/15/18 0757 97.6 °F (36.4 °C) 82 20 108/72 95 %     Temp (24hrs), Av.1 °F (36.7 °C), Min:97.6 °F (36.4 °C), Max:98.5 °F (36.9 °C)       Date 07/15/18 0700 - 18 0659   Shift 5197-5147 4973-9561 0552-0619 24 Hour Total   I  N  T  A  K  E   Shift Total  (mL/kg)       O  U  T  P  U  T   Urine  (mL/kg/hr) 300  (0.4)   300    Shift Total  (mL/kg) 300  (3.6)   300  (3.6)   Weight (kg) 83.9 83.9 83.9 83.9     Gen NAD  Abd Distended, soft, nontender    Assessment:   Hospital Problems as of 7/15/2018  Date Reviewed: 7/10/2018          Codes Class Noted - Resolved POA    Superficial postoperative wound infection ICD-10-CM: T81. 4XXA  ICD-9-CM: 998.59  2018 - Present Yes    Overview Addendum 2018  6:52 PM by Lauren Tran MD     6/10/18 wound culture  Culture result:    Final      MODERATE STAPHYLOCOCCUS EPIDERMIDIS (OXACILLIN RESISTANT) (A)     18 anaerobic wound culture   Culture result:    Final     MODERATE MIXED ANAEROBIC GRAM NEGATIVE RODS BETA LACTAMASE POSITIVE (A)     Culture result:    Final     HEAVY ANAEROBIC GRAM POSITIVE COCCI (A)                  Open wound of anterior abdominal wall with complication MNG-18-TY: N29.929Y  ICD-9-CM: 879.3  2018 - Present Yes        Methicillin resistant Staphylococcus epidermidis infection ICD-10-CM: A49.8, Z16.29  ICD-9-CM: 041.19  6/3/2018 - Present Yes    Overview Signed 2018 10:48 AM by Lauren Tran MD     Wound culture 18  Reported 6/10/18 - sensitive to cipro, clinda, dapto, linezolid, TMP/SMZ and vanc               Malignant neoplasm of ascending colon (HCC) ICD-10-CM: C18.2  ICD-9-CM: 153.6  5/26/2018 - Present Yes    Overview Signed 5/31/2018  3:49 PM by Trenton Miller MD     Results for Tiffany Cardenas (MRN [id number]) as of 5/31/2018 15:41   Ref. Range 5/31/2018 13:44   CEA Latest Units: ng/mL 28.4                DM (diabetes mellitus) ([facility] ICD-10-CM: E11.9  ICD-9-CM: 250.00  5/23/2013 - Present Yes        Nausea and vomiting in adult ICD-10-CM: R11.2  ICD-9-CM: 787.01  7/10/2018 - Present Yes        Abdominal pain ICD-10-CM: R10.9  ICD-9-CM: 789.00  7/10/2018 - Present Yes        * (Principal)Malignant ascites ICD-10-CM: R18.0  ICD-9-CM: 789.51  7/10/2018 - Present Yes        Generalized abdominal pain ICD-10-CM: R10.84  ICD-9-CM: 789.07  6/25/2018 - Present Yes        Class 1 obesity due to excess calories with body mass index (BMI) of 31.0 to 31.9 in adult ICD-10-CM: E66.09, Z68.31  ICD-9-CM: 278.00, V85.31  6/25/2018 - Present Yes        Severe protein-calorie malnutrition ([facility] ICD-10-CM: E43  ICD-9-CM: 262  6/8/2018 - Present Yes        HTN (hypertension) ICD-10-CM: I10  ICD-9-CM: 401.9  5/23/2013 - Present Yes              2 Days Post-Op  Procedure(s):  PORTACATH INSERTION     Okay for PO abx. Rec/Plan:  Reassess tomorrow for possible paracentesis port. Hold on IV access until determination of status by  Nearlyweds.     Signed By: Trenton Miller MD     July 15, 2018

## 2018-07-15 NOTE — PROGRESS NOTES
Bedside shift change report given to [address] (oncoming nurse) by Sharon Caballero (offgoing nurse). Report included the following information SBAR and MAR.

## 2018-07-16 NOTE — TELEPHONE ENCOUNTER
Talked with pt's  in hospital this afternoon  ID consult pending today  If ID says no infection, ok to set up FOLFOX chemo for tmw in hospital 6th floor then d/c when chemo is done  This is plan /pt wants

## 2018-07-16 NOTE — PROGRESS NOTES
Paged Dr. Torito Wahl to make him aware that the CXR needs to be amended by radiology in order to access port-a-cath. IVF and blood draw on hold until radiology reads xray and the port is accessed per protocol. Dr. Norma Davis called back and was notified that the port will be accessed at a later date. [facility] accessed by oncology RN. Pt tolerated well. IVF infusing.

## 2018-07-16 NOTE — PROGRESS NOTES
Problem: Pressure Injury - Risk of  Goal: *Prevention of pressure injury  Document Osvaldo Scale and appropriate interventions in the flowsheet. Outcome: Progressing Towards Goal  Pressure Injury Interventions: Activity Interventions: Increase time out of bed    Mobility Interventions: HOB 30 degrees or less    Nutrition Interventions: Document food/fluid/supplement intake    Friction and Shear Interventions: HOB 30 degrees or less               Problem: Falls - Risk of  Goal: *Absence of Falls  Document Heber Fall Risk and appropriate interventions in the flowsheet. Fall Risk Interventions:  Mobility Interventions: Communicate number of staff needed for ambulation/transfer         Medication Interventions: Patient to call before getting OOB    Elimination Interventions: Call light in reach             Problem: General Wound Care  Goal: *Non-infected wound: Improvement of existing wound, absence of infection, and maintenance of skin integrity  Received patient from previous RN via bedside shift report. Patient is resting comfortably; A&Ox4. AVSS, NAD noted at this time. Abd wound CDI, peritoneal drain CDI and clamped. Full assessment into epic. Patient updated on current POC- verbalized understanding. All safety precautions in place- safety maintained. Will continue to monitor.

## 2018-07-16 NOTE — PROGRESS NOTES
[facility] [place] [facility]  [address], [address]  W: [phone]  F: [phone]    Reason for Visit:   Augustine Martínez is a 79 y.o. female who is seen in hospital consut for met colon cancer post surgery . Currently admitted for abdominal distention due to ascites. Treatment History:   · 2018 Right colectomy-stage IV poorly differentiated adenocarcinoma  · 7/3/2018: EGFR neg, BRAF neg  · 2018: KRAS/HRAS/NRAS-neg    History of Present Illness:   Ms. Erna Montero is a 79 y.o. female with recent diagnosis of stage IV colon cancer s/p colectomy May 2018 with post surgical wound care complications requiring wound vac. Currently admitted with fever in the home, shaking chills on Monday, abdominal distention and pain. Unable to eat much at all. States she is not hungry. Trying clear liquids. C/o lower back pain, this has been present since diagnosis. Mouth dryness. Pt reports having an ultrasound paracentesis today. Denies current fever, chills, SOB or chest pain. INTERVAL HISTORY: pt seen today for f/u of metastatic colon cancer. Pt is in bed and did not go home over weekend due to questions with IR abdominal drain tube. Pt has port placed but it is not being used. Plan was for chemo to start this week as outpt after hospital d/c.      Past Medical History:   Diagnosis Date    Anemia NEC     Cancer ([address]     Class 1 obesity due to excess calories with body mass index (BMI) of 31.0 to 31.9 in adult 2018    Diabetes ([address]     Headache(784.0)     Hypercholesterolemia     Hypertension     Malignant neoplasm of ascending colon ([address] 2018    Stage IVc    S/P right colectomy 2018    Right hemicolectomy for cecal mass with partial SBO, N[place]    Severe protein-calorie malnutrition ([address] 2018    Superficial postoperative wound infection 2018      Past Surgical History:   Procedure Laterality Date    HX  SECTION Hysterectomy,total for precancer    HX COLECTOMY Right 05/27/2018    Right hemicolectomy for cecal mass with partial SBO, Ndiaye      Social History   Substance Use Topics    Smoking status: Never Smoker    Smokeless tobacco: Never Used    Alcohol use No      Family History   Problem Relation Age of Onset    Alcohol abuse Father     Cancer Maternal Aunt      breast ca    Breast Cancer Maternal Aunt     Breast Cancer Sister     Breast Cancer Daughter      Current Facility-Administered Medications   Medication Dose Route Frequency    metroNIDAZOLE (FLAGYL) tablet 500 mg  500 mg Oral Q12H    levoFLOXacin (LEVAQUIN) tablet 750 mg  750 mg Oral Q24H    ondansetron (ZOFRAN ODT) tablet 4 mg  4 mg Oral Q8H PRN    enoxaparin (LOVENOX) injection 40 mg  40 mg SubCUTAneous DAILY    insulin glargine (LANTUS) injection 10 Units  10 Units SubCUTAneous DAILY    sodium chloride (NS) flush 5-10 mL  5-10 mL IntraVENous PRN    morphine injection 2 mg  2 mg IntraVENous Multiple    midazolam (VERSED) injection 0.5 mg  0.5 mg IntraVENous Q5MIN PRN    famotidine (PEPCID) tablet 20 mg  20 mg Oral BID    lisinopril (PRINIVIL, ZESTRIL) tablet 10 mg  10 mg Oral DAILY    metoprolol tartrate (LOPRESSOR) tablet 50 mg  50 mg Oral BID    naloxone (NARCAN) injection 0.4 mg  0.4 mg IntraVENous PRN    oxyCODONE IR (ROXICODONE) tablet 5 mg  5 mg Oral Q6H PRN    insulin regular (NOVOLIN R, HUMULIN R) injection   SubCUTAneous Q6H    sodium chloride (NS) flush 5-10 mL  5-10 mL IntraVENous Q8H    sodium chloride (NS) flush 5-10 mL  5-10 mL IntraVENous PRN    prochlorperazine (COMPAZINE) injection 5 mg  5 mg IntraVENous Q8H PRN    glucose chewable tablet 16 g  4 Tab Oral PRN    dextrose (D50W) injection syrg 12.5-25 g  12.5-25 g IntraVENous PRN    glucagon (GLUCAGEN) injection 1 mg  1 mg IntraMUSCular PRN    albuterol (PROVENTIL VENTOLIN) nebulizer solution 2.5 mg  2.5 mg Nebulization Q6H PRN    HYDROmorphone (DILAUDID) syringe 0.5 mg  0.5 mg IntraVENous Q4H PRN      Allergies   Allergen Reactions    Tylenol [Acetaminophen] Swelling      Review of Systems: A complete review of systems was obtained, negative except as described above. Physical Exam:     Visit Vitals    /68 (BP 1 Location: Right arm, BP Patient Position: At rest)    Pulse 88    Temp 97.6 °F (36.4 °C)    Resp 16    Ht 5' 4\" (1.626 m)    Wt 185 lb (83.9 kg)    SpO2 99%    BMI 31.76 kg/m2     ECOG PS: 2  General: No acute distress, appears weak/tired  Eyes: anicteric sclerae  HENT: Atraumatic, OP clear  Neck: Supple  Abdominal distention with drain  Extremities: no edema  MS: unable to assess gait  Skin:  Normal temperature, turgor, and texture. Psych: conversant    Results:     Lab Results   Component Value Date/Time    WBC 14.6 (H) 07/13/2018 04:36 AM    HGB 11.5 07/13/2018 04:36 AM    HCT 35.9 07/13/2018 04:36 AM    PLATELET 081 14/52/4792 04:36 AM    MCV 80.0 07/13/2018 04:36 AM    ABS. NEUTROPHILS 9.5 (H) 07/13/2018 04:36 AM     Lab Results   Component Value Date/Time    Sodium 129 (L) 07/13/2018 04:36 AM    Potassium 3.9 07/13/2018 04:36 AM    Chloride 98 07/13/2018 04:36 AM    CO2 22 07/13/2018 04:36 AM    Glucose 208 (H) 07/13/2018 04:36 AM    BUN 11 07/13/2018 04:36 AM    Creatinine 0.49 (L) 07/13/2018 04:36 AM    GFR est AA >60 07/13/2018 04:36 AM    GFR est non-AA >60 07/13/2018 04:36 AM    Calcium 7.8 (L) 07/13/2018 04:36 AM    Glucose (POC) 166 (H) 07/16/2018 06:45 AM     Lab Results   Component Value Date/Time    Bilirubin, total 0.4 07/10/2018 06:03 PM    ALT (SGPT) 20 07/10/2018 06:03 PM    AST (SGOT) 101 (H) 07/10/2018 06:03 PM    Alk.  phosphatase 71 07/10/2018 06:03 PM    Protein, total 6.9 07/10/2018 06:03 PM    Albumin 2.2 (L) 07/10/2018 06:03 PM    Globulin 4.7 (H) 07/10/2018 06:03 PM     CT Results (most recent):    Results from [place] encounter on 07/10/18   CT ABD PELV W CONT   Narrative EXAM:  CT ABD PELV W CONT    INDICATION: abdominal pain; wound abscess, fever, chills, stage IV colon CA    COMPARISON: June 8    CONTRAST:  100 mL of Isovue-370. TECHNIQUE:   Following the uneventful intravenous administration of contrast, thin axial  images were obtained through the abdomen and pelvis. Coronal and sagittal  reconstructions were generated. Oral contrast was not administered. CT dose  reduction was achieved through use of a standardized protocol tailored for this  examination and automatic exposure control for dose modulation. FINDINGS:   LUNG BASES: Interval enlargement of lower lobe pulmonary nodules. A  representative right lower lobe pulmonary nodule measures 9 mm and previously  measured 7 mm. Larger bilateral pleural effusions, right greater than left. INCIDENTALLY IMAGED HEART AND MEDIASTINUM: Unremarkable. LIVER: Interval enlargement of multiple hepatic masses. GALLBLADDER: Unremarkable. SPLEEN: No mass. PANCREAS: No mass or ductal dilatation. ADRENALS: Unremarkable. KIDNEYS: Left renal cyst.  STOMACH: Unremarkable. SMALL BOWEL: No dilatation or wall thickening. COLON: Right hemicolectomy. APPENDIX: Unremarkable. PERITONEUM: Interval development of peritoneal soft tissue masses. Moderate  ascites. RETROPERITONEUM: Retroperitoneal lymphadenopathy. REPRODUCTIVE ORGANS: Hysterectomy. URINARY BLADDER: No mass or calculus. BONES: No destructive bone lesion. ADDITIONAL COMMENTS: N/A         Impression IMPRESSION:    1. Interval significant worsening of metastatic disease, with enlarging lung  nodules, enlarging liver lesions, retroperitoneal lymphadenopathy, and new  significant peritoneal metastatic disease with ascites. 2. Bilateral pleural effusions. 5/25/18 CT Abdomen/Pelvis:  IMPRESSION: Cecal mass with metastatic disease to liver, retroperitoneal nodes,  and lung. Relative small bowel obstruction. Associated dilated appendix.   Associated complex right pleural effusion and small amount of ascites. 5/29/18 Surgical Pathology:     FINAL PATHOLOGIC DIAGNOSIS   1. Peritoneum, biopsy:   Adenocarcinoma, poorly differentiated   2. Omentum, omentectomy:   Adenocarcinoma, poorly differentiated   3. Colon and terminal ileum, right hemicolectomy:   Colonic adenocarcinoma   Appendix with tubulovillous adenoma   Addendum handling for mismatch repair proteins   Synoptic report:   Procedure: Right hemicolectomy   Tumor site: Cecum   Tumor size: 10.0 cm   Macroscopic tumor perforation: Not identified   Histologic type: Adenocarcinoma   Histologic grade: Poorly differentiated (grade 3 of 4 grades)   Tumor extension: Invades the visceral peritoneum   Margins: Positive for adenocarcinoma, radial   Proximal and distal margins negative for adenocarcinoma   Treatment effect: Not identified   Lymphovascular invasion: Present   Perineural invasion: Present   Tumor deposits: Present, at least 11   Regional lymph nodes: Five of 12 lymph nodes positive for metastatic carcinoma   Largest lymph node metastasis: 6 mm, without extracapsular extension   Ancillary testing: Immunohistochemistry for mismatch repair proteins pending      Pathologic stage (TNM):   Primary tumor: pT4a   Regional lymph nodes: pN2a   Distant metastases: pM1b     Records reviewed and summarized above. Pathology report(s) reviewed above. Radiology report(s) reviewed above. Assessment/PLAN:     1) Stage 4 colon cancer MS stable found with colon mass with obstruction post surgery with wound healing issues. Liver and lung mets noted on CT 5/18  Patient is post surgery 5/18 for obstructing colon mass   On CTs, pt has progressive cancer with ascites   Pt had port placed and is getting peritoneal drain via IR. Case d/w surgery today. Pt needs ID eval for any infection prior to us starting chemo.     we plan to start outpatient dose reduced FOLFOX   If necessary, we could do in hospital but does not need to be given in hospital.     2) Ascites recurrent  S/p paracentesis on 7/11/18 with 5L fluid removed. For IR drain tube until chemo works. 3) Delayed wound healing. Now off of wound vac. Open wound with some drainage, packing required. Surgery managing  ID eval prior to chemo. 4) Lung metastasis. Reviewed on recent imaging. 5) Liver metastasis. LFTs stable   Continue to monitor    Will continue to follow while admitted.  Call if questions      Signed By: Saige Done, DO

## 2018-07-16 NOTE — PROGRESS NOTES
PALLIATIVE MEDICINE            Thank you for the opportunity to participate in Mrs. Lott's care. Family not receptive to our services at this time. Care goals are clear. Will sign off. Oscar Arce.  [address] MSN, FNP-BC, [facility]

## 2018-07-16 NOTE — PROGRESS NOTES
General Surgery at [facility]     had several questions like, \"Why does her belly bloat up when she eats spicy food? \"  Pt reports no appetite, poor intake. Patient Vitals for the past 12 hrs:   Temp Pulse Resp BP SpO2   18 1206 98.6 °F (37 °C) 82 16 112/73 97 %   18 0735 97.6 °F (36.4 °C) 88 16 112/68 -   18 0404 97.8 °F (36.6 °C) 84 16 107/72 99 %     Temp (24hrs), Av.2 °F (36.8 °C), Min:97.6 °F (36.4 °C), Max:98.6 °F (37 °C)       Date 18 0700 - 18 0659   Shift 1054-0262 2821-9403 9622-7750 24 Hour Total   I  N  T  A  K  E   Shift Total  (mL/kg)       O  U  T  P  U  T   Urine  (mL/kg/hr) 200   200    Shift Total  (mL/kg) 200  (2.4)   200  (2.4)   Weight (kg) 83.9 83.9 83.9 83.9     Gen NAD  Abd Mod distension, nontender    Assessment:   Hospital Problems as of 2018  Date Reviewed: 7/10/2018          Codes Class Noted - Resolved POA    Superficial postoperative wound infection ICD-10-CM: T81. 4XXA  ICD-9-CM: 998.59  2018 - Present Yes    Overview Addendum 2018  6:52 PM by Karen Mayers MD     6/10/18 wound culture  Culture result:    Final      MODERATE STAPHYLOCOCCUS EPIDERMIDIS (OXACILLIN RESISTANT) (A)     18 anaerobic wound culture   Culture result:    Final     MODERATE MIXED ANAEROBIC GRAM NEGATIVE RODS BETA LACTAMASE POSITIVE (A)     Culture result:    Final     HEAVY ANAEROBIC GRAM POSITIVE COCCI (A)                  Open wound of anterior abdominal wall with complication XBO-00-SH: V85.091E  ICD-9-CM: 879.3  2018 - Present Yes        Methicillin resistant Staphylococcus epidermidis infection ICD-10-CM: A49.8, Z16.29  ICD-9-CM: 041.19  6/3/2018 - Present Yes    Overview Signed 2018 10:48 AM by Karen Mayers MD     Wound culture 18  Reported 6/10/18 - sensitive to cipro, clinda, dapto, linezolid, TMP/SMZ and vanc               Malignant neoplasm of ascending colon (HCC) ICD-10-CM: C18.2  ICD-9-CM: 153.6  2018 - Present Yes    Overview Signed 5/31/2018  3:49 PM by Rae Jones MD     Results for Earnestine Vance (MRN [id number]) as of 5/31/2018 15:41   Ref. Range 5/31/2018 13:44   CEA Latest Units: ng/mL 28.4                DM (diabetes mellitus) ([facility] ICD-10-CM: E11.9  ICD-9-CM: 250.00  5/23/2013 - Present Yes        Nausea and vomiting in adult ICD-10-CM: R11.2  ICD-9-CM: 787.01  7/10/2018 - Present Yes        Abdominal pain ICD-10-CM: R10.9  ICD-9-CM: 789.00  7/10/2018 - Present Yes        * (Principal)Malignant ascites ICD-10-CM: R18.0  ICD-9-CM: 789.51  7/10/2018 - Present Yes        Generalized abdominal pain ICD-10-CM: R10.84  ICD-9-CM: 789.07  6/25/2018 - Present Yes        Class 1 obesity due to excess calories with body mass index (BMI) of 31.0 to 31.9 in adult ICD-10-CM: E66.09, Z68.31  ICD-9-CM: 278.00, V85.31  6/25/2018 - Present Yes        Severe protein-calorie malnutrition ([facility] ICD-10-CM: E43  ICD-9-CM: 262  6/8/2018 - Present Yes        HTN (hypertension) ICD-10-CM: I10  ICD-9-CM: 401.9  5/23/2013 - Present Yes              3 Days Post-Op  Procedure(s):  PORTACATH INSERTION   Cytology of peritoneal fluid - metastatic adenoCa    Rec/Plan:  Consult ID to clear for chemo  Consult RD to educate on nutritional supplements  Peritoneal cath to drainage then dc  Postpone permanent cath placement. Have onc RN access portacath. Resume IVF.     Signed By: Rae Jones MD     July 16, 2018

## 2018-07-16 NOTE — PROGRESS NOTES
NUTRITION brief    Recommendations:    1. Consider adding simethicone PRN - for complaints of gas  2. Encourage PO intake with meals and supplements   - document % meals consumed  3. May need to consider trial of appetite stimulant    Interventions:  - trial Ensure Clear TID  - bland diet  - preferences noted     Diet: regular  Supplements: Ensure Compact TID  Medications: pepcid, lantus (10 units), SSI, levaquin, flagyl, NS @ 125ml/hr  Patient Vitals for the past 100 hrs:   % Diet Eaten   07/16/18 1206 0 %   07/16/18 0843 0 %   07/13/18 0815 0 %     MD consult for supplements received. See full assessment from Friday (7/13). Pt and son visited today. Intake very poor, ate none of lunch. Has drunk a total of 2 Ensure Compact this admit, does not like milky consistency. Does not like Magic Cup either. Agreeable to trial of Ensure Clear as alternative (720kcal, 24g protein). Complains of gas and bloating after eating chicken sandwich. Discussed selection of bland foods for better tolerance but also wonder is ascites playing a role. Could consider trial of simethicone to help with gas. Hope that with drain in place and improvements in ascites that appetite and PO tolerance may improve. With possible plans for chemo will need to maximize oral intake and nutrition for best prognosis. May want to consider trial of appetite stimulant. Will continue to follow for plan of care, PO intake, supplement acceptance, wt trends/fluid status. Estimated Nutrition Needs:   Kcals/day: 1975 Kcals/day (1833-1975kcal)  Protein: 95 g (95-110g (1.2-1.4g/kg))  Fluid: 1900 ml (1ml/kcal)  Based On: Juarez-Patterson (x 1.3-1.4)  Weight Used:  Other (comment) (78.9kg low wt in June)    Libertad Bejarano RD

## 2018-07-16 NOTE — TELEPHONE ENCOUNTER
Yi Rizzo, , is requesting a callback from the doctor. He wasn't in the pt's room when she came to visit and would like to know what was discussed.        best contact number is [phone]              Message taken by [employer] service

## 2018-07-16 NOTE — PROGRESS NOTES
Problem: Mobility Impaired (Adult and Pediatric)  Goal: *Acute Goals and Plan of Care (Insert Text)  Physical Therapy Goals  Initiated 7/11/2018  1. Patient will move from supine to sit and sit to supine  in bed with modified independence within 7 day(s). 2.  Patient will transfer from bed to chair and chair to bed with modified independence using the least restrictive device within 7 day(s). 3.  Patient will perform sit to stand with modified independence within 7 day(s). 4.  Patient will ambulate with modified independence for 150 feet with the least restrictive device within 7 day(s). physical Therapy TREATMENT  Patient: Stone Lynch (52 y.o. female)  Date: 7/16/2018  Diagnosis: Wound Abcess, N&V  Wound, surgical, infected  Abdominal pain  unknown Malignant ascites  Procedure(s) (LRB):  PORTACATH INSERTION (Right) 3 Days Post-Op  Precautions: Fall  Chart, physical therapy assessment, plan of care and goals were reviewed. ASSESSMENT:  Pt was able improve mobility and decrease assistance. Treatment directed by . Pt's  is very hands on with pt, but was safe. Pt was able to ambulate 20 feet with HHAx2 for safety and balance. Pt fatigues quickly and unable to perform second trial. Pt's  reports he will attempt later. Pt has a very involved family that may benefit from [address] with physical assistance vs Rehab. Progression toward goals:  [x]    Improving appropriately and progressing toward goals  []    Improving slowly and progressing toward goals  []    Not making progress toward goals and plan of care will be adjusted     PLAN:  Patient continues to benefit from skilled intervention to address the above impairments. Continue treatment per established plan of care. Discharge Recommendations: HHPT with physical assistance vs rehab  Further Equipment Recommendations for Discharge:  Rolling walker? SUBJECTIVE:   Patient stated I am tired.     OBJECTIVE DATA SUMMARY:   Critical Behavior:  Neurologic State: Alert  Orientation Level: Oriented X4  Cognition: Appropriate for age attention/concentration     Functional Mobility Training:  Bed Mobility:     Supine to Sit:  (performed by . max A)  Sit to Supine: Moderate assistance;Assist x2           Transfers:  Sit to Stand: Minimum assistance;Assist x2  Stand to Sit: Minimum assistance;Assist x2                             Balance:  Sitting: Impaired  Sitting - Static: Fair (occasional)  Standing: Impaired  Standing - Static: Fair  Standing - Dynamic : Fair  Ambulation/Gait Training:  Distance (ft): 20 Feet (ft)  Assistive Device:  (HHA x2)           Gait Abnormalities: Antalgic;Decreased step clearance        Base of Support: Widened     Speed/Kassandra: Slow  Step Length: Left shortened;Right shortened                    Stairs:              Neuro Re-Education:    Therapeutic Exercises:     Pain:  Pain Scale 1: Numeric (0 - 10)  Pain Intensity 1: 2  Pain Location 1: Abdomen  Pain Orientation 1: Anterior  Pain Description 1: Pressure  Pain Intervention(s) 1: Medication (see MAR)  Activity Tolerance:   limited  Please refer to the flowsheet for vital signs taken during this treatment.   After treatment:   []    Patient left in no apparent distress sitting up in chair  [x]    Patient left in no apparent distress in bed  [x]    Call bell left within reach  [x]    Nursing notified  [x]    Caregiver present  []    Bed alarm activated    COMMUNICATION/COLLABORATION:   The patients plan of care was discussed with: Registered Nurse    Kaleb Leigh PTA   Time Calculation: 15 mins

## 2018-07-16 NOTE — PROGRESS NOTES
Bedside shift change report given to [facility] REDDY (oncoming nurse) by Marianna Mueller (offgoing nurse). Report included the following information SBAR.

## 2018-07-16 NOTE — PROGRESS NOTES
Spoke with Radiology to get them to say where the port was placed in the results so that a nurse from [facility] could help us access the [facility]. [facility] until it is written in the results were the port is at.

## 2018-07-16 NOTE — PROGRESS NOTES
Bedside and Verbal shift change report given to Spenecr Parrish (oncoming nurse) by Siva Carvajal (offgoing nurse). Report included the following information SBAR.

## 2018-07-16 NOTE — PROGRESS NOTES
General Surgery Daily Progress Note    Admit Date: 7/10/2018  Post-Operative Day: 3 Days Post-Op from Procedure(s):  PORTACATH INSERTION     Subjective:     Last 24 hrs: Pt c/o being hot. No gown on. Abd somewhat uncomfortable. No n/v  Dr Gerardo Maxwell in this am.     Objective:     Blood pressure 112/68, pulse 88, temperature 97.6 °F (36.4 °C), resp. rate 16, height 5' 4\" (1.626 m), weight 185 lb (83.9 kg), SpO2 99 %. Temp (24hrs), Av.1 °F (36.7 °C), Min:97.6 °F (36.4 °C), Max:98.5 °F (36.9 °C)      _____________________  Physical Exam:     Alert and Oriented, x3, in no acute distress. Cardiovascular: RRR, no peripheral edema  Lungs: clear anteriorally; portacath site L anterior chest , no erythema or drainage  Abdomen: distended w/ BS, midline incision w/ sm open area packed w/ nu gauze. Peritoneal drain in R abd intact. Assessment:   Principal Problem:    Malignant ascites (7/10/2018)    Active Problems:    HTN (hypertension) (2013)      DM (diabetes mellitus) ([facility] (2013)      M[facility] (2018)      Overview: Results for Angélica Mayfield (MRN [id number]) as of 2018 15:41       Ref.  Range 2018 13:44       CEA Latest Units: ng/mL 28.4             Superficial postoperative wound infection (2018)      Overview: 6/10/18 wound culture      Culture result:    Final          MODERATE STAPHYLOCOCCUS EPIDERMIDIS (OXACILLIN RESISTANT) (A)             18 anaerobic wound culture       Culture result:    Final         MODERATE MIXED ANAEROBIC GRAM NEGATIVE RODS BETA LACTAMASE POSITIVE (A)         Culture result:    Final         HEAVY ANAEROBIC GRAM POSITIVE COCCI (A)                   Methicillin resistant Staphylococcus epidermidis infection (6/3/2018)      Overview: Wound culture 18      Reported 6/10/18 - sensitive to cipro, clinda, dapto, linezolid, TMP/SMZ       and vanc            Open wound of anterior abdominal wall with complication (4/6/6105)      Generalized abdominal pain (6/25/2018)      Nausea and vomiting in adult (7/10/2018)      Abdominal pain (7/10/2018)      Severe protein-calorie malnutrition ([address] (6/8/2018)      Class 1 obesity due to excess calories with body mass index (BMI) of 31.0 to 31.9 in adult (6/25/2018)            Plan:     ID to see  Cont diet  Gi/dvt proph  OOB  Cont abd drain    Data Review:    No results for input(s): WBC, HGB, HCT, PLT, HGBEXT, HCTEXT, PLTEXT in the last 72 hours. No results for input(s): NA, K, CL, CO2, GLU, BUN, CREA, CA, MG, PHOS, ALB, TBIL, SGOT, ALT, INR in the last 72 hours. No lab exists for component: INREXT  No results for input(s): AML, LPSE in the last 72 hours.         ______________________  Medications:    Current Facility-Administered Medications   Medication Dose Route Frequency    metroNIDAZOLE (FLAGYL) tablet 500 mg  500 mg Oral Q12H    levoFLOXacin (LEVAQUIN) tablet 750 mg  750 mg Oral Q24H    ondansetron (ZOFRAN ODT) tablet 4 mg  4 mg Oral Q8H PRN    enoxaparin (LOVENOX) injection 40 mg  40 mg SubCUTAneous DAILY    insulin glargine (LANTUS) injection 10 Units  10 Units SubCUTAneous DAILY    sodium chloride (NS) flush 5-10 mL  5-10 mL IntraVENous PRN    morphine injection 2 mg  2 mg IntraVENous Multiple    midazolam (VERSED) injection 0.5 mg  0.5 mg IntraVENous Q5MIN PRN    famotidine (PEPCID) tablet 20 mg  20 mg Oral BID    lisinopril (PRINIVIL, ZESTRIL) tablet 10 mg  10 mg Oral DAILY    metoprolol tartrate (LOPRESSOR) tablet 50 mg  50 mg Oral BID    naloxone (NARCAN) injection 0.4 mg  0.4 mg IntraVENous PRN    oxyCODONE IR (ROXICODONE) tablet 5 mg  5 mg Oral Q6H PRN    insulin regular (NOVOLIN R, HUMULIN R) injection   SubCUTAneous Q6H    sodium chloride (NS) flush 5-10 mL  5-10 mL IntraVENous Q8H    sodium chloride (NS) flush 5-10 mL  5-10 mL IntraVENous PRN    prochlorperazine (COMPAZINE) injection 5 mg  5 mg IntraVENous Q8H PRN    glucose chewable tablet 16 g  4 Tab Oral PRN    dextrose (D50W) injection syrg 12.5-25 g  12.5-25 g IntraVENous PRN    glucagon (GLUCAGEN) injection 1 mg  1 mg IntraMUSCular PRN    albuterol (PROVENTIL VENTOLIN) nebulizer solution 2.5 mg  2.5 mg Nebulization Q6H PRN    HYDROmorphone (DILAUDID) syringe 0.5 mg  0.5 mg IntraVENous Q4H PRN       Viky Rod NP  7/16/2018

## 2018-07-16 NOTE — CONSULTS
ID consult    NAME:  Irineo Galeazzi                      :   1950                       MRN:   [id number]   Date/Time:  2018 4:17 PM  Subjective:   REASON FOR CONSULT:         Irineo Galeazzi  is a 79 y.o. female with a history of metatstaic colon ca  Is here with worsening abdominal distension  I am asked to see ehr to r/o infection and clear for chemo  Pt Had rt hemicolectomy for SBO on  and was diagnosed with colon carcinoma with mets to , lung and liver- She was readmitted on  for  surgical site infection and there was  Large abscess at the surgical site which was I/D and she was sent on Wound vac- The culture grew staph epi and anerobes and she was sent home on levaquin and flagyl   She was admitted on 7/10 with abdominal distension. feeling fatigued and hot. She had no fever    CT abdomen showed worsening liver and lung nodules and new peritoneal masses and ascites. She was started on zosyn with no blood culture and then it was changed to levaquin and flagyl on  She  underwent peritoneal aspiration  On 18 and found to have malignant ascites- drained 5 liters. The ascitics fluid had 1056 WBC but  only 2 % neutrophils and no culture was sent  She did not have any fever in the hospital    I am asked to see her as she has leucocytosis and want to r/o infection and clearance for chemo.   She has been on Levaquin and flagyl and had a port placed for chemo on 18 already  Pt c/o abdominal distension- says the bloating got worse after eating a spicy chicken  She has no diarrhea  No fever  No dysuria  No cough        Past Medical History:   Diagnosis Date    Anemia NEC     Cancer ([address]     Class 1 obesity due to excess calories with body mass index (BMI) of 31.0 to 31.9 in adult 2018    Diabetes ([address]     Headache(784.0)     Hypercholesterolemia     Hypertension     Malignant neoplasm of ascending colon ([address] 2018    Stage IVc    S/P right colectomy 2018    Right hemicolectomy for cecal mass with partial SBO, Ndiaye    Severe protein-calorie malnutrition ([address] 2018    Superficial postoperative wound infection 2018      Past Surgical History:   Procedure Laterality Date    HX  SECTION      Hysterectomy,total for precancer    HX COLECTOMY Right 2018    Right hemicolectomy for cecal mass with partial SBO, Ndiaye      Social History     Social History    Marital status:      Spouse name: N/A    Number of children: N/A    Years of education: N/A     Occupational History    Not on file.      Social History Main Topics    Smoking status: Never Smoker    Smokeless tobacco: Never Used    Alcohol use No    Drug use: No    Sexual activity: Yes     Partners: Male      Comment: ,4 children,1 passed away,not working     Other Topics Concern    Not on file     Social History Narrative      Family History   Problem Relation Age of Onset    Alcohol abuse Father     Cancer Maternal Aunt      breast ca    Breast Cancer Maternal Aunt     Breast Cancer Sister     Breast Cancer Daughter      Allergies   Allergen Reactions    Tylenol [Acetaminophen] Swelling         Current Facility-Administered Medications   Medication Dose Route Frequency Provider Last Rate Last Dose    0.9% sodium chloride infusion  125 mL/hr IntraVENous CONTINUOUS Sandy Hines MD        lidocaine (XYLOCAINE) 4 % cream   Topical PRN Sandy Hines MD        metroNIDAZOLE (FLAGYL) tablet 500 mg  500 mg Oral Q12H Sandy Hines MD   500 mg at 18 0629    levoFLOXacin (LEVAQUIN) tablet 750 mg  750 mg Oral Q24H Sandy Hines MD   750 mg at 18 1334    ondansetron (ZOFRAN ODT) tablet 4 mg  4 mg Oral Q8H PRN Sandy Hines MD        enoxaparin (LOVENOX) injection 40 mg  40 mg SubCUTAneous DAILY Heath Lopez MD   40 mg at 18 0814    insulin glargine (LANTUS) injection 10 Units  10 Units SubCUTAneous DAILY Alicia Costa NP   10 Units at 07/16/18 0843    sodium chloride (NS) flush 5-10 mL  5-10 mL IntraVENous PRN Aicha Dominguez MD   10 mL at 07/15/18 0346    midazolam (VERSED) injection 0.5 mg  0.5 mg IntraVENous Q5MIN PRN Aicha Dominguez MD        famotidine (PEPCID) tablet 20 mg  20 mg Oral BID Robert Longo MD   20 mg at 07/16/18 0814    lisinopril (PRINIVIL, ZESTRIL) tablet 10 mg  10 mg Oral DAILY Sissy Medin, NP   10 mg at 07/16/18 0814    metoprolol tartrate (LOPRESSOR) tablet 50 mg  50 mg Oral BID Sissy Medin, NP   50 mg at 07/16/18 6106    naloxone (NARCAN) injection 0.4 mg  0.4 mg IntraVENous PRN [facility]        oxyCODONE IR (ROXICODONE) tablet 5 mg  5 mg Oral Q6H PRN [facility], NP   5 mg at 07/16/18 1420    insulin regular (Andrena Lade R, HUMULIN R) injection   SubCUTAneous Q6H Sissy Medin, NP   Stopped at 07/16/18 1200    sodium chloride (NS) flush 5-10 mL  5-10 mL IntraVENous Q8H Robert Longo MD   5 mL at 07/16/18 0600    sodium chloride (NS) flush 5-10 mL  5-10 mL IntraVENous PRN Robert Longo MD        prochlorperazine (COMPAZINE) injection 5 mg  5 mg IntraVENous Q8H PRN Robert Longo MD        glucose chewable tablet 16 g  4 Tab Oral PRN Robert Longo MD        dextrose (D50W) injection syrg 12.5-25 g  12.5-25 g IntraVENous PRN Robert Longo MD   25 g at 07/11/18 0532    glucagon (GLUCAGEN) injection 1 mg  1 mg IntraMUSCular PRN Robert Longo MD        albuterol (PROVENTIL VENTOLIN) nebulizer solution 2.5 mg  2.5 mg Nebulization Q6H PRN Robert Longo MD        HYDROmorphone (DILAUDID) syringe 0.5 mg  0.5 mg IntraVENous Q4H PRN Robert Longo MD   0.5 mg at 07/14/18 0102        REVIEW OF SYSTEMS:     Const: negative fever, negative chills, negative weight loss  Eyes:  negative diplopia or visual changes, negative eye pain  ENT:  negative coryza, negative sore throat  Resp:  negative cough, hemoptysis, dyspnea  Cards: negative for chest pain, palpitations, lower extremity edema  : negative for frequency, dysuria and hematuria  Skin:  negative for rash and pruritus  Heme: negative for easy bruising and gum/nose bleeding  MS: negative for myalgias, arthralgias, back pain and muscle weakness  Neurolo:negative for headaches, dizziness, vertigo, memory problems   Psych: negative for feelings of anxiety, depression     Pertinent Positives include :    Objective:   VITALS:    Visit Vitals    /75 (BP 1 Location: Right arm, BP Patient Position: At rest)    Pulse 81    Temp 98 °F (36.7 °C)    Resp 20    Ht 5' 4\" (1.626 m)    Wt 185 lb (83.9 kg)    SpO2 96%    BMI 31.76 kg/m2       PHYSICAL EXAM:   General:    Alert, cooperative, in some  distress, pale. Head:   Normocephalic, without obvious abnormality, atraumatic. Eyes:   Conjunctivae clear, anicteric sclerae. Pupils are equal  Nose:  Nares normal. No drainage or sinus tenderness. Throat:    Lips, mucosa, and tongue normal.  No Thrush  Neck:  Supple,  Lungs:   B/l air entry- decreased bases    Heart:   s1s2  Port site on the right okay  Abdomen:   Distended. Firm masses felt on the rt side of the umbilicus- lap scar healthy except at one site where there a 1 cm ulcer with clean base and extending 2 cm inside     Bowel sounds normal. Left peritoenal drain- no fluid in the bag    Extremities: Extremities normal, atraumatic, no cyanosis. No edema. No clubbing  Skin:     No rashes or lesions. Not Jaundiced  Lymph: Cervical, supraclavicular normal.  Neurologic: Grossly non-focal    Pertinent Labs  Wbc 15.9>14.6  Hb 11.5    Cr 0.49      IMAGING RESULTS:  Interval significant worsening of metastatic disease, with enlarging lung nodules, enlarging liver lesions, retroperitoneal lymphadenopathy, and new  significant peritoneal metastatic disease with ascites. 2. Bilateral pleural effusions.           Impression/Recommendation  79 y.o. female with a history of metatstaic colon ca  Is here with worsening abdominal distension  I am asked to see her to r/o infection and clear for chemo    Metastatic colon cancer with new peritoneal mets and malignant ascites  Had rt hemicolectomy in May 2018    Leucocytosis- no clinical evidence of infection  The surgical site does not look infected- the mild erythema at the surgical scar could be due to stretching and previous wound vac with some eczematous reaction  The 1 cm ulcer does not look infected and there is no depth  I cannot say it is infected now  Previously in June she had an abscess at the site and it was drained and it ahd anerobes which make me wonder whether there was a enterocutaneousfistula  The recent CT did not show any  There is no bacterial peritonitis atleast as evidenced by cell count ( no fluid was sent for culture)   No pneumonia  The leucocytosis could be due to the  extensive malignancy  Recommend  DC levaquin and flagyl;  Send culture from the small wound  Blood culture    The benefit of starting chemo may outweigh the risk of infection at this time    Eosinophilia- ( both peritoneal fluid and blood) could be due to malignancy- will send stool for O/P as she is from [facility]    HTN- on lisinopril and metoprolol    DM on lantus    Discussed with her and her son ,  and Roosevelt Ventura

## 2018-07-16 NOTE — ADT AUTH CERT NOTES
Patient Demographics        Patient Name [address] Sex  Address Phone       Radha Hernandez [phone] Female 1950 [address] [facility]  [address] [phone] (Home) *Preferred*  [phone] (Mobile)           CSN:       [id number]           Admit Date: Admit Time Room Bed       Jul 10, 2018  3:06  [[id number]] 01 [[id number]]           Attending Providers        Provider Pager From To       Bran Triana MD  07/10/18            Emergency Contact(s)        Name Relation Home Work Mobile     Anai Gomez Spouse [phone]         Utilization Review           Medical Oncology [address] Day 3 (2018) by Jonas Jhaveri RN        Review Entered Review Status       2018 Completed       Details              Care Day: 3 Care Date: 2018 Level of Care: Inpatient Floor       Guideline Day 2        Clinical Status       (X) * No ICU or intermediate care needs                                   * Milestone              Additional Notes       History of Present Illness:       Ms. Pretty Del Cid is a 79 y.o. female with recent diagnosis of stage IV colon cancer s/p colectomy May 2018 with post surgical wound care complications requiring wound vac. Currently admitted with fever in the home, shaking chills on Monday, abdominal distention and pain. Unable to eat much at all. States she is not hungry. Trying clear liquids. C/o lower back pain, this has been present since diagnosis. Mouth dryness.        Pt reports having an ultrasound paracentesis today.        Denies current fever, chills, SOB or chest pain.                INTERVAL HISTORY: Patient states she is feeling a little bit better this morning. Abdominal pain controlled. Does note some back pain she attributes to lying in the bed/paracentesis. Wound culture obtained yesterday with NGTD and cytology from paracentesis yesterday still pending.  Palliative consult pending today.               Assessment/PLAN:               1) Stage 4 colon cancer MS stable found with colon mass with obstruction post surgery with wound healing issues.        Liver and lung mets noted on CT 5/18       Patient is post surgery 5/18 for obstructing colon mass and now admitted with wound recovery per #3       On CTs, pt has progressive cancer with ascites and pathology from ascitic fluid pending per #2               Not eligible for treatment currently due to wound healing concerns. Needs treatment started ASAP due to rapidly progressing disease and overall disease burden.        Reviewed this again with her today       Note palliative consult pending - appreciate PM               2)  Ascites       S/p paracentesis on 7/11/18 with 5L fluid removed.        Concern that this will be malignant.        Pathology still pending                3) Delayed wound healing.        Now off of wound vac. Open wound with some drainage, packing required.        Surgery on board and managing       Wound culture obtained yesterday 7/11/18 with NGTD thus far               4) Lung metastasis.       Reviewed on recent imaging.                5) Liver metastasis.       LFTs stable       Continue to monitor              LABS: NONE       MEDS: D5%-0.45% 75ML/HR IV CONT, PEPCID PO BID        VITAL: T98.8, BP 82/59, P 90, RR 18, 95% RA           PARACENTESIS by Luis A Beltran RN        Review Entered Review Status       7/12/2018 In Primary       Details             Study Result      EXAM:  US PARACENTESIS ABD W IMAGING  INDICATION: [place] Female with Stage IV colon cancer and ascites. PROCEDURE:  Procedure was discussed. Consent was given. Ultrasound was used to localize an appropriate area for paracentesis. The site  was marked. Digital ultrasound images were saved documenting ascites and  location. The area was prepped and draped. 1% lidocaine was used for local anesthesia.    An 11 blade was used to make a small incision.    A 6 Sy[place] 30 cm [place] catheter was placed into the peritoneal space  returning relatively clear fluid. The catheter was secured in place while large volume paracentesis was performed. The patient was monitored during the paracentesis.    The paracentesis was performed uneventfully without complication or significant  blood loss. Laboratory specimens were sent if requested.      IMPRESSION  IMPRESSION: Successful ultrasound guided placement of an abdominal  catheter and  large volume paracentesis. Total volume out 5 L.

## 2018-07-17 NOTE — TELEPHONE ENCOUNTER
Call from Lan Newman. Patient's  at bedside. Would like to speak with Dr. Rocio Patel re:  Plan. Also unable to administer 5FU as CADD if inpatient.   Advised would forward to NP.

## 2018-07-17 NOTE — PROGRESS NOTES
[facility] [place] [facility] [place] [address] [address], [address]  W: [phone]  F: [phone]    Reason for Visit:   Walter Morales is a 79 y.o. female who is seen in hospital consut for met colon cancer post surgery 5/18. Currently admitted for abdominal distention due to ascites. Treatment History:   · 5/25/2018 Right colectomy-stage IV poorly differentiated adenocarcinoma  · 7/3/2018: EGFR neg, BRAF neg  · 7/5/2018: KRAS/HRAS/NRAS-neg    History of Present Illness:   Ms. Pretty Del Cid is a 79 y.o. female with recent diagnosis of stage IV colon cancer s/p colectomy May 2018 with post surgical wound care complications requiring wound vac. Currently admitted with fever in the home, shaking chills on Monday, abdominal distention and pain. Unable to eat much at all. States she is not hungry. Trying clear liquids. C/o lower back pain, this has been present since diagnosis. Mouth dryness. Pt reports having an ultrasound paracentesis today. Denies current fever, chills, SOB or chest pain. INTERVAL HISTORY: Patient resting in bed this morning. Remains very fatigued. Denies any shortness of breath. Abdomen without pain. Ascites drain tube removed yesterday per IR and site with dressing CDI. WBC remains slightly elevated. No fevers overnight. Family not present during my visit.       Past Medical History:   Diagnosis Date    Anemia NEC     Cancer ([address]     Class 1 obesity due to excess calories with body mass index (BMI) of 31.0 to 31.9 in adult 6/25/2018    Diabetes ([address]     Headache(784.0)     Hypercholesterolemia     Hypertension     Malignant neoplasm of ascending colon ([address] 05/26/2018    Stage IVc    S/P right colectomy 5/27/2018    Right hemicolectomy for cecal mass with partial SBO, Ndiaye    Severe protein-calorie malnutrition ([address] 6/8/2018    Superficial postoperative wound infection 6/8/2018      Past Surgical History:   Procedure Laterality Date    HX  SECTION      Hysterectomy,total for precancer    HX COLECTOMY Right 2018    Right hemicolectomy for cecal mass with partial SBO, Ndiaye      Social History   Substance Use Topics    Smoking status: Never Smoker    Smokeless tobacco: Never Used    Alcohol use No      Family History   Problem Relation Age of Onset    Alcohol abuse Father     Cancer Maternal Aunt      breast ca    Breast Cancer Maternal Aunt     Breast Cancer Sister     Breast Cancer Daughter      Current Facility-Administered Medications   Medication Dose Route Frequency    oxaliplatin (ELOXATIN) 80 mg in dextrose 5% 250 mL, overfill volume 25 mL chemo infusion  80 mg IntraVENous ONCE    And    leucovorin (WELLCOVORIN) 390 mg in dextrose 5% 100 mL, overfill volume 10 mL IVPB  390 mg IntraVENous ONCE    fluorouracil (ADRUCIL) chemo syringe 390 mg  390 mg IntraVENous ONCE    fluorouracil (ADRUCIL) 1,200 mg in 0.9% sodium chloride 500 mL, overfill volume 50 mL chemo infusion  1,200 mg IntraVENous Q24H    Followed by   Mal Maribel ON 2018] fluorouracil (ADRUCIL) 1,100 mg in 0.9% sodium chloride 500 mL, overfill volume 50 mL chemo infusion  1,100 mg IntraVENous Q24H    [START ON 2018] dexamethasone (DECADRON) tablet 8 mg  8 mg Oral DAILY    dextrose 5% infusion  25 mL/hr IntraVENous CONTINUOUS    0.9% sodium chloride infusion  125 mL/hr IntraVENous CONTINUOUS    lidocaine (XYLOCAINE) 4 % cream   Topical PRN    ondansetron (ZOFRAN ODT) tablet 4 mg  4 mg Oral Q8H PRN    enoxaparin (LOVENOX) injection 40 mg  40 mg SubCUTAneous DAILY    insulin glargine (LANTUS) injection 10 Units  10 Units SubCUTAneous DAILY    sodium chloride (NS) flush 5-10 mL  5-10 mL IntraVENous PRN    midazolam (VERSED) injection 0.5 mg  0.5 mg IntraVENous Q5MIN PRN    famotidine (PEPCID) tablet 20 mg  20 mg Oral BID    lisinopril (PRINIVIL, ZESTRIL) tablet 10 mg  10 mg Oral DAILY    metoprolol tartrate (LOPRESSOR) tablet 50 mg  50 mg Oral BID    naloxone (NARCAN) injection 0.4 mg  0.4 mg IntraVENous PRN    oxyCODONE IR (ROXICODONE) tablet 5 mg  5 mg Oral Q6H PRN    insulin regular (NOVOLIN R, HUMULIN R) injection   SubCUTAneous Q6H    sodium chloride (NS) flush 5-10 mL  5-10 mL IntraVENous Q8H    sodium chloride (NS) flush 5-10 mL  5-10 mL IntraVENous PRN    prochlorperazine (COMPAZINE) injection 5 mg  5 mg IntraVENous Q8H PRN    glucose chewable tablet 16 g  4 Tab Oral PRN    dextrose (D50W) injection syrg 12.5-25 g  12.5-25 g IntraVENous PRN    glucagon (GLUCAGEN) injection 1 mg  1 mg IntraMUSCular PRN    albuterol (PROVENTIL VENTOLIN) nebulizer solution 2.5 mg  2.5 mg Nebulization Q6H PRN    HYDROmorphone (DILAUDID) syringe 0.5 mg  0.5 mg IntraVENous Q4H PRN      Allergies   Allergen Reactions    Tylenol [Acetaminophen] Swelling      Review of Systems: A complete review of systems was obtained, negative except as described above. Physical Exam:     Visit Vitals    /78 (BP 1 Location: Right arm, BP Patient Position: At rest)    Pulse 81    Temp 98 °F (36.7 °C)    Resp 20    Ht 5' 4\" (1.626 m)    Wt 187 lb (84.8 kg)    SpO2 99%    BMI 32.1 kg/m2     ECOG PS: 3  General: No acute distress, appears weak/tired  Eyes: anicteric sclerae  HENT: Atraumatic, OP clear  Neck: Supple  GI: Abdominal distention with drain  Cardiac: HRR  Respiratory: clear anterior lung fields, normal respiratory effort  Extremities: no edema  MS: unable to assess gait  Skin:  Lower extremities cool, normal turgor and texture. Psych: conversant    Results:     Lab Results   Component Value Date/Time    WBC 14.7 (H) 07/16/2018 06:31 PM    HGB 11.5 07/16/2018 06:31 PM    HCT 36.2 07/16/2018 06:31 PM    PLATELET 500 99/60/8132 06:31 PM    MCV 79.6 (L) 07/16/2018 06:31 PM    ABS.  NEUTROPHILS 10.0 (H) 07/16/2018 06:31 PM     Lab Results   Component Value Date/Time    Sodium 134 (L) 07/16/2018 06:31 PM    Potassium 4.7 07/16/2018 06:31 PM    Chloride 100 07/16/2018 06:31 PM    CO2 23 07/16/2018 06:31 PM    Glucose 120 (H) 07/16/2018 06:31 PM    BUN 11 07/16/2018 06:31 PM    Creatinine 0.45 (L) 07/16/2018 06:31 PM    GFR est AA >60 07/16/2018 06:31 PM    GFR est non-AA >60 07/16/2018 06:31 PM    Calcium 7.9 (L) 07/16/2018 06:31 PM    Glucose (POC) 126 (H) 07/17/2018 12:04 PM     Lab Results   Component Value Date/Time    Bilirubin, total 0.3 07/16/2018 06:31 PM    ALT (SGPT) 9 (L) 07/16/2018 06:31 PM    AST (SGOT) 27 07/16/2018 06:31 PM    Alk. phosphatase 73 07/16/2018 06:31 PM    Protein, total 5.1 (L) 07/16/2018 06:31 PM    Albumin 1.7 (L) 07/16/2018 06:31 PM    Globulin 3.4 07/16/2018 06:31 PM     CT Results (most recent):    Results from Hospital Encounter encounter on 07/10/18   CT ABD PELV W CONT   Narrative EXAM:  CT ABD PELV W CONT    INDICATION: abdominal pain; wound abscess, fever, chills, stage IV colon CA    COMPARISON: June 8    CONTRAST:  100 mL of Isovue-370. TECHNIQUE:   Following the uneventful intravenous administration of contrast, thin axial  images were obtained through the abdomen and pelvis. Coronal and sagittal  reconstructions were generated. Oral contrast was not administered. CT dose  reduction was achieved through use of a standardized protocol tailored for this  examination and automatic exposure control for dose modulation. FINDINGS:   LUNG BASES: Interval enlargement of lower lobe pulmonary nodules. A  representative right lower lobe pulmonary nodule measures 9 mm and previously  measured 7 mm. Larger bilateral pleural effusions, right greater than left. INCIDENTALLY IMAGED HEART AND MEDIASTINUM: Unremarkable. LIVER: Interval enlargement of multiple hepatic masses. GALLBLADDER: Unremarkable. SPLEEN: No mass. PANCREAS: No mass or ductal dilatation. ADRENALS: Unremarkable. KIDNEYS: Left renal cyst.  STOMACH: Unremarkable. SMALL BOWEL: No dilatation or wall thickening. COLON: Right hemicolectomy.   APPENDIX: Unremarkable. PERITONEUM: Interval development of peritoneal soft tissue masses. Moderate  ascites. RETROPERITONEUM: Retroperitoneal lymphadenopathy. REPRODUCTIVE ORGANS: Hysterectomy. URINARY BLADDER: No mass or calculus. BONES: No destructive bone lesion. ADDITIONAL COMMENTS: N/A         Impression IMPRESSION:    1. Interval significant worsening of metastatic disease, with enlarging lung  nodules, enlarging liver lesions, retroperitoneal lymphadenopathy, and new  significant peritoneal metastatic disease with ascites. 2. Bilateral pleural effusions. 5/25/18 CT Abdomen/Pelvis:  IMPRESSION: Cecal mass with metastatic disease to liver, retroperitoneal nodes,  and lung. Relative small bowel obstruction. Associated dilated appendix. Associated complex right pleural effusion and small amount of ascites. 5/29/18 Surgical Pathology:     FINAL PATHOLOGIC DIAGNOSIS   1. Peritoneum, biopsy:   Adenocarcinoma, poorly differentiated   2. Omentum, omentectomy:   Adenocarcinoma, poorly differentiated   3. Colon and terminal ileum, right hemicolectomy:   Colonic adenocarcinoma   Appendix with tubulovillous adenoma   Addendum handling for mismatch repair proteins   Synoptic report:   Procedure: Right hemicolectomy   Tumor site: Cecum   Tumor size: 10.0 cm   Macroscopic tumor perforation: Not identified   Histologic type:  Adenocarcinoma   Histologic grade: Poorly differentiated (grade 3 of 4 grades)   Tumor extension: Invades the visceral peritoneum   Margins: Positive for adenocarcinoma, radial   Proximal and distal margins negative for adenocarcinoma   Treatment effect: Not identified   Lymphovascular invasion: Present   Perineural invasion: Present   Tumor deposits: Present, at least 11   Regional lymph nodes: Five of 12 lymph nodes positive for metastatic carcinoma   Largest lymph node metastasis: 6 mm, without extracapsular extension   Ancillary testing: Immunohistochemistry for mismatch repair proteins pending      Pathologic stage (TNM):   Primary tumor: pT4a   Regional lymph nodes: pN2a   Distant metastases: pM1b     Records reviewed and summarized above. Pathology report(s) reviewed above. Radiology report(s) reviewed above. Assessment/PLAN:     1) Stage 4 colon cancer MS stable found with colon mass with obstruction post surgery with wound healing issues. Liver and lung mets noted on CT 5/18  Patient is post surgery 5/18 for obstructing colon mass   On CTs, patient has progressive cancer with ascites   Port in place    Given progressive disease and no clear evidence of infection per ID, will begin palliative chemotherapy in house with FOLFOX 50% dose reduced  Discussed risks versus benefit of starting chemotherapy and patient/family wish to proceed today  Transfer orders placed to move to [facility] and discussed with charge nurse, John Sharp    We discussed the risks and benefits of FOLFOX chemotherapy, including potential side effects. These include but are not limited to fatigue, nausea vomiting, diarrhea, neuropathy, taste changes, esophageal spasm, cold intolerance, allergic reactions, alopecia, mucositis, myelosuppression, risk for infection, infertility and rarely, death. Rarely, a patient may have a condition where they do not metabolize fluorouracil appropriately (called DPD deficiency),  and they may have excessive toxicity. Pt and  agreeable to chemo. Will return prior to initiation of therapy to obtain written consent    Monitor counts while she is here    2)  Ascites, recurrent. S/p paracentesis on 7/11/18 with 5L fluid removed. IR drain tube removed yesterday secondary to minimal drainage. Will monitor for re-accumulation of ascites. 3) Delayed wound healing. Now off of wound vac. Open wound with some drainage, packing required. Surgery managing  ID evaluated 7/16/17 and no evidence of infection currently    4) Lung metastasis. Reviewed on recent imaging.      5) Liver metastasis. LFTs stable. Continue to monitor      Plan to start FOLFOX with 50% dose reduction today. Will continue to follow while admitted. Pt seen today in conjunction with RUSS Rachel  We will follow. Fine to d/c pt post chemo if stable.      Signed By: Lacey Dean, DO

## 2018-07-17 NOTE — PROGRESS NOTES
Inpatient chemo orders for cycle 1 FOLFOX faxed complete to inpatient pharmacy and scanned to media. Consent per provider. See teaching encounter 7/13/18.

## 2018-07-17 NOTE — TELEPHONE ENCOUNTER
Discussed with patient and  plan of chemotherapy. Consent signed. No further questions at this time.

## 2018-07-17 NOTE — TELEPHONE ENCOUNTER
RN requests to know time chemo consent to be obtained to order med. Requested call back from NP. To provider.

## 2018-07-17 NOTE — PROGRESS NOTES
Physical Therapy  7/17/2018    Attempted to see pt this morning for therapy session. Pt currently in process of transferring floors. Will follow up this afternoon as able to progress mobility.     Thank Grant Torres, PT, DPT

## 2018-07-17 NOTE — PROGRESS NOTES
General Surgery at [facility]    This is a late entry. Pt was seen ~0900. Noted that port has been accessed. Pt states that she is ready to start chemotherapy. Paracentesis catheter was removed yesterday. Patient Vitals for the past 12 hrs:   Temp Pulse Resp BP SpO2   18 1455 98 °F (36.7 °C) 81 20 122/78 99 %   18 0925 97.9 °F (36.6 °C) 87 18 121/78 -   18 0733 98 °F (36.7 °C) 92 18 128/80 96 %     Temp (24hrs), Av °F (36.7 °C), Min:97.8 °F (36.6 °C), Max:98.3 °F (36.8 °C)       Date 18 0700 - 18 0659   Shift 3077-0377 8791-1167 7120-6368 24 Hour Total   I  N  T  A  K  E   P.O. 480   480    I.V.  (mL/kg/hr) 1000  (1.5)   1000    Shift Total  (mL/kg) 1480  (17.4)   1480  (17.4)   O  U  T  P  U  T   Shift Total  (mL/kg)       Weight (kg) 84.8 84.8 84.8 84.8     Gen NAD  Abd Soft, nontender    Assessment:   Hospital Problems as of 2018  Date Reviewed: 7/10/2018          Codes Class Noted - Resolved POA    Superficial postoperative wound infection ICD-10-CM: T81. 4XXA  ICD-9-CM: 998.59  2018 - Present Yes    Overview Addendum 2018  6:52 PM by Bran Triana MD     6/10/18 wound culture  Culture result:    Final      MODERATE STAPHYLOCOCCUS EPIDERMIDIS (OXACILLIN RESISTANT) (A)     18 anaerobic wound culture   Culture result:    Final     MODERATE MIXED ANAEROBIC GRAM NEGATIVE RODS BETA LACTAMASE POSITIVE (A)     Culture result:    Final     HEAVY ANAEROBIC GRAM POSITIVE COCCI (A)                  Open wound of anterior abdominal wall with complication REO-92-EX: U08.452N  ICD-9-CM: 879.3  2018 - Present Yes        Methicillin resistant Staphylococcus epidermidis infection ICD-10-CM: A49.8, Z16.29  ICD-9-CM: 041.19  6/3/2018 - Present Yes    Overview Signed 2018 10:48 AM by Bran Triana MD     Wound culture 18  Reported 6/10/18 - sensitive to cipro, clinda, dapto, linezolid, TMP/SMZ and vanc               Malignant neoplasm of ascending colon [facility]) ICD-10-CM: C18.2  ICD-9-CM: 153.6  5/26/2018 - Present Yes    Overview Signed 5/31/2018  3:49 PM by Olya Lynch MD     Results for Mary Trimble (MRN [id number]) as of 5/31/2018 15:41   Ref. Range 5/31/2018 13:44   CEA Latest Units: ng/mL 28.4                DM (diabetes mellitus) ([facility] ICD-10-CM: E11.9  ICD-9-CM: 250.00  5/23/2013 - Present Yes        Nausea and vomiting in adult ICD-10-CM: R11.2  ICD-9-CM: 787.01  7/10/2018 - Present Yes        Abdominal pain ICD-10-CM: R10.9  ICD-9-CM: 789.00  7/10/2018 - Present Yes        * (Principal)Malignant ascites ICD-10-CM: R18.0  ICD-9-CM: 789.51  7/10/2018 - Present Yes        Generalized abdominal pain ICD-10-CM: R10.84  ICD-9-CM: 789.07  6/25/2018 - Present Yes        Class 1 obesity due to excess calories with body mass index (BMI) of 31.0 to 31.9 in adult ICD-10-CM: E66.09, Z68.31  ICD-9-CM: 278.00, V85.31  6/25/2018 - Present Yes        Severe protein-calorie malnutrition ([facility] ICD-10-CM: E43  ICD-9-CM: 262  6/8/2018 - Present Yes        HTN (hypertension) ICD-10-CM: I10  ICD-9-CM: 401.9  5/23/2013 - Present Yes              4 Days Post-Op  Procedure(s):  PORTACATH INSERTION     Start first chemo today    Rec/Plan:  dispo per Onc  Repeat paracentesis prn per Onc  Discussed with  possibility of wound care performed by him. He was open to the idea. Will have RN demonstrate tomorrow.     Signed By: Olya Lynch MD     July 17, 2018

## 2018-07-17 NOTE — PROGRESS NOTES
Primary Nurse Etta Hodgkin, RN and Dominic RN performed a dual skin assessment on this patient Impairment noted- see wound doc flow sheet  Osvaldo score is 19  Small low abdominal wound from colectomy 5/18 almost healed  Small dressing over abdominal paracentesis drain site (drain removed 7-16-18

## 2018-07-17 NOTE — TELEPHONE ENCOUNTER
Per Zoe/Pharmacy unable to connect CADD pump inpatient for chemo. Patient would need to be discharged and have treatment through [facility].   Advised would forward to NP.

## 2018-07-17 NOTE — PROGRESS NOTES
Received patient from previous RN via bedside shift report. Patient is resting comfortably; A&Ox4. AVSS, NAD noted at this time. IVF infusing per emar. Abd distended, drsg CDI. Full assessment into epic. Patient updated on current POC- verbalized understanding. All safety precautions in place- safety maintained. Will continue to monitor. [phone]- Report called to nurse on [address] All questions answered. [address] to make him aware of pt's transfer. No answer. Message left to call 5E.

## 2018-07-17 NOTE — PROGRESS NOTES
Problem: Falls - Risk of  Goal: *Absence of Falls  Document Heber Fall Risk and appropriate interventions in the flowsheet.    Outcome: Progressing Towards Goal  Fall Risk Interventions:  Mobility Interventions: Communicate number of staff needed for ambulation/transfer         Medication Interventions: Patient to call before getting OOB    Elimination Interventions: Call light in reach

## 2018-07-18 NOTE — PROGRESS NOTES
Patient transferred from [address] [place] [address] surgical unit. Reviewed the chart and patient is currently receiving IV Vanc. Following for patient may need IV ABX at discharge.  Resumption of home health recommended at discharge and patient was open to [facility].    [phone]

## 2018-07-18 NOTE — PROGRESS NOTES
General Surgery Daily Progress Note    Admit Date: 7/10/2018  Post-Operative Day: 5 Days Post-Op from Procedure(s):  PORTACATH INSERTION     Subjective:     Last 24 hrs: Pt says she is \"tired\". No abd pain, multiple BMs yesterday  Getting chemo at present, on decadron    Objective:     Blood pressure 117/66, pulse 77, temperature 97.6 °F (36.4 °C), resp. rate 19, height 5' 4\" (1.626 m), weight 187 lb (84.8 kg), SpO2 94 %. Temp (24hrs), Av.7 °F (36.5 °C), Min:97.5 °F (36.4 °C), Max:98 °F (36.7 °C)      _____________________  Physical Exam:     sleepy, in no acute distress. Cardiovascular: RRR, no peripheral edema  portacath R chest  Lungs:CTAB anteriorally  Abdomen: distended w/ + BS      Assessment:   Principal Problem:    Malignant ascites (7/10/2018)    Active Problems:    HTN (hypertension) (2013)      DM (diabetes mellitus) ([facility] (2013)      Malignant neoplasm of ascending colon ([facility] (2018)      Overview: Results for Melissa Luong (MRN [id number]) as of 2018 15:41       Ref.  Range 2018 13:44       CEA Latest Units: ng/mL 28.4             Superficial postoperative wound infection (2018)      Overview: 6/10/18 wound culture      Culture result:    Final          MODERATE STAPHYLOCOCCUS EPIDERMIDIS (OXACILLIN RESISTANT) (A)             18 anaerobic wound culture       Culture result:    Final         MODERATE MIXED ANAEROBIC GRAM NEGATIVE RODS BETA LACTAMASE POSITIVE (A)         Culture result:    Final         HEAVY ANAEROBIC GRAM POSITIVE COCCI (A)                   Methicillin resistant Staphylococcus epidermidis infection (6/3/2018)      Overview: Wound culture 18      Reported 6/10/18 - sensitive to cipro, clinda, dapto, linezolid, TMP/SMZ       and vanc            Open wound of anterior abdominal wall with complication (8419)      Generalized abdominal pain (2018)      Nausea and vomiting in adult (7/10/2018)      Abdominal pain (7/10/2018)      Severe protein-calorie malnutrition ([address] (6/8/2018)      Class 1 obesity due to excess calories with body mass index (BMI) of 31.0 to 31.9 in adult (6/25/2018)            Plan:     Chemo per onc  Chg diet to carb controlled  Chg insulin doses per DTC recs  Add januvia  Am labs  Gi/dvt proph    Data Review:    Recent Labs      07/16/18   1831   WBC  14.7*   HGB  11.5   HCT  36.2   PLT  390     Recent Labs      07/18/18   0410  07/16/18   1831   NA  136  134*   K  5.0  4.7   CL  105  100   CO2  20*  23   GLU  207*  120*   BUN  8  11   CREA  0.50*  0.45*   CA  7.8*  7.9*   ALB   --   1.7*   SGOT   --   27   ALT   --   9*     No results for input(s): AML, LPSE in the last 72 hours.         ______________________  Medications:    Current Facility-Administered Medications   Medication Dose Route Frequency    fluorouracil (ADRUCIL) 1,200 mg in 0.9% sodium chloride 500 mL, overfill volume 50 mL chemo infusion  1,200 mg IntraVENous Q24H    Followed by   [facility] fluorouracil (ADRUCIL) 1,100 mg in 0.9% sodium chloride 500 mL, overfill volume 50 mL chemo infusion  1,100 mg IntraVENous Q24H    dexamethasone (DECADRON) tablet 8 mg  8 mg Oral DAILY    Vancomycin - pharmacy to dose   Other Rx Dosing/Monitoring    vancomycin (VANCOCIN) 1,000 mg in 0.9% sodium chloride (MBP/ADV) 250 mL  1,000 mg IntraVENous Q12H    0.9% sodium chloride infusion  125 mL/hr IntraVENous CONTINUOUS    lidocaine (XYLOCAINE) 4 % cream   Topical PRN    ondansetron (ZOFRAN ODT) tablet 4 mg  4 mg Oral Q8H PRN    enoxaparin (LOVENOX) injection 40 mg  40 mg SubCUTAneous DAILY    insulin glargine (LANTUS) injection 10 Units  10 Units SubCUTAneous DAILY    sodium chloride (NS) flush 5-10 mL  5-10 mL IntraVENous PRN    midazolam (VERSED) injection 0.5 mg  0.5 mg IntraVENous Q5MIN PRN    famotidine (PEPCID) tablet 20 mg  20 mg Oral BID    lisinopril (PRINIVIL, ZESTRIL) tablet 10 mg  10 mg Oral DAILY    metoprolol tartrate (LOPRESSOR) tablet 50 mg  50 mg Oral BID  naloxone (NARCAN) injection 0.4 mg  0.4 mg IntraVENous PRN    oxyCODONE IR (ROXICODONE) tablet 5 mg  5 mg Oral Q6H PRN    insulin regular (NOVOLIN R, HUMULIN R) injection   SubCUTAneous Q6H    sodium chloride (NS) flush 5-10 mL  5-10 mL IntraVENous Q8H    sodium chloride (NS) flush 5-10 mL  5-10 mL IntraVENous PRN    prochlorperazine (COMPAZINE) injection 5 mg  5 mg IntraVENous Q8H PRN    glucose chewable tablet 16 g  4 Tab Oral PRN    dextrose (D50W) injection syrg 12.5-25 g  12.5-25 g IntraVENous PRN    glucagon (GLUCAGEN) injection 1 mg  1 mg IntraMUSCular PRN    albuterol (PROVENTIL VENTOLIN) nebulizer solution 2.5 mg  2.5 mg Nebulization Q6H PRN    HYDROmorphone (DILAUDID) syringe 0.5 mg  0.5 mg IntraVENous Q4H PRN       David Thompson NP  7/18/2018

## 2018-07-18 NOTE — PROGRESS NOTES
Problem: Pressure Injury - Risk of  Goal: *Prevention of pressure injury  Document Osvaldo Scale and appropriate interventions in the flowsheet. Outcome: Progressing Towards Goal  Pressure Injury Interventions:             Activity Interventions: Increase time out of bed, Pressure redistribution bed/mattress(bed type)    Mobility Interventions: HOB 30 degrees or less, Pressure redistribution bed/mattress (bed type)    Nutrition Interventions: Document food/fluid/supplement intake    Friction and Shear Interventions: HOB 30 degrees or less, Lift sheet

## 2018-07-18 NOTE — PROGRESS NOTES
Call from [phone] to call pt's  at [phone]  Called and no answer so left a message to call us back in office if needed  Need navigator help in hospital for support

## 2018-07-18 NOTE — PROGRESS NOTES
Pharmacist Note - Vancomycin Dosing    Consult provided for this 79 y.o. female for indication of skin and soft tissue infection. Antibiotic regimen(s): Vanc    Recent Labs      18   1831   WBC  14.7*   CREA  0.45*   BUN  11     Frequency of BMP: daily x3  Height: 162.6 cm  Weight: 84.8 kg  Est CrCl: 129 ml/min; UO: 0.3 ml/kg/hr + 1 unmeasured  Temp (24hrs), Av.9 °F (36.6 °C), Min:97.7 °F (36.5 °C), Max:98 °F (36.7 °C)    Cultures:  716 - wound - scant possible staph aureus, pending   - blood - NGTD    Goal trough = 10 - 15 mcg/mL    Therapy will be initiated with a loading dose of 2000 mg IV x 1 to be followed by a maintenance dose of 1000 mg IV every 12 hours. Pharmacy to follow patient daily and order levels / make dose adjustments as appropriate.

## 2018-07-18 NOTE — PROGRESS NOTES
[facility] [place] [facility] [place] [address] [address], [address]  W: [phone]  F: [phone]    Reason for Visit:   Monroe Del Valle is a 79 y.o. female who is seen in hospital consut for met colon cancer post surgery 5/18. Currently admitted for abdominal distention due to ascites. Treatment History:   · 5/25/2018 Right colectomy-stage IV poorly differentiated adenocarcinoma  · 7/3/2018: EGFR neg, BRAF neg  · 7/5/2018: KRAS/HRAS/NRAS-neg    History of Present Illness:   Ms. Frank Preciado is a 79 y.o. female with recent diagnosis of stage IV colon cancer s/p colectomy May 2018 with post surgical wound care complications requiring wound vac. Currently admitted with fever in the home, shaking chills on Monday, abdominal distention and pain. Unable to eat much at all. States she is not hungry. Trying clear liquids. C/o lower back pain, this has been present since diagnosis. Mouth dryness. Pt reports having an ultrasound paracentesis today. Denies current fever, chills, SOB or chest pain. INTERVAL HISTORY: Patient resting in bed this morning. Very tired today. Responds to questions appropriately. Denies abdominal pain. Not able to eat today. Vomited once this morning. No diarrhea. Moved her bowels 3 times today, soft. No fever, chills, SOB or chest pain. No mouth sores. No swelling of extremities.        Past Medical History:   Diagnosis Date    Anemia NEC     Cancer ([address]     Class 1 obesity due to excess calories with body mass index (BMI) of 31.0 to 31.9 in adult 6/25/2018    Diabetes ([address]     Headache(784.0)     Hypercholesterolemia     Hypertension     Malignant neoplasm of ascending colon ([address] 05/26/2018    Stage IVc    S/P right colectomy 5/27/2018    Right hemicolectomy for cecal mass with partial SBO, Ndiaye    Severe protein-calorie malnutrition ([address] 6/8/2018    Superficial postoperative wound infection 6/8/2018      Past Surgical History:   Procedure Laterality Date    HX  SECTION      Hysterectomy,total for precancer    HX COLECTOMY Right 2018    Right hemicolectomy for cecal mass with partial SBO, Ndiaye      Social History   Substance Use Topics    Smoking status: Never Smoker    Smokeless tobacco: Never Used    Alcohol use No      Family History   Problem Relation Age of Onset    Alcohol abuse Father     Cancer Maternal Aunt      breast ca    Breast Cancer Maternal Aunt     Breast Cancer Sister     Breast Cancer Daughter      Current Facility-Administered Medications   Medication Dose Route Frequency    [START ON 2018] insulin glargine (LANTUS) injection 25 Units  25 Units SubCUTAneous DAILY    insulin lispro (HUMALOG) injection   SubCUTAneous AC&HS    glucose chewable tablet 16 g  4 Tab Oral PRN    dextrose (D50W) injection syrg 12.5-25 g  12.5-25 g IntraVENous PRN    glucagon (GLUCAGEN) injection 1 mg  1 mg IntraMUSCular PRN    SITagliptin (JANUVIA) tablet tab 50 mg  50 mg Oral DAILY    fluorouracil (ADRUCIL) 1,100 mg in 0.9% sodium chloride 500 mL, overfill volume 50 mL chemo infusion  1,100 mg IntraVENous Q24H    dexamethasone (DECADRON) tablet 8 mg  8 mg Oral DAILY    Vancomycin - pharmacy to dose   Other Rx Dosing/Monitoring    vancomycin (VANCOCIN) 1,000 mg in 0.9% sodium chloride (MBP/ADV) 250 mL  1,000 mg IntraVENous Q12H    0.9% sodium chloride infusion  125 mL/hr IntraVENous CONTINUOUS    lidocaine (XYLOCAINE) 4 % cream   Topical PRN    ondansetron (ZOFRAN ODT) tablet 4 mg  4 mg Oral Q8H PRN    enoxaparin (LOVENOX) injection 40 mg  40 mg SubCUTAneous DAILY    sodium chloride (NS) flush 5-10 mL  5-10 mL IntraVENous PRN    midazolam (VERSED) injection 0.5 mg  0.5 mg IntraVENous Q5MIN PRN    famotidine (PEPCID) tablet 20 mg  20 mg Oral BID    lisinopril (PRINIVIL, ZESTRIL) tablet 10 mg  10 mg Oral DAILY    metoprolol tartrate (LOPRESSOR) tablet 50 mg  50 mg Oral BID    naloxone (NARCAN) injection 0.4 mg  0.4 mg IntraVENous PRN    oxyCODONE IR (ROXICODONE) tablet 5 mg  5 mg Oral Q6H PRN    sodium chloride (NS) flush 5-10 mL  5-10 mL IntraVENous Q8H    sodium chloride (NS) flush 5-10 mL  5-10 mL IntraVENous PRN    prochlorperazine (COMPAZINE) injection 5 mg  5 mg IntraVENous Q8H PRN    albuterol (PROVENTIL VENTOLIN) nebulizer solution 2.5 mg  2.5 mg Nebulization Q6H PRN    HYDROmorphone (DILAUDID) syringe 0.5 mg  0.5 mg IntraVENous Q4H PRN      Allergies   Allergen Reactions    Tylenol [Acetaminophen] Swelling      Review of Systems: A complete review of systems was obtained, negative except as described above. Physical Exam:     Visit Vitals    /79 (BP 1 Location: Right arm, BP Patient Position: At rest)    Pulse 95    Temp 97.9 °F (36.6 °C)    Resp 18    Ht 5' 4\" (1.626 m)    Wt 187 lb (84.8 kg)    SpO2 95%    BMI 32.1 kg/m2     ECOG PS: 3  General: No acute distress, appears weak/tired, drowsy, arousable. Eyes: anicteric sclerae  HENT: Atraumatic, OP clear  Neck: Supple  GI: Abdominal distention soft, non tender  Cardiac: HRR  Respiratory: clear anterior lung fields, normal respiratory effort  Extremities: no edema  MS: unable to assess gait  Skin:  Lower extremities cool, normal turgor and texture. Psych: conversant    Results:     Lab Results   Component Value Date/Time    WBC 14.7 (H) 07/16/2018 06:31 PM    HGB 11.5 07/16/2018 06:31 PM    HCT 36.2 07/16/2018 06:31 PM    PLATELET 198 12/37/6149 06:31 PM    MCV 79.6 (L) 07/16/2018 06:31 PM    ABS.  NEUTROPHILS 10.0 (H) 07/16/2018 06:31 PM     Lab Results   Component Value Date/Time    Sodium 136 07/18/2018 04:10 AM    Potassium 5.0 07/18/2018 04:10 AM    Chloride 105 07/18/2018 04:10 AM    CO2 20 (L) 07/18/2018 04:10 AM    Glucose 207 (H) 07/18/2018 04:10 AM    BUN 8 07/18/2018 04:10 AM    Creatinine 0.50 (L) 07/18/2018 04:10 AM    GFR est AA >60 07/18/2018 04:10 AM    GFR est non-AA >60 07/18/2018 04:10 AM    Calcium 7.8 (L) 07/18/2018 04:10 AM    Glucose (POC) 197 (H) 07/18/2018 11:53 AM     Lab Results   Component Value Date/Time    Bilirubin, total 0.3 07/16/2018 06:31 PM    ALT (SGPT) 9 (L) 07/16/2018 06:31 PM    AST (SGOT) 27 07/16/2018 06:31 PM    Alk. phosphatase 73 07/16/2018 06:31 PM    Protein, total 5.1 (L) 07/16/2018 06:31 PM    Albumin 1.7 (L) 07/16/2018 06:31 PM    Globulin 3.4 07/16/2018 06:31 PM     CT Results (most recent):    Results from Hospital Encounter encounter on 07/10/18   CT ABD PELV W CONT   Narrative EXAM:  CT ABD PELV W CONT    INDICATION: abdominal pain; wound abscess, fever, chills, stage IV colon CA    COMPARISON: June 8    CONTRAST:  100 mL of Isovue-370. TECHNIQUE:   Following the uneventful intravenous administration of contrast, thin axial  images were obtained through the abdomen and pelvis. Coronal and sagittal  reconstructions were generated. Oral contrast was not administered. CT dose  reduction was achieved through use of a standardized protocol tailored for this  examination and automatic exposure control for dose modulation. FINDINGS:   LUNG BASES: Interval enlargement of lower lobe pulmonary nodules. A  representative right lower lobe pulmonary nodule measures 9 mm and previously  measured 7 mm. Larger bilateral pleural effusions, right greater than left. INCIDENTALLY IMAGED HEART AND MEDIASTINUM: Unremarkable. LIVER: Interval enlargement of multiple hepatic masses. GALLBLADDER: Unremarkable. SPLEEN: No mass. PANCREAS: No mass or ductal dilatation. ADRENALS: Unremarkable. KIDNEYS: Left renal cyst.  STOMACH: Unremarkable. SMALL BOWEL: No dilatation or wall thickening. COLON: Right hemicolectomy. APPENDIX: Unremarkable. PERITONEUM: Interval development of peritoneal soft tissue masses. Moderate  ascites. RETROPERITONEUM: Retroperitoneal lymphadenopathy. REPRODUCTIVE ORGANS: Hysterectomy. URINARY BLADDER: No mass or calculus. BONES: No destructive bone lesion.   ADDITIONAL COMMENTS: N/A         Impression IMPRESSION:    1. Interval significant worsening of metastatic disease, with enlarging lung  nodules, enlarging liver lesions, retroperitoneal lymphadenopathy, and new  significant peritoneal metastatic disease with ascites. 2. Bilateral pleural effusions. 5/25/18 CT Abdomen/Pelvis:  IMPRESSION: Cecal mass with metastatic disease to liver, retroperitoneal nodes,  and lung. Relative small bowel obstruction. Associated dilated appendix. Associated complex right pleural effusion and small amount of ascites. 5/29/18 Surgical Pathology:     FINAL PATHOLOGIC DIAGNOSIS   1. Peritoneum, biopsy:   Adenocarcinoma, poorly differentiated   2. Omentum, omentectomy:   Adenocarcinoma, poorly differentiated   3. Colon and terminal ileum, right hemicolectomy:   Colonic adenocarcinoma   Appendix with tubulovillous adenoma   Addendum handling for mismatch repair proteins   Synoptic report:   Procedure: Right hemicolectomy   Tumor site: Cecum   Tumor size: 10.0 cm   Macroscopic tumor perforation: Not identified   Histologic type: Adenocarcinoma   Histologic grade: Poorly differentiated (grade 3 of 4 grades)   Tumor extension: Invades the visceral peritoneum   Margins: Positive for adenocarcinoma, radial   Proximal and distal margins negative for adenocarcinoma   Treatment effect: Not identified   Lymphovascular invasion: Present   Perineural invasion: Present   Tumor deposits: Present, at least 11   Regional lymph nodes: Five of 12 lymph nodes positive for metastatic carcinoma   Largest lymph node metastasis: 6 mm, without extracapsular extension   Ancillary testing: Immunohistochemistry for mismatch repair proteins pending      Pathologic stage (TNM):   Primary tumor: pT4a   Regional lymph nodes: pN2a   Distant metastases: pM1b     Records reviewed and summarized above. Pathology report(s) reviewed above. Radiology report(s) reviewed above.     Assessment/PLAN:     1) Stage 4 colon cancer MS stable found with colon mass with obstruction post surgery with wound healing issues. Liver and lung mets noted on CT 5/18  Patient is post surgery 5/18 for obstructing colon mass   On CTs, patient has progressive cancer with ascites   Port in place    Given progressive disease and no clear evidence of infection per ID, palliative chemotherapy initiated 7/17/18 with FOLFOX 50% dose reduced. Day 2 of cycle 1 today. Reviewed pathology, staging, CT results and chemotherapy regimen again with patient's  today. Answered all current questions of patient's  including overall prognosis, CT results, pathology and development of malignant ascites. Robert Ward 2)  Ascites, recurrent. S/p paracentesis on 7/11/18 with 5L fluid removed. IR drain tube removed secondary to minimal drainage. Will monitor for re-accumulation of ascites. Discussed Aspira catheter placement prior to discharge. 3) Delayed wound healing. Now off of wound vac. Open wound with some drainage, packing required. Surgery managing  ID evaluated 7/16/17 and no evidence of infection currently    4) Lung metastasis. Reviewed on recent imaging. 5) Liver metastasis. LFTs stable. Continue to monitor      Continue FOLFOX as ordered. Will need discharge plan post treatment. PT/OT eval.     Pt seen today in conjunction with NP Zeyad Whitaker. Case d/w nursing today. Long d/w pt's  at bedside while pt is sleeping. Again reviewed overall palliative goal of care. Discussed difficult situation of advanced cancer and poor performance status and delayed chemo due to infection. Reviewed unclear if chemo will work fast enough to treat current advanced disease. Reviewed possibility that pt could get worse and not better.  continues to have hope that pt will be able to tolerate chemo and have good effect from this.  is very supportive overall and wants to take pt home after chemo is done.    We will continue to follow closely.        Signed By: Haylie Ruiz, DO

## 2018-07-18 NOTE — PROGRESS NOTES
Culture not MRSA  So DC vano  Can switch to omnicef ( cefdinir ) 300mg PO BID for 5 days  Discussed with her

## 2018-07-18 NOTE — PROGRESS NOTES
Bedside shift change report given to Dez Liao RN (oncoming nurse) by Savanah Millan RN (offgoing nurse). Report included the following information SBAR and Kardex.

## 2018-07-18 NOTE — DIABETES MGMT
DTC Progress Note    Recommendations/ Comments: Chart review for hyperglycemia - noted patient received dexamethasone 12 mg yesterday she will be receiving 8 mg today. If appropriate, please consider the followin. Increasing Lantus from 10 units to 25 units  2. Discontinuing insulin regular as correction - changing to Humalog resistant scale ac and hs (change to normal sensitivity once patient no longer on dexamethasone)  3. Adding consistent carbohydrate to current diet order  4. Adding Januvia 50 mg    Current hospital DM medication:   Lantus 10 units  Regular correction scale, normal sensitivity    Patient is a 79 y.o. female with known DM on the following at home:  Kombiglize (saxigliptin-metfomin ) 5-1000 once daily  Farxiga 10 mg daily  Lantus 47 units BID    A1c:   Lab Results   Component Value Date/Time    Hemoglobin A1c 8.3 (H) 2018 05:21 AM    Hemoglobin A1c 8.1 (H) 2018 09:48 AM       Recent Glucose Results:   Lab Results   Component Value Date/Time     (H) 2018 04:10 AM    GLUCPOC 186 (H) 2018 06:51 AM    GLUCPOC 254 (H) 2018 06:23 PM    GLUCPOC 309 (H) 2018 05:41 PM        Lab Results   Component Value Date/Time    Creatinine 0.50 (L) 2018 04:10 AM     Estimated Creatinine Clearance: 116.5 mL/min (based on Cr of 0.5). Active Orders   Diet    DIET REGULAR [place]        PO intake:   Patient Vitals for the past 72 hrs:   % Diet Eaten   18 1419 50 %   18 1206 0 %   18 0843 0 %       Will continue to follow as needed.     Thank you  Nicki Jaramillo, MS, RN, CDE

## 2018-07-18 NOTE — PROGRESS NOTES
ID  Culture from the superficial wound has staph aureus- could very well be colonizing  But because of chemo will give Iv vanco today- may be able to change to Po once we have the ID - discussed with her

## 2018-07-18 NOTE — PROGRESS NOTES
Chart reviewed. Pt had previously indicated her preference for a [facility]  or .  Pt was visited yesterday by Fr. Brady and is receiving visits from [employer]. Chaplains remain available for support as needed; please page at [phone].     Evelia Aaron, Palliative

## 2018-07-19 NOTE — PROGRESS NOTES
General Surgery Daily Progress Note    Admit Date: 7/10/2018  Post-Operative Day: 6 Days Post-Op from Procedure(s):  PORTACATH INSERTION     Subjective:      Last 24 hrs: Resting quietly in bed in dark room. Family in room to help with translation. She reports abdominal distension soreness--denies acute pain. Eating small amounts of food. Denies NV.  concerned about abdominal distention & requesting drainage prior to d/c  Chemo running  Not wearing SCDs      Objective:     Blood pressure 147/82, pulse 83, temperature 97.7 °F (36.5 °C), resp. rate 19, height 5' 4\" (1.626 m), weight 187 lb (84.8 kg), SpO2 96 %. Temp (24hrs), Av.9 °F (36.6 °C), Min:97.7 °F (36.5 °C), Max:98.1 °F (36.7 °C)      _____________________  Physical Exam:     Alert, cooperative, in no acute distress. Cardiovascular: RRR, trace peripheral edema  Lungs:CTAB, faint end inp wheeze right upper field  Abdomen: distended, firm. Appro TTP      Assessment:   Principal Problem:    Malignant ascites (7/10/2018)    Active Problems:    HTN (hypertension) (2013)      Type 2 diabetes mellitus with hyperglycemia ([facility] (2018)      Malignant neoplasm of ascending colon ([facility] (2018)      Overview: Results for Kelly Rivera (MRN [id number]) as of 2018 15:41       Ref.  Range 2018 13:44       CEA Latest Units: ng/mL 28.4             S/P right colectomy (2018)      Overview: Right hemicolectomy for cecal mass with partial SBO, Ndiaye      Superficial postoperative wound infection (2018)      Overview: 6/10/18 wound culture      Culture result:    Final          MODERATE STAPHYLOCOCCUS EPIDERMIDIS (OXACILLIN RESISTANT) (A)             18 anaerobic wound culture       Culture result:    Final         MODERATE MIXED ANAEROBIC GRAM NEGATIVE RODS BETA LACTAMASE POSITIVE (A)         Culture result:    Final         HEAVY ANAEROBIC GRAM POSITIVE COCCI (A)                   Methicillin resistant Staphylococcus epidermidis infection (6/3/2018)      Overview: Wound culture 6/8/18      Reported 6/10/18 - sensitive to cipro, clinda, dapto, linezolid, TMP/SMZ       and vanc            Open wound of anterior abdominal wall with complication (2/5/6414)      Metastatic colon cancer in female [facility]) (6/25/2018)      Generalized abdominal pain (6/25/2018)      Nausea and vomiting in adult (7/10/2018)      Abdominal pain (7/10/2018)      Severe protein-calorie malnutrition ([address] (6/8/2018)      Class 1 obesity due to excess calories with body mass index (BMI) of 31.0 to 31.9 in adult (6/25/2018)            Plan:     Analgesics as needed  Scheduled nebs & IS  DVT proph & SCDs  Further plan & d/c planning per Dr. Mimi Valiente    Data Review:    Recent Labs      07/16/18   1831   WBC  14.7*   HGB  11.5   HCT  36.2   PLT  390     Recent Labs      07/19/18   0445  07/18/18   0410  07/16/18   1831   NA  141  136  134*   K  4.3  5.0  4.7   CL  109*  105  100   CO2  20*  20*  23   GLU  174*  207*  120*   BUN  10  8  11   CREA  0.49*  0.50*  0.45*   CA  7.8*  7.8*  7.9*   ALB   --    --   1.7*   SGOT   --    --   27   ALT   --    --   9*     No results for input(s): AML, LPSE in the last 72 hours.         ______________________  Medications:    Current Facility-Administered Medications   Medication Dose Route Frequency    heparin (porcine) pf 300 Units  300 Units InterCATHeter PRN    insulin glargine (LANTUS) injection 25 Units  25 Units SubCUTAneous DAILY    insulin lispro (HUMALOG) injection   SubCUTAneous AC&HS    glucose chewable tablet 16 g  4 Tab Oral PRN    dextrose (D50W) injection syrg 12.5-25 g  12.5-25 g IntraVENous PRN    glucagon (GLUCAGEN) injection 1 mg  1 mg IntraMUSCular PRN    SITagliptin (JANUVIA) tablet tab 50 mg  50 mg Oral DAILY    cefdinir (OMNICEF) capsule 300 mg  300 mg Oral Q12H    Saccharomyces boulardii (FLORASTOR) capsule 250 mg  250 mg Oral DAILY    fluorouracil (ADRUCIL) 1,100 mg in 0.9% sodium chloride 500 mL, overfill volume 50 mL chemo infusion  1,100 mg IntraVENous Q24H    0.9% sodium chloride infusion  125 mL/hr IntraVENous CONTINUOUS    lidocaine (XYLOCAINE) 4 % cream   Topical PRN    ondansetron (ZOFRAN ODT) tablet 4 mg  4 mg Oral Q8H PRN    enoxaparin (LOVENOX) injection 40 mg  40 mg SubCUTAneous DAILY    sodium chloride (NS) flush 5-10 mL  5-10 mL IntraVENous PRN    midazolam (VERSED) injection 0.5 mg  0.5 mg IntraVENous Q5MIN PRN    famotidine (PEPCID) tablet 20 mg  20 mg Oral BID    lisinopril (PRINIVIL, ZESTRIL) tablet 10 mg  10 mg Oral DAILY    metoprolol tartrate (LOPRESSOR) tablet 50 mg  50 mg Oral BID    naloxone (NARCAN) injection 0.4 mg  0.4 mg IntraVENous PRN    oxyCODONE IR (ROXICODONE) tablet 5 mg  5 mg Oral Q6H PRN    sodium chloride (NS) flush 5-10 mL  5-10 mL IntraVENous Q8H    sodium chloride (NS) flush 5-10 mL  5-10 mL IntraVENous PRN    prochlorperazine (COMPAZINE) injection 5 mg  5 mg IntraVENous Q8H PRN    albuterol (PROVENTIL VENTOLIN) nebulizer solution 2.5 mg  2.5 mg Nebulization Q6H PRN    HYDROmorphone (DILAUDID) syringe 0.5 mg  0.5 mg IntraVENous Q4H PRN     Facility-Administered Medications Ordered in Other Encounters   Medication Dose Route Frequency    pegfilgrastim (NEULASTA) wearable SQ injector 6 mg  6 mg SubCUTAneous ONCE       Lena Swenson PA-C  7/19/2018

## 2018-07-19 NOTE — TELEPHONE ENCOUNTER
Call returned to number provided x 2 - rang busy. Call to [address] and staff stated Riddhi Knott not available/on the unit.

## 2018-07-19 NOTE — PROGRESS NOTES
[facility] [place] at [address] [place] [address] [facility], [address]  W: [phone]  F: [phone]    Reason for Visit:   Anca Dobbins is a 79 y.o. female who is seen in hospital consut for met colon cancer post surgery . Currently admitted for abdominal distention due to ascites. Treatment History:   · 2018 Right colectomy-stage IV poorly differentiated adenocarcinoma  · 7/3/2018: EGFR neg, BRAF neg  · 2018: KRAS/HRAS/NRAS-neg    History of Present Illness:   Ms. Lizette Vazquez is a 79 y.o. female with recent diagnosis of stage IV colon cancer s/p colectomy May 2018 with post surgical wound care complications requiring wound vac. Currently admitted with fever in the home, shaking chills on Monday, abdominal distention and pain. Unable to eat much at all. States she is not hungry. Trying clear liquids. C/o lower back pain, this has been present since diagnosis. Mouth dryness. Pt reports having an ultrasound paracentesis today. Denies current fever, chills, SOB or chest pain. INTERVAL HISTORY: Patient resting in bed this morning. Remains fatigued. Getting out of bed with assistance/daughter. No abdominal pain. States that there is \"some\" fullness in abdomen. No fever, chills, SOB or chest pain. No mouth sores. No swelling of extremities.        Past Medical History:   Diagnosis Date    Anemia NEC     Cancer ([address]     Class 1 obesity due to excess calories with body mass index (BMI) of 31.0 to 31.9 in adult 2018    Diabetes ([address]     Headache(784.0)     Hypercholesterolemia     Hypertension     Malignant neoplasm of ascending colon ([address] 2018    Stage IVc    S/P right colectomy 2018    Right hemicolectomy for cecal mass with partial SBO, Ndiaye    Severe protein-calorie malnutrition ([address] 2018    Superficial postoperative wound infection 2018      Past Surgical History:   Procedure Laterality Date    HX  SECTION Hysterectomy,total for precancer    HX COLECTOMY Right 05/27/2018    Right hemicolectomy for cecal mass with partial SBO, Ndiaye      Social History   Substance Use Topics    Smoking status: Never Smoker    Smokeless tobacco: Never Used    Alcohol use No      Family History   Problem Relation Age of Onset    Alcohol abuse Father     Cancer Maternal Aunt      breast ca    Breast Cancer Maternal Aunt     Breast Cancer Sister     Breast Cancer Daughter      Current Facility-Administered Medications   Medication Dose Route Frequency    heparin (porcine) pf 300 Units  300 Units InterCATHeter PRN    insulin glargine (LANTUS) injection 25 Units  25 Units SubCUTAneous DAILY    insulin lispro (HUMALOG) injection   SubCUTAneous AC&HS    glucose chewable tablet 16 g  4 Tab Oral PRN    dextrose (D50W) injection syrg 12.5-25 g  12.5-25 g IntraVENous PRN    glucagon (GLUCAGEN) injection 1 mg  1 mg IntraMUSCular PRN    SITagliptin (JANUVIA) tablet tab 50 mg  50 mg Oral DAILY    cefdinir (OMNICEF) capsule 300 mg  300 mg Oral Q12H    Saccharomyces boulardii (FLORASTOR) capsule 250 mg  250 mg Oral DAILY    fluorouracil (ADRUCIL) 1,100 mg in 0.9% sodium chloride 500 mL, overfill volume 50 mL chemo infusion  1,100 mg IntraVENous Q24H    0.9% sodium chloride infusion  125 mL/hr IntraVENous CONTINUOUS    lidocaine (XYLOCAINE) 4 % cream   Topical PRN    ondansetron (ZOFRAN ODT) tablet 4 mg  4 mg Oral Q8H PRN    enoxaparin (LOVENOX) injection 40 mg  40 mg SubCUTAneous DAILY    sodium chloride (NS) flush 5-10 mL  5-10 mL IntraVENous PRN    midazolam (VERSED) injection 0.5 mg  0.5 mg IntraVENous Q5MIN PRN    famotidine (PEPCID) tablet 20 mg  20 mg Oral BID    lisinopril (PRINIVIL, ZESTRIL) tablet 10 mg  10 mg Oral DAILY    metoprolol tartrate (LOPRESSOR) tablet 50 mg  50 mg Oral BID    naloxone (NARCAN) injection 0.4 mg  0.4 mg IntraVENous PRN    oxyCODONE IR (ROXICODONE) tablet 5 mg  5 mg Oral Q6H PRN    sodium chloride (NS) flush 5-10 mL  5-10 mL IntraVENous Q8H    sodium chloride (NS) flush 5-10 mL  5-10 mL IntraVENous PRN    prochlorperazine (COMPAZINE) injection 5 mg  5 mg IntraVENous Q8H PRN    albuterol (PROVENTIL VENTOLIN) nebulizer solution 2.5 mg  2.5 mg Nebulization Q6H PRN    HYDROmorphone (DILAUDID) syringe 0.5 mg  0.5 mg IntraVENous Q4H PRN     Facility-Administered Medications Ordered in Other Encounters   Medication Dose Route Frequency    pegfilgrastim (NEULASTA) wearable SQ injector 6 mg  6 mg SubCUTAneous ONCE      Allergies   Allergen Reactions    Tylenol [Acetaminophen] Swelling      Review of Systems: A complete review of systems was obtained, negative except as described above. Physical Exam:     Visit Vitals    /88 (BP 1 Location: Right arm, BP Patient Position: At rest)    Pulse 99    Temp 97.9 °F (36.6 °C)    Resp 20    Ht 5' 4\" (1.626 m)    Wt 187 lb (84.8 kg)    SpO2 94%    BMI 32.1 kg/m2     ECOG PS: 3  General: No acute distress, appears weak/tired, drowsy, arousable. Eyes: anicteric sclerae  HENT: Atraumatic, OP clear  Neck: Supple  GI: Abdominal distention soft, non tender  Cardiac: HRR  Respiratory: clear anterior lung fields, normal respiratory effort  Extremities: no edema  MS: unable to assess gait  Skin:  Lower extremities cool, normal turgor and texture. Psych: conversant    Results:     Lab Results   Component Value Date/Time    WBC 14.7 (H) 07/16/2018 06:31 PM    HGB 11.5 07/16/2018 06:31 PM    HCT 36.2 07/16/2018 06:31 PM    PLATELET 654 70/16/9327 06:31 PM    MCV 79.6 (L) 07/16/2018 06:31 PM    ABS.  NEUTROPHILS 10.0 (H) 07/16/2018 06:31 PM     Lab Results   Component Value Date/Time    Sodium 141 07/19/2018 04:45 AM    Potassium 4.3 07/19/2018 04:45 AM    Chloride 109 (H) 07/19/2018 04:45 AM    CO2 20 (L) 07/19/2018 04:45 AM    Glucose 174 (H) 07/19/2018 04:45 AM    BUN 10 07/19/2018 04:45 AM    Creatinine 0.49 (L) 07/19/2018 04:45 AM    GFR est AA >60 07/19/2018 04:45 AM    GFR est non-AA >60 07/19/2018 04:45 AM    Calcium 7.8 (L) 07/19/2018 04:45 AM    Glucose (POC) 152 (H) 07/19/2018 12:16 PM     Lab Results   Component Value Date/Time    Bilirubin, total 0.3 07/16/2018 06:31 PM    ALT (SGPT) 9 (L) 07/16/2018 06:31 PM    AST (SGOT) 27 07/16/2018 06:31 PM    Alk. phosphatase 73 07/16/2018 06:31 PM    Protein, total 5.1 (L) 07/16/2018 06:31 PM    Albumin 1.7 (L) 07/16/2018 06:31 PM    Globulin 3.4 07/16/2018 06:31 PM     CT Results (most recent):    Results from Hospital Encounter encounter on 07/10/18   CT ABD PELV W CONT   Narrative EXAM:  CT ABD PELV W CONT    INDICATION: abdominal pain; wound abscess, fever, chills, stage IV colon CA    COMPARISON: June 8    CONTRAST:  100 mL of Isovue-370. TECHNIQUE:   Following the uneventful intravenous administration of contrast, thin axial  images were obtained through the abdomen and pelvis. Coronal and sagittal  reconstructions were generated. Oral contrast was not administered. CT dose  reduction was achieved through use of a standardized protocol tailored for this  examination and automatic exposure control for dose modulation. FINDINGS:   LUNG BASES: Interval enlargement of lower lobe pulmonary nodules. A  representative right lower lobe pulmonary nodule measures 9 mm and previously  measured 7 mm. Larger bilateral pleural effusions, right greater than left. INCIDENTALLY IMAGED HEART AND MEDIASTINUM: Unremarkable. LIVER: Interval enlargement of multiple hepatic masses. GALLBLADDER: Unremarkable. SPLEEN: No mass. PANCREAS: No mass or ductal dilatation. ADRENALS: Unremarkable. KIDNEYS: Left renal cyst.  STOMACH: Unremarkable. SMALL BOWEL: No dilatation or wall thickening. COLON: Right hemicolectomy. APPENDIX: Unremarkable. PERITONEUM: Interval development of peritoneal soft tissue masses. Moderate  ascites. RETROPERITONEUM: Retroperitoneal lymphadenopathy.   REPRODUCTIVE ORGANS: Hysterectomy. URINARY BLADDER: No mass or calculus. BONES: No destructive bone lesion. ADDITIONAL COMMENTS: N/A         Impression IMPRESSION:    1. Interval significant worsening of metastatic disease, with enlarging lung  nodules, enlarging liver lesions, retroperitoneal lymphadenopathy, and new  significant peritoneal metastatic disease with ascites. 2. Bilateral pleural effusions. 5/25/18 CT Abdomen/Pelvis:  IMPRESSION: Cecal mass with metastatic disease to liver, retroperitoneal nodes,  and lung. Relative small bowel obstruction. Associated dilated appendix. Associated complex right pleural effusion and small amount of ascites. 5/29/18 Surgical Pathology:     FINAL PATHOLOGIC DIAGNOSIS   1. Peritoneum, biopsy:   Adenocarcinoma, poorly differentiated   2. Omentum, omentectomy:   Adenocarcinoma, poorly differentiated   3. Colon and terminal ileum, right hemicolectomy:   Colonic adenocarcinoma   Appendix with tubulovillous adenoma   Addendum handling for mismatch repair proteins   Synoptic report:   Procedure: Right hemicolectomy   Tumor site: Cecum   Tumor size: 10.0 cm   Macroscopic tumor perforation: Not identified   Histologic type: Adenocarcinoma   Histologic grade: Poorly differentiated (grade 3 of 4 grades)   Tumor extension: Invades the visceral peritoneum   Margins: Positive for adenocarcinoma, radial   Proximal and distal margins negative for adenocarcinoma   Treatment effect: Not identified   Lymphovascular invasion: Present   Perineural invasion: Present   Tumor deposits: Present, at least 11   Regional lymph nodes: Five of 12 lymph nodes positive for metastatic carcinoma   Largest lymph node metastasis: 6 mm, without extracapsular extension   Ancillary testing: Immunohistochemistry for mismatch repair proteins pending      Pathologic stage (TNM):   Primary tumor: pT4a   Regional lymph nodes: pN2a   Distant metastases: pM1b     Records reviewed and summarized above.   Pathology report(s) reviewed above. Radiology report(s) reviewed above. Assessment/PLAN:     1) Stage 4 colon cancer MS stable found with colon mass with obstruction post surgery with wound healing issues. Liver and lung mets noted on CT 5/18  Patient is post surgery 5/18 for obstructing colon mass   On CTs, patient has progressive cancer with ascites   Port in place    Given progressive disease and no clear evidence of infection per ID, palliative chemotherapy initiated 7/17/18 with FOLFOX 50% dose reduced. Day 3 of cycle 1 today. If discharged today as below, will follow up in our office in 1 week for evaluation after cycle 1. Will need Neulasta at discharge. Will set up today in [facility] and she will need to go directly from hospital to have this. 2)  Ascites, recurrent. S/p paracentesis on 7/11/18 with 5L fluid removed. IR drain tube removed secondary to minimal drainage. Will monitor for re-accumulation of ascites. Discussed Aspira catheter placement prior to discharge if indicated. 3) Delayed wound healing. Now off of wound vac. Open wound with some drainage, packing required. Surgery managing  ID evaluated 7/16/17 and no evidence of infection currently    4) Lung metastasis. Reviewed on recent imaging. 5) Liver metastasis. LFTs stable. Continue to monitor    6) Disposition. Okay for discharge today from an Oncology standpoint with appropriate help at home/PT/OT/home health. Consult to PT and case mgmt today. She will need Neulasta at discharge and will set up in [facility] today. Discussed with daughter and nursing. Please call with any questions.       Signed By: Tom Lazaro DO

## 2018-07-19 NOTE — PROGRESS NOTES
ONCOLOGY NURSE NAVIGATOR    Rec'ed referral from Dr. Santo Sos to offer support during hospitalization. Ms. Lida Scott is known to JUANITA Hartman in Dr. January Fung office. Noted that discharge is tentative this afternoon. Arrived to unit around 2pm --  indicates he's concerned about how his wife is feeling -- not a good time for a visit. Discussed with Carlos Beltran RN -- Apparently Ms. Lida Scott has had increasing abd discomfort this afternoon.   Med onc team is aware of change in patient's comfort    Will follow up at a later time    [id number] [facility]

## 2018-07-19 NOTE — PROGRESS NOTES
Bedside shift change report given to yogesh (oncoming nurse) by Juleen Schilder (offgoing nurse). Report included the following information SBAR, Kardex, MAR and Recent Results.

## 2018-07-19 NOTE — PROGRESS NOTES
Physical Therapy  Attempted to see patient two different times today. Each time the spouse was intervening and stating patient either asleep or too weak. On second attempt, patient also adamantly refusing to get up out of bed. Family stating she is too weak from chemo and not eating. Attempted to explain that therapy is to assist with increasing activity and strengthening. Offered the aide of a walker as well. Spouse states patient doesn't want to get up. He assures therapy that he can manage her at home. Again ex plained how therapy can assist.  All agreeable to therapy returning tomorrow. Will follow up tomorrow.   Amie Ashby, PT

## 2018-07-19 NOTE — PROGRESS NOTES
Palliative Medicine    Stopped to assess pt's pain / symptoms. She is asleep. Spoke with dtr, dtr indicates overall pain controlled, no issues. Reviewed chart / mar -- pt used 2 doses oxycodone 5mg po 7/18, no recent IV dilaudid  Will check back as able.

## 2018-07-19 NOTE — PROGRESS NOTES
Mr. Christoph Bowden stated he was not for certain patient will agree to therapy later but for now wished the referral for home Pt to be sent to [facility]. Discharge is anticipated for this afternoon to home in care of spouse. Care Management Interventions  PCP Verified by CM:  Yes  Palliative Care Criteria Met (RRAT>21 & CHF Dx)?: Yes  Palliative Consult Recommended?: Yes  Mode of Transport at Discharge:  (Patient will travel home via car.  )  Transition of Care Consult (CM Consult): [address]: Yes  MyChart Signup: No  Discharge Durable Medical Equipment: No  Physical Therapy Consult: Yes  Occupational Therapy Consult: No  Speech Therapy Consult: No  Current Support Network: Lives with Spouse  Confirm Follow Up Transport:  (Patient will travel home via car.  )  Plan discussed with Pt/Family/Caregiver: Yes  Freedom of Choice Offered: Yes  Discharge Location  Discharge Placement: Home with home health

## 2018-07-19 NOTE — PROGRESS NOTES
Bedside shift change report given to yogesh (oncoming nurse) by Dewayne Willett (offgoing nurse). Report included the following information SBAR, Kardex, MAR and Recent Results. [phone] spoke with md on call for dr Leslie Matute r/t family request to dc continuous fluids. order given to decrease fluids to 50cc/hr

## 2018-07-20 NOTE — PROGRESS NOTES
[facility] Short Stay Note:  Arrived - 200  Assessment - Pt presents to [facility] via wheelchair; weak but A&O and responsive to interview questions and assessment. Voiced no concerns at this time; stated she \"just wanted to go home\"    Visit Vitals    /79 (BP 1 Location: Right arm, BP Patient Position: At rest;Sitting)    Pulse 88    Temp 98 °F (36.7 °C)    Resp 18    SpO2 97%       Neulasta 6mg SQ slowly in right arm. 1800 hrs- Tolerated well. No reaction noted. Pt denies any acute problems/changes. Discharged from [facility] ambulatory. No distress. Next appt: No further appts needed.

## 2018-07-20 NOTE — PROGRESS NOTES
[facility] follow-up appointment on Tuesday July 24,2018 @ 11:15 a.m. with Dr. Toni Tenorio.   Added to AVS.  Brenda Hussein CM Specialist

## 2018-07-20 NOTE — PROGRESS NOTES
Discharge papers were given to pt and family.  is present. Lou Sandoval, oncology RN, removed pt's lines and wheeled pt out by wheelchair. Pt is in no acute distress.

## 2018-07-20 NOTE — DISCHARGE INSTRUCTIONS
Discharge Instructions for Chemotherapy/Biotherapy    Chemotherapy has the potential to cause many side effects. The following are general precautions that chemo patients should take:   1. Practice good hand washing:    Use soap and water for at least 15 seconds, covering all areas of hands.  Always wash hands before eating.  Wash hands after contact with public surfaces such as door knobs and handles, shopping carts, telephones and elevator buttons. 2. Get plenty of rest:    You will likely experience fatigue three to five days following your treatment. It may last as long as seven days. 3. Drink plenty of fluids. Water is best.   4. Eat a well balanced diet:    Small frequent meals may help if you are having trouble with nausea or your appetite. Some people also do well with nutritional supplements. 5. Pace yourself with daily activities:    Take frequent breaks and ask for help if you need it. 6. Exercise is very important:    It will increase circulation and will help the fatigue. Do what you can each day. 7. If your regimen results in hair loss:    You will likely notice effects between two and three weeks following your first treatment. Some lose all hair while others only experience thinning. 8. Practice good oral hygiene:   Eladio Redmans your M.D. immediately if any mouth sores or discomfort develop. 9. Protect yourself from the sun. Signs/Symptoms of an allergic reaction and/or some side effects may require immediate medical attention. Notify your physician if you develop one or more of the following:   Temperature of 100.5 degrees or greater;   Skin redness, itching, swelling, blistering, weeping, crusting, rash, or hives;    Wheezing, chest tightness, cough, or shortness of breath;   Swelling of the face, eyelids, lips, tongue, or throat;   Severe, persistent headache;   Stuffy nose, runny nose, sneezing;   Red (bloodshot), itchy, swollen, or watery eyes;   Stomach pain, nausea, vomiting, diarrhea, or bloody stools;   Mouth sores  Your physician should also be aware of the following symptoms:   Persistent and unresolved nausea and/or vomiting;   Persistent and unresolved diarrhea or constipation;   Numbness/tingling/burning of the extremities, including the fingers and toes; Bleeding or unexplained bruising; Unexplained redness/swelling/pain in the arms or legs; Shortness of breath or fatigue that worsens;   Pain with urination or blood in the urine; Chills;   Cough, especially a productive cough;   Mouth sores or a white coating of the tongue; Redness, swelling, pain or drainage at the port-a-cath or IV site; Increased feeling of bloating or water retention; Excessive weight loss or gain;   Ringing in the ears; Difficulty swallowing;   Dizziness, vertigo, lightheadedness, or fainting. Chemotherapy can cause birth defects. It is very important not to become pregnant   or father a child while undergoing chemotherapy. During chemotherapy and for 48 hours after chemotherapy, the drugs may still be in   your urine and other body fluids. You should use the following precautions to reduce   exposure to others:  * Both men and women should sit on the toilet to cut down on splashing. Flush   toilets with the lid down. Always wash your hands well with soap and water after using the toilet. You may want to use a separate toilet if possible. * Caregivers should wear disposable gloves to handle body wastes. Dispose of   gloves after each use and wash hands. * Any sheets or clothes soiled with bodily fluids should be machine washed separately from other laundry. Wash twice with regular laundry detergent in hot water. If they cannot be washed right away they can be stored in a sealed plastic  bag.    * If disposable adult diapers, underwear, or sanitary pads are used, they can be sealed in a plastic bag, and thrown away with regular trash.         I have reviewed discharge instructions with the patient. The patient verbalized understanding. MyChart Activation    Thank you for requesting access to [facility]. Please follow the instructions below to securely access and download your online medical record. [facility] allows you to send messages to your doctor, view your test results, renew your prescriptions, schedule appointments, and more. How Do I Sign Up? 1. In your internet browser, go to [email or web address]  2. Click on the First Time User? Click Here link in the Sign In box. You will be redirect to the New Member Sign Up page. 3. Enter your [facility] Access Code exactly as it appears below. You will not need to use this code after youve completed the sign-up process. If you do not sign up before the expiration date, you must request a new code. [facility] Access Code: [email or web address]  Expires: 2018 12:47 PM (This is the date your [facility] access code will )    4. Enter the last four digits of your Social Security Number (xxxx) and Date of Birth (mm/dd/yyyy) as indicated and click Submit. You will be taken to the next sign-up page. 5. Create a [facility] ID. This will be your [facility] login ID and cannot be changed, so think of one that is secure and easy to remember. 6. Create a [facility] password. You can change your password at any time. 7. Enter your Password Reset Question and Answer. This can be used at a later time if you forget your password. 8. Enter your e-mail address. You will receive e-mail notification when new information is available in [address] 9. Click Sign Up. You can now view and download portions of your medical record. 10. Click the Download Summary menu link to download a portable copy of your medical information. Additional Information    If you have questions, please visit the Frequently Asked Questions section of the [facility] website at [email or web address] Remember, [facility] is NOT to be used for urgent needs. For medical emergencies, dial 911.

## 2018-07-20 NOTE — PROGRESS NOTES
General Surgery at [facility]    PT progress note was reviewed.  says she doesn't want to walk because she's too weak because of lack of sleep, multiple procedures, etc.  \"But she will get up and move when she is at home. \"  They received instructions from Onc team for Neulasta. Patient Vitals for the past 12 hrs:   Temp Pulse Resp BP SpO2   18 0100 - - - - 93 %   18 0050 98 °F (36.7 °C) 82 20 132/72 98 %     Temp (24hrs), Av.9 °F (36.6 °C), Min:97.9 °F (36.6 °C), Max:98 °F (36.7 °C)      Assessment:   Hospital Problems as of 2018  Date Reviewed: 7/10/2018          Codes Class Noted - Resolved POA    Superficial postoperative wound infection ICD-10-CM: T81. 4XXA  ICD-9-CM: 998.59  2018 - Present Yes    Overview Addendum 2018  6:52 PM by Milla Lei MD     6/10/18 wound culture  Culture result:    Final      MODERATE STAPHYLOCOCCUS EPIDERMIDIS (OXACILLIN RESISTANT) (A)     18 anaerobic wound culture   Culture result:    Final     MODERATE MIXED ANAEROBIC GRAM NEGATIVE RODS BETA LACTAMASE POSITIVE (A)     Culture result:    Final     HEAVY ANAEROBIC GRAM POSITIVE COCCI (A)                  Open wound of anterior abdominal wall with complication [id number]: V49.714K  ICD-9-CM: 879.3  2018 - Present Yes        Methicillin resistant Staphylococcus epidermidis infection ICD-10-CM: A49.8, Z16.29  ICD-9-CM: 041.19  6/3/2018 - Present Yes    Overview Signed 2018 10:48 AM by Milla Lei MD     Wound culture 18  Reported 6/10/18 - sensitive to cipro, clinda, dapto, linezolid, TMP/SMZ and vanc               S/P right colectomy ICD-10-CM: Z90.49  ICD-9-CM: V45.89  2018 - Present Yes    Overview Signed 2018  8:25 AM by Milla Lei MD     Right hemicolectomy for cecal mass with partial SBO, Ndiaye             Malignant neoplasm of ascending colon ([facility] [address] ICD-10-CM: C18.2  ICD-9-CM: 153.6  2018 - Present Yes    Overview Signed 2018  3:49 PM by Jonas Kwan MD     Results for Huey Davidson (MRN [id number]) as of 5/31/2018 15:41   Ref. Range 5/31/2018 13:44   CEA Latest Units: ng/mL 28.4                Nausea and vomiting in adult ICD-10-CM: R11.2  ICD-9-CM: 787.01  7/10/2018 - Present Yes        Abdominal pain ICD-10-CM: R10.9  ICD-9-CM: 789.00  7/10/2018 - Present Yes        * (Principal)Malignant ascites ICD-10-CM: R18.0  ICD-9-CM: 789.51  7/10/2018 - Present Yes        Metastatic colon cancer in female [facility]) ICD-10-CM: C78.5  ICD-9-CM: 197.5  6/25/2018 - Present Yes        Generalized abdominal pain ICD-10-CM: R10.84  ICD-9-CM: 789.07  6/25/2018 - Present Yes        Class 1 obesity due to excess calories with body mass index (BMI) of 31.0 to 31.9 in adult ICD-10-CM: E66.09, Z68.31  ICD-9-CM: 278.00, V85.31  6/25/2018 - Present Yes        Severe protein-calorie malnutrition ([address] ICD-10-CM: E43  ICD-9-CM: 262  6/8/2018 - Present Yes        Type 2 diabetes mellitus with hyperglycemia (HCC) ICD-10-CM: E11.65  ICD-9-CM: 250.00  5/26/2018 - Present Yes        HTN (hypertension) ICD-10-CM: I10  ICD-9-CM: 401.9  5/23/2013 - Present Yes              7 Days Post-Op  Procedure(s):  PORTACATH INSERTION    reiterated that he can perform wound care. Ready for dc  Rec/Plan:  I reiterated the importance of mobilizing to avoid pressure sores and to improve strength. She will appointment to follow up with me in 2 weeks.     Signed By: Jonas Kwan MD     July 20, 2018

## 2018-07-20 NOTE — PROGRESS NOTES
Bedside shift change report given to Seth Block (oncoming nurse) by Irineo Caicedo RN (offgoing nurse). Report included the following information SBAR and Kardex.

## 2018-07-20 NOTE — PROGRESS NOTES
[facility] [place] at [address] [address] [facility] [address]  W: [phone]  F: [phone]    Reason for Visit:   Reche Kehr is a 79 y.o. female who is seen in hospital consut for met colon cancer post surgery 5/18. Currently admitted for abdominal distention due to ascites. Treatment History:   · 5/25/2018 Right colectomy-stage IV poorly differentiated adenocarcinoma  · 7/3/2018: EGFR neg, BRAF neg  · 7/5/2018: KRAS/HRAS/NRAS-neg    History of Present Illness:   Ms. Marcel Echeverria is a 79 y.o. female with recent diagnosis of stage IV colon cancer s/p colectomy May 2018 with post surgical wound care complications requiring wound vac. Currently admitted with fever in the home, shaking chills on Monday, abdominal distention and pain. Unable to eat much at all. States she is not hungry. Trying clear liquids. C/o lower back pain, this has been present since diagnosis. Mouth dryness. Pt reports having an ultrasound paracentesis today. Denies current fever, chills, SOB or chest pain. INTERVAL HISTORY: Patient resting in bed this morning. She is fatigued but family states improved today. US obtained yesterday for possible paracentesis with small amount of loculated ascites only. Appetite remains down. No nausea or vomiting. Plans for discharge today. Daughter and  at the bedside.       Past Medical History:   Diagnosis Date    Anemia NEC     Cancer ([address]     Class 1 obesity due to excess calories with body mass index (BMI) of 31.0 to 31.9 in adult 6/25/2018    Diabetes ([address]     Headache(784.0)     Hypercholesterolemia     Hypertension     Malignant neoplasm of ascending colon ([address] 05/26/2018    Stage IVc    S/P right colectomy 5/27/2018    Right hemicolectomy for cecal mass with partial SBO, Ndiaye    Severe protein-calorie malnutrition ([address] 6/8/2018    Superficial postoperative wound infection 6/8/2018      Past Surgical History:   Procedure Laterality Date    HX  SECTION      Hysterectomy,total for precancer    HX COLECTOMY Right 2018    Right hemicolectomy for cecal mass with partial SBO, Ndiaye      Social History   Substance Use Topics    Smoking status: Never Smoker    Smokeless tobacco: Never Used    Alcohol use No      Family History   Problem Relation Age of Onset    Alcohol abuse Father     Cancer Maternal Aunt      breast ca    Breast Cancer Maternal Aunt     Breast Cancer Sister     Breast Cancer Daughter      Current Facility-Administered Medications   Medication Dose Route Frequency    heparin (porcine) pf 300 Units  300 Units InterCATHeter PRN    insulin glargine (LANTUS) injection 25 Units  25 Units SubCUTAneous DAILY    insulin lispro (HUMALOG) injection   SubCUTAneous AC&HS    glucose chewable tablet 16 g  4 Tab Oral PRN    dextrose (D50W) injection syrg 12.5-25 g  12.5-25 g IntraVENous PRN    glucagon (GLUCAGEN) injection 1 mg  1 mg IntraMUSCular PRN    SITagliptin (JANUVIA) tablet tab 50 mg  50 mg Oral DAILY    cefdinir (OMNICEF) capsule 300 mg  300 mg Oral Q12H    Saccharomyces boulardii (FLORASTOR) capsule 250 mg  250 mg Oral DAILY    0.9% sodium chloride infusion  50 mL/hr IntraVENous CONTINUOUS    lidocaine (XYLOCAINE) 4 % cream   Topical PRN    ondansetron (ZOFRAN ODT) tablet 4 mg  4 mg Oral Q8H PRN    enoxaparin (LOVENOX) injection 40 mg  40 mg SubCUTAneous DAILY    sodium chloride (NS) flush 5-10 mL  5-10 mL IntraVENous PRN    midazolam (VERSED) injection 0.5 mg  0.5 mg IntraVENous Q5MIN PRN    famotidine (PEPCID) tablet 20 mg  20 mg Oral BID    lisinopril (PRINIVIL, ZESTRIL) tablet 10 mg  10 mg Oral DAILY    metoprolol tartrate (LOPRESSOR) tablet 50 mg  50 mg Oral BID    naloxone (NARCAN) injection 0.4 mg  0.4 mg IntraVENous PRN    oxyCODONE IR (ROXICODONE) tablet 5 mg  5 mg Oral Q6H PRN    sodium chloride (NS) flush 5-10 mL  5-10 mL IntraVENous Q8H    sodium chloride (NS) flush 5-10 mL 5-10 mL IntraVENous PRN    prochlorperazine (COMPAZINE) injection 5 mg  5 mg IntraVENous Q8H PRN    albuterol (PROVENTIL VENTOLIN) nebulizer solution 2.5 mg  2.5 mg Nebulization Q6H PRN    HYDROmorphone (DILAUDID) syringe 0.5 mg  0.5 mg IntraVENous Q4H PRN      Allergies   Allergen Reactions    Tylenol [Acetaminophen] Swelling      Review of Systems: A complete review of systems was obtained, negative except as described above. Physical Exam:     Visit Vitals    BP (P) 135/79    Pulse (P) 79    Temp (P) 98.1 °F (36.7 °C)    Resp (P) 18    Ht 5' 4\" (1.626 m)    Wt 187 lb (84.8 kg)    SpO2 (P) 94%    BMI 32.1 kg/m2     ECOG PS: 3  General: No acute distress, appears weak/tired, drowsy, arousable. Eyes: anicteric sclerae  HENT: Atraumatic, OP clear  Neck: Supple  GI: Abdominal distention soft, non tender  Cardiac: HRR  Respiratory: clear anterior lung fields, normal respiratory effort  Extremities: no edema  MS: unable to assess gait  Skin:  Lower extremities cool, normal turgor and texture. Psych: conversant    Results:     Lab Results   Component Value Date/Time    WBC 14.7 (H) 07/16/2018 06:31 PM    HGB 11.5 07/16/2018 06:31 PM    HCT 36.2 07/16/2018 06:31 PM    PLATELET 946 65/53/6800 06:31 PM    MCV 79.6 (L) 07/16/2018 06:31 PM    ABS.  NEUTROPHILS 10.0 (H) 07/16/2018 06:31 PM     Lab Results   Component Value Date/Time    Sodium 140 07/20/2018 02:41 AM    Potassium 4.1 07/20/2018 02:41 AM    Chloride 109 (H) 07/20/2018 02:41 AM    CO2 21 07/20/2018 02:41 AM    Glucose 151 (H) 07/20/2018 02:41 AM    BUN 12 07/20/2018 02:41 AM    Creatinine 0.47 (L) 07/20/2018 02:41 AM    GFR est AA >60 07/20/2018 02:41 AM    GFR est non-AA >60 07/20/2018 02:41 AM    Calcium 8.1 (L) 07/20/2018 02:41 AM    Glucose (POC) 127 (H) 07/20/2018 11:51 AM     Lab Results   Component Value Date/Time    Bilirubin, total 0.3 07/16/2018 06:31 PM    ALT (SGPT) 9 (L) 07/16/2018 06:31 PM    AST (SGOT) 27 07/16/2018 06:31 PM Alk. phosphatase 73 07/16/2018 06:31 PM    Protein, total 5.1 (L) 07/16/2018 06:31 PM    Albumin 1.7 (L) 07/16/2018 06:31 PM    Globulin 3.4 07/16/2018 06:31 PM     CT Results (most recent):    Results from Hospital Encounter encounter on 07/10/18   CT ABD PELV W CONT   Narrative EXAM:  CT ABD PELV W CONT    INDICATION: abdominal pain; wound abscess, fever, chills, stage IV colon CA    COMPARISON: June 8    CONTRAST:  100 mL of Isovue-370. TECHNIQUE:   Following the uneventful intravenous administration of contrast, thin axial  images were obtained through the abdomen and pelvis. Coronal and sagittal  reconstructions were generated. Oral contrast was not administered. CT dose  reduction was achieved through use of a standardized protocol tailored for this  examination and automatic exposure control for dose modulation. FINDINGS:   LUNG BASES: Interval enlargement of lower lobe pulmonary nodules. A  representative right lower lobe pulmonary nodule measures 9 mm and previously  measured 7 mm. Larger bilateral pleural effusions, right greater than left. INCIDENTALLY IMAGED HEART AND MEDIASTINUM: Unremarkable. LIVER: Interval enlargement of multiple hepatic masses. GALLBLADDER: Unremarkable. SPLEEN: No mass. PANCREAS: No mass or ductal dilatation. ADRENALS: Unremarkable. KIDNEYS: Left renal cyst.  STOMACH: Unremarkable. SMALL BOWEL: No dilatation or wall thickening. COLON: Right hemicolectomy. APPENDIX: Unremarkable. PERITONEUM: Interval development of peritoneal soft tissue masses. Moderate  ascites. RETROPERITONEUM: Retroperitoneal lymphadenopathy. REPRODUCTIVE ORGANS: Hysterectomy. URINARY BLADDER: No mass or calculus. BONES: No destructive bone lesion. ADDITIONAL COMMENTS: N/A         Impression IMPRESSION:    1.  Interval significant worsening of metastatic disease, with enlarging lung  nodules, enlarging liver lesions, retroperitoneal lymphadenopathy, and new  significant peritoneal metastatic disease with ascites. 2. Bilateral pleural effusions. 5/25/18 CT Abdomen/Pelvis:  IMPRESSION: Cecal mass with metastatic disease to liver, retroperitoneal nodes,  and lung. Relative small bowel obstruction. Associated dilated appendix. Associated complex right pleural effusion and small amount of ascites. 5/29/18 Surgical Pathology:     FINAL PATHOLOGIC DIAGNOSIS   1. Peritoneum, biopsy:   Adenocarcinoma, poorly differentiated   2. Omentum, omentectomy:   Adenocarcinoma, poorly differentiated   3. Colon and terminal ileum, right hemicolectomy:   Colonic adenocarcinoma   Appendix with tubulovillous adenoma   Addendum handling for mismatch repair proteins   Synoptic report:   Procedure: Right hemicolectomy   Tumor site: Cecum   Tumor size: 10.0 cm   Macroscopic tumor perforation: Not identified   Histologic type: Adenocarcinoma   Histologic grade: Poorly differentiated (grade 3 of 4 grades)   Tumor extension: Invades the visceral peritoneum   Margins: Positive for adenocarcinoma, radial   Proximal and distal margins negative for adenocarcinoma   Treatment effect: Not identified   Lymphovascular invasion: Present   Perineural invasion: Present   Tumor deposits: Present, at least 11   Regional lymph nodes: Five of 12 lymph nodes positive for metastatic carcinoma   Largest lymph node metastasis: 6 mm, without extracapsular extension   Ancillary testing: Immunohistochemistry for mismatch repair proteins pending      Pathologic stage (TNM):   Primary tumor: pT4a   Regional lymph nodes: pN2a   Distant metastases: pM1b     Records reviewed and summarized above. Pathology report(s) reviewed above. Radiology report(s) reviewed above. Assessment/PLAN:     1) Stage 4 colon cancer MS stable found with colon mass with obstruction post surgery with wound healing issues.    Liver and lung mets noted on CT 5/18  Patient is post surgery 5/18 for obstructing colon mass   On CTs, patient has progressive cancer with ascites   Port in place    Given progressive disease and no clear evidence of infection per ID, palliative chemotherapy initiated 7/17/18 with FOLFOX 50% dose reduced. Day 4 of cycle 1 today. She will get Neulasta today in OPI. Our office will call to coordinate follow up next week    2)  Ascites, recurrent. S/p paracentesis on 7/11/18 with 5L fluid removed. IR drain tube removed secondary to minimal drainage. Abdominal US yesterday with loculated fluid only. Will monitor for re-accumulation of ascites outpatient and consider Aspira if indicated. 3) Delayed wound healing. Now off of wound vac. Open wound with some drainage, packing required. Surgery managing  ID evaluated 7/16/17 and no evidence of infection currently    4) Lung metastasis. Reviewed on recent imaging. 5) Liver metastasis. LFTs stable. 6) Disposition. Okay for discharge today from an Oncology standpoint. She will follow up with us outpatient next week. Discussed with daughter and  this morning. They state she is feeling better and want to go home today. Call to [phone] [facility] ordered and will be administered upon discharge. Please call with any questions. Pt seen in conjunction with RUSS Segovia d/w pt/ / daughter and nursing all at bedside  D/w surgery also.      Signed By: Ej Gomez DO

## 2018-07-20 NOTE — PROGRESS NOTES
Problem: Mobility Impaired (Adult and Pediatric)  Goal: *Acute Goals and Plan of Care (Insert Text)  Physical Therapy Goals  Initiated 7/11/2018  1. Patient will move from supine to sit and sit to supine  in bed with modified independence within 7 day(s). 2.  Patient will transfer from bed to chair and chair to bed with modified independence using the least restrictive device within 7 day(s). 3.  Patient will perform sit to stand with modified independence within 7 day(s). 4.  Patient will ambulate with modified independence for 150 feet with the least restrictive device within 7 day(s). physical Therapy TREATMENT  Patient: Rolo Levy (60 y.o. female)  Date: 7/20/2018  Diagnosis: Wound Abcess, N&V  Wound, surgical, infected  Abdominal pain  unknown Malignant ascites  Procedure(s) (LRB):  PORTACATH INSERTION (Right) 7 Days Post-Op  Precautions: Fall  Chart, physical therapy assessment, plan of care and goals were reviewed. ASSESSMENT:  Patient received in bed and no family present. Explained purpose of therapy session to assess with use of rolling walker in preparation for going home later today. Patient agreeable and able to participate. She continues to have significant pain in abdomen area and low to transition in all movements. Required assist to get to sitting EOB and per staff,  assists with all mobility. Was able to ambulate with RW with contact guard assist ~20 feet with slow steady gait and returned to bed. Asked if she felt she would be able to manage the few steps to get into home and she indicated   Yes. I had previously spoken to spouse yesterday and indicates being able to manage fine. At this time feel she would benefit from home health PT to further progress mobility as able. Orders placed for rolling walker and to be delivered to room.     Progression toward goals:  []    Improving appropriately and progressing toward goals  [x]    Improving slowly and progressing toward goals  []    Not making progress toward goals and plan of care will be adjusted     PLAN:  Patient continues to benefit from skilled intervention to address the above impairments. Continue treatment per established plan of care. Discharge Recommendations:  Home Health  Further Equipment Recommendations for Discharge:  rolling walker     SUBJECTIVE:   Patient with minimal verbalization but agreeable to gait. OBJECTIVE DATA SUMMARY:   Critical Behavior:  Neurologic State: Drowsy  Orientation Level: Oriented X4  Cognition: Appropriate decision making, Appropriate for age attention/concentration, Appropriate safety awareness     Functional Mobility Training:  Bed Mobility:     Supine to Sit: Moderate assistance  Sit to Supine: Moderate assistance           Transfers:  Sit to Stand: Minimum assistance  Stand to Sit: Contact guard assistance                             Balance:  Sitting: Impaired; Without support  Sitting - Static: Good (unsupported)  Sitting - Dynamic: Good (unsupported)  Standing: Impaired; With support  Standing - Static: Fair  Standing - Dynamic : Fair  Ambulation/Gait Training:  Distance (ft): 25 Feet (ft)  Assistive Device: Gait belt;Walker, rolling           Gait Abnormalities: Antalgic;Decreased step clearance              Speed/Kassandra: Pace decreased (<100 feet/min)  Step Length: Right shortened;Left shortened         After treatment:   []    Patient left in no apparent distress sitting up in chair  [x]    Patient left in no apparent distress in bed  [x]    Call bell left within reach  [x]    Nursing notified  []    Caregiver present  []    Bed alarm activated    COMMUNICATION/COLLABORATION:   The patients plan of care was discussed with: Registered Nurse and [address], PT   Time Calculation: 22 mins

## 2018-07-21 PROBLEM — Z79.899 PALLIATIVE CHEMOTHERAPY UNDERWAY: Status: ACTIVE | Noted: 2018-01-01

## 2018-07-21 NOTE — DISCHARGE SUMMARY
Physician Discharge Summary     Patient ID:  Chirag Hernandez  [id number]  79 y.o.  1950    Admit Date: 7/10/2018    Discharge Date: 7/20/2018    Admission Diagnoses: Wound Abcess, N&V;Wound, surgical, infected; Abdominal pain;*    Admission Condition: Poor    Discharge Diagnoses:    Hospital Problems as of 7/20/2018  Date Reviewed: 7/10/2018          Codes Class Noted - Resolved POA    Superficial postoperative wound infection ICD-10-CM: T81. 4XXA  ICD-9-CM: 998.59  6/8/2018 - Present Yes    Overview Addendum 6/12/2018  6:52 PM by Abdi Soler MD     6/10/18 wound culture  Culture result:    Final      MODERATE STAPHYLOCOCCUS EPIDERMIDIS (OXACILLIN RESISTANT) (A)     6/12/18 anaerobic wound culture   Culture result:    Final     MODERATE MIXED ANAEROBIC GRAM NEGATIVE RODS BETA LACTAMASE POSITIVE (A)     Culture result:    Final     HEAVY ANAEROBIC GRAM POSITIVE COCCI (A)                  Open wound of anterior abdominal wall with complication [id number]: H02.751L  ICD-9-CM: 879.3  6/8/2018 - Present Yes        Methicillin resistant Staphylococcus epidermidis infection ICD-10-CM: A49.8, Z16.29  ICD-9-CM: 041.19  6/3/2018 - Present Yes    Overview Signed 6/11/2018 10:48 AM by Abdi Soler MD     Wound culture 6/8/18  Reported 6/10/18 - sensitive to cipro, clinda, dapto, linezolid, TMP/SMZ and vanc               S/P right colectomy ICD-10-CM: Z90.49  ICD-9-CM: V45.89  5/27/2018 - Present Yes    Overview Signed 5/29/2018  8:25 AM by Abdi Soler MD     Right hemicolectomy for cecal mass with partial SBO, Ndiaye             Malignant neoplasm of ascending colon ([address] ICD-10-CM: C18.2  ICD-9-CM: 153.6  5/26/2018 - Present Yes    Overview Signed 5/31/2018  3:49 PM by Abdi Soler MD     Results for Saurabh Granger (MRN [id number]) as of 5/31/2018 15:41   Ref.  Range 5/31/2018 13:44   CEA Latest Units: ng/mL 28.4                Nausea and vomiting in adult ICD-10-CM: R11.2  ICD-9-CM: 787.01  7/10/2018 - Present Yes        Abdominal pain ICD-10-CM: R10.9  ICD-9-CM: 789.00  7/10/2018 - Present Yes        * (Principal)Malignant ascites ICD-10-CM: R18.0  ICD-9-CM: 789.51  7/10/2018 - Present Yes        Metastatic colon cancer in female [facility]) ICD-10-CM: C78.5  ICD-9-CM: 197.5  6/25/2018 - Present Yes        Generalized abdominal pain ICD-10-CM: R10.84  ICD-9-CM: 789.07  6/25/2018 - Present Yes        Class 1 obesity due to excess calories with body mass index (BMI) of 31.0 to 31.9 in adult ICD-10-CM: E66.09, Z68.31  ICD-9-CM: 278.00, V85.31  6/25/2018 - Present Yes        Severe protein-calorie malnutrition ([facility] [address] ICD-10-CM: E43  ICD-9-CM: 262  6/8/2018 - Present Yes        Type 2 diabetes mellitus with hyperglycemia ([facility] [address] ICD-10-CM: E11.65  ICD-9-CM: 250.00  5/26/2018 - Present Yes        HTN (hypertension) ICD-10-CM: I10  ICD-9-CM: 401.9  5/23/2013 - Present Yes               Discharge Condition: Poor    Procedure(s):    Admission Date - Date of Surgery  7/10/2018 - 7/13/2018    Procedure(s):  PORTACATH INSERTION    Surgeon(s):  Lauren Tran MD  -------------------        Hospital Course: The patient was admitted due to abdominal distension and pain with inability to tolerate PO. On CT she was found to have massive ascites. 5 liters was tapped. 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 She had implanted venous access port placed 7/13/18  in anticipation of palliative chemotherapy. FOLFOX at 50% reduced dose was initiated on 7/17/18. She tolerated somewhat but was weak with loss of appetite, most likely from cancer. Repeat US showed loculated fluid but none appropriate for paracenteisi. She was ready for discharge on hospital day #12 with plans for Neulasta injection at [facility] on way home. Consults: Hematology/Oncology, Infectious Disease and palliative care medicine    Significant Diagnostic Studies: CT-scan of abdomen and pelvis 7/10/18: 1.  Interval significant worsening of metastatic disease, with enlarging lung nodules, enlarging liver lesions, retroperitoneal lymphadenopathy,and new significant peritoneal metastatic disease with ascites. Disposition: home    Patient Instructions:     Discharge Medication List as of 7/20/2018  4:46 PM      START taking these medications    Details   !! oxyCODONE IR (ROXICODONE) 5 mg immediate release tablet Take 1 Tab by mouth every eight (8) hours as needed. Max Daily Amount: 15 mg., Print, Disp-20 Tab, R-0       !! - Potential duplicate medications found. Please discuss with provider. CONTINUE these medications which have NOT CHANGED    Details   senna-docusate (PERICOLACE) 8.6-50 mg per tablet Take 1 Tab by mouth two (2) times daily as needed for Constipation. Can be obtained over-the-counter, Print, Disp-30 Tab, R-0      !! oxyCODONE IR (ROXICODONE) 5 mg immediate release tablet Take 1 Tab by mouth every eight (8) hours as needed. Max Daily Amount: 15 mg., Print, Disp-21 Tab, R-0      metoprolol tartrate (LOPRESSOR) 50 mg tablet TAKE 1 TABLET BY MOUTH TWICE DAILY, Normal, Disp-180 Tab, R-0      albuterol (PROVENTIL HFA, VENTOLIN HFA, PROAIR HFA) 90 mcg/actuation inhaler Take 1 Puff by inhalation every six (6) hours as needed for Wheezing., Normal, Disp-1 Inhaler, R-0      simvastatin (ZOCOR) 20 mg tablet Take 1 Tab by mouth nightly., Normal, Disp-90 Tab, R-1, RICARDO      lisinopril (PRINIVIL, ZESTRIL) 10 mg tablet Take 1 Tab by mouth daily. , Normal, Disp-90 Tab, R-1      sAXagliptin-metFORMIN (KOMBIGLYZE XR) 5-1,000 mg TM24 Take 1 tab po QD, Normal, Disp-90 Tab, R-1      dapagliflozin (FARXIGA) 10 mg tab tablet Take 1 Tab by mouth daily. , Normal, Disp-90 Tab, R-1      fexofenadine (ALLEGRA) 180 mg tablet Take 1 Tab by mouth daily. , Normal, Disp-30 Tab, R-3      fluticasone (FLONASE) 50 mcg/actuation nasal spray 2 Sprays by Both Nostrils route daily. , Normal, Disp-1 Bottle, R-2      insulin glargine (LANTUS SOLOSTAR) 100 unit/mL (3 mL) inpn INJECT 47 UNITS UNDER THE SKIN IN THE MORNING AND [address] [place] [address] ( please give 90 day supply 5 pens per box, give 6 boxes=30 pens), Normal, Disp-30 Pen, R-3      cholecalciferol, VITAMIN D3, (VITAMIN D3) 5,000 unit tab tablet Take 5,000 Units by mouth daily. 1 tablet daily, Historical Med      ferrous sulfate (IRON) 325 mg (65 mg iron) tablet Take  by mouth Daily (before breakfast). , Historical Med      aspirin delayed-release 81 mg tablet Take 81 mg by mouth daily. , Historical Med      calcium 600 mg Cap Take 600 mg by mouth daily. 1 tablet daily, Historical Med      ammonium lactate (AMLACTIN) 12 % topical cream APPLY 2 GRAMS TO AFFECTED AREA TWICE A DAY. RUB IN TO AFFECTED AREA WELL, Normal, Disp-1 Tube, R-2      clotrimazole (LOTRIMIN) 1 % topical cream Apply  to affected area two (2) times a day., Normal, Disp-30 g, R-0       !! - Potential duplicate medications found. Please discuss with provider. Activity: Activity as tolerated  Diet: Diabetic Diet  Wound Care: routine wound care--wet-to-dry dressing     Follow-up with Mila Candelaria MD in 2 week(s)  Follow-up with Dr. Ras Candelaria.     Signed:  Mila Candelaria MD  7/21/2018  11:16 PM

## 2018-07-23 PROBLEM — A41.9 SEPTIC SHOCK (HCC): Status: ACTIVE | Noted: 2018-01-01

## 2018-07-23 PROBLEM — N30.00 ACUTE CYSTITIS WITHOUT HEMATURIA: Status: ACTIVE | Noted: 2018-01-01

## 2018-07-23 PROBLEM — K92.2 UPPER GI BLEED: Status: ACTIVE | Noted: 2018-01-01

## 2018-07-23 PROBLEM — I46.9 CARDIAC ARREST (HCC): Status: ACTIVE | Noted: 2018-01-01

## 2018-07-23 PROBLEM — J90 PLEURAL EFFUSION, BILATERAL: Status: ACTIVE | Noted: 2018-01-01

## 2018-07-23 PROBLEM — R65.21 SEPTIC SHOCK (HCC): Status: ACTIVE | Noted: 2018-01-01

## 2018-07-23 NOTE — PROGRESS NOTES
Date of previous inpatient admission/ ED visit? Pt was previously admitted from 7/10/18-7/20/18 with cc of malignant ascites    What brought the patient back to ED? Pt presented to the ED via EMS with cc of cardiac arrest    Did patient decline recommended services during last admission/ ED visit (if yes, what)? No    Has patient seen a provider since their last inpatient admission/ED visit (if yes, when)? Pt has been seen by home health prior to being seen in the ED    CM Interventions:  From previous inpatient admission/ED visit: Pt was discharged home with home health     From current inpatient admission/ED visit: Pt presented to cardiac arrest to this ED. Pt is currently on a ventilator and will require admission at this time. CM will continue to remain available for support, discharge planning as needed.      Willi Haji, MSW  [facility]  [phone]

## 2018-07-23 NOTE — PROGRESS NOTES
7589 - Admitted to CCU 2514,  Dr. Marvin Began at bedside, updated on condition, aware of critical lactic acid level and troponin, admission orders noted. 200 -  at bedside, updated on condition, questions answered and re-assurance given. Given visiting pamphlet and access code, verbalized understanding.

## 2018-07-23 NOTE — TELEPHONE ENCOUNTER
Spoke with [place]. Per Orin Daughters cannot assess for abd distention. Recommended ED.   ( 9 min)

## 2018-07-23 NOTE — ED NOTES
+Pulse at this time     Bedside US performed by Dr Vidya Goodman showing \"crappy squeeze\" of heart and \"free fluid\" in abdomen    Pharmacy making Levophed at this time. 1 L of NS hung via pressure bag    Vika with Pastoral Care bedside and states family in consult room and aware that CPR was performed.

## 2018-07-23 NOTE — TELEPHONE ENCOUNTER
Spoke with . HIPAA verified. Stated patient is fatigued. Stated abd is distended. Rated abd pain 7/10 and is taking oxycodone. Reported last BM 2 days ago. Stated patient is drinking well 4-5 cups water. Stated patient is not eating. Wanted commode, but stated insurance dose not cover. Discussed home health referral to assess needs and  declined. Support given.   ([place])

## 2018-07-23 NOTE — PROGRESS NOTES
Chemo/FOLFOX orders for cycle 2-12 faxed complete to [facility] Scheduling.  is contact for appointments.

## 2018-07-23 NOTE — PROGRESS NOTES
Responded to page informing that pt's  had arrived. Met with  in waiting room and engaged in dialogue in native Green[place]ic tongue. Led  in processing while providing active listening.  shared details of pt's hammond with cancer and events that had taken place this morning which were distressing to . Waiting for children to arrive to discuss plan of care. Identify as Latter-day and spoke about [facility] adding that he is an optimist and feels that while pt is alive there is a chance. Kept  company until he was able to come back to pt's room. Stepped aside to provide some privacy as  spoke to pt softly and tenderly. Reengaged shortly after as  shared details of 39 year marriage. Provided ministry of presence throughout. Per 's approval at end of visit reached out to Silva Pop asking for anointing of the sick which will take place in the morning. Communicated this to  who expressed appreciation. Stepped away to attend to another matter in the E.R.  extended appreciation. DESI Austin. Martín Davila

## 2018-07-23 NOTE — PROGRESS NOTES
Spiritual Care Assessment/Progress Note  [facility]      NAME: Anca Dobbins      MRN: [id number]  AGE: 79 y.o. SEX: female  Restorationism Affiliation: Protestant   Language: English     7/23/2018     Total Time (in minutes): 45     Spiritual Assessment begun in \[facility]\"" EMERGENCY DEPT through conversation with:         [x]Patient        [] Family    [] Friend(s)        Reason for Consult: Code Blue/99, Emergency Department visit     Spiritual beliefs: (Please include comment if needed)     [] Identifies with a lon tradition:         [] Supported by a lon community:            [] Claims no spiritual orientation:           [] Seeking spiritual identity:                [] Adheres to an individual form of spirituality:           [x] Not able to assess:                           Identified resources for coping:      [] Prayer                               [] Music                  [] Guided Imagery     [x] Family/friends                 [] Pet visits     [] Devotional reading                         [] Unknown     [] Other:                                              Interventions offered during this visit: (See comments for more details)    Patient Interventions: Crisis, Initial/Spiritual assessment, patient floor     Family/Friend(s):  Affirmation of emotions/emotional suffering, Normalization of emotional/spiritual concerns, Catharsis/review of pertinent events in supportive environment, End of life issues discussed     Plan of Care:     [x] Support spiritual and/or cultural needs    [] Support AMD and/or advance care planning process      [] Support grieving process   [] Coordinate Rites and/or Rituals    [] Coordination with community clergy   [] No spiritual needs identified at this time   [] Detailed Plan of Care below (See Comments)  [] Make referral to Music Therapy  [] Make referral to Pet Therapy     [] Make referral to Addiction services  [] Make referral to [facility]  [] Make referral to Spiritual Care Partner  [] No future visits requested        [x] Follow up visits as needed     Comments:   Responded to page for cardiac arrest in ER. Patient's  was in ER waiting room and chose to stay there until a doctor could speak with him. Accompanied Dr. Claire Shore and Юлия Oren to family consult room where Dr. Claire Shore updated  on patient's current condition. Mr. Frances Woodard had to leave hospital to get his daughter as she does not drive. Zunilda Puri will assume care when Mr. Frances Woodard arrives.   Loy Christianson, KENIA, [facility], [address] [place] [address] [employer] Paging Service  [phone])

## 2018-07-23 NOTE — ED NOTES
Dr Odin Lafleur of pt PIV having infiltrated at this time     Per Dr Odin Lafleur Pt to receive 1st unit of PRBC via IO at this time.

## 2018-07-23 NOTE — ED NOTES
Bedside shift change report given to [address] Knott (oncoming nurse) by Crystal Amaral RN (offgoing nurse). Report included the following information SBAR and ED Summary.

## 2018-07-23 NOTE — PROGRESS NOTES
Pharmacy Clarification of Prior to Admission Medication Regimen     The patient was not interviewed regarding clarification of the prior to admission medication regimen due to being intubated. Patient's  was present in room and was able to verify the patient's PTA medication regimen and the last doses administered. Patient's  was questioned regarding the patient's use of any other inhalers, topical products, over the counter medications, herbal medications, vitamin products or ophthalmic/nasal/otic medication use. Information Obtained From: Rx Query and patient's      Pertinent Pharmacy Findings:   OTHER: IV Chemotherapy: Patient's  stated the patient received IV chemotherapy at [facility] last Tuesday, 07/17/2018.  Patient's  stated the only medications the patient has taken since being discharged from the hospital on Friday, 07/20/2018, is the Lantus, Metoprolol, and Oxycodone. Patient's  stated the last time the patient had all of her other medications were prior to her admission to the hospital on 07/10/2018. albuterol (PROVENTIL HFA, VENTOLIN HFA, PROAIR HFA) 90 mcg/actuation inhaler, ammonium lactate (AMLACTIN) 12 % topical cream, clotrimazole (LOTRIMIN AF, CLOTRIMAZOLE,) 1 % topical cream, fexofenadine (ALLEGRA) 180 mg tablet, fluticasone (FLONASE ALLERGY RELIEF) 50 mcg/actuation nasal spray: Patient's  stated the patient has these agents at home and takes them \"as needed. \"    PTA medication list was corrected to the following:     Prior to Admission Medications   Prescriptions Last Dose Informant Patient Reported? Taking?    OTHER 7/17/2018 at Unknown time Significant Other Yes Yes   Sig: IV Chemotherapy: last treatment was on Tuesday, 07/17/2018   albuterol (PROVENTIL HFA, VENTOLIN HFA, PROAIR HFA) 90 mcg/actuation inhaler Not Taking at Unknown time Significant Other No No   Sig: Take 1 Puff by inhalation every six (6) hours as needed for Wheezing. ammonium lactate (AMLACTIN) 12 % topical cream Not Taking at Unknown time Significant Other Yes No   Sig: Apply  to affected area two (2) times daily as needed (\"Skin irritation\"). rub in to affected area well   aspirin delayed-release 81 mg tablet 7/10/2018 at Unknown time Significant Other Yes Yes   Sig: Take 81 mg by mouth daily. calcium 600 mg Cap 7/10/2018 at Unknown time Significant Other Yes Yes   Sig: Take 600 mg by mouth daily. cholecalciferol, VITAMIN D3, (VITAMIN D3) 5,000 unit tab tablet 7/10/2018 at Unknown time Significant Other Yes Yes   Sig: Take 5,000 Units by mouth daily. clotrimazole (LOTRIMIN AF, CLOTRIMAZOLE,) 1 % topical cream Not Taking at Unknown time Significant Other Yes No   Sig: Apply  to affected area two (2) times daily as needed for Skin Irritation. dapagliflozin (FARXIGA) 10 mg tab tablet 7/10/2018 at Unknown time Significant Other No Yes   Sig: Take 1 Tab by mouth daily. fexofenadine (ALLEGRA) 180 mg tablet Not Taking at Unknown time Significant Other Yes No   Sig: Take 180 mg by mouth daily as needed for Allergies. fluticasone (FLONASE ALLERGY RELIEF) 50 mcg/actuation nasal spray Not Taking at Unknown time Significant Other Yes No   Si Sprays by Both Nostrils route daily as needed for Allergies. insulin glargine (LANTUS SOLOSTAR U-100 INSULIN) 100 unit/mL (3 mL) inpn 2018 at Unknown time Significant Other Yes Yes   Si Units by SubCUTAneous route two (2) times a day. lisinopril (PRINIVIL, ZESTRIL) 10 mg tablet 2018 at Unknown time Significant Other No Yes   Sig: Take 1 Tab by mouth daily. metoprolol tartrate (LOPRESSOR) 50 mg tablet 2018 at Unknown time Significant Other No Yes   Sig: TAKE 1 TABLET BY MOUTH TWICE DAILY   oxyCODONE IR (ROXICODONE) 5 mg immediate release tablet 2018 at Unknown time Significant Other No Yes   Sig: Take 1 Tab by mouth every eight (8) hours as needed. Max Daily Amount: 15 mg. sAXagliptin-metFORMIN (KOMBIGLYZE XR) 5-1,000 mg TM24 7/10/2018 at Unknown time Significant Other No Yes   Sig: Take 1 tab po QD   simvastatin (ZOCOR) 20 mg tablet 7/10/2018 at Unknown time Significant Other No Yes   Sig: Take 1 Tab by mouth nightly.       Facility-Administered Medications: None          Thank you,  Carmelo Milton CPhT  Medication History Pharmacy Technician

## 2018-07-23 NOTE — TELEPHONE ENCOUNTER
Discussed with Lili Galvin NP. Patient can use [facility] and or ED. HIPAA verified. Spoke with . Stated patient is very weak. Advised of above recommendations.  stated would like [facility] visit.

## 2018-07-23 NOTE — ED NOTES
ET pulled back 3 cm by RT Per Consuelo pt 25 @ the teeth    Portable chest x-ray being retaken at this time.

## 2018-07-23 NOTE — H&P
Hospitalist Admission Note NAME: Nelly Cantu :  1950 MRN:  [id number] Date/Time:  2018 4:04 PM 
 
Patient PCP: Alysha Christopher Onc:  Dr. Enzo Olszewski Surgeon:  Dr. Ary Ortiz 
______________________________________________________________________ Assessment & Plan: S/p out of hospital asystolic cardiac arrest 
--EKG with indeterminate rhythm and new septal infarct 
--troponin 1.25. CTA chest negative for PE or dissection. --consult cardiology but limited treatment option given gastroccult + brown liquid drainage via NG tube and concern for GI bleeding. 
--echo 
--serial enzymes Acute encephalopathy, POA  
--post arrest.  Has startle reflex, seems to be breathing above vent, pupils constricted but reactive. However, not on any sedation and no spontaneous movement 
--CT head negative except right maxillary air fluid level 
--will need to see what neurologic function/recovery she will have in next 24-48 hours. Shock, cardiogenic vs septic shock Leukocytosis WBC 55K s/p recent chemo with FOLFOX and then neulasta 18 Recent abdominal wound infection with MRSE (staph epi) Possible right maxillary sinusitis with air fluid level on CT 
--on levophed drip 
--give albumin 
--gentle IVF 
--get blood cultures, lactic. Check UA with reflex culture. Urine looks turbid in catheter tubing 
--empiric abx with cefepime and vancomycin Stage 4 colon CA with malignant ascites and bilateral pleural effusions which likely will be malignant. Liver and right lung mets --dx 2018 s/p right hemicolectomy complicated with recurrent wound infection requiring wound vac last month 
--paracentesis with 5L fluid removal earlier this month 
--had FOLFOX chemo 1st round  and then neulasta . 
--consult oncology. --consider palliative care consult Acute on chronic anemia due to possible upper GI bleed vs. gastritis --hgb 9.3, previously - 
--brown gastroccult + liquid drainage via OG tube. --IV protonix q12h 
--transfused 2 units uncrossed blood with repeat hgb 10.5. Monitor H&H 
--also given 2 units FFP for coagulopathy INR 1.5 
--consider GI consult depending on clinical course and neurological outcome Acute respiratory failure Large Bilateral pleural effusions, suspect malignant effusions --intubated. Oxygenating ok on 100% FIO2 
--consider therapeutic and diagnostic thoracentesis once hemodynamically stable. AL 
--Cr 1.4, previously 0.47. 
--osorio placed in ER, check UA, no hydronephrosis on CT 
--gentle IVF. --hold lisinopril DM 
--hold lantus, metformin, saxagliptin, farxiga 
--low dose SSI 
 
HTN Hyperlipidemia 
--hold zocor due to actuely elevated AST, ALP Body mass index is 32.17 kg/(m^2). Code: discussed with , full DVT prophylaxis:  SCD Surrogate decision maker:   Jayne Cullen Patient critically ill and high risk of dying but  does not want to \"give up\" at this time. Subjective: CHIEF COMPLAINT:  Unresponsive, asystolic cardiac arrest 
 
HISTORY OF PRESENT ILLNESS:    
Kory Spring is a 79 y.o.  female with PMH with stage 4 metastatic colon CA dx 5/18 s/p right hemicolectomy, malignant ascites with 5L paracentesis earlier this month, followed by recurrent abdominal wound infection requiring wound vac last month, wound culture with methicillin resistant staph epidermis, DM, hyperlipidemia, HTN who started on first round of chemotherapy with FOLFOX on 7/17 and received neulasta who presents by EMS after asystolic cardiac arrest.   
 
History from  Jayne Cullen at bedside. He is originally from [place], speaks English, declined interpretor. Patient been having b/l flank pain, poor appetite that had improved somewhat after paracentesis. He had given her oxycodone earlier this morning. Been very weak. He assisted her to sit up by bedside when she suddenly slumped over and had no pulse.   He performed CPR before EMS arrived. No hx of MI or arrhythmia or chf. 
 
EMS report noted patient in asystole, had large amount of brown watery emesis. Given epi x 2 and went into Vtach. Was defibrillator x 2 and then went into sinus tach. OG placed with large amount of brown fluid drainage. In ER, patient patient coded again, asystole, then narrow complex PEA before once again successfully resuscitated. Started on levophed. We were asked to admit for work up and evaluation of the above problems. Past Medical History:  
Diagnosis Date  Anemia NEC  Cancer ([facility] [place] [address]  Cardiac arrest ([facility] [place] [address] 2018  Class 1 obesity due to excess calories with body mass index (BMI) of 31.0 to 31.9 in adult 2018  Diabetes ([facility] [place] [address]  Headache(784.0)  Hypercholesterolemia  Hypertension  Malignant neoplasm of ascending colon ([facility] [place] [address] 2018 Stage IVc  Palliative chemotherapy underway 2018 FOLFOX 50% dose reduced  S/P right colectomy 2018 Right hemicolectomy for cecal mass with partial SBO, Nav[place]hin [place]  Severe protein-calorie malnutrition ([facility] [place] [address] 2018  Superficial postoperative wound infection 2018 Past Surgical History:  
Procedure Laterality Date  HX  SECTION Hysterectomy,total for precancer  HX COLECTOMY Right 2018 Right hemicolectomy for cecal mass with partial SBO, [place] Social History Substance Use Topics  Smoking status: Never Smoker  Smokeless tobacco: Never Used  Alcohol use No  
 
Family History Problem Relation Age of Onset  Alcohol abuse Father  Cancer Maternal Aunt   
  breast ca  Breast Cancer Maternal Aunt  Breast Cancer Sister  Breast Cancer Daughter Allergies Allergen Reactions  Tylenol [Acetaminophen] Swelling Prior to Admission medications Medication Sig Start Date End Date Taking?  Authorizing Provider  
insulin glargine (LANTUS SOLOSTAR U-100 INSULIN) 100 unit/mL (3 mL) inpn 30 Units by SubCUTAneous route two (2) times a day. Yes Historical Provider OTHER IV Chemotherapy: last treatment was on Tuesday, 07/17/2018   Yes Historical Provider  
oxyCODONE IR (ROXICODONE) 5 mg immediate release tablet Take 1 Tab by mouth every eight (8) hours as needed. Max Daily Amount: 15 mg. 6/1/18  Yes Gaurav Archuleta MD  
metoprolol tartrate (LOPRESSOR) 50 mg tablet TAKE 1 TABLET BY MOUTH TWICE DAILY 5/1/18  Yes Yoni Smith MD  
simvastatin (ZOCOR) 20 mg tablet Take 1 Tab by mouth nightly. 3/19/18  Yes Yoni Smith MD  
lisinopril (PRINIVIL, ZESTRIL) 10 mg tablet Take 1 Tab by mouth daily. 3/19/18  Yes Yoni Smith MD  
sAXagliptin-metFORMIN (KOMBIGLYZE XR) 5-1,000 mg BF61 Take 1 tab po QD 3/19/18  Yes Yoni Smith MD  
dapagliflozin [facility]) 10 mg tab tablet Take 1 Tab by mouth daily. 3/19/18  Yes Yoni Smith MD  
cholecalciferol, VITAMIN D3, (VITAMIN D3) 5,000 unit tab tablet Take 5,000 Units by mouth daily. Yes Historical Provider  
aspirin delayed-release 81 mg tablet Take 81 mg by mouth daily. Yes Historical Provider  
calcium 600 mg Cap Take 600 mg by mouth daily. Yes Historical Provider  
ammonium lactate (AMLACTIN) 12 % topical cream Apply  to affected area two (2) times daily as needed (\"Skin irritation\"). rub in to affected area well    Historical Provider  
clotrimazole (LOTRIMIN AF, CLOTRIMAZOLE,) 1 % topical cream Apply  to affected area two (2) times daily as needed for Skin Irritation. Historical Provider  
fluticasone (FLONASE ALLERGY RELIEF) 50 mcg/actuation nasal spray 2 Sprays by Both Nostrils route daily as needed for Allergies. Historical Provider  
fexofenadine (ALLEGRA) 180 mg tablet Take 180 mg by mouth daily as needed for Allergies. Historical Provider  
albuterol (PROVENTIL HFA, VENTOLIN HFA, PROAIR HFA) 90 mcg/actuation inhaler Take 1 Puff by inhalation every six (6) hours as needed for Wheezing.  3/19/18   Yoni Smith MD  
 
REVIEW OF SYSTEMS: POSITIVE= Bold. Negative = normal text Unable to obtain due to mental status Objective: VITALS:   
Visit Vitals  /80  Pulse 98  Temp 97.9 °F (36.6 °C)  Resp 21  
 Wt 85 kg (187 lb 6.3 oz)  SpO2 100%  BMI 32.17 kg/m2 Temp (24hrs), Av °F (36.1 °C), Min:92.8 °F (33.8 °C), Max:98.4 °F (36.9 °C) Body mass index is 32.17 kg/(m^2). PHYSICAL EXAM: 
 
General:    Obese female, ill appearing, intubated. Startles easily to touch with eye opening. No spontaneous movement. Appears older stated age. HEENT: Atraumatic, anicteric sclerae, pink conjunctivae Pupils 2mm, sluggishly reactive. OG with brown liquid drainage, no red blood Neck:  Supple, symmetrical,  thyroid: non tender Lungs:   Coarse crackles on left, decreased BS on right with crackles. No Wheezing or Rhonchi. Chest wall:  Portacath in right upper chest with fresh healing incision with some bruising without redness or drainage. No fluctuance. No Accessory muscle use. Heart:   Regular  rhythm,  No  murmur   No gallop. 1+ pitting edema. Abdomen:   Obese, mildly distended. Firm. Bowel sounds hypoactive. No masses. Small wound with packing in lower abdomen without redness or drainage. Extremities: No cyanosis. No clubbing Skin:     Not pale Not Jaundiced  No rashes. Left upper arm with large purple bruise with soft tissue swelling Neurologic: +startle reflex to touch. Pupils constricted 2mm, sluggish reactive b/l. No posturing or twitching Peripheral pulse: Bilateral, DP, 1+ Cap refill:  normal 
 
IMAGING RESULTS: 
 []       I have personally reviewed the actual   []     CXR  []     CT scan CXR: 
CT : 
EKG: 
 ________________________________________________________________________ Care Plan discussed with: 
  Comments Patient Family  y  bedside RN Care Manager                Consultant:  padmini Plascencia ________________________________________________________________________ Prophylaxis: 
GI PPI  
DVT SCD  
________________________________________________________________________ Recommended Disposition: TBD 
________________________________________________________________________ Code Status: 
Full Code y  
DNR/DNI   
________________________________________________________________________ TOTAL TIME:  45 minutes critical care Comments  
 y Reviewed previous records  
 
______________________________________________________________________ Dar Lawson MD 
 
 
Procedures: see electronic medical records for all procedures/Xrays and details which were not copied into this note but were reviewed prior to creation of Plan. LAB DATA REVIEWED:   
Recent Results (from the past 24 hour(s)) GLUCOSE, POC Collection Time: 07/23/18 11:00 AM  
Result Value Ref Range Glucose (POC) 148 (H) 65 - 100 mg/dL Performed by Porfirio Hung (PCT) CBC WITH AUTOMATED DIFF Collection Time: 07/23/18 11:05 AM  
Result Value Ref Range WBC 55.0 (HH) 3.6 - 11.0 K/uL  
 RBC 3.67 (L) 3.80 - 5.20 M/uL HGB 9.3 (L) 11.5 - 16.0 g/dL HCT 31.7 (L) 35.0 - 47.0 % MCV 86.4 80.0 - 99.0 FL  
 MCH 25.3 (L) 26.0 - 34.0 PG  
 MCHC 29.3 (L) 30.0 - 36.5 g/dL  
 RDW 16.5 (H) 11.5 - 14.5 % PLATELET 986 376 - 397 K/uL MPV 9.2 8.9 - 12.9 FL  
 NRBC 0.9 (H) 0  WBC ABSOLUTE NRBC 0.51 (H) 0.00 - 0.01 K/uL NEUTROPHILS 86 (H) 32 - 75 % BAND NEUTROPHILS 8 % LYMPHOCYTES 6 (L) 12 - 49 % MONOCYTES 0 (L) 5 - 13 % EOSINOPHILS 0 0 - 7 % BASOPHILS 0 0 - 1 % IMMATURE GRANULOCYTES 0 0.0 - 0.5 % ABS. NEUTROPHILS 51.7 (H) 1.8 - 8.0 K/UL  
 ABS. LYMPHOCYTES 3.3 0.8 - 3.5 K/UL  
 ABS. MONOCYTES 0.0 0.0 - 1.0 K/UL  
 ABS. EOSINOPHILS 0.0 0.0 - 0.4 K/UL  
 ABS. BASOPHILS 0.0 0.0 - 0.1 K/UL  
 ABS. IMM.  GRANS. 0.0 0.00 - 0.04 K/UL  
 DF MANUAL    
 RBC COMMENTS MALLORIE CELLS 
PRESENT 
    
 RBC COMMENTS ANISOCYTOSIS 
1+ PROTHROMBIN TIME + INR Collection Time: 07/23/18 11:05 AM  
Result Value Ref Range INR 1.5 (H) 0.9 - 1.1 Prothrombin time 15.1 (H) 9.0 - 11.1 sec METABOLIC PANEL, COMPREHENSIVE Collection Time: 07/23/18 11:05 AM  
Result Value Ref Range Sodium 148 (H) 136 - 145 mmol/L Potassium 4.4 3.5 - 5.1 mmol/L Chloride 111 (H) 97 - 108 mmol/L  
 CO2 19 (L) 21 - 32 mmol/L Anion gap 18 (H) 5 - 15 mmol/L Glucose 114 (H) 65 - 100 mg/dL BUN 22 (H) 6 - 20 MG/DL Creatinine 1.40 (H) 0.55 - 1.02 MG/DL  
 BUN/Creatinine ratio 16 12 - 20 GFR est AA 46 (L) >60 ml/min/1.73m2 GFR est non-AA 38 (L) >60 ml/min/1.73m2 Calcium 8.1 (L) 8.5 - 10.1 MG/DL Bilirubin, total 0.2 0.2 - 1.0 MG/DL  
 ALT (SGPT) 43 12 - 78 U/L  
 AST (SGOT) 218 (H) 15 - 37 U/L Alk. phosphatase 240 (H) 45 - 117 U/L Protein, total 4.6 (L) 6.4 - 8.2 g/dL Albumin 1.3 (L) 3.5 - 5.0 g/dL Globulin 3.3 2.0 - 4.0 g/dL A-G Ratio 0.4 (L) 1.1 - 2.2    
TROPONIN I Collection Time: 07/23/18 11:05 AM  
Result Value Ref Range Troponin-I, Qt. 1.25 (H) <0.05 ng/mL NT-PRO BNP Collection Time: 07/23/18 11:05 AM  
Result Value Ref Range NT pro-BNP 55038 (H) 0 - 125 PG/ML  
EMERGENT RELEASE OF UNCROSSMATCHED RED CELLS Collection Time: 07/23/18 11:05 AM  
Result Value Ref Range Crossmatch Expiration 07/26/2018 ABO/Rh(D) O NEGATIVE Antibody screen NEG Unit number [id number] Blood component type RC LR,1 Unit division 00 Status of unit ISSUED Crossmatch result Compatible Unit number [id number] Blood component type RC LR,2 Unit division 00 Status of unit ISSUED Crossmatch result Compatible Unit number [id number] Blood component type RC LR Unit division 00 Status of unit ALLOCATED Crossmatch result Compatible Unit number [id number] Blood component type RC LR Unit division 00  Status of unit ALLOCATED Crossmatch result Compatible PATHOLOGIST REVIEW Collection Time: 07/23/18 11:05 AM  
Result Value Ref Range Pathologist review (NOTE) EKG, 12 LEAD, INITIAL Collection Time: 07/23/18 11:12 AM  
Result Value Ref Range Ventricular Rate 165 BPM  
 Atrial Rate 85 BPM  
 P-R Interval 176 ms QRS Duration 74 ms Q-T Interval 298 ms QTC Calculation (Bezet) 493 ms Calculated P Axis 26 degrees Calculated R Axis -72 degrees Calculated T Axis 31 degrees Diagnosis Undetermined rhythm Left axis deviation Pulmonary disease pattern Septal infarct , age undetermined When compared with ECG of 27-MAY-2018 09:44, 
Current undetermined rhythm precludes rhythm comparison, needs review QRS duration has decreased QRS voltage has decreased Septal infarct is now present Confirmed by Veronica Coley ([id number]) on 7/23/2018 12:26:04 PM 
  
OCCULT BLOOD, GASTRIC Collection Time: 07/23/18 11:20 AM  
Result Value Ref Range OCCULT BLOOD,GASTRIC POSITIVE (A) NEG    
 pH,GASTRIC 4 (H) 1.5 - 3.5    
FFP, ALLOCATE Collection Time: 07/23/18 12:15 PM  
Result Value Ref Range Unit number [id number] Blood component type FP 24h, Thaw Unit division 00 Status of unit ISSUED Unit number [id number] Blood component type FP 24h, Thaw Unit division 00 Status of unit ISSUED   
BLOOD GAS, ARTERIAL Collection Time: 07/23/18 12:30 PM  
Result Value Ref Range pH 7.19 (LL) 7.35 - 7.45    
 PCO2 38 35.0 - 45.0 mmHg PO2 84 80 - 100 mmHg O2 SAT 94 92 - 97 % BICARBONATE 14 (L) 22 - 26 mmol/L  
 BASE DEFICIT 13.2 mmol/L  
 O2 METHOD VENTILATOR    
 O2 FLOW RATE 16.00 L/min FIO2 100 % MODE A/C Tidal volume 450 SET RATE 16    
 EPAP/CPAP/PEEP 6.0 Sample source ARTERIAL    
 SITE LEFT BRACHIAL BEATA'S TEST N/A Critical value read back Tanner Dejesus md   
HGB & HCT Collection Time: 07/23/18  2:47 PM  
Result Value Ref Range  HGB 10.5 (L) 11.5 - 16.0 g/dL HCT 33.8 (L) 35.0 - 47.0 %

## 2018-07-23 NOTE — ED NOTES
Pt cleansed of loose brown stool     Pt remains unresponsive to stimulation RR noted > 30 at this time with ventilator     A/C Rate 16 Peep 6 FiO2 100%

## 2018-07-23 NOTE — PROGRESS NOTES
1653 - Assumed care for new admit pt. Shift assessment complete. See doc flowsheet for details. Pt. Is unresponsive. Pupils are equally round and reactive. No extremities withdraw to pain. Skin temperature is cool and pale. Small blister-like lesion present on right side of thorac. ET tube, OG tube, femoral line, 1 PIV, EJ line and osorio catheter present. Cardiac monitor denotes sinus tachycardia. Lung sounds are coarse and diminished in lower lobes bilaterally. Bowel sounds are hypoactive, abdominal ascites noted. 1900 - Bedside verbal report given to oncoming shift.

## 2018-07-23 NOTE — CONSULTS
GI Consultation Note Pamela Corona) NAME: Leandro Maher : 1950 MRN: [id number] ATTG: Mi Barrow MD 
PCP: [facility] Nurse Navigator (Inactive) Date/Time:  2018 3:55 PM 
Subjective:  
REASON FOR CONSULT:     
Kelli Klinefelter is a 79 y.o.  female who I was asked to see for upper GI bleed with positive gastric occult blood. Medical history includes DM, HTN, HLD, and stage IV colon adenocarcinoma with right hemicolectomy (18) right after diagnosis on CT scan. Came to ER today post cardiac arrest with ROSC. Found to have positive gastric occult in OG tube drainage but brown stools per nursing. WBC 55.0. Hgb 9.3, Hct 31.7, MCV 86.4, Plt 268, INT 1.5, BUN elevated from baseline at 22. Unable obtain history, patient unresponsive and spouse not at bedside. Past Medical History:  
Diagnosis Date  Anemia NEC  Cancer ([address]  Cardiac arrest ([facility] 2018  Class 1 obesity due to excess calories with body mass index (BMI) of 31.0 to 31.9 in adult 2018  Diabetes ([address]  Headache(784.0)  Hypercholesterolemia  Hypertension  Malignant neoplasm of ascending colon ([facility] 2018 Stage IVc  Palliative chemotherapy underway 2018 FOLFOX 50% dose reduced  S/P right colectomy 2018 Right hemicolectomy for cecal mass with partial SBO, [place]  Severe protein-calorie malnutrition ([address] 2018  Superficial postoperative wound infection 2018 Past Surgical History:  
Procedure Laterality Date  HX  SECTION Hysterectomy,total for precancer  HX COLECTOMY Right 2018 Right hemicolectomy for cecal mass with partial SBO, [place] Social History Substance Use Topics  Smoking status: Never Smoker  Smokeless tobacco: Never Used  Alcohol use No  
  
Family History Problem Relation Age of Onset  Alcohol abuse Father  Cancer Maternal Aunt   
  breast ca  Breast Cancer Maternal Aunt  Breast Cancer Sister  Breast Cancer Daughter Allergies Allergen Reactions  Tylenol [Acetaminophen] Swelling Home Medications: 
Prior to Admission Medications Prescriptions Last Dose Informant Patient Reported? Taking? OTHER 2018 at Unknown time Significant Other Yes Yes Sig: IV Chemotherapy: last treatment was on Tuesday, 2018  
albuterol (PROVENTIL HFA, VENTOLIN HFA, PROAIR HFA) 90 mcg/actuation inhaler Not Taking at Unknown time Significant Other No No  
Sig: Take 1 Puff by inhalation every six (6) hours as needed for Wheezing. ammonium lactate (AMLACTIN) 12 % topical cream Not Taking at Unknown time Significant Other Yes No  
Sig: Apply  to affected area two (2) times daily as needed (\"Skin irritation\"). rub in to affected area well  
aspirin delayed-release 81 mg tablet 7/10/2018 at Unknown time Significant Other Yes Yes Sig: Take 81 mg by mouth daily. calcium 600 mg Cap 7/10/2018 at Unknown time Significant Other Yes Yes Sig: Take 600 mg by mouth daily. cholecalciferol, VITAMIN D3, (VITAMIN D3) 5,000 unit tab tablet 7/10/2018 at Unknown time Significant Other Yes Yes Sig: Take 5,000 Units by mouth daily. clotrimazole (LOTRIMIN AF, CLOTRIMAZOLE,) 1 % topical cream Not Taking at Unknown time Significant Other Yes No  
Sig: Apply  to affected area two (2) times daily as needed for Skin Irritation. dapagliflozin (FARXIGA) 10 mg tab tablet 7/10/2018 at Unknown time Significant Other No Yes Sig: Take 1 Tab by mouth daily. fexofenadine (ALLEGRA) 180 mg tablet Not Taking at Unknown time Significant Other Yes No  
Sig: Take 180 mg by mouth daily as needed for Allergies. fluticasone (FLONASE ALLERGY RELIEF) 50 mcg/actuation nasal spray Not Taking at Unknown time Significant Other Yes No  
Si Sprays by Both Nostrils route daily as needed for Allergies. insulin glargine (LANTUS SOLOSTAR U-100 INSULIN) 100 unit/mL (3 mL) inpn 2018 at Unknown time Significant Other Yes Yes Si Units by SubCUTAneous route two (2) times a day. lisinopril (PRINIVIL, ZESTRIL) 10 mg tablet 7/20/2018 at Unknown time Significant Other No Yes Sig: Take 1 Tab by mouth daily. metoprolol tartrate (LOPRESSOR) 50 mg tablet 7/22/2018 at Unknown time Significant Other No Yes Sig: TAKE 1 TABLET BY MOUTH TWICE DAILY  
oxyCODONE IR (ROXICODONE) 5 mg immediate release tablet 7/22/2018 at Unknown time Significant Other No Yes Sig: Take 1 Tab by mouth every eight (8) hours as needed. Max Daily Amount: 15 mg. sAXagliptin-metFORMIN (KOMBIGLYZE XR) 5-1,000 mg TM24 7/10/2018 at Unknown time Significant Other No Yes Sig: Take 1 tab po QD  
simvastatin (ZOCOR) 20 mg tablet 7/10/2018 at Unknown time Significant Other No Yes Sig: Take 1 Tab by mouth nightly. Facility-Administered Medications: None Hospital medications: 
Current Facility-Administered Medications Medication Dose Route Frequency  NOREPINephrine (LEVOPHED) 8 mg in 5% dextrose 250mL infusion  2-30 mcg/min IntraVENous TITRATE  cefepime (MAXIPIME) 2 g in 0.9% sodium chloride (MBP/ADV) 100 mL  2 g IntraVENous Q8H Current Outpatient Prescriptions Medication Sig  
 insulin glargine (LANTUS SOLOSTAR U-100 INSULIN) 100 unit/mL (3 mL) inpn 30 Units by SubCUTAneous route two (2) times a day.  OTHER IV Chemotherapy: last treatment was on Tuesday, 07/17/2018  oxyCODONE IR (ROXICODONE) 5 mg immediate release tablet Take 1 Tab by mouth every eight (8) hours as needed. Max Daily Amount: 15 mg.  
 metoprolol tartrate (LOPRESSOR) 50 mg tablet TAKE 1 TABLET BY MOUTH TWICE DAILY  simvastatin (ZOCOR) 20 mg tablet Take 1 Tab by mouth nightly.  lisinopril (PRINIVIL, ZESTRIL) 10 mg tablet Take 1 Tab by mouth daily.  sAXagliptin-metFORMIN (KOMBIGLYZE XR) 5-1,000 mg TM24 Take 1 tab po QD  dapagliflozin (FARXIGA) 10 mg tab tablet Take 1 Tab by mouth daily.   
 cholecalciferol, VITAMIN D3, (VITAMIN D3) 5,000 unit tab tablet Take 5,000 Units by mouth daily.  aspirin delayed-release 81 mg tablet Take 81 mg by mouth daily.  calcium 600 mg Cap Take 600 mg by mouth daily.  ammonium lactate (AMLACTIN) 12 % topical cream Apply  to affected area two (2) times daily as needed (\"Skin irritation\"). rub in to affected area well  clotrimazole (LOTRIMIN AF, CLOTRIMAZOLE,) 1 % topical cream Apply  to affected area two (2) times daily as needed for Skin Irritation.  fluticasone (FLONASE ALLERGY RELIEF) 50 mcg/actuation nasal spray 2 Sprays by Both Nostrils route daily as needed for Allergies.  fexofenadine (ALLEGRA) 180 mg tablet Take 180 mg by mouth daily as needed for Allergies.  albuterol (PROVENTIL HFA, VENTOLIN HFA, PROAIR HFA) 90 mcg/actuation inhaler Take 1 Puff by inhalation every six (6) hours as needed for Wheezing. REVIEW OF SYSTEMS:   
 [x]     Unable to obtain  ROS due to  []    mental status change  []    sedated   [x]    Intubated and unresponsive post code. []    Total of 11 systems reviewed as follows: 
Const:   negative fever, negative chills, negative weight loss Eyes:   negative diplopia or visual changes, negative eye pain ENT:   negative coryza, negative sore throat Resp:   negative cough, hemoptysis, dyspnea Cards:  negative for chest pain, palpitations, lower extremity edema :  negative for frequency, dysuria and hematuria Skin:   negative for rash and pruritus Heme:   negative for easy bruising and gum/nose bleeding MS:  negative for myalgias, arthralgias, back pain and muscle weakness Neurolo:  negative for headaches, dizziness, vertigo, memory problems Psych:  negative for feelings of anxiety, depression Pertinent Positives include : 
 
Objective: VITALS:   
Visit Vitals  /80  Pulse 98  Temp 97.9 °F (36.6 °C)  Resp 21  
 Wt 85 kg (187 lb 6.3 oz)  SpO2 100%  BMI 32.17 kg/m2 Temp (24hrs), Av °F (36.1 °C), Min:92.8 °F (33.8 °C), Max:98.4 °F (36.9 °C) PHYSICAL EXAM: General:    Unresponsive Head:   Normocephalic, without obvious abnormality, atraumatic. Eyes:   Conjunctivae clear, anicteric sclerae. Nose:  Nares normal. No drainage or sinus tenderness. NG Tube in place. Throat:    Lips, mucosa, and tongue normal.   
Neck:  Supple, symmetrical,  no adenopathy Lungs:   Bilateral rhonchi throughout. Chest wall:  No deformity. No Accessory muscle use. Heart:   Regular rate and rhythm Abdomen:   Firm, distended. Hypo active bowel sounds. Surgical incision healing. Extremities: Atraumatic, No cyanosis. +4 BLE pitting edema. No clubbing Skin:     Texture, turgor normal. No rashes/lesions/jaundice Lymph: Cervical, supraclavicular normal. 
Psych:  Unresponsive Neurologic Unable to complete. LAB DATA REVIEWED:   
Recent Results (from the past 48 hour(s)) GLUCOSE, POC Collection Time: 07/23/18 11:00 AM  
Result Value Ref Range Glucose (POC) 148 (H) 65 - 100 mg/dL Performed by Nikolai López (PCT) CBC WITH AUTOMATED DIFF Collection Time: 07/23/18 11:05 AM  
Result Value Ref Range WBC 55.0 (HH) 3.6 - 11.0 K/uL  
 RBC 3.67 (L) 3.80 - 5.20 M/uL HGB 9.3 (L) 11.5 - 16.0 g/dL HCT 31.7 (L) 35.0 - 47.0 % MCV 86.4 80.0 - 99.0 FL  
 MCH 25.3 (L) 26.0 - 34.0 PG  
 MCHC 29.3 (L) 30.0 - 36.5 g/dL  
 RDW 16.5 (H) 11.5 - 14.5 % PLATELET 433 230 - 471 K/uL MPV 9.2 8.9 - 12.9 FL  
 NRBC 0.9 (H) 0  WBC ABSOLUTE NRBC 0.51 (H) 0.00 - 0.01 K/uL NEUTROPHILS 86 (H) 32 - 75 % BAND NEUTROPHILS 8 % LYMPHOCYTES 6 (L) 12 - 49 % MONOCYTES 0 (L) 5 - 13 % EOSINOPHILS 0 0 - 7 % BASOPHILS 0 0 - 1 % IMMATURE GRANULOCYTES 0 0.0 - 0.5 % ABS. NEUTROPHILS 51.7 (H) 1.8 - 8.0 K/UL  
 ABS. LYMPHOCYTES 3.3 0.8 - 3.5 K/UL  
 ABS. MONOCYTES 0.0 0.0 - 1.0 K/UL  
 ABS. EOSINOPHILS 0.0 0.0 - 0.4 K/UL  
 ABS. BASOPHILS 0.0 0.0 - 0.1 K/UL  
 ABS. IMM.  GRANS. 0.0 0.00 - 0.04 K/UL  
 DF MANUAL    
 RBC COMMENTS MALLORIE CELLS 
PRESENT 
    
 RBC COMMENTS ANISOCYTOSIS 
1+ PROTHROMBIN TIME + INR Collection Time: 07/23/18 11:05 AM  
Result Value Ref Range INR 1.5 (H) 0.9 - 1.1 Prothrombin time 15.1 (H) 9.0 - 11.1 sec METABOLIC PANEL, COMPREHENSIVE Collection Time: 07/23/18 11:05 AM  
Result Value Ref Range Sodium 148 (H) 136 - 145 mmol/L Potassium 4.4 3.5 - 5.1 mmol/L Chloride 111 (H) 97 - 108 mmol/L  
 CO2 19 (L) 21 - 32 mmol/L Anion gap 18 (H) 5 - 15 mmol/L Glucose 114 (H) 65 - 100 mg/dL BUN 22 (H) 6 - 20 MG/DL Creatinine 1.40 (H) 0.55 - 1.02 MG/DL  
 BUN/Creatinine ratio 16 12 - 20 GFR est AA 46 (L) >60 ml/min/1.73m2 GFR est non-AA 38 (L) >60 ml/min/1.73m2 Calcium 8.1 (L) 8.5 - 10.1 MG/DL Bilirubin, total 0.2 0.2 - 1.0 MG/DL  
 ALT (SGPT) 43 12 - 78 U/L  
 AST (SGOT) 218 (H) 15 - 37 U/L Alk. phosphatase 240 (H) 45 - 117 U/L Protein, total 4.6 (L) 6.4 - 8.2 g/dL Albumin 1.3 (L) 3.5 - 5.0 g/dL Globulin 3.3 2.0 - 4.0 g/dL A-G Ratio 0.4 (L) 1.1 - 2.2    
TROPONIN I Collection Time: 07/23/18 11:05 AM  
Result Value Ref Range Troponin-I, Qt. 1.25 (H) <0.05 ng/mL NT-PRO BNP Collection Time: 07/23/18 11:05 AM  
Result Value Ref Range NT pro-BNP 30554 (H) 0 - 125 PG/ML  
EMERGENT RELEASE OF UNCROSSMATCHED RED CELLS Collection Time: 07/23/18 11:05 AM  
Result Value Ref Range Crossmatch Expiration 07/26/2018 ABO/Rh(D) O NEGATIVE Antibody screen NEG Unit number [id number] Blood component type RC LR,1 Unit division 00 Status of unit ISSUED Crossmatch result Compatible Unit number [id number] Blood component type RC LR,2 Unit division 00 Status of unit ISSUED Crossmatch result Compatible Unit number [id number] Blood component type RC LR Unit division 00 Status of unit ALLOCATED Crossmatch result Compatible Unit number [id number] Blood component type RC LR Unit division 00  Status of unit ALLOCATED Crossmatch result Compatible PATHOLOGIST REVIEW Collection Time: 07/23/18 11:05 AM  
Result Value Ref Range Pathologist review (NOTE) EKG, 12 LEAD, INITIAL Collection Time: 07/23/18 11:12 AM  
Result Value Ref Range Ventricular Rate 165 BPM  
 Atrial Rate 85 BPM  
 P-R Interval 176 ms QRS Duration 74 ms Q-T Interval 298 ms QTC Calculation (Bezet) 493 ms Calculated P Axis 26 degrees Calculated R Axis -72 degrees Calculated T Axis 31 degrees Diagnosis Undetermined rhythm Left axis deviation Pulmonary disease pattern Septal infarct , age undetermined When compared with ECG of 27-MAY-2018 09:44, 
Current undetermined rhythm precludes rhythm comparison, needs review QRS duration has decreased QRS voltage has decreased Septal infarct is now present Confirmed by Leonila Burner ([id number]) on 7/23/2018 12:26:04 PM 
  
OCCULT BLOOD, GASTRIC Collection Time: 07/23/18 11:20 AM  
Result Value Ref Range OCCULT BLOOD,GASTRIC POSITIVE (A) NEG    
 pH,GASTRIC 4 (H) 1.5 - 3.5    
FFP, ALLOCATE Collection Time: 07/23/18 12:15 PM  
Result Value Ref Range Unit number [id number] Blood component type FP 24h, Thaw Unit division 00 Status of unit ISSUED Unit number [id number] Blood component type FP 24h, Thaw Unit division 00 Status of unit ISSUED   
BLOOD GAS, ARTERIAL Collection Time: 07/23/18 12:30 PM  
Result Value Ref Range pH 7.19 (LL) 7.35 - 7.45    
 PCO2 38 35.0 - 45.0 mmHg PO2 84 80 - 100 mmHg O2 SAT 94 92 - 97 % BICARBONATE 14 (L) 22 - 26 mmol/L  
 BASE DEFICIT 13.2 mmol/L  
 O2 METHOD VENTILATOR    
 O2 FLOW RATE 16.00 L/min FIO2 100 % MODE A/C Tidal volume 450 SET RATE 16    
 EPAP/CPAP/PEEP 6.0 Sample source ARTERIAL    
 SITE LEFT BRACHIAL BEATA'S TEST N/A Critical value read back Carin Gates md   
HGB & HCT  Collection Time: 07/23/18  2:47 PM  
Result Value Ref Range HGB 10.5 (L) 11.5 - 16.0 g/dL HCT 33.8 (L) 35.0 - 47.0 % IMAGING RESULTS: 
 
EXAM: CT abdomen/pelvis with contrast 
DATE: 7/23/18 INDICATION: Abdominal distension  stage IV colon cancer 
  
COMPARISON: 7/10/2018 
  
TECHNIQUE:  
Following the uneventful intravenous administration of 100 cc Isovue-370, thin 
axial images were obtained through the abdomen and pelvis. Coronal and sagittal 
reconstructions were generated. Oral contrast was not administered. CT dose 
reduction was achieved through use of a standardized protocol tailored for this 
examination and automatic exposure control for dose modulation. 
  
FINDINGS:  
LUNG BASES: See concurrent CT thorax. LIVER: Multiple metastatic lesions are again noted in the left and right lobes 
of the liver unchanged from most recent previous examination no biliary 
dilatation. GALLBLADDER: Unremarkable. SPLEEN: No mass. 
  
PANCREAS: No mass or ductal dilatation. ADRENALS: Unremarkable. KIDNEYS: Left renal cyst is noted. There is no hydronephrosis or mass. 
  
GI: No bowel obstruction. Previous right colon resection noted. 
  
APPENDIX: Absent PERITONEUM: There is generalized ascites some of which is loculated. There is 
edema in the mesenteric fat. Nodularity noted in the greater omentum consistent 
with metastatic disease. This has not changed significantly. RETROPERITONEUM: Previously described adenopathy in the retroperitoneum is again 
noted unchanged 
  
  
URINARY BLADDER: Empty containing a Ford catheter. PELVIS: No adenopathy or abnormal mass. BONES: No destructive bone lesion. ADDITIONAL COMMENTS: N/A 
  
IMPRESSION: 
  
1. No bowel obstruction. 2. Ascites some of which is loculated. Overall this is less than on the previous 
examination. There is increased density in the mesenteric fat when compared to 
the previous exam, however.  There is also densities noted in the region of the 
greater omentum and mesentery consistent with prostatic disease in the 
peritoneum. 3. Metastatic disease in the liver again noted. Recommendations/Plan: Active Problems: Metastatic colon cancer in female [facility]) (6/25/2018) Cardiac arrest ([address] (7/23/2018) Upper GI bleed (7/23/2018) 
 
  
___________________________________________________ RECOMMENDATIONS:   
 
Ms. Hiren Singh is 78 yo female with known stage 4 adenocarcinoma colon cancer diagnosed (5/2018) s/p right hemicolectomy. She became asystolic twice today with ROSC, currently in critical condition. Found to have positive gastric occult blood but having brown stools per nursing. Hgb is stable at this time, she has received 2 units of PRBC. Pt is now unresponsive and intubated without sedation. WBC 55.0. Hgb 9.3, Hct 31.7, MCV 86.4, Plt 268, INT 1.5, BUN elevated from baseline at 22 but surprising due to recent cardiac arrest. Loculated ascites noted on CT scan. Concern for active GI bleed but not likely acute active bleeding source. Not stable enough for EGD at this time. 1. No emergent EGD at this time. 2. Agree with PPI 3. Continue volume repletion 4. Monitor serial hgb. 5. Will continue to follow Thanks for consultation. Discussed Code Status:    [x]    Full Code      []    DNR   
___________________________________________________ Care Plan discussed with: 
  [x]    Patient   []    Family   [x]    Nursing - Desmond Durant   []    Attending Total Time :  50   minutes 
 ___________________________________________________ GI: Caitlyn Wilburn NP

## 2018-07-23 NOTE — CONSULTS
Consult NAME: Sejal Adair :  1950 MRN:  [id number] Date/Time:  2018 6:29 PM 
 
Patient PCP: [address] (Inactive) 
________________________________________________________________________ Problem List:  
 
Asystolic arrest in a patient with metastatic colon CA. Intubated, concern for hypoxic brain injury, possible septic shock, anemia, AL, Acidosis, bilateral pleural effusions, mildly increased troponin. 
- No hx of CAD 
- Unknown EF 
- ECG with low voltage 
- HTN 
- Dyslipidemia  Assessment/Plan:  
 
Witnessed arrest found to be asystolic, CPR/ACLS with subsequent vfib in field, PEA in ED, currently with ROSC. 
ECG with no acute ST changes, mildly increased troponin. CT with no pericardial effusion, but bilateral pleural effusions. Check echo 
 
- Start aspirin once confirmed no bleeding - Levophed for pressure support 
- Hold metoprolol, lisinopril for now 
- Hold zocor for now ICU care Gaurded prognosis. [x]       High complexity decision making was performed in this patient at high risk for decompensation with multiple organ involvement. Subjective: CHIEF COMPLAINT: s/p cardiac arrest. 
 
HISTORY OF PRESENT ILLNESS:    
 
Sejal Adair, 79 y.o. female with PMHx significant for DM, HTN, HLD, and colon CA with right hemicolectomy, presents via EMS to the ED post cardiac arrest with ROSC. EMS states pt was c/o b/l flank pain and generalized weakness upon arrival, however went into asystole shortly after. EMS reports ACLS protocol for 10 minutes, during which pt had bouts of v-fib and was externally cardioverted x 2. Per EMS, pt had ROSC with a strong pulse and systolic bp of 77. EMS endorse intubating with a 7.0 ET tube and placing an OG tube. She is on palliative chemotherapy and has a portacath. Pt's last epinephrine was 5 minutes ago. They note her end tidal remained above 10 throughout.   
 
Patient currently in ICU, unresponive, intubated, not on any sedation. We were asked to consult for work up and evaluation of the above problems. Past Medical History:  
Diagnosis Date  Anemia NEC  Cancer ([facility]  [facility] 2018  Class 1 obesity due to excess calories with body mass index (BMI) of 31.0 to 31.9 in adult 2018  Diabetes ([facility]  Headache(784.0)  Hypercholesterolemia  Hypertension  Malignant neoplasm of ascending colon ([facility] 2018 Stage IVc  Palliative chemotherapy underway 2018 FOLFOX 50% dose reduced  S/P right colectomy 2018 Right hemicolectomy for cecal mass with partial SBO, Birdvinicius Crow  Severe protein-calorie malnutrition ([facility] 2018  Superficial postoperative wound infection 2018 Past Surgical History:  
Procedure Laterality Date  HX  SECTION Hysterectomy,total for precancer  HX COLECTOMY Right 2018 Right hemicolectomy for cecal mass with partial SBO, Birder Crow Allergies Allergen Reactions  Tylenol [Acetaminophen] Swelling Meds:  See below Social History Substance Use Topics  Smoking status: Never Smoker  Smokeless tobacco: Never Used  Alcohol use No  
  
Family History Problem Relation Age of Onset  Alcohol abuse Father  Cancer Maternal Aunt   
  breast ca  Breast Cancer Maternal Aunt  Breast Cancer Sister  Breast Cancer Daughter REVIEW OF SYSTEMS:   
 [x]         Unable to obtain  ROS due to intubated and sedated 
 []         Total of 12 systems reviewed as follows: Total of 12 systems reviewed as follows:   
   POSITIVE= Bold text  Negative = normal text General:  fever, chills, sweats, generalized weakness, weight loss/gain,  
   loss of appetite Eyes:    blurred vision, eye pain, loss of vision, double vision ENT:    rhinorrhea, pharyngitis Respiratory:   cough, sputum production, SOB, VILLELA, wheezing, pleuritic pain  
Cardiology: chest pain, palpitations, orthopnea, PND, edema, syncope Gastrointestinal:  abdominal pain , N/V, diarrhea, dysphagia, constipation, bleeding Genitourinary:  frequency, urgency, dysuria, hematuria, incontinence Muskuloskeletal :  arthralgia, myalgia, back pain Hematology:  easy bruising, nose or gum bleeding, lymphadenopathy Dermatological: rash, ulceration, pruritis, color change / jaundice Endocrine:   hot flashes or polydipsia Neurological:  headache, dizziness, confusion, focal weakness, paresthesia, Speech difficulties, memory loss, gait difficulty Psychological: Feelings of anxiety, depression, agitation Objective:  
  
Physical Exam: 
 
Last 24hrs VS reviewed since prior progress note. Most recent are: 
 
Visit Vitals  /57  Pulse (!) 102  Temp 99.7 °F (37.6 °C)  Resp 24  Wt 85 kg (187 lb 6.3 oz)  SpO2 96%  BMI 32.17 kg/m2 Intake/Output Summary (Last 24 hours) at 07/23/18 1829 Last data filed at 07/23/18 1653 Gross per 24 hour Intake          1503.67 ml Output                0 ml Net          1503.67 ml General Appearance: Well developed, well nourished, unresponsive,  
 no acute distress. Ears/Nose/Mouth/Throat: Pupils equal and round, Hearing unable to be assessed. Neck: Supple. JVP within normal limits. Carotids good upstrokes, with no bruit. Chest: Lungs ronchito auscultation bilaterally. Cardiovascular: Regular rate and rhythm, S1S2 normal, no murmur, rubs, gallops. Abdomen: Distended, non-tender, bowel sounds are active. No organomegaly. Extremities: Mild edema bilaterally. Femoral pulses +1, Distal Pulses +trace. Skin: Warm and dry. Neuro: unresponsive to painful stimuli Data:  
  
Prior to Admission medications Medication Sig Start Date End Date Taking? Authorizing Provider  
insulin glargine (LANTUS SOLOSTAR U-100 INSULIN) 100 unit/mL (3 mL) inpn 30 Units by SubCUTAneous route two (2) times a day.    Yes Historical Provider OTHER IV Chemotherapy: last treatment was on Tuesday, 07/17/2018   Yes Historical Provider  
oxyCODONE IR (ROXICODONE) 5 mg immediate release tablet Take 1 Tab by mouth every eight (8) hours as needed. Max Daily Amount: 15 mg. 6/1/18  Yes Ced Jacobsen MD  
metoprolol tartrate (LOPRESSOR) 50 mg tablet TAKE 1 TABLET BY MOUTH TWICE DAILY 5/1/18  Yes Hugh Simental MD  
simvastatin (ZOCOR) 20 mg tablet Take 1 Tab by mouth nightly. 3/19/18  Yes Hugh Simental MD  
lisinopril (PRINIVIL, ZESTRIL) 10 mg tablet Take 1 Tab by mouth daily. 3/19/18  Yes Hugh Simental MD  
sAXagliptin-metFORMIN (KOMBIGLYZE XR) 5-1,000 mg GT95 Take 1 tab po QD 3/19/18  Yes Hugh Simental MD  
dapagliflozin [facility]) 10 mg tab tablet Take 1 Tab by mouth daily. 3/19/18  Yes Hugh Simental MD  
cholecalciferol, VITAMIN D3, (VITAMIN D3) 5,000 unit tab tablet Take 5,000 Units by mouth daily. Yes Historical Provider  
aspirin delayed-release 81 mg tablet Take 81 mg by mouth daily. Yes Historical Provider  
calcium 600 mg Cap Take 600 mg by mouth daily. Yes Historical Provider  
ammonium lactate (AMLACTIN) 12 % topical cream Apply  to affected area two (2) times daily as needed (\"Skin irritation\"). rub in to affected area well    Historical Provider  
clotrimazole (LOTRIMIN AF, CLOTRIMAZOLE,) 1 % topical cream Apply  to affected area two (2) times daily as needed for Skin Irritation. Historical Provider  
fluticasone (FLONASE ALLERGY RELIEF) 50 mcg/actuation nasal spray 2 Sprays by Both Nostrils route daily as needed for Allergies. Historical Provider  
fexofenadine (ALLEGRA) 180 mg tablet Take 180 mg by mouth daily as needed for Allergies. Historical Provider  
albuterol (PROVENTIL HFA, VENTOLIN HFA, PROAIR HFA) 90 mcg/actuation inhaler Take 1 Puff by inhalation every six (6) hours as needed for Wheezing. 3/19/18   Hugh Simental MD  
 
 
Recent Results (from the past 24 hour(s)) GLUCOSE, POC  Collection Time: 07/23/18 11:00 AM Result Value Ref Range Glucose (POC) 148 (H) 65 - 100 mg/dL Performed by Dominga Lam (PCT) CBC WITH AUTOMATED DIFF Collection Time: 07/23/18 11:05 AM  
Result Value Ref Range WBC 55.0 (HH) 3.6 - 11.0 K/uL  
 RBC 3.67 (L) 3.80 - 5.20 M/uL HGB 9.3 (L) 11.5 - 16.0 g/dL HCT 31.7 (L) 35.0 - 47.0 % MCV 86.4 80.0 - 99.0 FL  
 MCH 25.3 (L) 26.0 - 34.0 PG  
 MCHC 29.3 (L) 30.0 - 36.5 g/dL  
 RDW 16.5 (H) 11.5 - 14.5 % PLATELET 219 494 - 629 K/uL MPV 9.2 8.9 - 12.9 FL  
 NRBC 0.9 (H) 0  WBC ABSOLUTE NRBC 0.51 (H) 0.00 - 0.01 K/uL NEUTROPHILS 86 (H) 32 - 75 % BAND NEUTROPHILS 8 % LYMPHOCYTES 6 (L) 12 - 49 % MONOCYTES 0 (L) 5 - 13 % EOSINOPHILS 0 0 - 7 % BASOPHILS 0 0 - 1 % IMMATURE GRANULOCYTES 0 0.0 - 0.5 % ABS. NEUTROPHILS 51.7 (H) 1.8 - 8.0 K/UL  
 ABS. LYMPHOCYTES 3.3 0.8 - 3.5 K/UL  
 ABS. MONOCYTES 0.0 0.0 - 1.0 K/UL  
 ABS. EOSINOPHILS 0.0 0.0 - 0.4 K/UL  
 ABS. BASOPHILS 0.0 0.0 - 0.1 K/UL  
 ABS. IMM. GRANS. 0.0 0.00 - 0.04 K/UL  
 DF MANUAL    
 RBC COMMENTS MALLORIE CELLS 
PRESENT 
    
 RBC COMMENTS ANISOCYTOSIS 
1+ PROTHROMBIN TIME + INR Collection Time: 07/23/18 11:05 AM  
Result Value Ref Range INR 1.5 (H) 0.9 - 1.1 Prothrombin time 15.1 (H) 9.0 - 11.1 sec METABOLIC PANEL, COMPREHENSIVE Collection Time: 07/23/18 11:05 AM  
Result Value Ref Range Sodium 148 (H) 136 - 145 mmol/L Potassium 4.4 3.5 - 5.1 mmol/L Chloride 111 (H) 97 - 108 mmol/L  
 CO2 19 (L) 21 - 32 mmol/L Anion gap 18 (H) 5 - 15 mmol/L Glucose 114 (H) 65 - 100 mg/dL BUN 22 (H) 6 - 20 MG/DL Creatinine 1.40 (H) 0.55 - 1.02 MG/DL  
 BUN/Creatinine ratio 16 12 - 20 GFR est AA 46 (L) >60 ml/min/1.73m2 GFR est non-AA 38 (L) >60 ml/min/1.73m2 Calcium 8.1 (L) 8.5 - 10.1 MG/DL Bilirubin, total 0.2 0.2 - 1.0 MG/DL  
 ALT (SGPT) 43 12 - 78 U/L  
 AST (SGOT) 218 (H) 15 - 37 U/L Alk. phosphatase 240 (H) 45 - 117 U/L  Protein, total 4.6 (L) 6.4 - 8.2 g/dL Albumin 1.3 (L) 3.5 - 5.0 g/dL Globulin 3.3 2.0 - 4.0 g/dL A-G Ratio 0.4 (L) 1.1 - 2.2    
TROPONIN I Collection Time: 07/23/18 11:05 AM  
Result Value Ref Range Troponin-I, Qt. 1.25 (H) <0.05 ng/mL NT-PRO BNP Collection Time: 07/23/18 11:05 AM  
Result Value Ref Range NT pro-BNP 12553 (H) 0 - 125 PG/ML  
EMERGENT RELEASE OF UNCROSSMATCHED RED CELLS Collection Time: 07/23/18 11:05 AM  
Result Value Ref Range Crossmatch Expiration 07/26/2018 ABO/Rh(D) O NEGATIVE Antibody screen NEG Unit number [id number] Blood component type RC LR,1 Unit division 00 Status of unit ISSUED Crossmatch result Compatible Unit number [id number] Blood component type RC LR,2 Unit division 00 Status of unit ISSUED Crossmatch result Compatible Unit number [id number] Blood component type RC LR Unit division 00 Status of unit ALLOCATED Crossmatch result Compatible Unit number [id number] Blood component type RC LR Unit division 00 Status of unit ALLOCATED Crossmatch result Compatible PATHOLOGIST REVIEW Collection Time: 07/23/18 11:05 AM  
Result Value Ref Range Pathologist review (NOTE) EKG, 12 LEAD, INITIAL Collection Time: 07/23/18 11:12 AM  
Result Value Ref Range Ventricular Rate 165 BPM  
 Atrial Rate 85 BPM  
 P-R Interval 176 ms QRS Duration 74 ms Q-T Interval 298 ms QTC Calculation (Bezet) 493 ms Calculated P Axis 26 degrees Calculated R Axis -72 degrees Calculated T Axis 31 degrees Diagnosis Undetermined rhythm Left axis deviation Pulmonary disease pattern Septal infarct , age undetermined When compared with ECG of 27-MAY-2018 09:44, 
Current undetermined rhythm precludes rhythm comparison, needs review QRS duration has decreased QRS voltage has decreased Septal infarct is now present Confirmed by Raina James ([id number]) on 7/23/2018 12:26:04 PM 
  
OCCULT BLOOD, GASTRIC Collection Time: 07/23/18 11:20 AM  
Result Value Ref Range OCCULT BLOOD,GASTRIC POSITIVE (A) NEG    
 pH,GASTRIC 4 (H) 1.5 - 3.5    
FFP, ALLOCATE Collection Time: 07/23/18 12:15 PM  
Result Value Ref Range Unit number [id number] Blood component type FP 24h, Thaw Unit division 00 Status of unit ISSUED Unit number [id number] Blood component type FP 24h, Thaw Unit division 00 Status of unit ISSUED   
BLOOD GAS, ARTERIAL Collection Time: 07/23/18 12:30 PM  
Result Value Ref Range pH 7.19 (LL) 7.35 - 7.45    
 PCO2 38 35.0 - 45.0 mmHg PO2 84 80 - 100 mmHg O2 SAT 94 92 - 97 % BICARBONATE 14 (L) 22 - 26 mmol/L  
 BASE DEFICIT 13.2 mmol/L  
 O2 METHOD VENTILATOR    
 O2 FLOW RATE 16.00 L/min FIO2 100 % MODE A/C Tidal volume 450 SET RATE 16    
 EPAP/CPAP/PEEP 6.0 Sample source ARTERIAL    
 SITE LEFT BRACHIAL BEATA'S TEST N/A Critical value read back [employer], md   
HGB & HCT Collection Time: 07/23/18  2:47 PM  
Result Value Ref Range HGB 10.5 (L) 11.5 - 16.0 g/dL HCT 33.8 (L) 35.0 - 47.0 % LACTIC ACID Collection Time: 07/23/18  4:04 PM  
Result Value Ref Range Lactic acid 7.7 (HH) 0.4 - 2.0 MMOL/L  
CK W/ CKMB & INDEX Collection Time: 07/23/18  4:15 PM  
Result Value Ref Range  (H) 26 - 192 U/L  
 CK - MB 14.6 (H) <3.6 NG/ML  
 CK-MB Index 2.2 0 - 2.5    
TROPONIN I Collection Time: 07/23/18  4:15 PM  
Result Value Ref Range Troponin-I, Qt. 1.82 (H) <0.05 ng/mL GLUCOSE, POC Collection Time: 07/23/18  5:54 PM  
Result Value Ref Range Glucose (POC) 340 (H) 65 - 100 mg/dL Performed by Ar Sarmiento

## 2018-07-23 NOTE — PROGRESS NOTES
[facility]  Pulmonary, Critical Care, and Sleep Medicine    Name: Krish Goodman MRN: [id number]   : 1950 Hospital: [facility]   Date: 2018        IMPRESSION:   · Acute respiratory failure  · Out of hospital asystole cardiac arrest  · Shock, likely septic  · Likely severe anoxic brain injury  · Metabolic/lactic acidosis  · Acute renal failure   · Abnormal troponin likely due to arrest, but doubt true ACS  · Widely metastatic colon cancer (peritoneum, liver, lung) on palliative chemo - FOLFOX, received Neulasta on   · Left lung atelectasis, can not rule out endobronchial obstruction  · DM  · Obesity       PLAN:   · Ventilator support  · Bronchodilators  · Repeat chest X-ray after she has had more time with positive pressure ventilation; if not improved, may need bronchoscopy  · IV fluids  · Pressors  · Serial neuro exams  · Check echo  · Empiric antibiotics pending culture data  · Monitor renal function  · Insulin/glycemic monitoring  · DVT/GI prophylaxis  · Poor prognosis. Consult palliative care. Subjective/Interval History:   I have reviewed the flowsheet and previous days notes. The patient is unable to give any meaningful history or review of systems because the patient is: Intubated/unresponsive - presented with bilateral flank pain on EMS arrival, however shortly thereafter went into asystole cardiac arrest.  Had subsequent episodes of VF. Now intubated/unresponsive with some mild myoclonic jerks.      The patient is critically ill on:      Mechanical ventilation/pressors     Review of Systems   Unable to perform ROS: Intubated     Objective:   Vital Signs:    Visit Vitals    BP 96/79 (BP 1 Location: Left arm, BP Patient Position: Head of bed elevated (Comment degrees))    Pulse 99    Temp 99.1 °F (37.3 °C)    Resp 20    Wt 85 kg (187 lb 6.3 oz)    SpO2 99%    BMI 32.17 kg/m2       O2 Device: Ventilator       Temp (24hrs), Av.2 °F (36.2 °C), Min:92.8 °F (33.8 °C), Max:99.1 °F (37.3 °C)       Intake/Output:   Last shift:      07/23 0701 - 07/23 1900  In: 1428   Out: -   Last 3 shifts:      Intake/Output Summary (Last 24 hours) at 07/23/18 1650  Last data filed at 07/23/18 1348   Gross per 24 hour   Intake             1428 ml   Output                0 ml   Net             1428 ml     Hemodynamics:   PAP:   CO:     Wedge:   CI:     CVP:    SVR:       PVR:       Ventilator Settings:  Mode Rate Tidal Volume Pressure FiO2 PEEP   Assist control   450 ml    100 % 6 cm H20     Peak airway pressure: 35 cm H2O    Minute ventilation: 8.74 l/min       Physical Exam   Constitutional: She is intubated. HENT:   Head: Normocephalic and atraumatic. Mouth/Throat: No oropharyngeal exudate. Eyes: No scleral icterus. Cardiovascular: Regular rhythm. Tachycardia present. Pulmonary/Chest: She is intubated. She has decreased breath sounds in the right lower field and the left lower field. She has no wheezes. She has rales. Musculoskeletal: She exhibits edema. Neurological: She is unresponsive. Myoclonic jerks   Skin: Skin is warm and dry. No rash noted.      Data:     Current Facility-Administered Medications   Medication Dose Route Frequency    NOREPINephrine (LEVOPHED) 8 mg in 5% dextrose 250mL infusion  2-30 mcg/min IntraVENous TITRATE    sodium chloride (NS) flush 5-10 mL  5-10 mL IntraVENous Q8H    0.9% sodium chloride infusion  75 mL/hr IntraVENous CONTINUOUS    insulin lispro (HUMALOG) injection   SubCUTAneous Q6H    cefepime (MAXIPIME) 2 g in 0.9% sodium chloride (MBP/ADV) 100 mL  2 g IntraVENous NOW    [START ON 7/24/2018] cefepime (MAXIPIME) 2 g in 0.9% sodium chloride (MBP/ADV) 100 mL  2 g IntraVENous Q12H    pantoprazole (PROTONIX) 40 mg in sodium chloride 0.9% 10 mL injection  40 mg IntraVENous Q12H    albumin human 25% (BUMINATE) solution 25 g  25 g IntraVENous ONCE    vancomycin (VANCOCIN) 2,250 mg in 0.9% sodium chloride 500 mL IVPB  2,250 mg IntraVENous ONCE                Labs:  Recent Labs      07/23/18   1447  07/23/18   1105   WBC   --   55.0*   HGB  10.5*  9.3*   HCT  33.8*  31.7*   PLT   --   268     Recent Labs      07/23/18   1105   NA  148*   K  4.4   CL  111*   CO2  19*   GLU  114*   BUN  22*   CREA  1.40*   CA  8.1*   ALB  1.3*   TBILI  0.2   SGOT  218*   ALT  43   INR  1.5*     Recent Labs      07/23/18   1230   PH  7.19*   PCO2  38   PO2  84   HCO3  14*   FIO2  100     Imaging:  I have personally reviewed the patients radiographs and have reviewed the reports:  Bilateral pleural effusions, near complete atelectasis of left lung (?endobronchial obstruction), widely metastatic colon cancer (bilateral lung metastases)        Total critical care time exclusive of procedures: 35 minutes  Tyrone Freed MD

## 2018-07-23 NOTE — TELEPHONE ENCOUNTER
Called and talked with  / daughter at home  Pt is very weak and  / daughter cannot move pt OOB.    Belly is distended again per /  Pt cannot eat much/ can drink  Family and pt want to come to ER for eval and likely hospital admit via hospitalist.

## 2018-07-23 NOTE — ED NOTES
Pt  now bedside along with Artemio Marte     Pt  questions answered by this RN     Pt to wait until CT results are completed and states he will be leaving shortly thereafter.

## 2018-07-23 NOTE — ED NOTES
TRANSFER - OUT REPORT:    Verbal report given to Lucio Higuera RN (name) on Irineo Galeazzi  being transferred to [phone], CCU (unit) for routine progression of care       Report consisted of patients Situation, Background, Assessment and   Recommendations(SBAR). Information from the following report(s) SBAR, Kardex, ED Summary, MAR, Recent Results and Cardiac Rhythm NSR was reviewed with the receiving nurse. Lines:   Triple Lumen 07/23/18 Right Femoral (Active)   Central Line Being Utilized Yes 7/23/2018  1:11 PM   Site Assessment Clean, dry, & intact 7/23/2018  1:11 PM   Dressing Status Clean, dry, & intact 7/23/2018  1:11 PM       Peripheral IV 07/23/18 Right Antecubital (Active)   Site Assessment Clean, dry, & intact 7/23/2018 12:13 PM   Phlebitis Assessment 0 7/23/2018 12:13 PM   Infiltration Assessment 0 7/23/2018 12:13 PM   Dressing Status Clean, dry, & intact 7/23/2018 12:13 PM       Intraosseous Line 07/23/18 Right;Humerus (Active)   Site Assessment Clean, dry, & intact 7/23/2018 10:58 AM   Dressing Status Clean, dry, & intact 7/23/2018 10:58 AM   Dressing Type 4 X 4 7/23/2018 10:58 AM   Status Infusing 7/23/2018 10:58 AM        Opportunity for questions and clarification was provided.       Patient transported with:   Monitor  O2 @ 15 liters  Registered Nurse

## 2018-07-23 NOTE — PROGRESS NOTES
Pharmacy Automatic Renal Dosing Protocol - Antimicrobials    Indication for Antimicrobials: sepsis of unknown etiology    Current Regimen of Each Antimicrobial:  Cefepime 2 grams IV q 8 hr  (Start Date ; Day # 1)    Previous Antimicrobial Therapy: None    Cx: None      Radiology / Imaging results: (X-ray, CT scan or MRI):  None    Paralysis, amputations, malnutrition: none noted    Labs:  Recent Labs      18   1105   CREA  1.40*   BUN  22*   WBC  55.0*     Temp (24hrs), Av °F (36.1 °C), Min:92.8 °F (33.8 °C), Max:98.4 °F (36.9 °C)    Creatinine Clearance (mL/min) or Dialysis: 34    Impression/Plan:   · Dose changed to Cefepime 2 grams IV q 12 hr  · Antimicrobial stop date to be determine     Pharmacy will follow daily and adjust medications as appropriate for renal function and/or serum levels.     Thank you,  Chichi Camejo, PHARMD

## 2018-07-23 NOTE — ED PROVIDER NOTES
EMERGENCY DEPARTMENT HISTORY AND PHYSICAL EXAM 
 
 
Date: 7/23/2018 Patient Name: Harjit Olson History of Presenting Illness Chief Complaint Patient presents with  Cardiac arrest  
  Arrives via EMS in cardiac arrest with ROSC History Provided By: EMS 
 
HPI: Harjit Olson, 79 y.o. female with PMHx significant for DM, HTN, HLD, and colon CA with right hemicolectomy, presents via EMS to the ED post cardiac arrest with ROSC. EMS states pt was c/o b/l flank pain and generalized weakness upon arrival, however went into asystole shortly after. EMS reports ACLS protocol for 10 minutes, during which pt had bouts of v-fib and was externally cardioverted x 2. Per EMS, pt had ROSC with a strong pulse and systolic bp of 77. EMS endorse intubating with a 7.0 ET tube and placing an OG tube. She is on palliative chemotherapy and has a portacath. Pt's last epinephrine was 5 minutes ago. They note her end tidal remained above 10 throughout. History of present illness and review of systems limited secondary to acuity of condition. PCP: JOESPH Nurse Navigator (Inactive) Current Facility-Administered Medications Medication Dose Route Frequency Provider Last Rate Last Dose  NOREPINephrine (LEVOPHED) 8 mg in 5% dextrose 250mL infusion  2-16 mcg/min IntraVENous TITRATE Amanda Pac, DO 9.4 mL/hr at 07/23/18 1435 5 mcg/min at 07/23/18 1435  
 0.9% sodium chloride infusion 250 mL  250 mL IntraVENous PRN Amanda Pac, DO      
 0.9% sodium chloride infusion 250 mL  250 mL IntraVENous PRN Amanda Pac, DO      
 
Current Outpatient Prescriptions Medication Sig Dispense Refill  insulin glargine (LANTUS SOLOSTAR U-100 INSULIN) 100 unit/mL (3 mL) inpn 30 Units by SubCUTAneous route two (2) times a day.  OTHER IV Chemotherapy: last treatment was on Tuesday, 07/17/2018  oxyCODONE IR (ROXICODONE) 5 mg immediate release tablet Take 1 Tab by mouth every eight (8) hours as needed.  Max Daily Amount: 15 mg. 21 Tab 0  
 metoprolol tartrate (LOPRESSOR) 50 mg tablet TAKE 1 TABLET BY MOUTH TWICE DAILY 180 Tab 0  
 simvastatin (ZOCOR) 20 mg tablet Take 1 Tab by mouth nightly. 90 Tab 1  
 lisinopril (PRINIVIL, ZESTRIL) 10 mg tablet Take 1 Tab by mouth daily. 90 Tab 1  
 sAXagliptin-metFORMIN (KOMBIGLYZE XR) 5-1,000 mg TM24 Take 1 tab po QD 90 Tab 1  
 dapagliflozin (FARXIGA) 10 mg tab tablet Take 1 Tab by mouth daily. 90 Tab 1  cholecalciferol, VITAMIN D3, (VITAMIN D3) 5,000 unit tab tablet Take 5,000 Units by mouth daily.  aspirin delayed-release 81 mg tablet Take 81 mg by mouth daily.  calcium 600 mg Cap Take 600 mg by mouth daily.  ammonium lactate (AMLACTIN) 12 % topical cream Apply  to affected area two (2) times daily as needed (\"Skin irritation\"). rub in to affected area well  clotrimazole (LOTRIMIN AF, CLOTRIMAZOLE,) 1 % topical cream Apply  to affected area two (2) times daily as needed for Skin Irritation.  fluticasone (FLONASE ALLERGY RELIEF) 50 mcg/actuation nasal spray 2 Sprays by Both Nostrils route daily as needed for Allergies.  fexofenadine (ALLEGRA) 180 mg tablet Take 180 mg by mouth daily as needed for Allergies.  albuterol (PROVENTIL HFA, VENTOLIN HFA, PROAIR HFA) 90 mcg/actuation inhaler Take 1 Puff by inhalation every six (6) hours as needed for Wheezing. 1 Inhaler 0 Past History Past Medical History: 
Past Medical History:  
Diagnosis Date  Anemia NEC  Cancer ([facility]  [facility] 7/23/2018  Class 1 obesity due to excess calories with body mass index (BMI) of 31.0 to 31.9 in adult 6/25/2018  Diabetes ([facility]  Headache(784.0)  Hypercholesterolemia  Hypertension  Malignant neoplasm of ascending colon ([facility] 05/26/2018 Stage IVc  Palliative chemotherapy underway 7/17/2018 FOLFOX 50% dose reduced  S/P right colectomy 5/27/2018 Right hemicolectomy for cecal mass with partial SBO, Zetta Parkinson  Severe protein-calorie malnutrition ([facility] 2018  Superficial postoperative wound infection 2018 Past Surgical History: 
Past Surgical History:  
Procedure Laterality Date  HX  SECTION Hysterectomy,total for precancer  HX COLECTOMY Right 2018 Right hemicolectomy for cecal mass with partial SBO, Zaheer Clemons Family History: 
Family History Problem Relation Age of Onset  Alcohol abuse Father  Cancer Maternal Aunt   
  breast ca  Breast Cancer Maternal Aunt  Breast Cancer Sister  Breast Cancer Daughter Social History: 
Social History Substance Use Topics  Smoking status: Never Smoker  Smokeless tobacco: Never Used  Alcohol use No  
 
 
Allergies: Allergies Allergen Reactions  Tylenol [Acetaminophen] Swelling Review of Systems Review of Systems Unable to perform ROS: Acuity of condition Allergic/Immunologic: Positive for environmental allergies. Physical Exam  
Physical Exam  
Constitutional: She appears distressed (Severe). Obese female, Intubated HENT:  
Head: Normocephalic and atraumatic. Et tube in place, OG tube in place draining dark/bloody appearing female. Eyes: Right eye exhibits no discharge. Left eye exhibits no discharge. cunjunctiva pale. Pupils midline 5 mm,  Minimally reactive Neck: No JVD present. No tracheal deviation present. Cardiovascular: Normal rate and regular rhythm. No murmur heard. Pulmonary/Chest:  
BV mask ventilations on going. Decreased breath sounds on the right. New port scar on right chest wall. Abdominal:  
Distended, firm Midline scar Wound midline, packing in place. Musculoskeletal: She exhibits no deformity. Neurological:  
gcs 3 Skin: Skin is warm and dry. Psychiatric:  
Unable to assess Diagnostic Study Results Labs - Recent Results (from the past 12 hour(s)) GLUCOSE, POC  Collection Time: 18 11:00 AM  
Result Value Ref Range Glucose (POC) 148 (H) 65 - 100 mg/dL Performed by Rojas Fried (PCT) CBC WITH AUTOMATED DIFF Collection Time: 07/23/18 11:05 AM  
Result Value Ref Range WBC 55.0 (HH) 3.6 - 11.0 K/uL  
 RBC 3.67 (L) 3.80 - 5.20 M/uL HGB 9.3 (L) 11.5 - 16.0 g/dL HCT 31.7 (L) 35.0 - 47.0 % MCV 86.4 80.0 - 99.0 FL  
 MCH 25.3 (L) 26.0 - 34.0 PG  
 MCHC 29.3 (L) 30.0 - 36.5 g/dL  
 RDW 16.5 (H) 11.5 - 14.5 % PLATELET 501 977 - 702 K/uL MPV 9.2 8.9 - 12.9 FL  
 NRBC 0.9 (H) 0  WBC ABSOLUTE NRBC 0.51 (H) 0.00 - 0.01 K/uL NEUTROPHILS 86 (H) 32 - 75 % BAND NEUTROPHILS 8 % LYMPHOCYTES 6 (L) 12 - 49 % MONOCYTES 0 (L) 5 - 13 % EOSINOPHILS 0 0 - 7 % BASOPHILS 0 0 - 1 % IMMATURE GRANULOCYTES 0 0.0 - 0.5 % ABS. NEUTROPHILS 51.7 (H) 1.8 - 8.0 K/UL  
 ABS. LYMPHOCYTES 3.3 0.8 - 3.5 K/UL  
 ABS. MONOCYTES 0.0 0.0 - 1.0 K/UL  
 ABS. EOSINOPHILS 0.0 0.0 - 0.4 K/UL  
 ABS. BASOPHILS 0.0 0.0 - 0.1 K/UL  
 ABS. IMM. GRANS. 0.0 0.00 - 0.04 K/UL  
 DF MANUAL    
 RBC COMMENTS MALLORIE CELLS 
PRESENT 
    
 RBC COMMENTS ANISOCYTOSIS 
1+ PROTHROMBIN TIME + INR Collection Time: 07/23/18 11:05 AM  
Result Value Ref Range INR 1.5 (H) 0.9 - 1.1 Prothrombin time 15.1 (H) 9.0 - 11.1 sec METABOLIC PANEL, COMPREHENSIVE Collection Time: 07/23/18 11:05 AM  
Result Value Ref Range Sodium 148 (H) 136 - 145 mmol/L Potassium 4.4 3.5 - 5.1 mmol/L Chloride 111 (H) 97 - 108 mmol/L  
 CO2 19 (L) 21 - 32 mmol/L Anion gap 18 (H) 5 - 15 mmol/L Glucose 114 (H) 65 - 100 mg/dL BUN 22 (H) 6 - 20 MG/DL Creatinine 1.40 (H) 0.55 - 1.02 MG/DL  
 BUN/Creatinine ratio 16 12 - 20 GFR est AA 46 (L) >60 ml/min/1.73m2 GFR est non-AA 38 (L) >60 ml/min/1.73m2 Calcium 8.1 (L) 8.5 - 10.1 MG/DL Bilirubin, total 0.2 0.2 - 1.0 MG/DL  
 ALT (SGPT) 43 12 - 78 U/L  
 AST (SGOT) 218 (H) 15 - 37 U/L Alk. phosphatase 240 (H) 45 - 117 U/L Protein, total 4.6 (L) 6.4 - 8.2 g/dL Albumin 1.3 (L) 3.5 - 5.0 g/dL Globulin 3.3 2.0 - 4.0 g/dL A-G Ratio 0.4 (L) 1.1 - 2.2    
TROPONIN I Collection Time: 07/23/18 11:05 AM  
Result Value Ref Range Troponin-I, Qt. 1.25 (H) <0.05 ng/mL NT-PRO BNP Collection Time: 07/23/18 11:05 AM  
Result Value Ref Range NT pro-BNP 47266 (H) 0 - 125 PG/ML  
EMERGENT RELEASE OF UNCROSSMATCHED RED CELLS Collection Time: 07/23/18 11:05 AM  
Result Value Ref Range Crossmatch Expiration 07/26/2018 ABO/Rh(D) O NEGATIVE Antibody screen NEG Unit number [id number] Blood component type RC LR,1 Unit division 00 Status of unit ISSUED Crossmatch result Compatible Unit number [id number] Blood component type RC LR,2 Unit division 00 Status of unit ISSUED Crossmatch result Compatible Unit number [id number] Blood component type RC LR Unit division 00 Status of unit ALLOCATED Crossmatch result Compatible Unit number [id number] Blood component type RC LR Unit division 00 Status of unit ALLOCATED Crossmatch result Compatible PATHOLOGIST REVIEW Collection Time: 07/23/18 11:05 AM  
Result Value Ref Range Pathologist review (NOTE) EKG, 12 LEAD, INITIAL Collection Time: 07/23/18 11:12 AM  
Result Value Ref Range Ventricular Rate 165 BPM  
 Atrial Rate 85 BPM  
 P-R Interval 176 ms QRS Duration 74 ms Q-T Interval 298 ms QTC Calculation (Bezet) 493 ms Calculated P Axis 26 degrees Calculated R Axis -72 degrees Calculated T Axis 31 degrees Diagnosis Undetermined rhythm Left axis deviation Pulmonary disease pattern Septal infarct , age undetermined When compared with ECG of 27-MAY-2018 09:44, 
Current undetermined rhythm precludes rhythm comparison, needs review QRS duration has decreased QRS voltage has decreased Septal infarct is now present Confirmed by Zehra Colunga ([id number]) on 7/23/2018 12:26:04 PM 
  
OCCULT BLOOD, GASTRIC Collection Time: 07/23/18 11:20 AM  
Result Value Ref Range OCCULT BLOOD,GASTRIC POSITIVE (A) NEG    
 pH,GASTRIC 4 (H) 1.5 - 3.5    
FFP, ALLOCATE Collection Time: 07/23/18 12:15 PM  
Result Value Ref Range Unit number [id number] Blood component type FP 24h, Thaw Unit division 00 Status of unit ISSUED Unit number [id number] Blood component type FP 24h, Thaw Unit division 00 Status of unit ISSUED   
BLOOD GAS, ARTERIAL Collection Time: 07/23/18 12:30 PM  
Result Value Ref Range pH 7.19 (LL) 7.35 - 7.45    
 PCO2 38 35.0 - 45.0 mmHg PO2 84 80 - 100 mmHg O2 SAT 94 92 - 97 % BICARBONATE 14 (L) 22 - 26 mmol/L  
 BASE DEFICIT 13.2 mmol/L  
 O2 METHOD VENTILATOR    
 O2 FLOW RATE 16.00 L/min FIO2 100 % MODE A/C Tidal volume 450 SET RATE 16    
 EPAP/CPAP/PEEP 6.0 Sample source ARTERIAL    
 SITE LEFT BRACHIAL BEATA'S TEST N/A Critical value read back Ray md Oleg   
HGB & HCT Collection Time: 07/23/18  2:47 PM  
Result Value Ref Range HGB 10.5 (L) 11.5 - 16.0 g/dL HCT 33.8 (L) 35.0 - 47.0 % Radiologic Studies -  
 
CT Results  (Last 48 hours) 07/23/18 1423  CT HEAD WO CONT Final result Impression:   impression: No acute intracranial findings, paranasal sinus disease with  
possible air-fluid level right maxillary sinus. Narrative:  EXAM:  CT HEAD WO CONT INDICATION:   Confusion/delirium, altered LOC, unexplained COMPARISON: None. CONTRAST:  None. TECHNIQUE: Unenhanced CT of the head was performed using 5 mm images. Brain and  
bone windows were generated. CT dose reduction was achieved through use of a  
standardized protocol tailored for this examination and automatic exposure  
control for dose modulation. FINDINGS:  
There is no extra-axial fluid collection hemorrhage or shift.  No masses or.  
   
  
 07/23/18 1423  CTA CHEST W OR W WO CONT Final result Impression:  IMPRESSION:   
1. No evidence of pulmonary embolus. 2. Near complete atelectasis of the left lung. Bilateral pleural effusions right  
greater than left which are large. 3. Metastatic disease right lung. Vladimir Knutson Narrative:  EXAM:  CTA CHEST W OR W WO CONT INDICATION:   Chest pain, acute, pulmonary embolism (PE) suspected. Stage IV  
colon cancer COMPARISON: 5/31/2018. CONTRAST:  100 mL of Isovue-370. TECHNIQUE:   
Precontrast  images were obtained to localize the volume for acquisition. Multislice helical CT arteriography was performed from the diaphragm to the  
thoracic inlet during uneventful rapid bolus intravenous contrast  
administration. Lung and soft tissue windows were generated. Coronal and  
sagittal images were generated and 3D post processing consisting of coronal  
maximum intensity images was performed. CT dose reduction was achieved through  
use of a standardized protocol tailored for this examination and automatic  
exposure control for dose modulation. FINDINGS:  
LUNGS:  There is near total atelectasis of the left lung with shift of the  
mediastinum to the left as well as moderately large pleural effusion on the  
left. There is a large right pleural effusion as well with compressive  
atelectasis in the right lower lobe. Right lower lobe and right upper lobe  
demonstrate multiple pulmonary nodules. It is difficult to exactly compare this  
exam with previous studies because of the amount of consolidation distortion of  
the lungs secondary to this. Vladimir Knutson PLEURA: See above TRACHEA/BRONCHI: Endotracheal tube is in satisfactory position. NG tube  
traverses the mediastinum with the tip in the stomach. PULMONARY ARTERIES: The pulmonary arteries are well enhanced and no pulmonary  
emboli are identified. MEDIASTINUM/FRAN: There is no mediastinal or hilar adenopathy or mass.    
AORTA: The aorta enhances normally without evidence of aneurysm or dissection. UPPER ABDOMEN: Please see CT abdomen pelvis. BONES: No sclerotic or lytic lesion. 07/23/18 1423  CT ABD PELV W CONT Final result Impression:  IMPRESSION:  
   
1. No bowel obstruction. 2. Ascites some of which is loculated. Overall this is less than on the previous  
examination. There is increased density in the mesenteric fat when compared to  
the previous exam, however. There is also densities noted in the region of the  
greater omentum and mesentery consistent with prostatic disease in the  
peritoneum. 3. Metastatic disease in the liver again noted. Narrative:  INDICATION: Abdominal distension  stage IV colon cancer COMPARISON: 7/10/2018 TECHNIQUE:   
Following the uneventful intravenous administration of 100 cc Isovue-370, thin  
axial images were obtained through the abdomen and pelvis. Coronal and sagittal  
reconstructions were generated. Oral contrast was not administered. CT dose  
reduction was achieved through use of a standardized protocol tailored for this  
examination and automatic exposure control for dose modulation. FINDINGS:   
LUNG BASES: See concurrent CT thorax. LIVER: Multiple metastatic lesions are again noted in the left and right lobes  
of the liver unchanged from most recent previous examination no biliary  
dilatation. GALLBLADDER: Unremarkable. SPLEEN: No mass. PANCREAS: No mass or ductal dilatation. ADRENALS: Unremarkable. KIDNEYS: Left renal cyst is noted. There is no hydronephrosis or mass. GI: No bowel obstruction. Previous right colon resection noted. APPENDIX: Absent PERITONEUM: There is generalized ascites some of which is loculated. There is  
edema in the mesenteric fat. Nodularity noted in the greater omentum consistent  
with metastatic disease. This has not changed significantly.   
RETROPERITONEUM: Previously described adenopathy in the retroperitoneum is again  
noted unchanged URINARY BLADDER: Empty containing a Ford catheter. PELVIS: No adenopathy or abnormal mass. BONES: No destructive bone lesion. ADDITIONAL COMMENTS: N/A  
   
  
  
 
CXR Results  (Last 48 hours) 07/23/18 1158  XR CHEST PORT Final result Impression:  IMPRESSION: Endotracheal tube pulled back to the karen. Persistent the left  
lung atelectasis. Persistent right lung pulmonary edema This result was relayed to Dr. Aries Hand by me at 21  Narrative:  EXAM:  XR CHEST PORT INDICATION:  Tube pulled back COMPARISON:  1139 FINDINGS: A portable AP radiograph of the chest was obtained at 1151 hours. The  
patient is on a cardiac monitor. The endotracheal tube is now at the karen. The  
left lung remains hypoinflated. The right lung remains opacified. 07/23/18 1157  XR CHEST PORT Final result Impression:  IMPRESSION: Endotracheal tube in right mainstem bronchus. Hypoinflated left  
lung. This result was verbally relayed by myself to Dr. Aries Hand at [phone] hours [address] Narrative:  EXAM:  XR CHEST PORT INDICATION:  et tube placement, post arrest  
   
COMPARISON:  7/13/2018 FINDINGS: A portable AP radiograph of the chest was obtained at 1139 hours. The  
patient is on a cardiac monitor. Endotracheal tube extends into the right  
mainstem bronchus. The left lung is hypoinflated. NG tube extends into the  
stomach. Port-A-Cath extends to the caval atrial junction. There is central  
pulmonary vascular congestion. Medical Decision Making I am the first provider for this patient. I reviewed the vital signs, available nursing notes, past medical history, past surgical history, family history and social history. Vital Signs-Reviewed the patient's vital signs.  
Patient Vitals for the past 12 hrs: 
 Temp Pulse Resp BP SpO2  
07/23/18 1532 - - - - 100 %  
07/23/18 1530 - 98 - 106/80 -  
07/23/18 1430 97.9 °F (36.6 °C) 96 - 110/82 100 % 07/23/18 1423 - 96 21 - 100 % 07/23/18 1407 - 96 18 113/68 100 % 07/23/18 1340 97.9 °F (36.6 °C) 97 30 93/78 -  
07/23/18 1324 98.1 °F (36.7 °C) 98 (!) 32 (!) 86/66 -  
07/23/18 1305 98.4 °F (36.9 °C) (!) 103 16 (!) 66/47 -  
07/23/18 1256 96.9 °F (36.1 °C) (!) 103 16 (!) 71/58 -  
07/23/18 1243 98.1 °F (36.7 °C) (!) 106 16 (!) 55/21 -  
07/23/18 1240 97.9 °F (36.6 °C) (!) 106 16 (!) 51/28 -  
07/23/18 1224 95.3 °F (35.2 °C) (!) 107 16 (!) 62/35 -  
07/23/18 1219 (!) 92.8 °F (33.8 °C) (!) 106 16 (!) 72/27 -  
07/23/18 1155 - (!) 107 16 (!) 55/32 90 % 07/23/18 1145 - (!) 107 16 (!) 54/36 (!) 84 %  
07/23/18 1140 - (!) 108 20 (!) 63/38 (!) 74 %  
07/23/18 1135 - (!) 108 16 (!) 67/50 (!) 87 %  
07/23/18 1130 - (!) 110 16 98/74 92 %  
07/23/18 1106 - 90 21 96/67 -  
 
Pulse Oximetry Analysis - 90% on ventilator Cardiac Monitor:  
Rate: 98 bpm 
Rhythm: sinus rhythm EKG interpretation: (Preliminary) [address] Rhythm: possible afib, tachycardia, irregular narrow complexes; and irregular rate (approx.): 165, axis: normal; QRs: 74; QTc: 493; ST/T changes: normal 
Written by Hulen Font, ED scribe, as dictated by Nestor Ruiz DO 
 
Records Reviewed: Nursing Notes, Old Medical Records, Previous electrocardiograms, Ambulance Run Sheet, Previous Radiology Studies and Previous Laboratory Studies Provider Notes (Medical Decision Making): Pt presents post cardiac arrest and subsequent loss of pulses. Known stage 4 metastatic colon CA. Full code per family. Appears to have active upper GI bleed on initial eval. H&H was lower than previous. Assumed patient may also be total volume down and H&H may not be equilibrated to total volume lose. We will trend h8h and given instability and with what appears to be active bleeding will transfuse. . DDx for arrest also includes ACS, hypoxia, electrolyte disturbance, PE to name a few, hypovolumic, septic shock initially seems less likely but possible. WBC may be result of chemo, dehydration and acute phase secondary to arrest. Wound on abdomen not overtly purulent, no cellulitis. Will provider supportive measures. Will treat with uncrossed matched blood with the assumption pt has active GI bleed. Will continue with other resuscitative measures. ED Course:  
Initial assessment performed. The patients presenting problems have been discussed, and they are in agreement with the care plan formulated and outlined with them. I have encouraged them to ask questions as they arise throughout their visit. 1055: Pulse lost. CPR started. 1058: Bicarb x 2 administered. POC glucose 148.  
1100: Pulse and rhythm check reveals no pulse. Narrow complex PEA seen on cardiac monitor. CPR resumed. 1100: Epi #1 administered. 1105: gross blood coming from OG tube. Will send Gastroccult. Procedure Note - Bedside Ultrasound: 
1101 Performed by: Teresa Church DO 
US of abdomen, showing a malignancy and free fluid. Procedure Note - Bedside Ultrasound: 
3571 Performed by: Teresa Church DO 
US of chest, showing cardiac motion, no pericardial effusion. Pt has a palpable pulse. [address] Per chart review, CT on 7/10/2018 revealed worsening metastatic disease and new significant peritoneal metastatic disease with ascites. She is currently on ASA, but no other anticoagulants. Written by CHRISTINA Maxwelle, as dictated by Vinayak Mckinley DO and pastoral care to the conference room to update pt's family. CONSULT NOTE:  
12:21 PM 
Teresa Church DO spoke with Albert Hughes MD  
Specialty: GI 
Discussed pt's hx, disposition, and available diagnostic and imaging results. Reviewed care plans. Consultant agrees with plans as outlined. Dr. Rica Lee agrees with plan to continue to stabilize pt and states GI will follow along.   
Written by CHRISTINA Maxwellibe, as dictated by Teresa Church  
 
Procedure Note - Central Line Placement:  
12:40 PM 
Performed by: Rehan Mccall DO Immediately prior to the procedure, the patient was reevaluated and found suitable for the planned procedure and any planned medications. Immediately prior to the procedure a time out was called to verify the correct patient, procedure, equipment, staff, and marking as appropriate. Area was cleansed with chlorhexidine. Prepped and draped in sterile fashion. Landmarks identified. 18 gauge needle with triple lumen catheter was inserted into pt's Right femoral with ultrasound guidance. Line sutured in place; sterile dressing applied. Position: Trendelenburg Number of attempts: 1 Estimated blood loss: < 5 cc's The procedure took 16-30 minutes, and pt tolerated well. CONSULT NOTE:  
12:58 PM 
Rehan Mccall DO spoke with Srinath Patel MD  
Specialty: pulmonology Discussed pt's hx, disposition, and available diagnostic and imaging results. Reviewed care plans. Consultant agrees with plans as outlined. Dr. Haider Ch agrees with care, but would like further discussion with family regarding goals of care. He will review chart of pt and make further recommendation. Written by CHRISTINA Pinon, as dictated by Rehan Mccall DO 
 
1:24 PM 
Spoke with pt's family again and updated on pt's status. Pt's spouse states he continues to want pt to be full-code. Written by CHRISTINA Pinon, as dictated by Rehan Mccall DO 
 
CONSULT NOTE:  
1:35 PM 
Rehan Mccall DO spoke with Rehan Mccall DO Specialty: Hospitalist 
Discussed pt's hx, disposition, and available diagnostic and imaging results. Reviewed care plans. Consultant will evaluate pt for admission. Written by CHRISTINA Pinon, as dictated by Rehan Mccall DO 
 
8444 update: CT results show no clear infectious source, workup can continue as inpatient, will continue work as inpatient.   
 
CRITICAL CARE NOTE : 
 
11:28 AM 
 
IMPENDING DETERIORATION -Airway, Respiratory, Cardiovascular, CNS and Metabolic ASSOCIATED RISK FACTORS - Hypotension, Shock, Hypoxia, Bleeding, Dysrhythmia, Metabolic changes and Dehydration MANAGEMENT- Bedside Assessment and Supervision of Care INTERPRETATION -  Xrays, CT Scan, Blood Gases, ECG, Blood Pressure and Screening Ultrasound INTERVENTIONS - hemodynamic mngmt, vent mngmt, gastric tube CASE REVIEW - Hospitalist, Medical Sub-Specialist, Nursing and Family TREATMENT RESPONSE -Improved PERFORMED BY - Self NOTES   : 
 
I have spent 75 minutes of critical care time involved in lab review, consultations with specialist, family decision- making, bedside attention and documentation. During this entire length of time I was immediately available to the patient . Ana María Huddleston DO Disposition: 
ADMIT NOTE: 
1:36 PM 
The patient is being admitted to the hospital by Orquidea Mantilla MD.  The results of their tests and reasons for their admission have been discussed with the patient and/or available family. They convey agreement and understanding for the need to be admitted and for their admission diagnosis. PLAN: 
1. Admit to hospitalist  
 
Diagnosis Clinical Impression: 1. Cardiac arrest ([address] 2. Upper GI bleed 3. Systemic inflammatory response syndrome (SIRS) (HCC) 4. Shock ([address] 5. Leukocytosis, unspecified type 6. Acute renal failure, unspecified acute renal failure type ([address] Attestations: This note is prepared by Becky Trinidad, acting as Scribe for Ana María Huddleston DO. Ana María Huddleston DO: The scribe's documentation has been prepared under my direction and personally reviewed by me in its entirety. I confirm that the note above accurately reflects all work, treatment, procedures, and medical decision making performed by me. This note will not be viewable in [address]

## 2018-07-23 NOTE — PROGRESS NOTES
1930- Bedside and Verbal shift change report given to Evelia RN (oncoming nurse) by Berlin Musa RN (offgoing nurse). Report included the following information SBAR, Kardex, Intake/Output, MAR, Recent Results and Cardiac Rhythm ST.   1930- Assessment complete. Pt on vent, no sedation. Pupils reactive, weak cough with suction, no response to pain. Pt has occasional myoclonic jerks. Lungs very coarse and diminished. OGT to LWS, brown drainage. Abd large with healing midline incision, small open area at lower portion. Rt shoulder with bruising and swelling where IO removed, area marked, will continue to monitor. 2035-  and sister at bedside to visit. Questions and concerns addressed. Cleaned abd wound with NS and applied sterile 4x4 guaze per husbands request.   5094- Page to hosp re:temp 101.1. Order received for cooling blanket. 2305- OGT continues to leak large amts from air vent. New OGT placed. 2330- Pt bathed and placed on cooling blanket. Pt temp 101.3 bladder. Reassess unchanged. 2995- Critical lactic 3.5, this result decreased from previous. Awaiting remaining lab results. 5945- Bedside and verbal report given to Noman Brewster.

## 2018-07-23 NOTE — ED TRIAGE NOTES
Pt arrives via EMS with ROSC. Per EMS pt was c/o of bilateral flank pain and general weakness upon their arrival. EMS reports pt was witnessed arrest and found to be in asystole. Per EMS they had VFib during transport with defib x 2 with ROSC    Pt arrives unresponsive ETT 7.0 and OGT in place.  Per EMS pt with hx of colon CA and due to start \"palliative chemo\"    Pt noted with dermabond to R chest wall from recent portacath insertion Pt with IO in R humeral head upon arrival Pt with dark brown substance from OGT

## 2018-07-23 NOTE — TELEPHONE ENCOUNTER
Pt  would like a call back from Dr Carrillo Daughters he will not schedule a follow up until he speaks to her

## 2018-07-24 PROBLEM — G93.40 ACUTE ENCEPHALOPATHY: Status: ACTIVE | Noted: 2018-01-01

## 2018-07-24 PROBLEM — J96.02 ACUTE RESPIRATORY FAILURE WITH HYPOXIA AND HYPERCAPNIA (HCC): Status: ACTIVE | Noted: 2018-01-01

## 2018-07-24 PROBLEM — J96.01 ACUTE RESPIRATORY FAILURE WITH HYPOXIA AND HYPERCAPNIA (HCC): Status: ACTIVE | Noted: 2018-01-01

## 2018-07-24 PROBLEM — N17.9 ACUTE RENAL FAILURE (HCC): Status: ACTIVE | Noted: 2018-01-01

## 2018-07-24 PROBLEM — Z71.89 COUNSELING REGARDING GOALS OF CARE: Status: ACTIVE | Noted: 2018-01-01

## 2018-07-24 NOTE — PROGRESS NOTES
Paged by pt's nurse who indicated that family was requesting a [place] speaking  (if possible). Arrived to unit and reviewed pt's chart before meeting family. Pt had several family members present, including her  and two daughters. Spoke with pt's . He shared that he was uncertain if a  had visited today. Pt's  had met Kyler Baez yesterday and Kyler Baez had arranged for visit from  today, but family was not present earlier and wondered if  had come while they were away. Family plans to be here again in the morning and desire a visit from Kyler Baez, and would also appreciate support from a  at that time if available. Will coordinate with Kyler Baez in the morning regarding family's request.  Offered emotional support to pt's  and assured him and family of prayers. Family's Judaism lon appears to be significant in their lives and as they seek comfort and strength during this difficult time.     Evelia Sanchezocco, [address] [place] [address] Road

## 2018-07-24 NOTE — PROGRESS NOTES
Pharmacy Automatic Renal Dosing Protocol - Antimicrobials  Indication for Antimicrobials: sepsis of unknown etiology     Current Regimen of Each Antimicrobial:  Cefepime 2 grams IV q 8 hr  (Start Date ; Day # 1)  Levaquin 750 mg IV every 24hrs (Start date 18; Day 1)  Vancomycin 2250mg IV x1  (Start 18; Day 1)    Previous Antimicrobial Therapy: None    Cx:  18  Blood = pending  18 Trach aspirate = pending  18 Urine = collected    Radiology / Imaging results: (X-ray, CT scan or MRI):  CTA Chest: There is near total atelectasis of the left lung with shift of the  mediastinum to the left as well as moderately large pleural effusion on the  left. There is a large right pleural effusion as well with compressive  atelectasis in the right lower lobe. Right lower lobe and right upper lobe  demonstrate multiple pulmonary nodules. It is difficult to exactly compare this  exam with previous studies because of the amount of consolidation distortion of  the lungs secondary to this. .    Paralysis, amputations, malnutrition: none noted    Labs:  Recent Labs      18   1105   CREA  1.40*   BUN  22*   WBC  55.0*     Temp (24hrs), Av.6 °F (36.4 °C), Min:92.8 °F (33.8 °C), Max:99.7 °F (37.6 °C)    Creatinine Clearance (mL/min) or Dialysis:  calculated 34ml/min, However Cr on the  = 0.47, will assume CrCl < 10 for now until we can see the renal trend from subsequent labs)    Impression/Plan:   · Will change Levaquin dose to 750 mg IV every 48hrs,  as appropriate for current renal function and indication. · Loading dose of Vancomycin of 2250 mg (~ approximately 25mg per kg) given, will determine future dosing needs based on future renal function as patient will not be needing any more doses in the next 24hrs). · Antimicrobial stop date to be determined     Pharmacy will follow daily and adjust medications as appropriate for renal function and/or serum levels.     Thank you,  Mateo Aguilar, [facility]

## 2018-07-24 NOTE — PROCEDURES
[address] [facility],[address] [place] [address], [facility]  MR#: [id number]  : 1950  ACCOUNT #: [de-identified]   DATE OF SERVICE: 2018    EXAM NUMBER:  MR [id number]. DESCRIPTION OF PROCEDURE:  The electrodes were applied in accordance with International 10-20 system of electrode placement. The EEG was reviewed in both bipolar and referential montages. FINDINGS:  The background looks to be a very low voltage 3 Hz activity not seen in all leads. IMPRESSION:  This is a markedly abnormal electroencephalogram due to the absence of any alpha or theta activity. This is low voltage. This is consistent with significant structural and/or metabolic encephalopathy. The absence of seizures on an electroencephalogram does not eliminate the diagnosis of epilepsy. Clinical correlation is advised.       MD Annabel Oliver / [place]  D: 2018 15:56     T: 2018 16:11  JOB #: [id number]

## 2018-07-24 NOTE — PROGRESS NOTES
07/24/18 0611   ABCDEF Bundle   SBT Safety Screen Passed Yes   SBT Trial Passed No   SBT Trial Reason for Failure Respiratory rate > 35;RSBI>105; Low tidal volume

## 2018-07-24 NOTE — PROGRESS NOTES
Hospitalist Progress Note Meghan Wilburn MD 
Cell: [phone] Date of Service:  2018 NAME:  Monroe Forde :  1950 MRN:  [id number] Assessment & Plan: S/p out of hospital asystolic cardiac arrest 
--EKG with indeterminate rhythm and new septal infarct 
--troponin 1.25. CTA chest negative for PE or dissection. --cardiology following but limited treatment option given gastroccult + brown liquid drainage via NG tube and concern for GI bleeding. 
--echo with ef 70%, no wall motion abnl 
--serial enzymes 
  
Acute encephalopathy, POA  
--post arrest.  Has startle reflex, seems to be breathing above vent, pupils constricted but reactive. However, not on any sedation and no spontaneous movement 
--CT head negative except right maxillary air fluid level 
--will need to see what neurologic function/recovery she will have in next 24-48 hours. 
  
Shock, cardiogenic vs septic shock Leukocytosis WBC 55K s/p recent chemo with FOLFOX and then neulasta 18 Recent abdominal wound infection with MRSE (staph epi) Possible right maxillary sinusitis with air fluid level on CT 
--on levophed drip 
--give albumin 
--gentle IVF 
--get blood cultures, lactic. Check UA with reflex culture. Urine looks turbid in catheter tubing 
--empiric abx with cefepime and vancomycin 
  
Stage 4 colon CA with malignant ascites and bilateral pleural effusions which likely will be malignant. Liver and right lung mets --dx 2018 s/p right hemicolectomy complicated with recurrent wound infection requiring wound vac last month 
--paracentesis with 5L fluid removal earlier this month 
--had FOLFOX chemo 1st round  and then neulasta . 
--oncology consulted 
--palliative care  following 
  
Acute on chronic anemia due to possible upper GI bleed vs. gastritis --hgb 9.3, previously 11-12 
--brown gastroccult + liquid drainage via OG tube.   
--IV protonix q12h 
--transfused 2 units uncrossed blood with repeat hgb 10.5. Monitor H&H 
--also given 2 units FFP for coagulopathy INR 1.5 
--GI  Has signed off.  
  
Acute respiratory failure Large Bilateral pleural effusions, suspect malignant effusions --intubated. Oxygenating ok on 100% FIO2 
--consider therapeutic and diagnostic thoracentesis once hemodynamically stable.   
  
AL 
--Cr 1.4, previously 0.47. 
--osorio placed in ER, check UA, no hydronephrosis on CT 
--gentle IVF. --hold lisinopril 
  
DM 
--hold lantus, metformin, saxagliptin, farxiga 
--low dose SSI 
  
HTN Hyperlipidemia 
--hold zocor due to actuely elevated AST, ALP 
  
Body mass index is 32.17 kg/(m^2). 
  
Code: discussed with , full DVT prophylaxis:  SCD Surrogate decision maker:   Kedar Ayoub 
  
Patient critically ill and high risk of dying but  does not want to \"give up\" at this time. Hospital Problems  Date Reviewed: 7/23/2018 Codes Class Noted POA Acute encephalopathy ICD-10-CM: G93.40 ICD-9-CM: 348.30  7/24/2018 Yes Acute renal failure (HCC) ICD-10-CM: N17.9 ICD-9-CM: 584.9  7/24/2018 No  
   
 Acute respiratory failure with hypoxia and hypercapnia (HCC) ICD-10-CM: J96.01, J96.02 
ICD-9-CM: 518.81  7/24/2018 Yes Counseling regarding goals of care ICD-10-CM: Z71.89 ICD-9-CM: V65.49  7/24/2018 Unknown * (Principal)Cardiac arrest ([facility] [address] ICD-10-CM: I46.9 ICD-9-CM: 427.5  7/23/2018 Yes Upper GI bleed ICD-10-CM: K92.2 ICD-9-CM: 578.9  7/23/2018 Yes Septic shock (HCC) ICD-10-CM: A41.9, R65.21 ICD-9-CM: 038.9, 785.52, 995.92  7/23/2018 Yes Pleural effusion, bilateral ICD-10-CM: J90 ICD-9-CM: 511.9  7/23/2018 Yes Acute cystitis without hematuria ICD-10-CM: N30.00 ICD-9-CM: 595.0  7/23/2018 Yes Metastatic colon cancer in female [facility]) ICD-10-CM: C78.5 ICD-9-CM: 197.5  6/25/2018 Yes Subjective:  
Pt remains obtunded on vent support. Vital Signs:  
 Last 24hrs VS reviewed since prior progress note. Most recent are: 
Visit Vitals  /62  Pulse (!) 129  Temp 98.4 °F (36.9 °C)  Resp (!) 3  
 Wt 85 kg (187 lb 6.3 oz)  SpO2 99%  BMI 32.17 kg/m2 Intake/Output Summary (Last 24 hours) at 07/24/18 1216 Last data filed at 07/24/18 1100 Gross per 24 hour Intake          3829.02 ml Output             1121 ml Net          2708.02 ml Physical Examination:  
 
Gen: NAD Lungs: occ rhonchi, dec BS 
CV: RR tachy, no mur Abd: soft NT ND. +BS Ext: + edema, cyanosis. Warm. Pulses 2+ Neuro: unresponsive. Labs:  
 
Recent Labs  
   07/24/18 
 0355  07/23/18 
 1447  07/23/18 
 1105 WBC  33.4*   --   55.0* HGB  11.1*  10.5*  9.3* HCT  34.2*  33.8*  31.7* PLT  277   --   268 Recent Labs  
   07/24/18 
 0355  07/23/18 
 1105 NA  146*  148* K  3.8  4.4  
CL  112*  111* CO2  20*  19* BUN  31*  22* CREA  1.52*  1.40* GLU  129*  114* CA  8.3*  8.1*  
MG  2.3   --   
PHOS  3.4   --   
 
Recent Labs  
   07/24/18 
 0355  07/23/18 
 1105 SGOT  415*  218* ALT  89*  43 AP  186*  240* TBILI  0.5  0.2 TP  5.4*  4.6* ALB  1.9*  1.3*  
GLOB  3.5  3.3 Recent Labs  
   07/24/18 
 0355  07/23/18 
 1105 INR  1.3*  1.5* PTP  13.5*  15.1* No results for input(s): FE, TIBC, PSAT, FERR in the last 72 hours. No results found for: FOL, RBCF Recent Labs  
   07/24/18 
 0443  07/23/18 
 1230 PH  7.41  7.19* PCO2  31*  38  
PO2  135*  84 Recent Labs  
   07/24/18 
 0355  07/23/18 
 1615  07/23/18 
 1105 CPK  462*  670*   --   
CKNDX  1.4  2.2   --   
TROIQ  1.54*  1.82*  1.25* Lab Results Component Value Date/Time Cholesterol, total 174 06/01/2017 09:40 AM  
 HDL Cholesterol 45 06/01/2017 09:40 AM  
 LDL, calculated 92 06/01/2017 09:40 AM  
 Triglyceride 183 (H) 06/01/2017 09:40 AM  
 CHOL/HDL Ratio 3.4 01/29/2010 11:46 AM  
 
Lab Results Component Value Date/Time Glucose (POC) 151 (H) 07/24/2018 12:00 PM  
 Glucose (POC) 117 (H) 07/24/2018 06:12 AM  
 Glucose (POC) 125 (H) 07/24/2018 01:06 AM  
 Glucose (POC) 340 (H) 07/23/2018 05:54 PM  
 Glucose (POC) 148 (H) 07/23/2018 11:00 AM  
 
Lab Results Component Value Date/Time Color DARK YELLOW 07/23/2018 05:50 PM  
 Appearance TURBID (A) 07/23/2018 05:50 PM  
 Specific gravity 1.020 07/23/2018 05:50 PM  
 pH (UA) 5.0 07/23/2018 05:50 PM  
 Protein 100 (A) 07/23/2018 05:50 PM  
 Glucose NEGATIVE  07/23/2018 05:50 PM  
 Ketone TRACE (A) 07/23/2018 05:50 PM  
 Bilirubin Negative 04/30/2015 12:00 AM  
 Urobilinogen 0.2 07/23/2018 05:50 PM  
 Nitrites NEGATIVE  07/23/2018 05:50 PM  
 Leukocyte Esterase SMALL (A) 07/23/2018 05:50 PM  
 Epithelial cells FEW 07/23/2018 05:50 PM  
 Bacteria 1+ (A) 07/23/2018 05:50 PM  
 WBC >100 (H) 07/23/2018 05:50 PM  
 RBC >100 (H) 07/23/2018 05:50 PM  
 
 
 
Medications Reviewed:  
 
Current Facility-Administered Medications Medication Dose Route Frequency  aspirin (ASA) suppository 300 mg  300 mg Rectal DAILY  0.45% sodium chloride infusion  100 mL/hr IntraVENous CONTINUOUS  
 vancomycin (VANCOCIN) 1250 mg in  ml infusion  1,250 mg IntraVENous Q24H  
 artificial tears (dextran 70-hypromellose) (NATURAL BALANCE) 0.1-0.3 % ophthalmic solution 1 Drop  1 Drop Both Eyes PRN  
 NOREPINephrine (LEVOPHED) 8 mg in 5% dextrose 250mL infusion  2-30 mcg/min IntraVENous TITRATE  sodium chloride (NS) flush 5-10 mL  5-10 mL IntraVENous Q8H  
 sodium chloride (NS) flush 5-10 mL  5-10 mL IntraVENous PRN  
 ondansetron (ZOFRAN) injection 4 mg  4 mg IntraVENous Q6H PRN  
 insulin lispro (HUMALOG) injection   SubCUTAneous Q6H  
 glucose chewable tablet 16 g  4 Tab Oral PRN  
 dextrose (D50W) injection syrg 12.5-25 g  12.5-25 g IntraVENous PRN  
 glucagon (GLUCAGEN) injection 1 mg  1 mg IntraMUSCular PRN  
 cefepime (MAXIPIME) 2 g in 0.9% sodium chloride (MBP/ADV) 100 mL  2 g IntraVENous Q12H  pantoprazole (PROTONIX) 40 mg in sodium chloride 0.9% 10 mL injection  40 mg IntraVENous Q12H  mupirocin (BACTROBAN) 2 % ointment   Both Nostrils Q12H  levoFLOXacin (LEVAQUIN) 750 mg in D5W IVPB  750 mg IntraVENous Q48H  chlorhexidine (PERIDEX) 0.12 % mouthwash 15 mL  15 mL Oral Q12H  
 
______________________________________________________________________ EXPECTED LENGTH OF STAY: 4d 21h ACTUAL LENGTH OF STAY:          1 Flores Glover MD

## 2018-07-24 NOTE — PROGRESS NOTES
[address] [facility] [place] [facility] [address], [address], [address] [place], [address] [place] [address] 
[phone] Reason for consult:  
Marianna Ye is a 79 y.o. female who we have been asked to see by Dr. Washington Zayas for metastatic colon cancer who is currently on respiratory support and had witness cardiac arrest.  
 
Treatment History:  
· 5/25/2018 Right colectomy-stage IV poorly differentiated adenocarcinoma · 7/3/2018: EGFR neg, BRAF neg · 7/5/2018: KRAS/HRAS/NRAS-neg History of Present Illness: Ms. Elma Borja is a 79 y.o. female with recent diagnosis of stage IV colon cancer s/p colectomy May 2018 with post surgical wound care complications requiring wound vac. She underwent 1 cycle of palliative FOLFOX chemotherapy inpatient on 7/17/2018 and was discharged from [facility] on 9/19/2018. She received neulasta on 7/20/2018 in the [facility]. Yesterday, Ms. Elma Borja presented to the ED via EMS post cardiac arrest. EMS reports ACLS protocol for 10 minutes, during which pt had bouts of v-fib and was externally cardioverted x 2. Per EMS, pt had ROSC with a strong pulse and systolic BP of 77. She is currently intubated and unresponsive. There is concern for anoxic brain injury and neurology has been consulted. An EEG is scheduled for today. CT of the chest revealed near complete atelectasis of the left lung. Bilateral pleural effusions right greater than left which are large and metastatic disease right lung. Her family is not at the hospital at this time. I did discuss the case with Dr. Washington Zayas. Past Medical History:  
Diagnosis Date  Anemia NEC  Cancer ([address]  Cardiac arrest ([facility] 7/23/2018  Class 1 obesity due to excess calories with body mass index (BMI) of 31.0 to 31.9 in adult 6/25/2018  Diabetes ([address]  Headache(784.0)  Hypercholesterolemia  Hypertension  Malignant neoplasm of ascending colon ([address] 05/26/2018 Stage IVc  Palliative chemotherapy underway 2018 FOLFOX 50% dose reduced  S/P right colectomy 2018 Right hemicolectomy for cecal mass with partial SBO, [place]  Severe protein-calorie malnutrition ([address] 2018  Superficial postoperative wound infection 2018 Past Surgical History:  
Procedure Laterality Date  HX  SECTION Hysterectomy,total for precancer  HX COLECTOMY Right 2018 Right hemicolectomy for cecal mass with partial SBO, [place] Social History Substance Use Topics  Smoking status: Never Smoker  Smokeless tobacco: Never Used  Alcohol use No  
  
Family History Problem Relation Age of Onset  Alcohol abuse Father  Cancer Maternal Aunt   
  breast ca  Breast Cancer Maternal Aunt  Breast Cancer Sister  Breast Cancer Daughter Current Facility-Administered Medications Medication Dose Route Frequency  aspirin (ASA) suppository 300 mg  300 mg Rectal DAILY  0.45% sodium chloride infusion  100 mL/hr IntraVENous CONTINUOUS  
 NOREPINephrine (LEVOPHED) 8 mg in 5% dextrose 250mL infusion  2-30 mcg/min IntraVENous TITRATE  sodium chloride (NS) flush 5-10 mL  5-10 mL IntraVENous Q8H  
 sodium chloride (NS) flush 5-10 mL  5-10 mL IntraVENous PRN  
 ondansetron (ZOFRAN) injection 4 mg  4 mg IntraVENous Q6H PRN  
 insulin lispro (HUMALOG) injection   SubCUTAneous Q6H  
 glucose chewable tablet 16 g  4 Tab Oral PRN  
 dextrose (D50W) injection syrg 12.5-25 g  12.5-25 g IntraVENous PRN  
 glucagon (GLUCAGEN) injection 1 mg  1 mg IntraMUSCular PRN  
 cefepime (MAXIPIME) 2 g in 0.9% sodium chloride (MBP/ADV) 100 mL  2 g IntraVENous Q12H  pantoprazole (PROTONIX) 40 mg in sodium chloride 0.9% 10 mL injection  40 mg IntraVENous Q12H  mupirocin (BACTROBAN) 2 % ointment   Both Nostrils Q12H  levoFLOXacin (LEVAQUIN) 750 mg in D5W IVPB  750 mg IntraVENous Q48H  chlorhexidine (PERIDEX) 0.12 % mouthwash 15 mL  15 mL Oral Q12H  VANCOMYCIN INFORMATION NOTE   Other Rx Dosing/Monitoring Allergies Allergen Reactions  Tylenol [Acetaminophen] Swelling Review of Systems: A complete review of systems was obtained, negative except as described above. Physical Exam:  
 
Visit Vitals  /64  Pulse (!) 116  Temp 98.4 °F (36.9 °C)  Resp 15  Wt 187 lb 6.3 oz (85 kg)  SpO2 98%  BMI 32.17 kg/m2 ECOG PS: 3 General: No acute distress, appears weak/tired, drowsy, arousable. Eyes: anicteric sclerae HENT: Atraumatic, OP clear Neck: Supple GI: Abdominal distention soft, non tender Cardiac: HRR Respiratory: clear anterior lung fields, normal respiratory effort Extremities: no edema MS: unable to assess gait Skin:  Lower extremities cool, normal turgor and texture. Psych: conversant Results:  
 
Lab Results Component Value Date/Time WBC 33.4 (H) 07/24/2018 03:55 AM  
 HGB 11.1 (L) 07/24/2018 03:55 AM  
 HCT 34.2 (L) 07/24/2018 03:55 AM  
 PLATELET 327 99/95/7643 03:55 AM  
 MCV 77.9 (L) 07/24/2018 03:55 AM  
 ABS. NEUTROPHILS 31.7 (H) 07/24/2018 03:55 AM  
 
Lab Results Component Value Date/Time Sodium 146 (H) 07/24/2018 03:55 AM  
 Potassium 3.8 07/24/2018 03:55 AM  
 Chloride 112 (H) 07/24/2018 03:55 AM  
 CO2 20 (L) 07/24/2018 03:55 AM  
 Glucose 129 (H) 07/24/2018 03:55 AM  
 BUN 31 (H) 07/24/2018 03:55 AM  
 Creatinine 1.52 (H) 07/24/2018 03:55 AM  
 GFR est AA 41 (L) 07/24/2018 03:55 AM  
 GFR est non-AA 34 (L) 07/24/2018 03:55 AM  
 Calcium 8.3 (L) 07/24/2018 03:55 AM  
 Glucose (POC) 117 (H) 07/24/2018 06:12 AM  
 
 
CT Results (most recent): 
 
Results from Hospital Encounter encounter on 07/23/18 CT ABD PELV W CONT Narrative INDICATION: Abdominal distension  stage IV colon cancer COMPARISON: 7/10/2018 TECHNIQUE:  
Following the uneventful intravenous administration of 100 cc Isovue-370, thin 
axial images were obtained through the abdomen and pelvis.  Coronal and sagittal 
reconstructions were generated. Oral contrast was not administered. CT dose 
reduction was achieved through use of a standardized protocol tailored for this 
examination and automatic exposure control for dose modulation. FINDINGS:  
LUNG BASES: See concurrent CT thorax. LIVER: Multiple metastatic lesions are again noted in the left and right lobes 
of the liver unchanged from most recent previous examination no biliary 
dilatation. GALLBLADDER: Unremarkable. SPLEEN: No mass. PANCREAS: No mass or ductal dilatation. ADRENALS: Unremarkable. KIDNEYS: Left renal cyst is noted. There is no hydronephrosis or mass. GI: No bowel obstruction. Previous right colon resection noted. APPENDIX: Absent PERITONEUM: There is generalized ascites some of which is loculated. There is 
edema in the mesenteric fat. Nodularity noted in the greater omentum consistent 
with metastatic disease. This has not changed significantly. RETROPERITONEUM: Previously described adenopathy in the retroperitoneum is again 
noted unchanged URINARY BLADDER: Empty containing a Ford catheter. PELVIS: No adenopathy or abnormal mass. BONES: No destructive bone lesion. ADDITIONAL COMMENTS: N/A Impression IMPRESSION: 
 
1. No bowel obstruction. 2. Ascites some of which is loculated. Overall this is less than on the previous 
examination. There is increased density in the mesenteric fat when compared to 
the previous exam, however. There is also densities noted in the region of the 
greater omentum and mesentery consistent with prostatic disease in the 
peritoneum. 3. Metastatic disease in the liver again noted. 5/25/18 CT Abdomen/Pelvis: 
IMPRESSION: Cecal mass with metastatic disease to liver, retroperitoneal nodes, 
and lung. Relative small bowel obstruction. Associated dilated appendix. Associated complex right pleural effusion and small amount of ascites. 5/29/18 Surgical Pathology:  FINAL PATHOLOGIC DIAGNOSIS 1. Peritoneum, biopsy:  
Adenocarcinoma, poorly differentiated 2. Omentum, omentectomy:  
Adenocarcinoma, poorly differentiated 3. Colon and terminal ileum, right hemicolectomy:  
Colonic adenocarcinoma Appendix with tubulovillous adenoma Addendum handling for mismatch repair proteins Synoptic report:  
Procedure: Right hemicolectomy Tumor site: Cecum Tumor size: 10.0 cm Macroscopic tumor perforation: Not identified Histologic type: Adenocarcinoma Histologic grade: Poorly differentiated (grade 3 of 4 grades) Tumor extension: Invades the visceral peritoneum Margins: Positive for adenocarcinoma, radial  
Proximal and distal margins negative for adenocarcinoma Treatment effect: Not identified Lymphovascular invasion: Present Perineural invasion: Present Tumor deposits: Present, at least 11 Regional lymph nodes: Five of 12 lymph nodes positive for metastatic carcinoma Largest lymph node metastasis: 6 mm, without extracapsular extension Ancillary testing: Immunohistochemistry for mismatch repair proteins pending Pathologic stage (TNM):  
Primary tumor: pT4a Regional lymph nodes: pN2a Distant metastases: pM1b Records reviewed and summarized above. Pathology report(s) reviewed above. Radiology report(s) reviewed above. Assessment/PLAN:  
 
1. Acute Respiratory Failure - 
 
Currently on ventilator. Poor prognosis CT of the chest revealed near complete atelectasis of the left lung. Bilateral pleural effusions right greater than left which are large and metastatic disease right lung. 2. Cardiac arrest - Witnessed Cardiac arrest.  
EMS reports ACLS protocol for 10 minutes, during which pt had bouts of v-fib and was externally cardioverted x 2. Per EMS, pt had ROSC with a strong pulse and systolic BP of 77. 
 
3. ? Anoxic brain injury Patient unresponsive. Neurology consulted EEG planned for today 4) Stage 4 colon cancer MS stable found with colon mass with obstruction post surgery with wound healing issues. Liver and lung mets noted on CT 5/18 Patient is post surgery 5/18 for obstructing colon mass On CTs, patient has progressive cancer with ascites Intent of treatment - palliative Receiving palliative chemotherapy initiated 7/17/18 with FOLFOX 50% dose reduced. Cycle 1 day 8 PLAN:  
 
Await neurology consult and EEG results. Poor prognosis very unlikely that patient will be able to resume systemic chemotherapy. Agree with palliative consult regarding care decisions. Will be available to discuss with family if necessary.    
 
 
Signed By: Tori Griffin NP

## 2018-07-24 NOTE — PROGRESS NOTES
Pharmacy Automatic Renal Dosing Protocol - Antimicrobials    Indication for Antimicrobials: sepsis of unknown etiology     Current Regimen of Each Antimicrobial:  Cefepime 2 grams IV q 12 hr  (Start Date ; Day 2)  Levaquin 750 mg IV every 48hrs (Start date 18; Day 2)  Vancomycin 2250mg IV x1  (Start 18; Day 2)    Previous Antimicrobial Therapy: None    Cx:  18  Blood = No organisms seen on gram stain, pending  18 Trach aspirate = pending  18 Urine = collected    Radiology / Imaging results: (X-ray, CT scan or MRI):    CTA Chest: There is near total atelectasis of the left lung with shift of the mediastinum to the left as well as moderately large pleural effusion on the left. There is a large right pleural effusion as well with compressive atelectasis in the right lower lobe. Right lower lobe and right upper lobe demonstrate multiple pulmonary nodules. It is difficult to exactly compare this exam with previous studies because of the amount of consolidation distortion of the lungs secondary to this. Paralysis, amputations, malnutrition: none noted    Labs:  Recent Labs      18   0355  18   1105   CREA  1.52*  1.40*   BUN  31*  22*   WBC  33.4*  55.0*     Temp (24hrs), Av.2 °F (36.8 °C), Min:92.8 °F (33.8 °C), Max:101.3 °F (38.5 °C)    Creatinine Clearance (mL/min) or Dialysis:  35 ml/min    Impression/Plan:   · Continue current cefepime dose as appropriate for indication and current renal function. · Continue current levofloxacin dose as appropriate for indication and current renal function. · Will order vancomycin maintenance dose of 1250 mg IV q24hr for a predicted trough of ~15 mcg/ml. · Antimicrobial stop date to be determined     Pharmacy will follow daily and adjust medications as appropriate for renal function and/or serum levels.     Thank you,  TATA Dickerson

## 2018-07-24 NOTE — CONSULTS
DATE OF CONSULTATION: 7/24/2018    CONSULTED BY: Fidencio Tipton MD    Chief Complaint   Patient presents with    Cardiac arrest     Arrives via EMS in cardiac arrest with ROSC        Reason for Consult  I have been asked to see the patient in neurological consultation to render advice and opinion regarding CNS status S/P cardiac arrest    HISTORY OF PRESENT ILLNESS  Karis Treviño, 79 y.o. female with PMHx significant for DM, HTN, HLD, and colon CA with right hemicolectomy, presents via EMS to the ED post cardiac arrest with ROSC about 27 hours ago. [facility] states pt was c/o b/l flank pain and generalized weakness upon arrival, however went into asystole shortly after. EMS reports ACLS protocol for 10 minutes, during which pt had bouts of v-fib and was externally cardioverted x 2. She is comatose on ventilator.     ROS  Not obtainable    PMH  Past Medical History:   Diagnosis Date    Anemia NEC     Cancer ([address]     Cardiac arrest ([facility] 7/23/2018    Class 1 obesity due to excess calories with body mass index (BMI) of 31.0 to 31.9 in adult 6/25/2018    Diabetes ([address]     Headache(784.0)     Hypercholesterolemia     Hypertension     Malignant neoplasm of ascending colon ([facility] 05/26/2018    Stage IVc    Palliative chemotherapy underway 7/17/2018    FOLFOX 50% dose reduced    S/P right colectomy 5/27/2018    Right hemicolectomy for cecal mass with partial SBO, [place]    Severe protein-calorie malnutrition ([address] 6/8/2018    Superficial postoperative wound infection 6/8/2018       FH  Family History   Problem Relation Age of Onset    Alcohol abuse Father     Cancer Maternal Aunt      breast ca    Breast Cancer Maternal Aunt     Breast Cancer Sister     Breast Cancer Daughter        SH  Social History     Social History    Marital status:      Spouse name: N/A    Number of children: N/A    Years of education: N/A     Social History Main Topics    Smoking status: Never Smoker    Smokeless tobacco: Never Used    Alcohol use No    Drug use: No    Sexual activity: Yes     Partners: Male      Comment: ,4 children,1 passed away,not working     Other Topics Concern    None     Social History Narrative       ALLERGIES  Allergies   Allergen Reactions    Tylenol [Acetaminophen] Swelling       PHYSICAL EXAM  EXAMINATION:   Patient Vitals for the past 24 hrs:   Temp Pulse Resp BP SpO2   07/24/18 1100 - (!) 129 (!) 3 117/62 99 %   07/24/18 1000 - (!) 116 15 114/64 98 %   07/24/18 0900 - (!) 113 17 132/66 98 %   07/24/18 0800 98.4 °F (36.9 °C) (!) 111 23 123/62 99 %   07/24/18 0743 - (!) 111 20 - 98 %   07/24/18 0730 - (!) 111 21 113/60 -   07/24/18 0700 - (!) 109 19 111/60 97 %   07/24/18 0630 - (!) 108 17 119/67 -   07/24/18 0600 - (!) 108 15 115/62 100 %   07/24/18 0530 - (!) 108 21 106/61 -   07/24/18 0511 - (!) 108 22 112/85 100 %   07/24/18 0430 - (!) 108 22 112/75 -   07/24/18 0400 99.3 °F (37.4 °C) (!) 108 18 119/72 100 %   07/24/18 0330 - (!) 110 21 116/59 100 %   07/24/18 0300 - (!) 108 20 110/60 100 %   07/24/18 0230 - (!) 109 23 108/44 -   07/24/18 0200 - (!) 109 21 118/67 100 %   07/24/18 0130 - (!) 110 24 120/52 100 %   07/24/18 0100 - (!) 110 20 109/63 100 %   07/24/18 0030 - (!) 111 21 116/55 100 %   07/24/18 0000 - (!) 111 21 108/63 100 %   07/23/18 2340 - (!) 112 21 - 100 %   07/23/18 2330 (!) 101.3 °F (38.5 °C) (!) 112 20 94/53 100 %   07/23/18 2300 - (!) 114 21 104/56 100 %   07/23/18 2230 (!) 101.1 °F (38.4 °C) (!) 114 20 109/60 100 %   07/23/18 2200 - (!) 113 23 119/62 100 %   07/23/18 2130 - (!) 110 23 118/66 100 %   07/23/18 2100 - (!) 109 21 117/69 100 %   07/23/18 2030 - (!) 107 21 103/67 100 %   07/23/18 2000 - (!) 107 21 118/57 98 %   07/23/18 1930 99.6 °F (37.6 °C) (!) 104 24 123/55 100 %   07/23/18 1904 - (!) 107 20 - 97 %   07/23/18 1900 - (!) 106 21 113/64 97 %   07/23/18 1800 - (!) 103 19 118/82 95 %   07/23/18 1730 - (!) 102 10 128/54 96 %   07/23/18 1700 - - - 119/57 - 07/23/18 1653 99.7 °F (37.6 °C) (!) 102 24 121/67 96 %   07/23/18 1630 99.1 °F (37.3 °C) 99 20 96/79 99 %   07/23/18 1620 - 99 20 105/78 -   07/23/18 1600 - 98 22 98/74 -   07/23/18 1545 - 99 23 111/78 -   07/23/18 1532 - - - - 100 %   07/23/18 1530 - 98 - 106/80 -   07/23/18 1430 97.9 °F (36.6 °C) 96 - 110/82 100 %   07/23/18 1423 - 96 21 - 100 %   07/23/18 1407 - 96 18 113/68 100 %   07/23/18 1340 97.9 °F (36.6 °C) 97 30 93/78 -   07/23/18 1324 98.1 °F (36.7 °C) 98 (!) 32 (!) 86/66 -   07/23/18 1305 98.4 °F (36.9 °C) (!) 103 16 (!) 66/47 -   07/23/18 1256 96.9 °F (36.1 °C) (!) 103 16 (!) 71/58 -        General:   Physical Exam   CONSTITUTIONAL Comatose on ventilator  Neurological Examination:   Mental Status:  Comatose on ventilator    Cranial Nerves: Pupils 8mm, unreactive,no corneal response     Motor:Flaccid throughout     Sensation: No Response to pain    Reflexes: none, no corneal responses, Babinski NR          LAB DATA REVIEWED:    Results for orders placed or performed during the hospital encounter of 07/23/18   CULTURE, BLOOD, PAIRED   Result Value Ref Range    Special Requests: NO SPECIAL REQUESTS      Culture result:        No organisms seen on gram stain. Multiple subcultures are in progress.    CULTURE, RESPIRATORY/SPUTUM/BRONCH W GRAM STAIN   Result Value Ref Range    Special Requests: NO SPECIAL REQUESTS      GRAM STAIN OCCASIONAL WBCS SEEN      GRAM STAIN RARE EPITHELIAL CELLS SEEN      GRAM STAIN OCCASIONAL BUDDING YEAST      GRAM STAIN OCCASIONAL PSEUDOHYPHAE      Culture result: PENDING    CBC WITH AUTOMATED DIFF   Result Value Ref Range    WBC 55.0 (HH) 3.6 - 11.0 K/uL    RBC 3.67 (L) 3.80 - 5.20 M/uL    HGB 9.3 (L) 11.5 - 16.0 g/dL    HCT 31.7 (L) 35.0 - 47.0 %    MCV 86.4 80.0 - 99.0 FL    MCH 25.3 (L) 26.0 - 34.0 PG    MCHC 29.3 (L) 30.0 - 36.5 g/dL    RDW 16.5 (H) 11.5 - 14.5 %    PLATELET 256 870 - 663 K/uL    MPV 9.2 8.9 - 12.9 FL    NRBC 0.9 (H) 0  WBC    ABSOLUTE NRBC 0.51 (H) 0.00 - 0.01 K/uL    NEUTROPHILS 86 (H) 32 - 75 %    BAND NEUTROPHILS 8 %    LYMPHOCYTES 6 (L) 12 - 49 %    MONOCYTES 0 (L) 5 - 13 %    EOSINOPHILS 0 0 - 7 %    BASOPHILS 0 0 - 1 %    IMMATURE GRANULOCYTES 0 0.0 - 0.5 %    ABS. NEUTROPHILS 51.7 (H) 1.8 - 8.0 K/UL    ABS. LYMPHOCYTES 3.3 0.8 - 3.5 K/UL    ABS. MONOCYTES 0.0 0.0 - 1.0 K/UL    ABS. EOSINOPHILS 0.0 0.0 - 0.4 K/UL    ABS. BASOPHILS 0.0 0.0 - 0.1 K/UL    ABS. IMM. GRANS. 0.0 0.00 - 0.04 K/UL    DF MANUAL      RBC COMMENTS MALLORIE CELLS  PRESENT        RBC COMMENTS ANISOCYTOSIS  1+       PROTHROMBIN TIME + INR   Result Value Ref Range    INR 1.5 (H) 0.9 - 1.1      Prothrombin time 15.1 (H) 9.0 - 11.9 sec   METABOLIC PANEL, COMPREHENSIVE   Result Value Ref Range    Sodium 148 (H) 136 - 145 mmol/L    Potassium 4.4 3.5 - 5.1 mmol/L    Chloride 111 (H) 97 - 108 mmol/L    CO2 19 (L) 21 - 32 mmol/L    Anion gap 18 (H) 5 - 15 mmol/L    Glucose 114 (H) 65 - 100 mg/dL    BUN 22 (H) 6 - 20 MG/DL    Creatinine 1.40 (H) 0.55 - 1.02 MG/DL    BUN/Creatinine ratio 16 12 - 20      GFR est AA 46 (L) >60 ml/min/1.73m2    GFR est non-AA 38 (L) >60 ml/min/1.73m2    Calcium 8.1 (L) 8.5 - 10.1 MG/DL    Bilirubin, total 0.2 0.2 - 1.0 MG/DL    ALT (SGPT) 43 12 - 78 U/L    AST (SGOT) 218 (H) 15 - 37 U/L    Alk.  phosphatase 240 (H) 45 - 117 U/L    Protein, total 4.6 (L) 6.4 - 8.2 g/dL    Albumin 1.3 (L) 3.5 - 5.0 g/dL    Globulin 3.3 2.0 - 4.0 g/dL    A-G Ratio 0.4 (L) 1.1 - 2.2     TROPONIN I   Result Value Ref Range    Troponin-I, Qt. 1.25 (H) <0.05 ng/mL   NT-PRO BNP   Result Value Ref Range    NT pro-BNP 22396 (H) 0 - 125 PG/ML   OCCULT BLOOD, GASTRIC   Result Value Ref Range    OCCULT BLOOD,GASTRIC POSITIVE (A) NEG      pH,GASTRIC 4 (H) 1.5 - 3.5     PATHOLOGIST REVIEW   Result Value Ref Range    Pathologist review (NOTE)    BLOOD GAS, ARTERIAL   Result Value Ref Range    pH 7.19 (LL) 7.35 - 7.45      PCO2 38 35.0 - 45.0 mmHg    PO2 84 80 - 100 mmHg    O2 SAT 94 92 - 97 % BICARBONATE 14 (L) 22 - 26 mmol/L    BASE DEFICIT 13.2 mmol/L    O2 METHOD VENTILATOR      O2 FLOW RATE 16.00 L/min    FIO2 100 %    MODE A/C      Tidal volume 450      SET RATE 16      EPAP/CPAP/PEEP 6.0      Sample source ARTERIAL      SITE LEFT BRACHIAL      BEATA'S TEST N/A      Critical value read back Catherine Ragsdale md    HGB & HCT   Result Value Ref Range    HGB 10.5 (L) 11.5 - 16.0 g/dL    HCT 33.8 (L) 35.0 - 47.0 %   LACTIC ACID   Result Value Ref Range    Lactic acid 7.7 (HH) 0.4 - 2.0 MMOL/L   CK W/ CKMB & INDEX   Result Value Ref Range     (H) 26 - 192 U/L    CK - MB 14.6 (H) <3.6 NG/ML    CK-MB Index 2.2 0 - 2.5     TROPONIN I   Result Value Ref Range    Troponin-I, Qt. 1.82 (H) <0.05 ng/mL   URINALYSIS W/ REFLEX CULTURE   Result Value Ref Range    Color DARK YELLOW      Appearance TURBID (A) CLEAR      Specific gravity 1.020 1.003 - 1.030      pH (UA) 5.0 5.0 - 8.0      Protein 100 (A) NEG mg/dL    Glucose NEGATIVE  NEG mg/dL    Ketone TRACE (A) NEG mg/dL    Blood LARGE (A) NEG      Urobilinogen 0.2 0.2 - 1.0 EU/dL    Nitrites NEGATIVE  NEG      Leukocyte Esterase SMALL (A) NEG      WBC >100 (H) 0 - 4 /hpf    RBC >100 (H) 0 - 5 /hpf    Epithelial cells FEW FEW /lpf    Bacteria 1+ (A) NEG /hpf    UA:UC IF INDICATED URINE CULTURE ORDERED (A) CNI      Amorphous Crystals 2+ (A) NEG    Granular cast 10-20 (A) NEG /lpf   BILIRUBIN, CONFIRM   Result Value Ref Range    Bilirubin UA, confirm NEGATIVE  NEG     CBC WITH AUTOMATED DIFF   Result Value Ref Range    WBC 33.4 (H) 3.6 - 11.0 K/uL    RBC 4.39 3.80 - 5.20 M/uL    HGB 11.1 (L) 11.5 - 16.0 g/dL    HCT 34.2 (L) 35.0 - 47.0 %    MCV 77.9 (L) 80.0 - 99.0 FL    MCH 25.3 (L) 26.0 - 34.0 PG    MCHC 32.5 30.0 - 36.5 g/dL    RDW 16.5 (H) 11.5 - 14.5 %    PLATELET 324 969 - 248 K/uL    MPV 9.5 8.9 - 12.9 FL    NRBC 0.8 (H) 0  WBC    ABSOLUTE NRBC 0.26 (H) 0.00 - 0.01 K/uL    NEUTROPHILS 90 (H) 32 - 75 %    BAND NEUTROPHILS 5 %    LYMPHOCYTES 2 (L) 12 - 49 %    MONOCYTES 3 (L) 5 - 13 %    EOSINOPHILS 0 0 - 7 %    BASOPHILS 0 0 - 1 %    IMMATURE GRANULOCYTES 0 0.0 - 0.5 %    ABS. NEUTROPHILS 31.7 (H) 1.8 - 8.0 K/UL    ABS. LYMPHOCYTES 0.7 (L) 0.8 - 3.5 K/UL    ABS. MONOCYTES 1.0 0.0 - 1.0 K/UL    ABS. EOSINOPHILS 0.0 0.0 - 0.4 K/UL    ABS. BASOPHILS 0.0 0.0 - 0.1 K/UL    ABS. IMM. GRANS. 0.0 0.00 - 0.04 K/UL    DF MANUAL      RBC COMMENTS ANISOCYTOSIS  1+        RBC COMMENTS MICROCYTOSIS  1+        RBC COMMENTS MALLORIE CELLS  PRESENT        WBC COMMENTS DOHLE BODIES     MAGNESIUM   Result Value Ref Range    Magnesium 2.3 1.6 - 2.4 mg/dL   PHOSPHORUS   Result Value Ref Range    Phosphorus 3.4 2.6 - 4.7 MG/DL   METABOLIC PANEL, COMPREHENSIVE   Result Value Ref Range    Sodium 146 (H) 136 - 145 mmol/L    Potassium 3.8 3.5 - 5.1 mmol/L    Chloride 112 (H) 97 - 108 mmol/L    CO2 20 (L) 21 - 32 mmol/L    Anion gap 14 5 - 15 mmol/L    Glucose 129 (H) 65 - 100 mg/dL    BUN 31 (H) 6 - 20 MG/DL    Creatinine 1.52 (H) 0.55 - 1.02 MG/DL    BUN/Creatinine ratio 20 12 - 20      GFR est AA 41 (L) >60 ml/min/1.73m2    GFR est non-AA 34 (L) >60 ml/min/1.73m2    Calcium 8.3 (L) 8.5 - 10.1 MG/DL    Bilirubin, total 0.5 0.2 - 1.0 MG/DL    ALT (SGPT) 89 (H) 12 - 78 U/L    AST (SGOT) 415 (H) 15 - 37 U/L    Alk.  phosphatase 186 (H) 45 - 117 U/L    Protein, total 5.4 (L) 6.4 - 8.2 g/dL    Albumin 1.9 (L) 3.5 - 5.0 g/dL    Globulin 3.5 2.0 - 4.0 g/dL    A-G Ratio 0.5 (L) 1.1 - 2.2     CK W/ CKMB & INDEX   Result Value Ref Range     (H) 26 - 192 U/L    CK - MB 6.5 (H) <3.6 NG/ML    CK-MB Index 1.4 0 - 2.5     TROPONIN I   Result Value Ref Range    Troponin-I, Qt. 1.54 (H) <0.05 ng/mL   LACTIC ACID   Result Value Ref Range    Lactic acid 3.5 (HH) 0.4 - 2.0 MMOL/L   BLOOD GAS, ARTERIAL   Result Value Ref Range    pH 7.41 7.35 - 7.45      PCO2 31 (L) 35.0 - 45.0 mmHg    PO2 135 (H) 80 - 100 mmHg    O2 SAT 99 (H) 92 - 97 %    BICARBONATE 19 (L) 22 - 26 mmol/L    BASE DEFICIT 4.4 mmol/L    O2 METHOD VENTILATOR      FIO2 50 %    MODE A/C      Tidal volume 450      SET RATE 16      EPAP/CPAP/PEEP 6.0      Sample source ARTERIAL      SITE RIGHT RADIAL      BEATA'S TEST YES     PROTHROMBIN TIME + INR   Result Value Ref Range    INR 1.3 (H) 0.9 - 1.1      Prothrombin time 13.5 (H) 9.0 - 11.1 sec   GLUCOSE, POC   Result Value Ref Range    Glucose (POC) 148 (H) 65 - 100 mg/dL    Performed by CYNTHIA MAYO (PCT)    GLUCOSE, POC   Result Value Ref Range    Glucose (POC) 340 (H) 65 - 100 mg/dL    Performed by Rosa Elena Willingham    GLUCOSE, POC   Result Value Ref Range    Glucose (POC) 125 (H) 65 - 100 mg/dL    Performed by Tatyana Altamirano    GLUCOSE, POC   Result Value Ref Range    Glucose (POC) 117 (H) 65 - 100 mg/dL    Performed by Tatyana Altamirano    GLUCOSE, POC   Result Value Ref Range    Glucose (POC) 151 (H) 65 - 100 mg/dL    Performed by Tate Rader    EKG, 12 LEAD, INITIAL   Result Value Ref Range    Ventricular Rate 165 BPM    Atrial Rate 85 BPM    P-R Interval 176 ms    QRS Duration 74 ms    Q-T Interval 298 ms    QTC Calculation (Bezet) 493 ms    Calculated P Axis 26 degrees    Calculated R Axis -72 degrees    Calculated T Axis 31 degrees    Diagnosis       Undetermined rhythm  Left axis deviation  Pulmonary disease pattern  Septal infarct , age undetermined  When compared with ECG of 27-MAY-2018 09:44,  Current undetermined rhythm precludes rhythm comparison, needs review  QRS duration has decreased  QRS voltage has decreased  Septal infarct is now present  Confirmed by Ken Partida ([id number]) on 7/23/2018 12:26:04 PM     EMERGENT RELEASE OF UNCROSSMATCHED RED CELLS   Result Value Ref Range    Crossmatch Expiration 07/26/2018     ABO/Rh(D) O NEGATIVE     Antibody screen NEG     Unit number [id number]     Blood component type  LR,1     Unit division 00     Status of unit TRANSFUSED     Crossmatch result Compatible     Unit number [id number]     Blood component type  LR,2     Unit division 00     Status of unit TRANSFUSED     Crossmatch result Compatible     Unit number [id number]     Blood component type RC LR     Unit division 00     Status of unit ALLOCATED     Crossmatch result Compatible     Unit number [id number]     Blood component type RC LR     Unit division 00     Status of unit ALLOCATED     Crossmatch result Compatible    FFP, ALLOCATE   Result Value Ref Range    Unit number [id number]     Blood component type FP 24h, Thaw     Unit division 00     Status of unit TRANSFUSED     Unit number [id number]     Blood component type FP 24h, Thaw     Unit division 00     Status of unit TRANSFUSED         Imaging review:    CT HEAD W/O  7/23/18     TECHNIQUE: Unenhanced CT of the head was performed using 5 mm images. Brain and  bone windows were generated. CT dose reduction was achieved through use of a  standardized protocol tailored for this examination and automatic exposure  control for dose modulation.       FINDINGS:  There is no extra-axial fluid collection hemorrhage or shift. No masses or.     IMPRESSION   impression: No acute intracranial findings, paranasal sinus disease with  possible air-fluid level right maxillary sinus.       Imaging          HOME MEDS  Prior to Admission Medications   Prescriptions Last Dose Informant Patient Reported? Taking? OTHER 7/17/2018 at Unknown time Significant Other Yes Yes   Sig: IV Chemotherapy: last treatment was on Tuesday, 07/17/2018   albuterol (PROVENTIL HFA, VENTOLIN HFA, PROAIR HFA) 90 mcg/actuation inhaler Not Taking at Unknown time Significant Other No No   Sig: Take 1 Puff by inhalation every six (6) hours as needed for Wheezing. ammonium lactate (AMLACTIN) 12 % topical cream Not Taking at Unknown time Significant Other Yes No   Sig: Apply  to affected area two (2) times daily as needed (\"Skin irritation\").  rub in to affected area well   aspirin delayed-release 81 mg tablet 7/10/2018 at Unknown time Significant Other Yes Yes   Sig: Take 81 mg by mouth daily. calcium 600 mg Cap 7/10/2018 at Unknown time Significant Other Yes Yes   Sig: Take 600 mg by mouth daily. cholecalciferol, VITAMIN D3, (VITAMIN D3) 5,000 unit tab tablet 7/10/2018 at Unknown time Significant Other Yes Yes   Sig: Take 5,000 Units by mouth daily. clotrimazole (LOTRIMIN AF, CLOTRIMAZOLE,) 1 % topical cream Not Taking at Unknown time Significant Other Yes No   Sig: Apply  to affected area two (2) times daily as needed for Skin Irritation. dapagliflozin (FARXIGA) 10 mg tab tablet 7/10/2018 at Unknown time Significant Other No Yes   Sig: Take 1 Tab by mouth daily. fexofenadine (ALLEGRA) 180 mg tablet Not Taking at Unknown time Significant Other Yes No   Sig: Take 180 mg by mouth daily as needed for Allergies. fluticasone (FLONASE ALLERGY RELIEF) 50 mcg/actuation nasal spray Not Taking at Unknown time Significant Other Yes No   Si Sprays by Both Nostrils route daily as needed for Allergies. insulin glargine (LANTUS SOLOSTAR U-100 INSULIN) 100 unit/mL (3 mL) inpn 2018 at Unknown time Significant Other Yes Yes   Si Units by SubCUTAneous route two (2) times a day. lisinopril (PRINIVIL, ZESTRIL) 10 mg tablet 2018 at Unknown time Significant Other No Yes   Sig: Take 1 Tab by mouth daily. metoprolol tartrate (LOPRESSOR) 50 mg tablet 2018 at Unknown time Significant Other No Yes   Sig: TAKE 1 TABLET BY MOUTH TWICE DAILY   oxyCODONE IR (ROXICODONE) 5 mg immediate release tablet 2018 at Unknown time Significant Other No Yes   Sig: Take 1 Tab by mouth every eight (8) hours as needed. Max Daily Amount: 15 mg. sAXagliptin-metFORMIN (KOMBIGLYZE XR) 5-1,000 mg TM24 7/10/2018 at Unknown time Significant Other No Yes   Sig: Take 1 tab po QD   simvastatin (ZOCOR) 20 mg tablet 7/10/2018 at Unknown time Significant Other No Yes   Sig: Take 1 Tab by mouth nightly.       Facility-Administered Medications: None       CURRENT MEDS  Current Facility-Administered Medications   Medication Dose Route Frequency    aspirin (ASA) suppository 300 mg  300 mg Rectal DAILY    0.45% sodium chloride infusion  100 mL/hr IntraVENous CONTINUOUS    vancomycin (VANCOCIN) 1250 mg in  ml infusion  1,250 mg IntraVENous Q24H    NOREPINephrine (LEVOPHED) 8 mg in 5% dextrose 250mL infusion  2-30 mcg/min IntraVENous TITRATE    sodium chloride (NS) flush 5-10 mL  5-10 mL IntraVENous Q8H    insulin lispro (HUMALOG) injection   SubCUTAneous Q6H    cefepime (MAXIPIME) 2 g in 0.9% sodium chloride (MBP/ADV) 100 mL  2 g IntraVENous Q12H    pantoprazole (PROTONIX) 40 mg in sodium chloride 0.9% 10 mL injection  40 mg IntraVENous Q12H    mupirocin (BACTROBAN) 2 % ointment   Both Nostrils Q12H    levoFLOXacin (LEVAQUIN) 750 mg in D5W IVPB  750 mg IntraVENous Q48H    chlorhexidine (PERIDEX) 0.12 % mouthwash 15 mL  15 mL Oral Q12H       IMPRESSION:RECOMMENDATIONS:  Irineo Galeazzi is a 79 y.o. female who presents with coma after cardiac arrest with resuscitation. I see no evidence of cortical function, she has slight oculocephalic reflex (brainstem) so I cannot say she has no evidence of CNS function but prognosis is poor, will review EEG. She is not on any CNS depressants  Thank you very much for this consultation. We will follow up on the above studies and give further recommendations as indicated. Som Dent. Violette Maddox MD  Neurologist    This note will not be viewable in [address]

## 2018-07-24 NOTE — PROGRESS NOTES
[facility]  Pulmonary, Critical Care, and Sleep Medicine    Name: Chas White MRN: [id number]   : 1950 Hospital: [facility]   Date: 2018        IMPRESSION:   · Acute respiratory failure  · Out of hospital asystole cardiac arrest - asystole and VF, then PEA in ER  · Shock, likely septic vs cardiogenic  · Likely severe anoxic brain injury  · Metabolic/lactic acidosis  · Acute renal failure   · Abnormal troponin likely due to arrest, but doubt true ACS  · Widely metastatic colon cancer (peritoneum, liver, lung) on palliative chemo - FOLFOX, received Neulasta on   · DM  · Obesity       PLAN:   · Ventilator support - failed SBT, also mental status would preclude extubation  · Bronchodilators  · IV fluids, adjust  · Pressors currently off  · Serial neuro exams  · EEG  · Neuro consult  · Check echo  · Empiric antibiotics pending culture data  · Monitor renal function  · Insulin/glycemic monitoring  · DVT/GI prophylaxis  · Poor prognosis. Consult palliative care. Subjective/Interval History:   I have reviewed the flowsheet and previous days notes. The patient is unable to give any meaningful history or review of systems because the patient is: Intubated/unresponsive - presented with bilateral flank pain on EMS arrival, however shortly thereafter went into asystole cardiac arrest.  Had subsequent episodes of VF. Now intubated/unresponsive with some mild myoclonic jerks.      The patient is critically ill on:      Mechanical ventilation/pressors     Review of Systems   Unable to perform ROS: Intubated     Objective:   Vital Signs:    Visit Vitals    /62 (BP 1 Location: Right arm, BP Patient Position: At rest)    Pulse (!) 111    Temp 98.4 °F (36.9 °C)    Resp 23    Wt 85 kg (187 lb 6.3 oz)    SpO2 99%    BMI 32.17 kg/m2       O2 Device: Ventilator       Temp (24hrs), Av.2 °F (36.8 °C), Min:92.8 °F (33.8 °C), Max:101.3 °F (38.5 °C) Intake/Output:   Last shift:      07/24 0701 - 07/24 1900  In: 112.5 [I.V.:112.5]  Out: 27 [Urine:27]  Last 3 shifts: 07/22 1901 - 07/24 0700  In: 3549.9 [I.V.:2011.9]  Out: 3177 [Urine:165]    Intake/Output Summary (Last 24 hours) at 07/24/18 0824  Last data filed at 07/24/18 0800   Gross per 24 hour   Intake          3662.35 ml   Output             1092 ml   Net          2570.35 ml     Ventilator Settings:  Mode Rate Tidal Volume Pressure FiO2 PEEP   Assist control   450 ml    40 % 6 cm H20     Peak airway pressure: 29 cm H2O    Minute ventilation: 8.8 l/min       Physical Exam   Constitutional: She is intubated. HENT:   Head: Normocephalic and atraumatic. Mouth/Throat: No oropharyngeal exudate. Eyes: No scleral icterus. Cardiovascular: Regular rhythm. Tachycardia present. Pulmonary/Chest: She is intubated. She has decreased breath sounds in the right lower field and the left lower field. She has no wheezes. She has no rales. Musculoskeletal: She exhibits edema. Neurological: She is unresponsive. GCS eye subscore is 1. GCS verbal subscore is 1. GCS motor subscore is 1. Skin: Skin is warm and dry. No rash noted.      Data:     Current Facility-Administered Medications   Medication Dose Route Frequency    aspirin (ASA) suppository 300 mg  300 mg Rectal DAILY    NOREPINephrine (LEVOPHED) 8 mg in 5% dextrose 250mL infusion  2-30 mcg/min IntraVENous TITRATE    sodium chloride (NS) flush 5-10 mL  5-10 mL IntraVENous Q8H    0.9% sodium chloride infusion  75 mL/hr IntraVENous CONTINUOUS    insulin lispro (HUMALOG) injection   SubCUTAneous Q6H    cefepime (MAXIPIME) 2 g in 0.9% sodium chloride (MBP/ADV) 100 mL  2 g IntraVENous Q12H    pantoprazole (PROTONIX) 40 mg in sodium chloride 0.9% 10 mL injection  40 mg IntraVENous Q12H    mupirocin (BACTROBAN) 2 % ointment   Both Nostrils Q12H    levoFLOXacin (LEVAQUIN) 750 mg in D5W IVPB  750 mg IntraVENous Q48H    chlorhexidine (PERIDEX) 0.12 % mouthwash 15 mL  15 mL Oral Q12H    VANCOMYCIN INFORMATION NOTE   Other Rx Dosing/Monitoring                Labs:  Recent Labs      07/24/18   0355  07/23/18   1447  07/23/18   1105   WBC  33.4*   --   55.0*   HGB  11.1*  10.5*  9.3*   HCT  34.2*  33.8*  31.7*   PLT  277   --   268     Recent Labs      07/24/18   0355  07/23/18   1105   NA  146*  148*   K  3.8  4.4   CL  112*  111*   CO2  20*  19*   GLU  129*  114*   BUN  31*  22*   CREA  1.52*  1.40*   CA  8.3*  8.1*   MG  2.3   --    PHOS  3.4   --    ALB  1.9*  1.3*   TBILI  0.5  0.2   SGOT  415*  218*   ALT  89*  43   INR  1.3*  1.5*     Recent Labs      07/24/18   0443  07/23/18   1230   PH  7.41  7.19*   PCO2  31*  38   PO2  135*  84   HCO3  19*  14*   FIO2  50  100     Imaging:  I have personally reviewed the patients radiographs and have reviewed the reports:  Improved atelectasis of left lung        Total critical care time exclusive of procedures: 30 minutes  Magali Xavier MD

## 2018-07-24 NOTE — PROGRESS NOTES
Brief GI Note    Came to see pt today but appeared to be in a family meeting with Critical Care attending (Dr. Atif Garcia). Pt with extremely poor prognosis of any Neurological recovery. No overt bleeding and Hgb stable. No benefit from endoscopy at this time. GI will sign off.  Feel free to page w/ any questions    D/w Dr. Jannette Espinoza By: Marissa Emmanuel MD     July 24, 2018

## 2018-07-24 NOTE — TELEPHONE ENCOUNTER
Request for NP to return call about mutual patient. Can be reached on cell also @ [phone].   Message to NP.

## 2018-07-24 NOTE — PROGRESS NOTES
1900 Report received from [facility], [address] [facility], RN.     0400 Bladder temp 101.6, cooling blanket restarted.     0630 Pt remains unresponsive, cooling blanket turned off, bladder temp 98.7.     0700 Report given to Judd Velazquez RN

## 2018-07-24 NOTE — PROGRESS NOTES
0700: Received bedside and verbal shift report from PELON Altamirano.    0730: Assessment complete, see flowsheets for details; afebrile, vitals stable, does not appear to be in pain/discomfort. 1200: Assessment complete, see flowsheets for details; urine output minimal and 's, updated Dr. Moo Pradhan; verbal order received to give 1 liter bolus of NS over 1 hour. 1300: Bladder temperature 100.2, placed patient back on cooling blanket. 1600: 1230 Kimani Sexton RN; agree with her charting; see her note for more details.

## 2018-07-24 NOTE — WOUND CARE
Wound care nurse consult from Dr Noemi Weiss for POA abdominal wound. Patient is a 78 y/o CF admitted for witnessed cardiac arrest to CCU with Stage 4 metastatic colon CA. Past Medical History:   Diagnosis Date    Anemia NEC     [facility]     Cardiac arrest ([facility] 7/23/2018    Class 1 obesity due to excess calories with body mass index (BMI) of 31.0 to 31.9 in adult 6/25/2018    Diabetes ([facility]     Headache(784.0)     Hypercholesterolemia     Hypertension     Malignant neoplasm of ascending colon ([facility] [address] [facility] 05/26/2018    Stage IVc    Palliative chemotherapy underway 7/17/2018    FOLFOX 50% dose reduced    S/P right colectomy 5/27/2018    Right hemicolectomy for cecal mass with partial SBO, Ndiaye    Severe protein-calorie malnutrition ([facility] [address] [facility] 6/8/2018    Superficial postoperative wound infection 6/8/2018     Patient had a recent hemicolectomy and the distal end of incision to midline abdomen has a small open wound. With her moderate ascites she has small amount of serous fluid drainage. WOUND POA CONDITIONS        Wound Abdomen Distal (Active)   DRESSING STATUS Removed 7/24/2018  9:43 AM   DRESSING TYPE 4 x 4 7/24/2018  9:43 AM   Incision site well approximated? No 7/24/2018  9:43 AM   Non-Pressure Injury Full thickness (subcut/muscle) 7/24/2018  9:43 AM   Wound Length (cm) 0.5 cm 7/24/2018  9:43 AM   Wound Width (cm) 0.5 cm 7/24/2018  9:43 AM   Wound Depth (cm) 1 7/24/2018  9:43 AM   Wound Surface area (cm^2) 0.25 cm^2 7/24/2018  9:43 AM   Condition of [place] 7/24/2018  9:43 AM   Condition of Edges Rolled/curled 7/24/2018  9:43 AM   Tissue Type Pink 7/24/2018  9:43 AM   Drainage Amount  Small  7/24/2018  9:43 AM   Drainage Color Serous 7/24/2018  9:43 AM   Wound Odor None 7/24/2018  9:43 AM   Periwound Skin Condition Intact 7/24/2018  9:43 AM   Cleansing and Cleansing Agents  Dermal wound cleanser 7/24/2018  9:43 AM   Dressing Type Applied Packing;Silver products; Foam 7/24/2018  9:43 AM   Procedure Tolerated Well 7/24/2018  9:43 AM   Number of days:0           Recommend:    Lower abdomen midline incision wound: daily, cleanse with dermal wound cleanser. Place a small amount of wound gel on the end of a piece of Aquacell-AG and pack wound. Cover with a small foam dressing.     Suellen Del Cid RN, [employer]

## 2018-07-24 NOTE — PROGRESS NOTES
Progress Note 7/24/2018 7:52 AM 
NAME: Augustine Martínez MRN:  [id number] Admit Diagnosis: Cardiac arrest ([facility] [address] Upper GI bleed Metastatic colon cancer in female [facility]) Problem List:            
  
Asystolic arrest in a patient with metastatic colon CA. Intubated, concern for hypoxic brain injury, possible septic shock, anemia, AL, Acidosis, bilateral pleural effusions, mildly increased troponin. 
- No hx of CAD 
- Echo 7/2018 nl EF. 
- ECG with low voltage 
- HTN 
- Dyslipidemia   
  
                      Assessment/Plan:                
  
Witnessed arrest found to be asystolic, CPR/ACLS with subsequent vfib in field, PEA in ED, currently with ROSC. 
ECG with no acute ST changes, mildly increased troponin, medically manage. CT with no pericardial effusion, but bilateral pleural effusions. 
  
Echo with nl EF. 
  
- Start aspirin suppository - Levophed tapered off 
- Hold metoprolol for now, may need some PRN if bp or hr increases off of levophed 
- lisinopril for now 
- Hold zocor for now Current main concern is for hypoxic brain injury. 
  
ICU care Gaurded prognosis.  
  
  
 [x]       High complexity decision making was performed in this patient at high risk for decompensation with multiple organ involvement. Subjective:  
 
Augustine Martínez denies unresponsive Discussed with RN events overnight. Review of Systems: 
 
Symptom Y/N Comments  Symptom Y/N Comments Fever/Chills N   Chest Pain N Poor Appetite N   Edema N   
Cough N   Abdominal Pain N Sputum N   Joint Pain N   
SOB/VILLELA N   Pruritis/Rash N   
Nausea/vomit N   Tolerating PT/OT Diarrhea N   Tolerating Diet Constipation N   Other Could NOT obtain due to: Unresponsive Objective:  
  
Physical Exam: 
 
Last 24hrs VS reviewed since prior progress note. Most recent are: 
 
Visit Vitals  /60  Pulse (!) 111  Temp 99.3 °F (37.4 °C)  Resp 20  Wt 85 kg (187 lb 6.3 oz)  SpO2 98%  BMI 32.17 kg/m2 Intake/Output Summary (Last 24 hours) at 07/24/18 1776 Last data filed at 07/24/18 0630 Gross per 24 hour Intake          3549.85 ml Output             1065 ml Net          2484.85 ml General Appearance: Well developed, well nourished, unresponsive,  
 no acute distress. Ears/Nose/Mouth/Throat: Hearing can not be assessed. Neck: Supple. Chest: Lungs clear to auscultation bilaterally. Cardiovascular: Regular rate and rhythm, S1S2 normal, no murmur. Abdomen: Soft, non-tender, bowel sounds are active. Extremities: No edema bilaterally. Skin: Warm and dry. PMH/SH reviewed - no change compared to H&P Data Review Telemetry: normal sinus rhythm Lab Data Personally Reviewed: 
 
Recent Labs  
   07/24/18 
 0355  07/23/18 
 1447  07/23/18 
 1105 WBC  33.4*   --   55.0* HGB  11.1*  10.5*  9.3* HCT  34.2*  33.8*  31.7* PLT  277   --   268 Recent Labs  
   07/24/18 
 0355  07/23/18 
 1105 INR  1.3*  1.5* PTP  13.5*  15.1* Recent Labs  
   07/24/18 
 0355  07/23/18 
 1105 NA  146*  148* K  3.8  4.4  
CL  112*  111* CO2  20*  19* BUN  31*  22* CREA  1.52*  1.40* GLU  129*  114* CA  8.3*  8.1*  
MG  2.3   --   
 
Recent Labs  
   07/24/18 
 0355  07/23/18 
 1615  07/23/18 
 1105 CPK  462*  670*   --   
CKNDX  1.4  2.2   --   
TROIQ  1.54*  1.82*  1.25* Lab Results Component Value Date/Time Cholesterol, total 174 06/01/2017 09:40 AM  
 HDL Cholesterol 45 06/01/2017 09:40 AM  
 LDL, calculated 92 06/01/2017 09:40 AM  
 Triglyceride 183 (H) 06/01/2017 09:40 AM  
 CHOL/HDL Ratio 3.4 01/29/2010 11:46 AM  
 
 
Recent Labs  
   07/24/18 
 0355  07/23/18 
 1105 SGOT  415*  218* AP  186*  240* TP  5.4*  4.6* ALB  1.9*  1.3*  
GLOB  3.5  3.3 Recent Labs  
   07/24/18 
 0443  07/23/18 
 1230 PH  7.41  7.19* PCO2  31*  38  
PO2  135*  84 Medications Personally Reviewed: 
 
Current Facility-Administered Medications Medication Dose Route Frequency  aspirin (ASA) suppository 300 mg  300 mg Rectal DAILY  NOREPINephrine (LEVOPHED) 8 mg in 5% dextrose 250mL infusion  2-30 mcg/min IntraVENous TITRATE  sodium chloride (NS) flush 5-10 mL  5-10 mL IntraVENous Q8H  
 sodium chloride (NS) flush 5-10 mL  5-10 mL IntraVENous PRN  
 0.9% sodium chloride infusion  75 mL/hr IntraVENous CONTINUOUS  
 ondansetron (ZOFRAN) injection 4 mg  4 mg IntraVENous Q6H PRN  
 insulin lispro (HUMALOG) injection   SubCUTAneous Q6H  
 glucose chewable tablet 16 g  4 Tab Oral PRN  
 dextrose (D50W) injection syrg 12.5-25 g  12.5-25 g IntraVENous PRN  
 glucagon (GLUCAGEN) injection 1 mg  1 mg IntraMUSCular PRN  
 cefepime (MAXIPIME) 2 g in 0.9% sodium chloride (MBP/ADV) 100 mL  2 g IntraVENous Q12H  pantoprazole (PROTONIX) 40 mg in sodium chloride 0.9% 10 mL injection  40 mg IntraVENous Q12H  mupirocin (BACTROBAN) 2 % ointment   Both Nostrils Q12H  levoFLOXacin (LEVAQUIN) 750 mg in D5W IVPB  750 mg IntraVENous Q48H  chlorhexidine (PERIDEX) 0.12 % mouthwash 15 mL  15 mL Oral Q12H  VANCOMYCIN INFORMATION NOTE   Other Rx Dosing/Monitoring Olya Sanchez MD

## 2018-07-24 NOTE — CONSULTS
Palliative Medicine Consult  Luis: [phone])    Patient Name: Karis Treviño  YOB: 1950    Date of Initial Consult:   Reason for Consult: care decisions  Requesting Provider: Dr. Damian Palacios  Primary Care Physician: [facility] Nurse Navigator (Inactive)     SUMMARY:   Karis Treviño is a 79 y.o. with a past history of  DM, HTN, HLD, and colon CA with right hemicolectomy, who was admitted on 7/23/2018 from home with a diagnosis of cardiac arrest. Current medical issues leading to Palliative Medicine involvement include: pt with stage 4 colon cancer, cardiac arrest, anoxic brain injury. HOSPITAL COURSE:  In ED, pulse lost at 10:55am and CPR initiated, 11:02am ROSC. Transferred to CCU on vent, pressors. Fever 101.1, lactic acid 3.5       PALLIATIVE DIAGNOSES:   1. Cardiac arrest  2. Acute respiratory failure  3. Pallor   4. GIB  5. SIRS  6. Leukocytosis  7. Acute renal failure  8. Acute encephalopathy  9. Cardiogenic vs septic shock  10. Stage 4 colon cancer with malignant ascites and bilateral effusions, liver and right lung mets  11. Lactic acidosis  12. Care decisions     PLAN:   1. Met with , 1 dtr, and ; obtained detailed history, discussed pt's overall poor condition, poor prognosis, goals of care.  is struggling, asking to have drains for ascites and pleural effusions as he believes this will help pt recover. Counseled family that at this time, the anoxic brain injury and whether or not she will have meaningful recovery is the main focus. Waiting for neurology evaluation. 2. Extensive chart review. 3. Will continue psychosocial support. 4. Initial consult note routed to primary continuity provider  5.  Communicated plan of care with: Palliative IDT, Dr. Salgado Members, RN     GOALS OF CARE / TREATMENT PREFERENCES:     GOALS OF CARE:  Patient/Health Care Proxy Stated Goals: Prolong life      TREATMENT PREFERENCES:   Code Status: Full Code    Advance Care Planning:  Advance Care Planning 7/24/2018   Patient's Healthcare Decision Maker is: Legal Next of Kin   Primary Decision Maker Name Tresa Zamarripa   Primary Decision Maker Phone Number [phone]   Primary Decision Maker Relationship to Patient Spouse   Confirm Advance Directive None   Patient Would Like to Complete Advance Directive Unable       Medical Interventions: Full interventions   Other Instructions: Other:    As far as possible, the palliative care team has discussed with patient / health care proxy about goals of care / treatment preferences for patient. HISTORY:     History obtained from: chart, family    CHIEF COMPLAINT: cardiac arrest    HPI/SUBJECTIVE:    The patient is:   [] Verbal and participatory  [x] Non-participatory due to: intubation    BIBA post cardiac arrest with ROSC. EMS reports Pt was c/o b/l flank pain and general weakness upon their arrival then shortly thereafter went into asystole. EMS reports ACLS protocol for 10 min during which pt had bouts of v-fib and was externally cardioverted x2. She had ROSC with strong pulse and sbp of 77, they then intubated and placed OGT. Pt is on palliative chemotherapy and has a portacath. ED W/U:  EXAM: severe distress, OG tube draining dark blood, pallor. abd distended and firm  LAB: wbc 55, h/h 9.3/31.7, INR 1.5, Na 1248, BUN/Cr 22/1.40, ALT 43, MQC166, alk phos 240. Albumin 1.3, troponin 1.25, pro-BNP 75963. Blood Gas:pH 7.19, bicarb 14  IMAGING:  HEAD CT: no acute intracranial findings, paranasal sinus disease with possible air-fluid level right maxillary sinus. ABD/PELVIS CT: no bowel obstruction, ascites, metastatic disease. CXR: persistent left lung atelectasis, persistent right lung pulmonary edema.             Clinical Pain Assessment (nonverbal scale for severity on nonverbal patients):   Clinical Pain Assessment  Severity: 0     Activity (Movement): Lying quietly, normal position    Duration: for how long has pt been experiencing pain (e.g., 2 days, 1 month, years)  Frequency: how often pain is an issue (e.g., several times per day, once every few days, constant)     FUNCTIONAL ASSESSMENT:     Palliative Performance Scale (PPS):  PPS: 10       PSYCHOSOCIAL/SPIRITUAL SCREENING:     Palliative IDT has assessed this patient for cultural preferences / practices and a referral made as appropriate to needs (Cultural Services, Patient Advocacy, Ethics, etc.)    Advance Care Planning:  Advance Care Planning 7/24/2018   Patient's Healthcare Decision Maker is: Legal Next of Bebo Silva   Primary Decision Maker Name Tanner Rosa   Primary Decision Maker Phone Number [phone]   Primary Decision Maker Relationship to Patient Spouse   Confirm Advance Directive None   Patient Would Like to Complete Advance Directive Unable       Any spiritual / Congregational concerns:  [] Yes /  [x] No    Caregiver Burnout:  [] Yes /  [x] No /  [] No Caregiver Present      Anticipatory grief assessment:   [x] Normal  / [] Maladaptive       ESAS Anxiety: Anxiety: 0    ESAS Depression:   unable to assess due to pt factors     REVIEW OF SYSTEMS:     Positive and pertinent negative findings in ROS are noted above in HPI. The following systems were [] reviewed / [x] unable to be reviewed as noted in HPI  Other findings are noted below. Systems: constitutional, ears/nose/mouth/throat, respiratory, gastrointestinal, genitourinary, musculoskeletal, integumentary, neurologic, psychiatric, endocrine. Positive findings noted below. Modified ESAS Completed by: provider           Pain: 0   Anxiety: 0       Dyspnea: 0     Constipation: No     Stool Occurrence(s): 1        PHYSICAL EXAM:     From RN flowsheet:  Wt Readings from Last 3 Encounters:   07/23/18 187 lb 6.3 oz (85 kg)   07/17/18 187 lb (84.8 kg)   07/10/18 182 lb (82.6 kg)     Blood pressure 117/62, pulse (!) 129, temperature 98.4 °F (36.9 °C), resp. rate (!) 3, weight 187 lb 6.3 oz (85 kg), SpO2 99 %.     Pain Scale 1: Behavioral Pain Scale (BPS)                    Last bowel movement, if known:     Constitutional:  Intubated without sedation  Eyes: pupils equal, anicteric  ENMT: no nasal discharge, moist mucous membranes  Cardiovascular: tachy, regular rhythm, distal pulses intact  Respiratory: breathing not labored, symmetric, diminished in bases  Gastrointestinal: distended and firm, +bowel sounds  Musculoskeletal: no deformity, no tenderness to palpation  Skin: warm, dry  Neurologic: not following commands, not moving all extremities, does not withdraw from pain  Psychiatric: unable to assess due to pt factors     HISTORY:     Principal Problem:    Cardiac arrest ([address] (2018)    Active Problems:    Metastatic colon cancer in female [facility]) (2018)      Upper GI bleed (2018)      Septic shock (HCC) (2018)      Pleural effusion, bilateral (2018)      Acute cystitis without hematuria (2018)      Past Medical History:   Diagnosis Date    Anemia NEC     Cancer ([address]     Cardiac arrest ([address] 2018    Class 1 obesity due to excess calories with body mass index (BMI) of 31.0 to 31.9 in adult 2018    Diabetes ([address]     Headache(784.0)     Hypercholesterolemia     Hypertension     Malignant neoplasm of ascending colon ([address] 2018    Stage IVc    Palliative chemotherapy underway 2018    FOLFOX 50% dose reduced    S/P right colectomy 2018    Right hemicolectomy for cecal mass with partial SBO, Ndiaye    Severe protein-calorie malnutrition ([address] 2018    Superficial postoperative wound infection 2018      Past Surgical History:   Procedure Laterality Date    HX  SECTION      Hysterectomy,total for precancer    HX COLECTOMY Right 2018    Right hemicolectomy for cecal mass with partial SBO, Ndiaye      Family History   Problem Relation Age of Onset    Alcohol abuse Father     Cancer Maternal Aunt      breast ca    Breast Cancer Maternal Aunt     Breast Cancer Sister     Breast Cancer Daughter       History reviewed, no pertinent family history.   Social History   Substance Use Topics    Smoking status: Never Smoker    Smokeless tobacco: Never Used    Alcohol use No     Allergies   Allergen Reactions    Tylenol [Acetaminophen] Swelling      Current Facility-Administered Medications   Medication Dose Route Frequency    aspirin (ASA) suppository 300 mg  300 mg Rectal DAILY    0.45% sodium chloride infusion  100 mL/hr IntraVENous CONTINUOUS    vancomycin (VANCOCIN) 1250 mg in  ml infusion  1,250 mg IntraVENous Q24H    artificial tears (dextran 70-hypromellose) (NATURAL BALANCE) 0.1-0.3 % ophthalmic solution 1 Drop  1 Drop Both Eyes PRN    NOREPINephrine (LEVOPHED) 8 mg in 5% dextrose 250mL infusion  2-30 mcg/min IntraVENous TITRATE    sodium chloride (NS) flush 5-10 mL  5-10 mL IntraVENous Q8H    sodium chloride (NS) flush 5-10 mL  5-10 mL IntraVENous PRN    ondansetron (ZOFRAN) injection 4 mg  4 mg IntraVENous Q6H PRN    insulin lispro (HUMALOG) injection   SubCUTAneous Q6H    glucose chewable tablet 16 g  4 Tab Oral PRN    dextrose (D50W) injection syrg 12.5-25 g  12.5-25 g IntraVENous PRN    glucagon (GLUCAGEN) injection 1 mg  1 mg IntraMUSCular PRN    cefepime (MAXIPIME) 2 g in 0.9% sodium chloride (MBP/ADV) 100 mL  2 g IntraVENous Q12H    pantoprazole (PROTONIX) 40 mg in sodium chloride 0.9% 10 mL injection  40 mg IntraVENous Q12H    mupirocin (BACTROBAN) 2 % ointment   Both Nostrils Q12H    levoFLOXacin (LEVAQUIN) 750 mg in D5W IVPB  750 mg IntraVENous Q48H    chlorhexidine (PERIDEX) 0.12 % mouthwash 15 mL  15 mL Oral Q12H          LAB AND IMAGING FINDINGS:     Lab Results   Component Value Date/Time    WBC 33.4 (H) 07/24/2018 03:55 AM    HGB 11.1 (L) 07/24/2018 03:55 AM    PLATELET 656 72/50/9881 03:55 AM     Lab Results   Component Value Date/Time    Sodium 146 (H) 07/24/2018 03:55 AM    Potassium 3.8 07/24/2018 03:55 AM    Chloride 112 (H) 07/24/2018 03:55 AM    CO2 20 (L) 07/24/2018 03:55 AM    BUN 31 (H) 07/24/2018 03:55 AM    Creatinine 1.52 (H) 07/24/2018 03:55 AM    Calcium 8.3 (L) 07/24/2018 03:55 AM    Magnesium 2.3 07/24/2018 03:55 AM    Phosphorus 3.4 07/24/2018 03:55 AM      Lab Results   Component Value Date/Time    AST (SGOT) 415 (H) 07/24/2018 03:55 AM    Alk. phosphatase 186 (H) 07/24/2018 03:55 AM    Protein, total 5.4 (L) 07/24/2018 03:55 AM    Albumin 1.9 (L) 07/24/2018 03:55 AM    Globulin 3.5 07/24/2018 03:55 AM     Lab Results   Component Value Date/Time    INR 1.3 (H) 07/24/2018 03:55 AM    Prothrombin time 13.5 (H) 07/24/2018 03:55 AM      Lab Results   Component Value Date/Time    Iron 75 04/30/2015 12:00 AM    Ferritin 65 04/30/2015 12:00 AM      Lab Results   Component Value Date/Time    pH 7.41 07/24/2018 04:43 AM    PCO2 31 (L) 07/24/2018 04:43 AM    PO2 135 (H) 07/24/2018 04:43 AM     No components found for: [place]   Lab Results   Component Value Date/Time     (H) 07/24/2018 03:55 AM    CK - MB 6.5 (H) 07/24/2018 03:55 AM                Total time: 90  Counseling / coordination time, spent as noted above: 75  > 50% counseling / coordination?: yes    Prolonged service was provided for  []30 min   []75 min in face to face time in the presence of the patient, spent as noted above. Time Start:   Time End:   Note: this can only be billed with 00027 (initial) or 10664 (follow up). If multiple start / stop times, list each separately.

## 2018-07-24 NOTE — TELEPHONE ENCOUNTER
Call returned to NP with [facility] and discussed patient and current clinical status.  Appreciate of care of [facility] partners

## 2018-07-24 NOTE — PROGRESS NOTES
0700 - Bedside verbal report received from off-going shift. 0800 - Shift assessment complete. See doc flowsheet for details. Pt. Is unresponsive. Pupils are equally round and reactive. No extremities withdraw to pain. Skin is cool, dry and pale. Mid-abdominal incision and one small blister-like lesion on right side of thorac POA. ET tube, femoral line, 1 PIV and osorio catheter present. Cardiac monitor denotes sinus tachycardia. Lung sounds are coarse and diminished in lower lobes bilaterally. Bowel sounds are hypoactive, abdominal ascites noted. Radial pulses palpable in upper extremities bilaterally. Pedal pulses palpable in lower extremities bilaterally. Delbert Ramus Dr. Doreatha Osler at bedside. Updated on patient condition. Orders noted. 9574 - Wound care at beside per request for consult. See doc flowsheet for details and orders. 1200 - Reassessment completed. See doc flowsheet for details. 1525 - Afternoon rounds. MD updated on patient condition, low urine output. No orders noted. 1600 - Reassessment complete. See doc flowsheet for details. New onset skin mottling noted on lower extremities. Vitals stable. Afebrile. Cooling blanket on standby. Does not appear to be in pain/discomfort. [phone] - Paged  per family request.     Diana Li - Received call back from [address] [place] [address]. She will visit with pt. And family in ~20 minutes. 1845 - Evelia Davila, at pt. Bedside. 1910 - Bedside verbal report given to oncoming shift.

## 2018-07-25 NOTE — PROCEDURES
[address] [facility] [facility], [facility]  MR#: [id number]  : 1950  ACCOUNT #: [de-identified]   DATE OF SERVICE: 2018    EXAM NUMBER:  MR . DESCRIPTION OF PROCEDURE:  Electrodes were applied in accordance with International 10-20 system of electrode placement. EEG was reviewed in both bipolar and referential montages. FINDINGS:  The background is primarily flat with what may represent some very slow 2-3 Hz activity occasionally. IMPRESSION:  This is a markedly abnormal electroencephalogram, which reveals little or no cerebral activity. The absence of seizures on electroencephalogram does not eliminate a diagnosis of epilepsy. Clinical correlation is advised.       MD Lou Scott / Jocelyn Black  D: 2018 12:27     T: 2018 13:04  JOB #: [id number]

## 2018-07-25 NOTE — PROGRESS NOTES
1900 Report received from Junaid Walker RN & Stephanie Gallegos RN.     1620 Shift summary-no change in pt status, VSS except temps spiking-see doc flow. Cooling blanket used as needed. 0700 Report given to Juan Harris RN.

## 2018-07-25 NOTE — DIABETES MGMT
DTC Progress Note Recommendations/ Comments: Pt discussed with rounding team and Dr. Jenniffer Camacho. Pt with BG trending upward BG of 185 mg/dL -206 mg/dL this am.. Plan is to add Lantus 5 units. Chart reviewed on Leandro Maher during Multidisciplinary Rounds. Pt takes Lantus 30 units BID at home. A1c:  
Lab Results Component Value Date/Time Hemoglobin A1c 8.3 (H) 05/26/2018 05:21 AM  
 
 
 
 
Recent Glucose Results:  
Lab Results Component Value Date/Time  (H) 07/25/2018 03:53 AM  
 GLUCPOC 206 (H) 07/25/2018 05:23 AM  
 GLUCPOC 185 (H) 07/25/2018 12:00 AM  
 GLUCPOC 130 (H) 07/24/2018 05:43 PM  
  
 
Lab Results Component Value Date/Time Creatinine 1.66 (H) 07/25/2018 03:53 AM  
 
Estimated Creatinine Clearance: 36.8 mL/min (based on Cr of 1.66). Active Orders Diet DIET NPO  
  
 
PO intake: No data found. Will continue to follow as needed. Thank you. Sherine Romero [facility] Office: [phone]

## 2018-07-25 NOTE — PROGRESS NOTES
[facility]  Pulmonary, Critical Care, and Sleep Medicine    Name: Kelly Irving MRN: [id number]   : 1950 Hospital: [facility]   Date: 2018        IMPRESSION:   · Acute respiratory failure  · Out of hospital asystole cardiac arrest - asystole and VF, then PEA in ER  · Shock, likely septic vs cardiogenic  · Likely severe anoxic brain injury  · Metabolic/lactic acidosis  · Acute renal failure   · Abnormal troponin likely due to arrest, but doubt true ACS  · Widely metastatic colon cancer (peritoneum, liver, lung) on palliative chemo - FOLFOX, received Neulasta on   · DM  · Obesity       PLAN:   · Ventilator support - failed SBT, also mental status would preclude extubation  · Bronchodilators  · IV fluids, adjust again - add bicarb  · Pressors currently off  · Serial neuro exams  · EEG results pending  · Neuro eval ongoing  · Empiric antibiotics pending culture data  · Monitor renal function  · Insulin/glycemic monitoring  · DVT/GI prophylaxis  · Poor prognosis. Palliative care following. Do not think she can/will have meaningful recovery. Subjective/Interval History:   I have reviewed the flowsheet and previous days notes. The patient is unable to give any meaningful history or review of systems because the patient is:   Intubated/unresponsive      The patient is critically ill on:      Mechanical ventilation      Review of Systems   Unable to perform ROS: Intubated     Objective:   Vital Signs:    Visit Vitals    /64    Pulse (!) 108    Temp 98.7 °F (37.1 °C)    Resp 21    Wt 92.7 kg (204 lb 5.9 oz)    SpO2 97%    BMI 35.08 kg/m2       O2 Device: Endotracheal tube, Ventilator       Temp (24hrs), Av.6 °F (37.6 °C), Min:98 °F (36.7 °C), Max:101.6 °F (38.7 °C)       Intake/Output:   Last shift:      701 - 1900  In: -   Out: 60 [Urine:60]  Last 3 shifts: 1901 - 700  In: 5229.3 [I.V.:5169.3]  Out: 1038 [FFGCZ:063]    Intake/Output Summary (Last 24 hours) at 07/25/18 0813  Last data filed at 07/25/18 0752   Gross per 24 hour   Intake          3583.34 ml   Output             1579 ml   Net          2004.34 ml     Ventilator Settings:  Mode Rate Tidal Volume Pressure FiO2 PEEP   Assist control   450 ml    40 % 6 cm H20     Peak airway pressure: 28 cm H2O    Minute ventilation: 10.8 l/min       Physical Exam   Constitutional: She is intubated. HENT:   Head: Normocephalic and atraumatic. Mouth/Throat: No oropharyngeal exudate. Eyes: No scleral icterus. Cardiovascular: Regular rhythm. Tachycardia present. Pulmonary/Chest: She is intubated. She has decreased breath sounds in the right lower field and the left lower field. She has no wheezes. She has no rales. Musculoskeletal: She exhibits edema. Neurological: She is unresponsive. GCS eye subscore is 1. GCS verbal subscore is 1. GCS motor subscore is 1. Skin: Skin is warm and dry. No rash noted.      Data:     Current Facility-Administered Medications   Medication Dose Route Frequency    aspirin (ASA) suppository 300 mg  300 mg Rectal DAILY    0.45% sodium chloride infusion  100 mL/hr IntraVENous CONTINUOUS    vancomycin (VANCOCIN) 1250 mg in  ml infusion  1,250 mg IntraVENous Q24H    NOREPINephrine (LEVOPHED) 8 mg in 5% dextrose 250mL infusion  2-30 mcg/min IntraVENous TITRATE    sodium chloride (NS) flush 5-10 mL  5-10 mL IntraVENous Q8H    insulin lispro (HUMALOG) injection   SubCUTAneous Q6H    cefepime (MAXIPIME) 2 g in 0.9% sodium chloride (MBP/ADV) 100 mL  2 g IntraVENous Q12H    pantoprazole (PROTONIX) 40 mg in sodium chloride 0.9% 10 mL injection  40 mg IntraVENous Q12H    mupirocin (BACTROBAN) 2 % ointment   Both Nostrils Q12H    levoFLOXacin (LEVAQUIN) 750 mg in D5W IVPB  750 mg IntraVENous Q48H    chlorhexidine (PERIDEX) 0.12 % mouthwash 15 mL  15 mL Oral Q12H                Labs:  Recent Labs      07/25/18   0353  07/24/18 0355  07/23/18   1447  07/23/18   1105   WBC  34.4*  33.4*   --   55.0*   HGB  9.7*  11.1*  10.5*  9.3*   HCT  30.3*  34.2*  33.8*  31.7*   PLT  277  277   --   268     Recent Labs      07/25/18   0353  07/24/18   0355  07/23/18   1105   NA  145  146*  148*   K  4.1  3.8  4.4   CL  112*  112*  111*   CO2  17*  20*  19*   GLU  180*  129*  114*   BUN  34*  31*  22*   CREA  1.66*  1.52*  1.40*   CA  7.9*  8.3*  8.1*   MG  2.2  2.3   --    PHOS  3.5  3.4   --    ALB  1.5*  1.9*  1.3*   TBILI  0.4  0.5  0.2   SGOT  195*  415*  218*   ALT  54  89*  43   INR   --   1.3*  1.5*     Recent Labs      07/25/18   0541  07/24/18   0443  07/23/18   1230   PH  7.39  7.41  7.19*   PCO2  31*  31*  38   PO2  97  135*  84   HCO3  18*  19*  14*   FIO2  40  50  100     Imaging:  I have personally reviewed the patients radiographs and have reviewed the reports:  No change        Total critical care time exclusive of procedures: 25 minutes  Noel Estes MD

## 2018-07-25 NOTE — PROGRESS NOTES
Patient anointed with the Sacrament of the Sick by on 7/24/18 by Rev. Thanh Sánchez.     [facility], [facility], Staff [address] [place] [address] [employer] Paging Service  [phone]

## 2018-07-25 NOTE — PROGRESS NOTES
Palliative Medicine Consult  Luis: [phone])    Patient Name: Nadja De La Fuente  YOB: 1950    Date of Initial Consult:   Reason for Consult: care decisions  Requesting Provider: Dr. Han Colorado  Primary Care Physician: [facility] Nurse Navigator (Inactive)     SUMMARY:   Nadja De La uFente is a 79 y.o. with a past history of  DM, HTN, HLD, and colon CA with right hemicolectomy, who was admitted on 7/23/2018 from home with a diagnosis of cardiac arrest. Current medical issues leading to Palliative Medicine involvement include: pt with stage 4 colon cancer, cardiac arrest, anoxic brain injury. HOSPITAL COURSE:  In ED, pulse lost at 10:55am and CPR initiated, 11:02am ROSC. Transferred to CCU on vent, pressors. Fever 101.1, lactic acid 3.5.    7/24: Seen by Neurology, per note pt has no evidence of CNS function but prognosis is poor. EEG is a markedly abnormal electroencephalogram due to the absence of any alpha or theta activity. This is low voltage. This is consistent with significant structural and/or metabolic encephalopathy. 7/25: troponin peaked early this am at 1.82, now 1.54. Pt was  anointed with the Sacrament of the Sick this am.   Repeat EEG:  This is a markedly abnormal electroencephalogram, which reveals little or no cerebral activity. The absence of seizures on electroencephalogram does not eliminate a diagnosis of epilepsy. PSYCHOSOCIAL:    PALLIATIVE DIAGNOSES:   1. Cardiac arrest  2. Acute respiratory failure  3. Pallor   4. GIB  5. SIRS  6. Leukocytosis  7. Acute renal failure  8. Acute encephalopathy  9. Cardiogenic vs septic shock  10. Stage 4 colon cancer with malignant ascites and bilateral effusions, liver and right lung mets  11. Lactic acidosis  12.  Care decisions     PLAN:   1. Multiple visits throughout the day with family  2. 4pm: Family meeting with , 2 dtrs, Ralene Sicard, Dr. Han Colorado:.  reviewed EEG results, demonstrated lack of reflex on exam, gave poor prognosis for meaningful recovery.  reviewed hospital course with family, discussed poor prognosis, discussed compassionate extubation. Family will proceed with compassionate extubation after all family have visited. Most likely tonight or tomorrow am.  3. Initial consult note routed to primary continuity provider  4. Communicated plan of care with: Palliative IDT, Dr. Juan Coker, RN     GOALS OF CARE / TREATMENT PREFERENCES:     GOALS OF CARE:  Patient/Health Care Proxy Stated Goals: Prolong life      TREATMENT PREFERENCES:   Code Status: Full Code    Advance Care Planning:  Advance Care Planning 7/24/2018   Patient's Healthcare Decision Maker is: Legal Next of Kin   Primary Decision Maker Name Luisa Farley   Primary Decision Maker Phone Number [phone]   Primary Decision Maker Relationship to Patient Spouse   Confirm Advance Directive None   Patient Would Like to Complete Advance Directive Unable       Medical Interventions: Full interventions   Other Instructions: Other:    As far as possible, the palliative care team has discussed with patient / health care proxy about goals of care / treatment preferences for patient. HISTORY:     History obtained from: chart, family    CHIEF COMPLAINT: cardiac arrest    HPI/SUBJECTIVE:    The patient is:   [] Verbal and participatory  [x] Non-participatory due to: intubation    BIBA post cardiac arrest with ROSC. EMS reports Pt was c/o b/l flank pain and general weakness upon their arrival then shortly thereafter went into asystole. EMS reports ACLS protocol for 10 min during which pt had bouts of v-fib and was externally cardioverted x2. She had ROSC with strong pulse and sbp of 77, they then intubated and placed OGT. Pt is on palliative chemotherapy and has a portacath. ED W/U:  EXAM: severe distress, OG tube draining dark blood, pallor.  abd distended and firm  LAB: wbc 55, h/h 9.3/31.7, INR 1.5, Na 1248, BUN/Cr 22/1.40, ALT 43, CQX107, alk phos 240. Albumin 1.3, troponin 1.25, pro-BNP 17644. Blood Gas:pH 7.19, bicarb 14  IMAGING:  HEAD CT: no acute intracranial findings, paranasal sinus disease with possible air-fluid level right maxillary sinus. ABD/PELVIS CT: no bowel obstruction, ascites, metastatic disease. CXR: persistent left lung atelectasis, persistent right lung pulmonary edema. HISTORY:    Admission 7/10-7/10/18: wound infection. abd CT revealed interval significant worsening of metastatic disease, with enlarging lung nodules, enlarging liver lesions, retroperitoneal lymphadenopathy,and new significant peritoneal metastatic disease with ascites. 7/13 portacath placed, 7/17 Folfox 50% strength tolerated, but very weak and no appetite. 5/25-6/2/18: admitted for abd pain, CT revealed colon mass with liver and lung mets, retroperitoneal nodes. 5/27 right hemicolectomy. D/c home,  refused home health assistance. 6/8/18:  called to report wound bleeding and red. 6/8-6/13/18: wound infection. 7/10-7/20/18: wound infection, ascites 5 liters drained, cytology pos adenocarcinoma. Abd, CT reveals significant worsening of metastatic disease, enlarging lung nodules and liver lesions. 7/13: medaport placed. 7/17: Folfox started at 1/2 dose; tolerated but very weak and no appetite. 7/19: nurse navigator report Mr. Kristen Valiente voiced concerns that wife was feeling bad, worsening abdominal pain. Surgeon's note indicates  states pt will do better at home. 7/20: Neulasta administered at [facility].      Clinical Pain Assessment (nonverbal scale for severity on nonverbal patients):   Clinical Pain Assessment  Severity: 0     Activity (Movement): Lying quietly, normal position    Duration: for how long has pt been experiencing pain (e.g., 2 days, 1 month, years)  Frequency: how often pain is an issue (e.g., several times per day, once every few days, constant)     FUNCTIONAL ASSESSMENT:     Palliative Performance Scale (PPS):  PPS: 10       PSYCHOSOCIAL/SPIRITUAL SCREENING:     Palliative IDT has assessed this patient for cultural preferences / practices and a referral made as appropriate to needs (Cultural Services, Patient Advocacy, Ethics, etc.)    Advance Care Planning:  Advance Care Planning 7/24/2018   Patient's Healthcare Decision Maker is: Legal Next of Bebo Silva   Primary Decision Maker Name Saturnino Marrero   Primary Decision Maker Phone Number [phone]   Primary Decision Maker Relationship to Patient Spouse   Confirm Advance Directive None   Patient Would Like to Complete Advance Directive Unable       Any spiritual / Alevism concerns:  [] Yes /  [x] No    Caregiver Burnout:  [] Yes /  [x] No /  [] No Caregiver Present      Anticipatory grief assessment:   [x] Normal  / [] Maladaptive       ESAS Anxiety: Anxiety: 0    ESAS Depression:   unable to assess due to pt factors     REVIEW OF SYSTEMS:     Positive and pertinent negative findings in ROS are noted above in HPI. The following systems were [] reviewed / [x] unable to be reviewed as noted in HPI  Other findings are noted below. Systems: constitutional, ears/nose/mouth/throat, respiratory, gastrointestinal, genitourinary, musculoskeletal, integumentary, neurologic, psychiatric, endocrine. Positive findings noted below. Modified ESAS Completed by: provider           Pain: 0   Anxiety: 0       Dyspnea: 0     Constipation: No     Stool Occurrence(s): 1        PHYSICAL EXAM:     From RN flowsheet:  Wt Readings from Last 3 Encounters:   07/25/18 204 lb 5.9 oz (92.7 kg)   07/17/18 187 lb (84.8 kg)   07/10/18 182 lb (82.6 kg)     Blood pressure 124/55, pulse (!) 108, temperature 98 °F (36.7 °C), resp. rate 19, weight 204 lb 5.9 oz (92.7 kg), SpO2 97 %.     Pain Scale 1: Behavioral Pain Scale (BPS)  Pain Intensity 1: 3                 Last bowel movement, if known:     Constitutional:  Intubated without sedation  Eyes: pupils equal, pinpoint and unreactive, anicteric  ENMT: no nasal discharge, moist mucous membranes  Cardiovascular: tachy, regular rhythm, distal pulses intact  Respiratory: breathing not labored, symmetric, diminished in bases  Gastrointestinal: distended and firm, +bowel sounds  Musculoskeletal: no deformity, no tenderness to palpation  Skin: warm, dry  Neurologic: not following commands, not moving all extremities, does not withdraw from pain  Psychiatric: unable to assess due to pt factors     HISTORY:     Principal Problem:    Cardiac arrest ([address] (2018)    Active Problems:    Metastatic colon cancer in female [facility]) (2018)      Upper GI bleed (2018)      Septic shock (HCC) (2018)      Pleural effusion, bilateral (2018)      Acute cystitis without hematuria (2018)      Acute encephalopathy (2018)      Acute renal failure (HCC) (2018)      Acute respiratory failure with hypoxia and hypercapnia (HCC) (2018)      Counseling regarding goals of care (2018)      Past Medical History:   Diagnosis Date    Anemia NEC     Cancer ([address]     Cardiac arrest ([address] 2018    Class 1 obesity due to excess calories with body mass index (BMI) of 31.0 to 31.9 in adult 2018    Diabetes ([address]     Headache(784.0)     Hypercholesterolemia     Hypertension     Malignant neoplasm of ascending colon ([address] 2018    Stage IVc    Palliative chemotherapy underway 2018    FOLFOX 50% dose reduced    S/P right colectomy 2018    Right hemicolectomy for cecal mass with partial SBO, [place]    Severe protein-calorie malnutrition ([address] 2018    Superficial postoperative wound infection 2018      Past Surgical History:   Procedure Laterality Date    HX  SECTION      Hysterectomy,total for precancer    HX COLECTOMY Right 2018    Right hemicolectomy for cecal mass with partial SBO, Ndiaye      Family History   Problem Relation Age of Onset    Alcohol abuse Father     Cancer Maternal Aunt      breast ca    Breast Cancer Maternal Aunt     Breast Cancer Sister     Breast Cancer Daughter       History reviewed, no pertinent family history.   Social History   Substance Use Topics    Smoking status: Never Smoker    Smokeless tobacco: Never Used    Alcohol use No     Allergies   Allergen Reactions    Tylenol [Acetaminophen] Swelling      Current Facility-Administered Medications   Medication Dose Route Frequency    sodium bicarbonate (8.4%) 100 mEq in sterile water 1,000 mL infusion   IntraVENous CONTINUOUS    aspirin (ASA) suppository 300 mg  300 mg Rectal DAILY    vancomycin (VANCOCIN) 1250 mg in  ml infusion  1,250 mg IntraVENous Q24H    artificial tears (dextran 70-hypromellose) (NATURAL BALANCE) 0.1-0.3 % ophthalmic solution 1 Drop  1 Drop Both Eyes PRN    NOREPINephrine (LEVOPHED) 8 mg in 5% dextrose 250mL infusion  2-30 mcg/min IntraVENous TITRATE    sodium chloride (NS) flush 5-10 mL  5-10 mL IntraVENous Q8H    sodium chloride (NS) flush 5-10 mL  5-10 mL IntraVENous PRN    ondansetron (ZOFRAN) injection 4 mg  4 mg IntraVENous Q6H PRN    insulin lispro (HUMALOG) injection   SubCUTAneous Q6H    glucose chewable tablet 16 g  4 Tab Oral PRN    dextrose (D50W) injection syrg 12.5-25 g  12.5-25 g IntraVENous PRN    glucagon (GLUCAGEN) injection 1 mg  1 mg IntraMUSCular PRN    cefepime (MAXIPIME) 2 g in 0.9% sodium chloride (MBP/ADV) 100 mL  2 g IntraVENous Q12H    pantoprazole (PROTONIX) 40 mg in sodium chloride 0.9% 10 mL injection  40 mg IntraVENous Q12H    mupirocin (BACTROBAN) 2 % ointment   Both Nostrils Q12H    levoFLOXacin (LEVAQUIN) 750 mg in D5W IVPB  750 mg IntraVENous Q48H    chlorhexidine (PERIDEX) 0.12 % mouthwash 15 mL  15 mL Oral Q12H          LAB AND IMAGING FINDINGS:     Lab Results   Component Value Date/Time    WBC 34.4 (H) 07/25/2018 03:53 AM    HGB 9.7 (L) 07/25/2018 03:53 AM    PLATELET 722 10/61/5959 03:53 AM Lab Results   Component Value Date/Time    Sodium 145 07/25/2018 03:53 AM    Potassium 4.1 07/25/2018 03:53 AM    Chloride 112 (H) 07/25/2018 03:53 AM    CO2 17 (L) 07/25/2018 03:53 AM    BUN 34 (H) 07/25/2018 03:53 AM    Creatinine 1.66 (H) 07/25/2018 03:53 AM    Calcium 7.9 (L) 07/25/2018 03:53 AM    Magnesium 2.2 07/25/2018 03:53 AM    Phosphorus 3.5 07/25/2018 03:53 AM      Lab Results   Component Value Date/Time    AST (SGOT) 195 (H) 07/25/2018 03:53 AM    Alk. phosphatase 185 (H) 07/25/2018 03:53 AM    Protein, total 5.1 (L) 07/25/2018 03:53 AM    Albumin 1.5 (L) 07/25/2018 03:53 AM    Globulin 3.6 07/25/2018 03:53 AM     Lab Results   Component Value Date/Time    INR 1.3 (H) 07/24/2018 03:55 AM    Prothrombin time 13.5 (H) 07/24/2018 03:55 AM      Lab Results   Component Value Date/Time    Iron 75 04/30/2015 12:00 AM    Ferritin 65 04/30/2015 12:00 AM      Lab Results   Component Value Date/Time    pH 7.39 07/25/2018 05:41 AM    PCO2 31 (L) 07/25/2018 05:41 AM    PO2 97 07/25/2018 05:41 AM     No components found for: [place]   Lab Results   Component Value Date/Time     (H) 07/24/2018 03:55 AM    CK - MB 6.5 (H) 07/24/2018 03:55 AM                Total time: 90  Counseling / coordination time, spent as noted above: 75  > 50% counseling / coordination?: yes    Prolonged service was provided for  []30 min   []75 min in face to face time in the presence of the patient, spent as noted above. Time Start:   Time End:   Note: this can only be billed with 89455 (initial) or 64681 (follow up). If multiple start / stop times, list each separately.

## 2018-07-25 NOTE — PROGRESS NOTES
Interdisciplinary team rounds were held  7/25/2018 with the following team members: Care Management, Diabetes Treatment Specialist, Nursing, Nutrition, Pharmacy, Physical Therapy, Physician, Respiratory therapy and Clinical Coordinator. Plan of care discussed. Goal: Adjust medications, palliative assisting, continue to monitor and support. See MD orders and progress notes for further  interventions and desired outcomes.

## 2018-07-25 NOTE — PROGRESS NOTES
Report received from [place], lab results reviewed. Will follow up with  regarding Velton Coins to support family. Orienting Nadja Perez to unit and care of Critically ill patients. 0800  Assessment completed with Vivi Nuñez RN. No great changes noted, remains distended with moderate amount of green drainage from stomach. 6159 Family in and appropriately tearful. Requested Neurologist be called, to possibly repeat EEG today per family. Expressed concerns over ascites, will direct care and discuss during rounds. 1200  NO changes noted to assessment. Family closely attentive, tearful at times. 1600  No changes noted, to have family meeting soon. 1730  DNR status written, family to make us aware of when they want to extubate patient.

## 2018-07-25 NOTE — PROGRESS NOTES
Hospitalist Progress Note Aidan Weinstein MD 
Cell: [phone] Date of Service:  2018 NAME:  Zaina Martino :  1950 MRN:  [id number] Assessment & Plan: S/p out of hospital asystolic cardiac arrest 
--EKG with indeterminate rhythm and new septal infarct 
--troponin 1.25. CTA chest negative for PE or dissection. --cardiology following but limited treatment option given gastroccult + brown liquid drainage via NG tube and concern for GI bleeding. 
--echo with ef 70%, no wall motion abnl 
--serial enzymes 
  
Acute encephalopathy, POA  
--post arrest.  Has startle reflex, seems to be breathing above vent, pupils constricted but reactive. However, not on any sedation and no spontaneous movement 
--CT head negative except right maxillary air fluid level 
--neuro following, EEG noted. Plan is to repeat EEG today per family request.  
  
Shock, cardiogenic vs septic shock Leukocytosis WBC 55K s/p recent chemo with FOLFOX and then neulasta 18 Recent abdominal wound infection with MRSE (staph epi) Possible right maxillary sinusitis with air fluid level on CT 
--on levophed drip 
--give albumin 
--gentle IVF 
--get blood cultures, lactic.  UA +, culture pending.    
--empiric abx with cefepime and levaquin 
  
Stage 4 colon CA with malignant ascites and bilateral pleural effusions which likely will be malignant. Liver and right lung mets --dx 2018 s/p right hemicolectomy complicated with recurrent wound infection requiring wound vac last month 
--paracentesis with 5L fluid removal earlier this month 
--had FOLFOX chemo 1st round  and then neulasta . 
--oncology consulted 
--palliative care  following 
  
Acute on chronic anemia due to possible upper GI bleed vs. gastritis --hgb 9.3, previously 11-12 
--brown gastroccult + liquid drainage via OG tube.   
--IV protonix q12h 
--transfused 2 units uncrossed blood with repeat hgb 10.5. Monitor H&H 
--also given 2 units FFP for coagulopathy INR 1.5 
--GI  Has signed off.  
  
Acute respiratory failure Large Bilateral pleural effusions, suspect malignant effusions --intubated. Oxygenating ok on 100% FIO2 
--consider therapeutic and diagnostic thoracentesis once hemodynamically stable.   
  
AL 
--Cr 1.4, previously 0.47. 
--osorio placed in ER, check UA, no hydronephrosis on CT 
--gentle IVF. --hold lisinopril 
  
DM 
--hold lantus, metformin, saxagliptin, farxiga 
--low dose SSI 
  
HTN Hyperlipidemia 
--hold zocor due to actuely elevated AST, ALP 
  
Body mass index is 32.17 kg/(m^2). 
  
Code: discussed with , full DVT prophylaxis:  SCD Surrogate decision maker:   Alissa Chamber 
  
Patient critically ill and high risk of dying but  does not want to \"give up\" at this time. Hospital Problems  Date Reviewed: 7/23/2018 Codes Class Noted POA Acute encephalopathy ICD-10-CM: G93.40 ICD-9-CM: 348.30  7/24/2018 Yes Acute renal failure (HCC) ICD-10-CM: N17.9 ICD-9-CM: 584.9  7/24/2018 No  
   
 Acute respiratory failure with hypoxia and hypercapnia (HCC) ICD-10-CM: J96.01, J96.02 
ICD-9-CM: 518.81  7/24/2018 Yes Counseling regarding goals of care ICD-10-CM: Z71.89 ICD-9-CM: V65.49  7/24/2018 Unknown * (Principal)Cardiac arrest ([address] ICD-10-CM: I46.9 ICD-9-CM: 427.5  7/23/2018 Yes Upper GI bleed ICD-10-CM: K92.2 ICD-9-CM: 578.9  7/23/2018 Yes Septic shock (HCC) ICD-10-CM: A41.9, R65.21 ICD-9-CM: 038.9, 785.52, 995.92  7/23/2018 Yes Pleural effusion, bilateral ICD-10-CM: J90 ICD-9-CM: 511.9  7/23/2018 Yes Acute cystitis without hematuria ICD-10-CM: N30.00 ICD-9-CM: 595.0  7/23/2018 Yes Metastatic colon cancer in female [facility]) ICD-10-CM: C78.5 ICD-9-CM: 197.5  6/25/2018 Yes Subjective:  
Pt remains obtunded on vent support.   
Dr Ledora Bernheim speaking with family re EEG 
 is requesting another EEG today Vital Signs:  
 Last 24hrs VS reviewed since prior progress note. Most recent are: 
Visit Vitals  /55 (BP 1 Location: Right arm, BP Patient Position: At rest)  Pulse (!) 108  Temp 98 °F (36.7 °C)  Resp 19  Wt 92.7 kg (204 lb 5.9 oz)  SpO2 97%  BMI 35.08 kg/m2 Intake/Output Summary (Last 24 hours) at 07/25/18 1051 Last data filed at 07/25/18 1000 Gross per 24 hour Intake          3681.67 ml Output             1710 ml Net          1971.67 ml Physical Examination:  
 
Gen: NAD Lungs: occ rhonchi, dec BS 
CV: RR tachy, no mur Abd: soft NT ND. +BS Ext: + edema, cyanosis. Warm. Pulses 2+ Neuro: unresponsive. Labs:  
 
Recent Labs  
   07/25/18 
 0353  07/24/18 
 0355 WBC  34.4*  33.4* HGB  9.7*  11.1*  
HCT  30.3*  34.2*  
PLT  277  277 Recent Labs  
   07/25/18 
 0353  07/24/18 
 0355  07/23/18 
 1105 NA  145  146*  148* K  4.1  3.8  4.4  
CL  112*  112*  111* CO2  17*  20*  19* BUN  34*  31*  22* CREA  1.66*  1.52*  1.40* GLU  180*  129*  114* CA  7.9*  8.3*  8.1*  
MG  2.2  2.3   --   
PHOS  3.5  3.4   --   
 
Recent Labs  
   07/25/18 0353 07/24/18 
 0355  07/23/18 
 1105 SGOT  195*  415*  218* ALT  54  89*  43 AP  185*  186*  240* TBILI  0.4  0.5  0.2 TP  5.1*  5.4*  4.6* ALB  1.5*  1.9*  1.3*  
GLOB  3.6  3.5  3.3 Recent Labs  
   07/24/18 
 0355  07/23/18 
 1105 INR  1.3*  1.5* PTP  13.5*  15.1* No results for input(s): FE, TIBC, PSAT, FERR in the last 72 hours. No results found for: FOL, RBCF Recent Labs  
   07/25/18 
 0541  07/24/18 
 0443 PH  7.39  7.41  
PCO2  31*  31* PO2  97  135* Recent Labs  
   07/24/18 
 0355  07/23/18 
 1615  07/23/18 
 1105 CPK  462*  670*   --   
CKNDX  1.4  2.2   --   
TROIQ  1.54*  1.82*  1.25* Lab Results Component Value Date/Time  Cholesterol, total 174 06/01/2017 09:40 AM  
 HDL Cholesterol 45 06/01/2017 09:40 AM  
 LDL, calculated 92 06/01/2017 09:40 AM  
 Triglyceride 183 (H) 06/01/2017 09:40 AM  
 CHOL/HDL Ratio 3.4 01/29/2010 11:46 AM  
 
Lab Results Component Value Date/Time Glucose (POC) 206 (H) 07/25/2018 05:23 AM  
 Glucose (POC) 185 (H) 07/25/2018 12:00 AM  
 Glucose (POC) 130 (H) 07/24/2018 05:43 PM  
 Glucose (POC) 151 (H) 07/24/2018 12:00 PM  
 Glucose (POC) 117 (H) 07/24/2018 06:12 AM  
 
Lab Results Component Value Date/Time Color DARK YELLOW 07/23/2018 05:50 PM  
 Appearance TURBID (A) 07/23/2018 05:50 PM  
 Specific gravity 1.020 07/23/2018 05:50 PM  
 pH (UA) 5.0 07/23/2018 05:50 PM  
 Protein 100 (A) 07/23/2018 05:50 PM  
 Glucose NEGATIVE  07/23/2018 05:50 PM  
 Ketone TRACE (A) 07/23/2018 05:50 PM  
 Bilirubin Negative 04/30/2015 12:00 AM  
 Urobilinogen 0.2 07/23/2018 05:50 PM  
 Nitrites NEGATIVE  07/23/2018 05:50 PM  
 Leukocyte Esterase SMALL (A) 07/23/2018 05:50 PM  
 Epithelial cells FEW 07/23/2018 05:50 PM  
 Bacteria 1+ (A) 07/23/2018 05:50 PM  
 WBC >100 (H) 07/23/2018 05:50 PM  
 RBC >100 (H) 07/23/2018 05:50 PM  
 
 
 
Medications Reviewed:  
 
Current Facility-Administered Medications Medication Dose Route Frequency  sodium bicarbonate (8.4%) 100 mEq in sterile water 1,000 mL infusion   IntraVENous CONTINUOUS  
 famotidine (PF) (PEPCID) 20 mg in sodium chloride 0.9% 10 mL injection  20 mg IntraVENous DAILY  insulin glargine (LANTUS) injection 5 Units  5 Units SubCUTAneous Q24H  
 aspirin (ASA) suppository 300 mg  300 mg Rectal DAILY  artificial tears (dextran 70-hypromellose) (NATURAL BALANCE) 0.1-0.3 % ophthalmic solution 1 Drop  1 Drop Both Eyes PRN  
 sodium chloride (NS) flush 5-10 mL  5-10 mL IntraVENous Q8H  
 sodium chloride (NS) flush 5-10 mL  5-10 mL IntraVENous PRN  
 ondansetron (ZOFRAN) injection 4 mg  4 mg IntraVENous Q6H PRN  
 insulin lispro (HUMALOG) injection   SubCUTAneous Q6H  
 glucose chewable tablet 16 g  4 Tab Oral PRN  
 dextrose (D50W) injection syrg 12.5-25 g  12.5-25 g IntraVENous PRN  
 glucagon (GLUCAGEN) injection 1 mg  1 mg IntraMUSCular PRN  
 cefepime (MAXIPIME) 2 g in 0.9% sodium chloride (MBP/ADV) 100 mL  2 g IntraVENous Q12H  mupirocin (BACTROBAN) 2 % ointment   Both Nostrils Q12H  levoFLOXacin (LEVAQUIN) 750 mg in D5W IVPB  750 mg IntraVENous Q48H  chlorhexidine (PERIDEX) 0.12 % mouthwash 15 mL  15 mL Oral Q12H  
 
______________________________________________________________________ EXPECTED LENGTH OF STAY: 4d 21h ACTUAL LENGTH OF STAY:          2 Norman Beard MD

## 2018-07-25 NOTE — PROGRESS NOTES
Pharmacy Automatic Renal Dosing Protocol - Antimicrobials    Indication for Antimicrobials: sepsis of unknown etiology     Current Regimen of Each Antimicrobial:  Cefepime 2 grams IV q 12 hr  (Start Date ; Day # 3)  Levaquin 750 mg IV every 48hrs (Start date 18; Day 3)    Previous Antimicrobial Therapy: None  Vancomycin 2250mg IV x1  (Start 18; Day 2)    Cx:  18  Blood = No organisms seen on gram stain, pending  18 Trach aspirate = light candida, FINAL  18 Urine = NG x 2 days, FINAL    Radiology / Imaging results: (X-ray, CT scan or MRI):  CTA Chest: There is near total atelectasis of the left lung with shift of the mediastinum to the left as well as moderately large pleural effusion on the left. There is a large right pleural effusion as well with compressive atelectasis in the right lower lobe. Right lower lobe and right upper lobe demonstrate multiple pulmonary nodules. It is difficult to exactly compare this exam with previous studies because of the amount of consolidation distortion of  the lungs secondary to this. .    Paralysis, amputations, malnutrition: none noted    Labs:  Recent Labs      18   0353  18   0355  18   1105   CREA  1.66*  1.52*  1.40*   BUN  34*  31*  22*   WBC  34.4*  33.4*  55.0*     Temp (24hrs), Av °F (37.2 °C), Min:98 °F (36.7 °C), Max:101.6 °F (38.7 °C)    Creatinine Clearance (mL/min) or Dialysis:  28.8 ml/min    Impression/Plan:   · Will change cefepime dose to 2 gm IV q24hr as appropriate for indication and current renal function. · Continue current levofloxacin dose as appropriate for indication and current renal function. · Antimicrobial stop date to be determined     Pharmacy will follow daily and adjust medications as appropriate for renal function and/or serum levels.     Thank you,  TATA Kauffman    Recommended duration of therapy  [email or web address]/[place]/[facility]/Pharmacy/Clinical%20Companion/[email or web address]%[email or web address]. docx    Renal Dosing  http://[facility]/[place]/[facility]/Pharmacy/Clinical%20Companion/Renal%[id number]. pdf

## 2018-07-25 NOTE — PROGRESS NOTES
Met with pt's  and pt's sister earlier in the morning as they were on their way for palliative meeting with Palliative SW. Briefly engaged with  and was introduced to sister. Informed of conversation with  confirming that pt had received anointing of the sick.  was sure that must of been reason for why pt didn't appear as agitated as the previous day. Sister exclaimed that she was [place] and pt had accepted Bi as her savior. Asked for a prayer afterwards with  which  assured he would return to do.  self-reported to be coping poorly. Provided empathic presence.  indicated he had to go to meeting expressing appreciation for 's support thus far.  stated plan to follow up at a later time. Upon return found family was not in room. Prayed out loud in [place]ian on behalf of pt, pronouncing blessing. Will continue to follow up as able/needed. DESI Izaguirre. Martín Davila

## 2018-07-25 NOTE — PROGRESS NOTES
DATE OF CONSULTATION: 7/25/2018    CONSULTED BY: Cami Carrizales MD    Chief Complaint   Patient presents with    Cardiac arrest     Arrives via EMS in cardiac arrest with ROSC        Reason for Consult  I have been asked to see the patient in neurological consultation to render advice and opinion regarding CNS status S/P cardiac arrest    HISTORY OF PRESENT ILLNESS  Colie Schirmer, 79 y.o. female with PMHx significant for DM, HTN, HLD, and colon CA with right hemicolectomy, presents via EMS to the ED post cardiac arrest with ROSC about 27 hours ago. [facility] states pt was c/o b/l flank pain and generalized weakness upon arrival, however went into asystole shortly after. EMS reports ACLS protocol for 10 minutes, during which pt had bouts of v-fib and was externally cardioverted x 2. She is comatose on ventilator. Nursing reports no change, still unresponsive,  at bedside.     ROS  Not obtainable    PMH  Past Medical History:   Diagnosis Date    Anemia NEC     Cancer ([address]     Cardiac arrest ([address] 7/23/2018    Class 1 obesity due to excess calories with body mass index (BMI) of 31.0 to 31.9 in adult 6/25/2018    Diabetes ([address]     Headache(784.0)     Hypercholesterolemia     Hypertension     Malignant neoplasm of ascending colon ([address] 05/26/2018    Stage IVc    Palliative chemotherapy underway 7/17/2018    FOLFOX 50% dose reduced    S/P right colectomy 5/27/2018    Right hemicolectomy for cecal mass with partial SBO, [place]    Severe protein-calorie malnutrition ([address] 6/8/2018    Superficial postoperative wound infection 6/8/2018       FH  Family History   Problem Relation Age of Onset    Alcohol abuse Father     Cancer Maternal Aunt      breast ca    Breast Cancer Maternal Aunt     Breast Cancer Sister     Breast Cancer Daughter        SH  Social History     Social History    Marital status:      Spouse name: N/A    Number of children: N/A    Years of education: N/A     Social History Main Topics    Smoking status: Never Smoker    Smokeless tobacco: Never Used    Alcohol use No    Drug use: No    Sexual activity: Yes     Partners: Male      Comment: ,4 children,1 passed away,not working     Other Topics Concern    None     Social History Narrative       ALLERGIES  Allergies   Allergen Reactions    Tylenol [Acetaminophen] Swelling       PHYSICAL EXAM  EXAMINATION:   Patient Vitals for the past 24 hrs:   Temp Pulse Resp BP SpO2   07/25/18 1000 - (!) 108 19 124/55 97 %   07/25/18 0900 - (!) 107 20 126/57 -   07/25/18 0800 98 °F (36.7 °C) (!) 107 18 135/71 97 %   07/25/18 0700 98 °F (36.7 °C) (!) 108 21 136/64 -   07/25/18 0630 98.7 °F (37.1 °C) - - - -   07/25/18 0600 - (!) 112 23 130/57 97 %   07/25/18 0500 100.2 °F (37.9 °C) (!) 116 20 115/59 95 %   07/25/18 0411 - (!) 126 22 - 100 %   07/25/18 0400 (!) 101.6 °F (38.7 °C) (!) 119 22 114/58 98 %   07/25/18 0300 - (!) 123 23 106/63 99 %   07/25/18 0200 - (!) 119 18 108/62 98 %   07/25/18 0100 - (!) 120 20 110/56 95 %   07/25/18 0001 - (!) 120 28 - 100 %   07/25/18 0000 100.2 °F (37.9 °C) (!) 119 23 114/64 100 %   07/24/18 2300 - (!) 119 24 123/50 100 %   07/24/18 2200 - (!) 116 21 121/65 -   07/24/18 2100 - (!) 117 20 102/67 98 %   07/24/18 2035 - (!) 114 20 - 93 %   07/24/18 2000 98 °F (36.7 °C) (!) 115 20 120/64 97 %   07/24/18 1900 - (!) 114 18 127/64 97 %   07/24/18 1800 - (!) 113 21 148/73 97 %   07/24/18 1700 - (!) 111 18 122/77 -   07/24/18 1600 98.4 °F (36.9 °C) (!) 111 16 131/63 100 %   07/24/18 1500 - (!) 114 20 122/70 -   07/24/18 1400 - (!) 115 19 133/63 100 %   07/24/18 1300 - (!) 117 20 109/54 -   07/24/18 1200 100.2 °F (37.9 °C) (!) 119 9 110/63 99 %   07/24/18 1100 - (!) 129 (!) 3 117/62 99 %        General:   Physical Exam   CONSTITUTIONAL Comatose on ventilator  Neurological Examination:   Mental Status:  Comatose on ventilator    Cranial Nerves: Pupils 2mm, unreactive,no corneal response     Motor: now has decerebrate posturing right arm,     Sensation: No Response to pain    Reflexes: none in other 3 ext, no corneal responses, Babinski NR          LAB DATA REVIEWED:      Imaging review:    CT HEAD W/O  7/23/18     TECHNIQUE: Unenhanced CT of the head was performed using 5 mm images. Brain and  bone windows were generated. CT dose reduction was achieved through use of a  standardized protocol tailored for this examination and automatic exposure  control for dose modulation.       FINDINGS:  There is no extra-axial fluid collection hemorrhage or shift. No masses or.     IMPRESSION   impression: No acute intracranial findings, paranasal sinus disease with  possible air-fluid level right maxillary sinus.       Imaging          HOME MEDS  Prior to Admission Medications   Prescriptions Last Dose Informant Patient Reported? Taking? OTHER 7/17/2018 at Unknown time Significant Other Yes Yes   Sig: IV Chemotherapy: last treatment was on Tuesday, 07/17/2018   albuterol (PROVENTIL HFA, VENTOLIN HFA, PROAIR HFA) 90 mcg/actuation inhaler Not Taking at Unknown time Significant Other No No   Sig: Take 1 Puff by inhalation every six (6) hours as needed for Wheezing. ammonium lactate (AMLACTIN) 12 % topical cream Not Taking at Unknown time Significant Other Yes No   Sig: Apply  to affected area two (2) times daily as needed (\"Skin irritation\"). rub in to affected area well   aspirin delayed-release 81 mg tablet 7/10/2018 at Unknown time Significant Other Yes Yes   Sig: Take 81 mg by mouth daily. calcium 600 mg Cap 7/10/2018 at Unknown time Significant Other Yes Yes   Sig: Take 600 mg by mouth daily. cholecalciferol, VITAMIN D3, (VITAMIN D3) 5,000 unit tab tablet 7/10/2018 at Unknown time Significant Other Yes Yes   Sig: Take 5,000 Units by mouth daily.    clotrimazole (LOTRIMIN AF, CLOTRIMAZOLE,) 1 % topical cream Not Taking at Unknown time Significant Other Yes No   Sig: Apply  to affected area two (2) times daily as needed for Skin Irritation. dapagliflozin (FARXIGA) 10 mg tab tablet 7/10/2018 at Unknown time Significant Other No Yes   Sig: Take 1 Tab by mouth daily. fexofenadine (ALLEGRA) 180 mg tablet Not Taking at Unknown time Significant Other Yes No   Sig: Take 180 mg by mouth daily as needed for Allergies. fluticasone (FLONASE ALLERGY RELIEF) 50 mcg/actuation nasal spray Not Taking at Unknown time Significant Other Yes No   Si Sprays by Both Nostrils route daily as needed for Allergies. insulin glargine (LANTUS SOLOSTAR U-100 INSULIN) 100 unit/mL (3 mL) inpn 2018 at Unknown time Significant Other Yes Yes   Si Units by SubCUTAneous route two (2) times a day. lisinopril (PRINIVIL, ZESTRIL) 10 mg tablet 2018 at Unknown time Significant Other No Yes   Sig: Take 1 Tab by mouth daily. metoprolol tartrate (LOPRESSOR) 50 mg tablet 2018 at Unknown time Significant Other No Yes   Sig: TAKE 1 TABLET BY MOUTH TWICE DAILY   oxyCODONE IR (ROXICODONE) 5 mg immediate release tablet 2018 at Unknown time Significant Other No Yes   Sig: Take 1 Tab by mouth every eight (8) hours as needed. Max Daily Amount: 15 mg. sAXagliptin-metFORMIN (KOMBIGLYZE XR) 5-1,000 mg TM24 7/10/2018 at Unknown time Significant Other No Yes   Sig: Take 1 tab po QD   simvastatin (ZOCOR) 20 mg tablet 7/10/2018 at Unknown time Significant Other No Yes   Sig: Take 1 Tab by mouth nightly.       Facility-Administered Medications: None       CURRENT MEDS  Current Facility-Administered Medications   Medication Dose Route Frequency    sodium bicarbonate (8.4%) 100 mEq in sterile water 1,000 mL infusion   IntraVENous CONTINUOUS    famotidine (PF) (PEPCID) 20 mg in sodium chloride 0.9% 10 mL injection  20 mg IntraVENous DAILY    insulin glargine (LANTUS) injection 5 Units  5 Units SubCUTAneous Q24H    aspirin (ASA) suppository 300 mg  300 mg Rectal DAILY    sodium chloride (NS) flush 5-10 mL  5-10 mL IntraVENous Q8H    insulin lispro (HUMALOG) injection   SubCUTAneous Q6H    cefepime (MAXIPIME) 2 g in 0.9% sodium chloride (MBP/ADV) 100 mL  2 g IntraVENous Q12H    mupirocin (BACTROBAN) 2 % ointment   Both Nostrils Q12H    levoFLOXacin (LEVAQUIN) 750 mg in D5W IVPB  750 mg IntraVENous Q48H    chlorhexidine (PERIDEX) 0.12 % mouthwash 15 mL  15 mL Oral Q12H       IMPRESSION:RECOMMENDATIONS:  Chas White is a 79 y.o. female who presents with coma after cardiac arrest with resuscitation. I see no evidence of cortical function, she has slight oculocephalic reflex (brainstem) and decerebrate posturing RUE, so I cannot say she has no evidence of CNS function but prognosis is poor, EEG showed very small amount of 3 hz activity,  is not ready to consider comfort care only, will repeat her EEG at his suggestion. She is not on any CNS depressants        Zoe Fofana. Eliu Jeffers MD  Neurologist    This note will not be viewable in [address]

## 2018-07-25 NOTE — CARDIO/PULMONARY
CP Rehab Note: chart review    Admit: cardiac arrest    Mhx: DM, HTN, Widely metastatic colon cancer (peritoneum, liver, lung) on palliative chemo     Never smoked. Patient on vent with neurology following, likely severe anoxic brain injury. CP Rehab deferred at this time.

## 2018-07-25 NOTE — PROGRESS NOTES
4159 Bedside and Verbal shift change report given to Ely Benavidez , PELON and Ann De Guzman , RN (oncoming nurse) by Aly Dukes RN (offgoing nurse). Report included the following information SBAR, Kardex, Recent Results, Cardiac Rhythm sinus tach and Alarm Parameters . 1903- Assessment performed. Oral care performed  0820- Pt. repositioned at this time. [facility] came in to visit patient per family request.  36-  Family in at bedside. 7828-  Per family request Neurology MD Violette Maddox paged. [address] at bedside to assess pt and consult with family. Plan to repeat EEG   1120- EEG technician in to prep pt. For test  1135-  at bedside during test.   1200-  Reassessment performed. .  at bedside. 1230- EEG completed. Pt. Resting with no change at this time. 1330- Patient resting quietly . No change to neuro status. No change noted at this time. 1400- Pt. repositioned at this time. 65- Neuro MDTitnathanael Postin) back in to discuss EEG results with family. 1640- family meeting concluded pt. Code status changed to DNR per MD Sen and N.O. For Morphine sulfate prn.  9671 -Bedside and Verbal shift change report given to Aly Dukes (oncoming nurse) by Ely Benavidez RN and Ann De Guzman RN (offgoing nurse). Report included the following information SBAR, Kardex, Intake/Output and Cardiac Rhythm Sinus Tach.

## 2018-07-25 NOTE — PROGRESS NOTES
Progress Note 7/25/2018 7:52 AM 
NAME: Krish Goodman MRN:  [id number] Admit Diagnosis: Cardiac arrest ([facility] [address] Upper GI bleed Metastatic colon cancer in female [facility]) Problem List:            
  
Asystolic arrest in a patient with metastatic colon CA. Intubated, concern for hypoxic brain injury, possible septic shock, anemia, AL, Acidosis, bilateral pleural effusions, mildly increased troponin. 
- No hx of CAD 
- Echo 7/2018 nl EF. 
- AL 
- ECG with low voltage 
- HTN 
- Dyslipidemia   
  
                      Assessment/Plan:                
  
Witnessed arrest found to be asystolic, CPR/ACLS with subsequent vfib in field, PEA in ED, currently with ROSC. 
ECG with no acute ST changes, mildly increased troponin, medically manage. CT with no pericardial effusion, but bilateral pleural effusions. 
  
Echo with nl EF. 
  
- Continueaspirin suppository - Levophed tapered off - Metoprolol 2.5mg q6h PRN HR > 120 
- lisinopril for now 
- Hold zocor for now Current main concern is for hypoxic brain injury. 
  
ICU care Poor prognosis.  
  
  
 [x]       High complexity decision making was performed in this patient at high risk for decompensation with multiple organ involvement. Subjective:  
 
Krish Goodman denies unresponsive Discussed with RN events overnight. Review of Systems: 
 
Symptom Y/N Comments  Symptom Y/N Comments Fever/Chills N   Chest Pain N Poor Appetite N   Edema N   
Cough N   Abdominal Pain N Sputum N   Joint Pain N   
SOB/VILLELA N   Pruritis/Rash N   
Nausea/vomit N   Tolerating PT/OT Diarrhea N   Tolerating Diet Constipation N   Other Could NOT obtain due to: Unresponsive Objective:  
  
Physical Exam: 
 
Last 24hrs VS reviewed since prior progress note. Most recent are: 
 
Visit Vitals  /64 (BP 1 Location: Right arm, BP Patient Position: At rest)  Pulse (!) 109  Temp 98.3 °F (36.8 °C)  Resp 17  Wt 92.7 kg (204 lb 5.9 oz)  SpO2 100%  BMI 35.08 kg/m2 Intake/Output Summary (Last 24 hours) at 07/25/18 1250 Last data filed at 07/25/18 1200 Gross per 24 hour Intake          3681.67 ml Output             1435 ml Net          2246.67 ml General Appearance: Well developed, well nourished, unresponsive,  
 no acute distress. Ears/Nose/Mouth/Throat: Hearing can not be assessed. Neck: Supple. Chest: Lungs clear to auscultation bilaterally. Cardiovascular: Regular rate and rhythm, S1S2 normal, no murmur. Abdomen: Soft, non-tender, bowel sounds are active. Extremities: No edema bilaterally. Skin: Warm and dry. PMH/SH reviewed - no change compared to H&P Data Review Telemetry: normal sinus rhythm Lab Data Personally Reviewed: 
 
Recent Labs  
   07/25/18 
 6949  07/24/18 
 0355 WBC  34.4*  33.4* HGB  9.7*  11.1*  
HCT  30.3*  34.2*  
PLT  277  277 Recent Labs  
   07/24/18 
 0355  07/23/18 
 1105 INR  1.3*  1.5* PTP  13.5*  15.1* Recent Labs  
   07/25/18 
 0353  07/24/18 
 0355  07/23/18 
 1105 NA  145  146*  148* K  4.1  3.8  4.4  
CL  112*  112*  111* CO2  17*  20*  19* BUN  34*  31*  22* CREA  1.66*  1.52*  1.40* GLU  180*  129*  114* CA  7.9*  8.3*  8.1*  
MG  2.2  2.3   --   
 
Recent Labs  
   07/24/18 
 0355  07/23/18 
 1615  07/23/18 
 1105 CPK  462*  670*   --   
CKNDX  1.4  2.2   --   
TROIQ  1.54*  1.82*  1.25* Lab Results Component Value Date/Time Cholesterol, total 174 06/01/2017 09:40 AM  
 HDL Cholesterol 45 06/01/2017 09:40 AM  
 LDL, calculated 92 06/01/2017 09:40 AM  
 Triglyceride 183 (H) 06/01/2017 09:40 AM  
 CHOL/HDL Ratio 3.4 01/29/2010 11:46 AM  
 
 
Recent Labs  
   07/25/18 
 0353  07/24/18 
 0355  07/23/18 
 1105 SGOT  195*  415*  218* AP  185*  186*  240* TP  5.1*  5.4*  4.6* ALB  1.5*  1.9*  1.3*  
GLOB  3.6  3.5  3.3 Recent Labs  
   07/25/18 
 0541  07/24/18 
 0443 PH  7.39  7.41  
PCO2 31*  31* PO2  97  135* Medications Personally Reviewed: 
 
Current Facility-Administered Medications Medication Dose Route Frequency  sodium bicarbonate (8.4%) 100 mEq in sterile water 1,000 mL infusion   IntraVENous CONTINUOUS  
 famotidine (PF) (PEPCID) 20 mg in sodium chloride 0.9% 10 mL injection  20 mg IntraVENous DAILY  insulin glargine (LANTUS) injection 5 Units  5 Units SubCUTAneous Q24H  
 aspirin (ASA) suppository 300 mg  300 mg Rectal DAILY  artificial tears (dextran 70-hypromellose) (NATURAL BALANCE) 0.1-0.3 % ophthalmic solution 1 Drop  1 Drop Both Eyes PRN  
 sodium chloride (NS) flush 5-10 mL  5-10 mL IntraVENous Q8H  
 sodium chloride (NS) flush 5-10 mL  5-10 mL IntraVENous PRN  
 ondansetron (ZOFRAN) injection 4 mg  4 mg IntraVENous Q6H PRN  
 insulin lispro (HUMALOG) injection   SubCUTAneous Q6H  
 glucose chewable tablet 16 g  4 Tab Oral PRN  
 dextrose (D50W) injection syrg 12.5-25 g  12.5-25 g IntraVENous PRN  
 glucagon (GLUCAGEN) injection 1 mg  1 mg IntraMUSCular PRN  
 cefepime (MAXIPIME) 2 g in 0.9% sodium chloride (MBP/ADV) 100 mL  2 g IntraVENous Q12H  mupirocin (BACTROBAN) 2 % ointment   Both Nostrils Q12H  levoFLOXacin (LEVAQUIN) 750 mg in D5W IVPB  750 mg IntraVENous Q48H  chlorhexidine (PERIDEX) 0.12 % mouthwash 15 mL  15 mL Oral Q12H Lloyd Ferguson III, DO

## 2018-07-25 NOTE — PROGRESS NOTES
Palliative Medicine  [place] [id number] [id number] - [phone] (COPE)      Diagnoses: Anoxic Brain Injury, Stage IV Met Colon Ca. (Patient has severe anoxic brain injury, minimal activity and no meaningful responses)    GOALS OF CARE: Comfort chosen today by  and family (legal NOK). TREATMENT PREFERENCES:   Code Status: DNR    Advance Care Planning:  Advance Care Planning 7/24/2018   Patient's Healthcare Decision Maker is: Legal Next of Bebo 71   Primary Decision Maker Name Adela Vela   Primary Decision Maker Phone Number [phone]   Primary Decision Maker Relationship to Patient Spouse   Confirm Advance Directive None   Patient Would Like to Complete Advance Directive Unable                   Family Meeting Documentation    Participants:  - Adela Vela, Daughters Radha and Dr. Rigoberto Jimenez NP and Steph Foreman LCSW    Psychosocial: 1. Large family gathered (atleast 6 adults and 4 grandkids). Supporting each other, each taking turns being with patient. 2. Daughter Radha shared that she suspects a gene that may run in family (women) that may be responsible for cancer. She recently has undergone chemotx. Would like her sister and nieces tested so they know if they carry the gene. 3.  finally accepting that there is nothing more that can be done to reverse current course for patient and agreed to extubation after everyone has time with patient, waiting for friend from [place]. He is emotional and grieving internally, trying to hold in feelings. Discussion: Dr Shavonne Aponte discussed the overall poor prognosis with little to no hope of meaningful recovery. Palliative team there to answer questions and support family. Discussed, protocol for extubation. Explained that no one knows how long patient will still be with them following extubation.  Family would like for this to happen in the evening, so \"she can simply fall asleep\".  wanted to know if patient would be staying in the hospital following extubation. Dr Han Colorado shared that she would be in ICU initially but then may be moved to a private room. No mention made yet of hospice as family quite overwhelmed. Family noted that patient wanted to be cremated and needed resources.  wanted [place] to follow up. LCSW gave her numbers for [employer] [facility] [place] and [address] Family, after much discussion with MDs and today's meeting, agreed to compassionately extubate patient once all the family has had time to say their goodbyes. Family is awaiting patient's \"best friend\" who is on her way from [facility] via bus. All are accepting of patient's condition now, understand that the primary issue is the anoxic brain injury which patient will not recover from. Family will let us know when they are ready. All the grandkids have made time to see patient. They have come here from [place]. Daughter Aneudy Bonilla came from [place]. Did offer emotional support and literature for grandkids, but family is supporting them and is protective of them. Will assist as requested and allowed. Follow Up: Will see patient/family tomorrow. Total Time:  Time spent in counseling / coordination:  >50% of time in counseling / coordination?  Yes

## 2018-07-25 NOTE — PROGRESS NOTES
Patient anointed with the Sacrament of the Sick by on 7/25/18 by Rev. Monico Hebert and met with family.     KENIA Paulino, [facility], Staff [address]      [address] Paging Service  287[facility] ([phone])

## 2018-07-25 NOTE — PROGRESS NOTES
Palliative Medicine  [place]: [phone] ([phone])  [facility]: [phone])        Per ER notes, Niurka Hogan is a 79 y.o.  female with PMH with stage 4 metastatic colon CA dx 5/18 s/p right hemicolectomy, malignant ascites with 5L paracentesis earlier this month, followed by recurrent abdominal wound infection requiring wound vac last month, wound culture with methicillin resistant staph epidermis, DM, hyperlipidemia, HTN who started on first round of chemotherapy with FOLFOX on 7/17 and received neulasta who presents by EMS after asystolic cardiac arrest.     History from  Jolie Boo at bedside. He is originally from [place], speaks English, declined interpretor. Patient been having b/l flank pain, poor appetite that had improved somewhat after paracentesis. He had given her oxycodone earlier this morning. Been very weak. He assisted her to sit up by bedside when she suddenly slumped over and had no pulse. He performed CPR before EMS arrived. No hx of MI or arrhythmia or chf.   EMS report noted patient in asystole, had large amount of brown watery emesis. Given epi x 2 and went into Vtach. Was defibrillator x 2 and then went into sinus tach. OG placed with large amount of brown fluid drainage.   In ER, patient patient coded again, asystole, then narrow complex PEA before once again successfully resuscitated. Started on levophed. \"    Patient's diagnoses include: Stage 4 Colon Ca, DM, Acute Encephalopathy, out of hospital cardiac arrest.     Palliative team was consulted for patient's end stage condition, her anoxic brain injury and to support family. Patient and  Jolie Boo have four children, they live with one of their daughters. Other children are spread out in other states. A daughter is here from [place] [id number] today, some grandkids are coming later ages 25, 15 and 6. Jolie Boo was not sure that the younger children would come to see patient in ICU.  Nigel's sister Rhina Morales" is also here today from [place], for support to family. Family is [employer]. They have a good relationship with Chaplain Murray.     LCSW met briefly with  and his sister today, to introduce role of palliative  and to provide support. 's goal today is to be there when all the doctors visit so he can get information about patient. He is eager to speak to the neurologist. Chart reports that  wanted another EEG done today. Family is having difficulty accepting that patient may be at end stage of life. Bereavement may be moderate due to the sudden decline and family being unprepared for this. Did not keep family from being in room when MDs make rounds. Gave card to [facility]. Will follow as needed and as able.

## 2018-07-26 NOTE — PROGRESS NOTES
Palliative Medicine Consult  Luis: [phone])    Patient Name: Walter Morales  YOB: 1950    Date of Initial Consult:   Reason for Consult: care decisions  Requesting Provider: Dr. Atif Garcia  Primary Care Physician: [facility] Nurse Navigator (Inactive)     SUMMARY:   Walter Morales is a 79 y.o. with a past history of  DM, HTN, HLD, and colon CA with right hemicolectomy, who was admitted on 7/23/2018 from home with a diagnosis of cardiac arrest. Current medical issues leading to Palliative Medicine involvement include: pt with stage 4 colon cancer, cardiac arrest, anoxic brain injury. HOSPITAL COURSE:  In ED, pulse lost at 10:55am and CPR initiated, 11:02am ROSC. Transferred to CCU on vent, pressors. Fever 101.1, lactic acid 3.5.    7/24: Seen by Neurology, per note pt has no evidence of CNS function but prognosis is poor. EEG is a markedly abnormal electroencephalogram due to the absence of any alpha or theta activity. This is low voltage. This is consistent with significant structural and/or metabolic encephalopathy. 7/25: troponin peaked early this am at 1.82, now 1.54. Pt was  anointed with the Sacrament of the Sick this am.   Repeat EEG:  This is a markedly abnormal electroencephalogram, which reveals little or no cerebral activity. The absence of seizures on electroencephalogram does not eliminate a diagnosis of epilepsy. 7/26: no gag reflex when suctioned, remains on cooling blanket. PSYCHOSOCIAL: , 2 daughters   PALLIATIVE DIAGNOSES:   1. Cardiac arrest  2. Acute respiratory failure  3. Pallor   4. GIB  5. SIRS  6. Leukocytosis  7. Acute renal failure  8. Acute encephalopathy  9. Cardiogenic vs septic shock  10. Stage 4 colon cancer with malignant ascites and bilateral effusions, liver and right lung mets  11. Lactic acidosis  12. Care decisions     PLAN:   1. Plan was for compassionate extubation this am after pt's friend from [place] arrived.   At this time there is no one at bedside. RN states that the last she heard compassionate extubation was scheduled for today at unknown time. 2. Initial consult note routed to primary continuity provider  3. Communicated plan of care with: Palliative IDT, Dr. Oleg Hurtado RN     GOALS OF CARE / TREATMENT PREFERENCES:     GOALS OF CARE:  Patient/Health Care Proxy Stated Goals: Prolong life      TREATMENT PREFERENCES:   Code Status: DNR    Advance Care Planning:  Advance Care Planning 7/24/2018   Patient's Healthcare Decision Maker is: Legal Next of Kin   Primary Decision Maker Name Laurie Worthy   Primary Decision Maker Phone Number [phone]   Primary Decision Maker Relationship to Patient Spouse   Confirm Advance Directive None   Patient Would Like to Complete Advance Directive Unable       Medical Interventions: Full interventions   Other Instructions: Other:    As far as possible, the palliative care team has discussed with patient / health care proxy about goals of care / treatment preferences for patient. HISTORY:     History obtained from: chart, family    CHIEF COMPLAINT: cardiac arrest    HPI/SUBJECTIVE:    The patient is:   [] Verbal and participatory  [x] Non-participatory due to: intubation    BIBA post cardiac arrest with ROSC. EMS reports Pt was c/o b/l flank pain and general weakness upon their arrival then shortly thereafter went into asystole. EMS reports ACLS protocol for 10 min during which pt had bouts of v-fib and was externally cardioverted x2. She had ROSC with strong pulse and sbp of 77, they then intubated and placed OGT. Pt is on palliative chemotherapy and has a portacath. ED W/U:  EXAM: severe distress, OG tube draining dark blood, pallor. abd distended and firm  LAB: wbc 55, h/h 9.3/31.7, INR 1.5, Na 1248, BUN/Cr 22/1.40, ALT 43, ZWD055, alk phos 240. Albumin 1.3, troponin 1.25, pro-BNP 04847.   Blood Gas:pH 7.19, bicarb 14  IMAGING:  HEAD CT: no acute intracranial findings, paranasal sinus disease with possible air-fluid level right maxillary sinus. ABD/PELVIS CT: no bowel obstruction, ascites, metastatic disease. CXR: persistent left lung atelectasis, persistent right lung pulmonary edema. HISTORY:    Admission 7/10-7/10/18: wound infection. abd CT revealed interval significant worsening of metastatic disease, with enlarging lung nodules, enlarging liver lesions, retroperitoneal lymphadenopathy,and new significant peritoneal metastatic disease with ascites. 7/13 portacath placed, 7/17 Folfox 50% strength tolerated, but very weak and no appetite. 5/25-6/2/18: admitted for abd pain, CT revealed colon mass with liver and lung mets, retroperitoneal nodes. 5/27 right hemicolectomy. D/c home,  refused home health assistance. 6/8/18:  called to report wound bleeding and red. 6/8-6/13/18: wound infection. 7/10-7/20/18: wound infection, ascites 5 liters drained, cytology pos adenocarcinoma. Abd, CT reveals significant worsening of metastatic disease, enlarging lung nodules and liver lesions. 7/13: medaport placed. 7/17: Folfox started at 1/2 dose; tolerated but very weak and no appetite. 7/19: nurse navigator report Mr. Erna Montero voiced concerns that wife was feeling bad, worsening abdominal pain. Surgeon's note indicates  states pt will do better at home. 7/20: Neulasta administered at [facility].      Clinical Pain Assessment (nonverbal scale for severity on nonverbal patients):   Clinical Pain Assessment  Severity: 0     Activity (Movement): Lying quietly, normal position    Duration: for how long has pt been experiencing pain (e.g., 2 days, 1 month, years)  Frequency: how often pain is an issue (e.g., several times per day, once every few days, constant)     FUNCTIONAL ASSESSMENT:     Palliative Performance Scale (PPS):  PPS: 10       PSYCHOSOCIAL/SPIRITUAL SCREENING:     Palliative IDT has assessed this patient for cultural preferences / practices and a referral made as appropriate to needs (Cultural Services, Patient Advocacy, Ethics, etc.)    Advance Care Planning:  Advance Care Planning 7/24/2018   Patient's Healthcare Decision Maker is: Legal Next of Bebo Silva   Primary Decision Maker Name Sofya Carroll   Primary Decision Maker Phone Number [phone]   Primary Decision Maker Relationship to Patient Spouse   Confirm Advance Directive None   Patient Would Like to Complete Advance Directive Unable       Any spiritual / Sikhism concerns:  [] Yes /  [x] No    Caregiver Burnout:  [] Yes /  [x] No /  [] No Caregiver Present      Anticipatory grief assessment:   [x] Normal  / [] Maladaptive       ESAS Anxiety: Anxiety: 0    ESAS Depression:   unable to assess due to pt factors     REVIEW OF SYSTEMS:     Positive and pertinent negative findings in ROS are noted above in HPI. The following systems were [] reviewed / [x] unable to be reviewed as noted in HPI  Other findings are noted below. Systems: constitutional, ears/nose/mouth/throat, respiratory, gastrointestinal, genitourinary, musculoskeletal, integumentary, neurologic, psychiatric, endocrine. Positive findings noted below. Modified ESAS Completed by: provider           Pain: 0   Anxiety: 0       Dyspnea: 0     Constipation: No     Stool Occurrence(s): 1        PHYSICAL EXAM:     From RN flowsheet:  Wt Readings from Last 3 Encounters:   07/25/18 204 lb 5.9 oz (92.7 kg)   07/17/18 187 lb (84.8 kg)   07/10/18 182 lb (82.6 kg)     Blood pressure 124/53, pulse (!) 109, temperature (!) 100.9 °F (38.3 °C), resp. rate 20, weight 204 lb 5.9 oz (92.7 kg), SpO2 97 %.     Pain Scale 1: Behavioral Pain Scale (BPS)  Pain Intensity 1: 3                 Last bowel movement, if known:     Constitutional:  Intubated without sedation  Eyes: pupils equal, pinpoint and unreactive, anicteric  ENMT: no nasal discharge, moist mucous membranes  Cardiovascular: tachy, regular rhythm, distal pulses intact  Respiratory: breathing not labored, symmetric, diminished in bases, course throughout, no gag to sxn  Gastrointestinal: distended and firm, +bowel sounds  Musculoskeletal: no deformity, no tenderness to palpation  Skin: warm, dry  Neurologic: not following commands, not moving all extremities, does not withdraw from pain  Psychiatric: unable to assess due to pt factors     HISTORY:     Principal Problem:    Cardiac arrest ([address] (2018)    Active Problems:    Metastatic colon cancer in female [facility]) (2018)      Upper GI bleed (2018)      Septic shock (HCC) (2018)      Pleural effusion, bilateral (2018)      Acute cystitis without hematuria (2018)      Acute encephalopathy (2018)      Acute renal failure (HCC) (2018)      Acute respiratory failure with hypoxia and hypercapnia (HCC) (2018)      Counseling regarding goals of care (2018)      Past Medical History:   Diagnosis Date    Anemia NEC     Cancer ([address]     Cardiac arrest ([address] 2018    Class 1 obesity due to excess calories with body mass index (BMI) of 31.0 to 31.9 in adult 2018    Diabetes ([address]     Headache(784.0)     Hypercholesterolemia     Hypertension     Malignant neoplasm of ascending colon ([address] 2018    Stage IVc    Palliative chemotherapy underway 2018    FOLFOX 50% dose reduced    S/P right colectomy 2018    Right hemicolectomy for cecal mass with partial SBO, Ndiaye    Severe protein-calorie malnutrition ([address] 2018    Superficial postoperative wound infection 2018      Past Surgical History:   Procedure Laterality Date    HX  SECTION      Hysterectomy,total for precancer    HX COLECTOMY Right 2018    Right hemicolectomy for cecal mass with partial SBO, Ndiaye      Family History   Problem Relation Age of Onset    Alcohol abuse Father     Cancer Maternal Aunt      breast ca    Breast Cancer Maternal Aunt     Breast Cancer Sister     Breast Cancer Daughter       History reviewed, no pertinent family history.   Social History   Substance Use Topics    Smoking status: Never Smoker    Smokeless tobacco: Never Used    Alcohol use No     Allergies   Allergen Reactions    Tylenol [Acetaminophen] Swelling      Current Facility-Administered Medications   Medication Dose Route Frequency    sodium bicarbonate (8.4%) 100 mEq in sterile water 1,000 mL infusion   IntraVENous CONTINUOUS    famotidine (PF) (PEPCID) 20 mg in sodium chloride 0.9% 10 mL injection  20 mg IntraVENous DAILY    insulin glargine (LANTUS) injection 5 Units  5 Units SubCUTAneous Q24H    metoprolol (LOPRESSOR) injection 2.5 mg  2.5 mg IntraVENous Q6H PRN    cefepime (MAXIPIME) 2 g in 0.9% sodium chloride (MBP/ADV) 100 mL  2 g IntraVENous Q24H    morphine injection 2-8 mg  2-8 mg IntraVENous PRN    aspirin (ASA) suppository 300 mg  300 mg Rectal DAILY    artificial tears (dextran 70-hypromellose) (NATURAL BALANCE) 0.1-0.3 % ophthalmic solution 1 Drop  1 Drop Both Eyes PRN    sodium chloride (NS) flush 5-10 mL  5-10 mL IntraVENous Q8H    sodium chloride (NS) flush 5-10 mL  5-10 mL IntraVENous PRN    ondansetron (ZOFRAN) injection 4 mg  4 mg IntraVENous Q6H PRN    insulin lispro (HUMALOG) injection   SubCUTAneous Q6H    glucose chewable tablet 16 g  4 Tab Oral PRN    dextrose (D50W) injection syrg 12.5-25 g  12.5-25 g IntraVENous PRN    glucagon (GLUCAGEN) injection 1 mg  1 mg IntraMUSCular PRN    mupirocin (BACTROBAN) 2 % ointment   Both Nostrils Q12H    levoFLOXacin (LEVAQUIN) 750 mg in D5W IVPB  750 mg IntraVENous Q48H    chlorhexidine (PERIDEX) 0.12 % mouthwash 15 mL  15 mL Oral Q12H          LAB AND IMAGING FINDINGS:     Lab Results   Component Value Date/Time    WBC 34.1 (H) 07/26/2018 04:24 AM    HGB 9.3 (L) 07/26/2018 04:24 AM    PLATELET 429 10/67/6004 04:24 AM     Lab Results   Component Value Date/Time    Sodium 144 07/26/2018 04:24 AM    Potassium 3.6 07/26/2018 04:24 AM    Chloride 110 (H) 07/26/2018 04:24 AM    CO2 22 07/26/2018 04:24 AM    BUN 46 (H) 07/26/2018 04:24 AM    Creatinine 1.52 (H) 07/26/2018 04:24 AM    Calcium 7.8 (L) 07/26/2018 04:24 AM    Magnesium 2.2 07/26/2018 04:24 AM    Phosphorus 2.8 07/26/2018 04:24 AM      Lab Results   Component Value Date/Time    AST (SGOT) 149 (H) 07/26/2018 04:24 AM    Alk. phosphatase 215 (H) 07/26/2018 04:24 AM    Protein, total 5.1 (L) 07/26/2018 04:24 AM    Albumin 1.4 (L) 07/26/2018 04:24 AM    Globulin 3.7 07/26/2018 04:24 AM     Lab Results   Component Value Date/Time    INR 1.3 (H) 07/24/2018 03:55 AM    Prothrombin time 13.5 (H) 07/24/2018 03:55 AM      Lab Results   Component Value Date/Time    Iron 75 04/30/2015 12:00 AM    Ferritin 65 04/30/2015 12:00 AM      Lab Results   Component Value Date/Time    pH 7.53 (H) 07/26/2018 07:14 AM    PCO2 26 (L) 07/26/2018 07:14 AM    PO2 80 07/26/2018 07:14 AM     No components found for: [place]   Lab Results   Component Value Date/Time     (H) 07/24/2018 03:55 AM    CK - MB 6.5 (H) 07/24/2018 03:55 AM                Total time: 25  Counseling / coordination time, spent as noted above: 20  > 50% counseling / coordination?: yes    Prolonged service was provided for  []30 min   []75 min in face to face time in the presence of the patient, spent as noted above. Time Start:   Time End:   Note: this can only be billed with 96963 (initial) or 49374 (follow up). If multiple start / stop times, list each separately.

## 2018-07-26 NOTE — PROGRESS NOTES
Palliative Medicine  Luis: [phone] ([phone])            LCSW went by patient's room twice, once in the morning and once in the afternoon. Unable to meet with family. In the morning,  was speaking at some length with Dr Salgado Members and no family in room or in the waiting area in the afternoon. Palliative team will follow up with pulmonology group regarding plan as family was planning to extubate once patient's friend from [facility] had a chance to see patient.

## 2018-07-26 NOTE — PROGRESS NOTES
Attempted to see family on multiple attempts without success. Left personalized card in room for family after praying for pt and family. Will follow up as able/needed. QIANA Delgado

## 2018-07-26 NOTE — PROGRESS NOTES
0730 Report received assessed the patient   82 , daughter, friend from [place] and sister at the bedside. They requested to speak with Dr Moo Pradhan concerning the treatment plan for her HR,temp and BP.  1200  left with no word to the nurse. 0 Dr Ledora Bernheim called to informed of seizure like activity. 1700 NO changes   1800 morphine given pt rate 30 peak pressures 45   1930 Bedside shift change report given to  (oncoming nurse) by Edmar Steele  (offgoing nurse). Report included the following information SBAR, Kardex, OR Summary, Procedure Summary, Intake/Output and MAR.

## 2018-07-26 NOTE — PROGRESS NOTES
Hospitalist Progress Note Lacy Cook MD 
Cell: [phone] Date of Service:  2018 NAME:  Karis Treviño :  1950 MRN:  [id number] Assessment & Plan: S/p out of hospital asystolic cardiac arrest 
--EKG with indeterminate rhythm and new septal infarct 
--troponin 1.25. CTA chest negative for PE or dissection. --cardiology following but limited treatment option given gastroccult + brown liquid drainage via NG tube and concern for GI bleeding. 
--echo with ef 70%, no wall motion abnl 
  
Acute encephalopathy, POA  
--post arrest.  Has startle reflex, seems to be breathing above vent, pupils constricted but reactive. However, not on any sedation and no spontaneous movement 
--CT head negative except right maxillary air fluid level 
--neuro following, EEG noted. Plan is to repeat EEG today per family request.  
- Likely severe anoxic brain injury 
  
Shock, cardiogenic vs septic shock Leukocytosis WBC 55K s/p recent chemo with FOLFOX and then neulasta 18 Recent abdominal wound infection with MRSE (staph epi) Possible right maxillary sinusitis with air fluid level on CT 
--on levophed drip 
--give albumin 
--gentle IVF 
--get blood cultures, lactic.  UA +, culture pending.    
--empiric abx with cefepime and levaquin 
  
Stage 4 colon CA with malignant ascites and bilateral pleural effusions which likely will be malignant. Liver and right lung mets --dx 2018 s/p right hemicolectomy complicated with recurrent wound infection requiring wound vac last month 
--paracentesis with 5L fluid removal earlier this month 
--had FOLFOX chemo 1st round  and then neulasta . 
--oncology consulted 
--palliative care  following 
  
Acute on chronic anemia due to possible upper GI bleed vs. gastritis --hgb 9.3, previously 11-12 
--brown gastroccult + liquid drainage via OG tube.   
--IV protonix q12h 
--transfused 2 units uncrossed blood with repeat hgb 10.5. Monitor H&H 
--also given 2 units FFP for coagulopathy INR 1.5 
--GI  Has signed off.  
  
Acute respiratory failure Large Bilateral pleural effusions, suspect malignant effusions --intubated. Oxygenating ok on 100% FIO2 
--consider therapeutic and diagnostic thoracentesis once hemodynamically stable.   
  
AL 
--Cr 1.4, previously 0.47. 
--osorio placed in ER, check UA, no hydronephrosis on CT 
--gentle IVF. --hold lisinopril 
  
DM 
--hold lantus, metformin, saxagliptin, farxiga 
--low dose SSI 
  
HTN Hyperlipidemia 
--hold zocor due to actuely elevated AST, ALP 
  
Body mass index is 32.17 kg/(m^2).  , family considering terminal extubation today and then comfort care 
  
Code: discussed with , full DVT prophylaxis:  SCD Surrogate decision maker:   Ani Client Hospital Problems  Date Reviewed: 7/23/2018 Codes Class Noted POA Acute encephalopathy ICD-10-CM: G93.40 ICD-9-CM: 348.30  7/24/2018 Yes Acute renal failure (HCC) ICD-10-CM: N17.9 ICD-9-CM: 584.9  7/24/2018 No  
   
 Acute respiratory failure with hypoxia and hypercapnia (HCC) ICD-10-CM: J96.01, J96.02 
ICD-9-CM: 518.81  7/24/2018 Yes Counseling regarding goals of care ICD-10-CM: Z71.89 ICD-9-CM: V65.49  7/24/2018 Unknown * (Principal)Cardiac arrest ([facility] ICD-10-CM: I46.9 ICD-9-CM: 427.5  7/23/2018 Yes Upper GI bleed ICD-10-CM: K92.2 ICD-9-CM: 578.9  7/23/2018 Yes Septic shock (HCC) ICD-10-CM: A41.9, R65.21 ICD-9-CM: 038.9, 785.52, 995.92  7/23/2018 Yes Pleural effusion, bilateral ICD-10-CM: J90 ICD-9-CM: 511.9  7/23/2018 Yes Acute cystitis without hematuria ICD-10-CM: N30.00 ICD-9-CM: 595.0  7/23/2018 Yes Metastatic colon cancer in female [facility]) ICD-10-CM: C78.5 ICD-9-CM: 197.5  6/25/2018 Yes Subjective:  
Pt remains obtunded on vent support For possible terminal extubation today Vital Signs:  
 Last 24hrs VS reviewed since prior progress note. Most recent are: 
Visit Vitals  /53  Pulse (!) 109  Temp (!) 100.9 °F (38.3 °C)  Resp 20  Wt 92.7 kg (204 lb 5.9 oz)  SpO2 97%  BMI 35.08 kg/m2 Intake/Output Summary (Last 24 hours) at 07/26/18 1517 Last data filed at 07/26/18 1503 Gross per 24 hour Intake             1730 ml Output             1575 ml Net              155 ml Physical Examination:  
 
Gen: NAD Lungs: occ rhonchi, dec BS 
CV: RR tachy, no mur Abd: soft NT ND. +BS Ext: + edema, cyanosis. Warm. Pulses 2+ Neuro: unresponsive. Labs:  
 
Recent Labs  
   07/26/18 0424 07/25/18 
 1811 WBC  34.1*  34.4* HGB  9.3*  9.7* HCT  29.0*  30.3* PLT  223  277 Recent Labs  
   07/26/18 0424 07/25/18 0353  07/24/18 
 0355 NA  144  145  146*  
K  3.6  4.1  3.8 CL  110*  112*  112* CO2  22  17*  20* BUN  46*  34*  31* CREA  1.52*  1.66*  1.52* GLU  141*  180*  129* CA  7.8*  7.9*  8.3*  
MG  2.2  2.2  2.3 PHOS  2.8  3.5  3.4 Recent Labs  
   07/26/18 0424 07/25/18 0353  07/24/18 0355 SGOT  149*  195*  415* ALT  37  54  89* AP  215*  185*  186* TBILI  0.4  0.4  0.5 TP  5.1*  5.1*  5.4* ALB  1.4*  1.5*  1.9*  
GLOB  3.7  3.6  3.5 Recent Labs  
   07/24/18 
 0355 INR  1.3* PTP  13.5* No results for input(s): FE, TIBC, PSAT, FERR in the last 72 hours. No results found for: FOL, RBCF Recent Labs  
   07/26/18 
 8818  07/25/18 
 0541 PH  7.53*  7.39  
PCO2  26*  31* PO2  80  97 Recent Labs  
   07/24/18 
 0355  07/23/18 
 1615 CPK  462*  670* CKNDX  1.4  2.2  
TROIQ  1.54*  1.82* Lab Results Component Value Date/Time Cholesterol, total 174 06/01/2017 09:40 AM  
 HDL Cholesterol 45 06/01/2017 09:40 AM  
 LDL, calculated 92 06/01/2017 09:40 AM  
 Triglyceride 183 (H) 06/01/2017 09:40 AM  
 CHOL/HDL Ratio 3.4 01/29/2010 11:46 AM  
 
Lab Results Component Value Date/Time Glucose (POC) 137 (H) 07/26/2018 02:55 PM  
 Glucose (POC) 111 (H) 07/26/2018 05:50 AM  
 Glucose (POC) 136 (H) 07/25/2018 11:19 PM  
 Glucose (POC) 175 (H) 07/25/2018 05:27 PM  
 Glucose (POC) 207 (H) 07/25/2018 11:41 AM  
 
Lab Results Component Value Date/Time Color DARK YELLOW 07/23/2018 05:50 PM  
 Appearance TURBID (A) 07/23/2018 05:50 PM  
 Specific gravity 1.020 07/23/2018 05:50 PM  
 pH (UA) 5.0 07/23/2018 05:50 PM  
 Protein 100 (A) 07/23/2018 05:50 PM  
 Glucose NEGATIVE  07/23/2018 05:50 PM  
 Ketone TRACE (A) 07/23/2018 05:50 PM  
 Bilirubin Negative 04/30/2015 12:00 AM  
 Urobilinogen 0.2 07/23/2018 05:50 PM  
 Nitrites NEGATIVE  07/23/2018 05:50 PM  
 Leukocyte Esterase SMALL (A) 07/23/2018 05:50 PM  
 Epithelial cells FEW 07/23/2018 05:50 PM  
 Bacteria 1+ (A) 07/23/2018 05:50 PM  
 WBC >100 (H) 07/23/2018 05:50 PM  
 RBC >100 (H) 07/23/2018 05:50 PM  
 
 
 
Medications Reviewed:  
 
Current Facility-Administered Medications Medication Dose Route Frequency  sodium bicarbonate (8.4%) 100 mEq in sterile water 1,000 mL infusion   IntraVENous CONTINUOUS  
 famotidine (PF) (PEPCID) 20 mg in sodium chloride 0.9% 10 mL injection  20 mg IntraVENous DAILY  insulin glargine (LANTUS) injection 5 Units  5 Units SubCUTAneous Q24H  
 metoprolol (LOPRESSOR) injection 2.5 mg  2.5 mg IntraVENous Q6H PRN  
 cefepime (MAXIPIME) 2 g in 0.9% sodium chloride (MBP/ADV) 100 mL  2 g IntraVENous Q24H  
 morphine injection 2-8 mg  2-8 mg IntraVENous PRN  
 aspirin (ASA) suppository 300 mg  300 mg Rectal DAILY  artificial tears (dextran 70-hypromellose) (NATURAL BALANCE) 0.1-0.3 % ophthalmic solution 1 Drop  1 Drop Both Eyes PRN  
 sodium chloride (NS) flush 5-10 mL  5-10 mL IntraVENous Q8H  
 sodium chloride (NS) flush 5-10 mL  5-10 mL IntraVENous PRN  
 ondansetron (ZOFRAN) injection 4 mg  4 mg IntraVENous Q6H PRN  
 insulin lispro (HUMALOG) injection   SubCUTAneous Q6H  
 glucose chewable tablet 16 g  4 Tab Oral PRN  
 dextrose (D50W) injection syrg 12.5-25 g  12.5-25 g IntraVENous PRN  
 glucagon (GLUCAGEN) injection 1 mg  1 mg IntraMUSCular PRN  
 mupirocin (BACTROBAN) 2 % ointment   Both Nostrils Q12H  levoFLOXacin (LEVAQUIN) 750 mg in D5W IVPB  750 mg IntraVENous Q48H  chlorhexidine (PERIDEX) 0.12 % mouthwash 15 mL  15 mL Oral Q12H  
 
______________________________________________________________________ EXPECTED LENGTH OF STAY: 4d 21h ACTUAL LENGTH OF STAY:          3 Ron Jones MD

## 2018-07-26 NOTE — PROGRESS NOTES
[facility]  Pulmonary, Critical Care, and Sleep Medicine    Name: Leandro Maher MRN: [id number]   : 1950 Hospital: [facility]   Date: 2018        IMPRESSION:   · Acute respiratory failure  · Out of hospital asystole cardiac arrest - asystole and VF, then PEA in ER  · Shock, likely septic vs cardiogenic  · Likely severe anoxic brain injury  · Metabolic/lactic acidosis  · Acute renal failure   · Abnormal troponin likely due to arrest, but doubt true ACS  · Widely metastatic colon cancer (peritoneum, liver, lung) on palliative chemo - FOLFOX, received Neulasta on   · DM  · Obesity       PLAN:   · Ventilator support    · Bronchodilators  · IV fluids   · Empiric antibiotics   · Insulin/glycemic monitoring  · DVT/GI prophylaxis  · Plan is for compassionate extubation/comfort measures sometime today. Subjective/Interval History:   I have reviewed the flowsheet and previous days notes. The patient is unable to give any meaningful history or review of systems because the patient is:   Intubated/unresponsive      The patient is critically ill on:      Mechanical ventilation      Review of Systems   Unable to perform ROS: Intubated     Objective:   Vital Signs:    Visit Vitals    /55    Pulse (!) 112    Temp 99.2 °F (37.3 °C)    Resp 25    Wt 92.7 kg (204 lb 5.9 oz)    SpO2 95%    BMI 35.08 kg/m2       O2 Device: Endotracheal tube, Ventilator       Temp (24hrs), Av.2 °F (37.3 °C), Min:98.3 °F (36.8 °C), Max:100.2 °F (37.9 °C)       Intake/Output:   Last shift:         Last 3 shifts: 1901 - 700  In: 3379 [I.V.:3815]  Out: 1542 [Urine:1430]    Intake/Output Summary (Last 24 hours) at 18 0810  Last data filed at 18 07   Gross per 24 hour   Intake             2295 ml   Output             1665 ml   Net              630 ml     Ventilator Settings:  Mode Rate Tidal Volume Pressure FiO2 PEEP   Assist control   450 ml    40 % 6 cm H20     Peak airway pressure: 31 cm H2O    Minute ventilation: 9.79 l/min       Physical Exam   Constitutional: She is intubated. HENT:   Head: Normocephalic and atraumatic. Mouth/Throat: No oropharyngeal exudate. Eyes: No scleral icterus. Cardiovascular: Regular rhythm. Tachycardia present. Pulmonary/Chest: She is intubated. She has decreased breath sounds in the right lower field, the left upper field, the left middle field and the left lower field. She has no wheezes. She has no rales. Musculoskeletal: She exhibits edema. Neurological: She is unresponsive. GCS eye subscore is 1. GCS verbal subscore is 1. GCS motor subscore is 1. Skin: Skin is warm and dry. No rash noted.      Data:     Current Facility-Administered Medications   Medication Dose Route Frequency    sodium bicarbonate (8.4%) 100 mEq in sterile water 1,000 mL infusion   IntraVENous CONTINUOUS    famotidine (PF) (PEPCID) 20 mg in sodium chloride 0.9% 10 mL injection  20 mg IntraVENous DAILY    insulin glargine (LANTUS) injection 5 Units  5 Units SubCUTAneous Q24H    cefepime (MAXIPIME) 2 g in 0.9% sodium chloride (MBP/ADV) 100 mL  2 g IntraVENous Q24H    aspirin (ASA) suppository 300 mg  300 mg Rectal DAILY    sodium chloride (NS) flush 5-10 mL  5-10 mL IntraVENous Q8H    insulin lispro (HUMALOG) injection   SubCUTAneous Q6H    mupirocin (BACTROBAN) 2 % ointment   Both Nostrils Q12H    levoFLOXacin (LEVAQUIN) 750 mg in D5W IVPB  750 mg IntraVENous Q48H    chlorhexidine (PERIDEX) 0.12 % mouthwash 15 mL  15 mL Oral Q12H                Labs:  Recent Labs      07/26/18   0424  07/25/18   0353  07/24/18   0355   WBC  34.1*  34.4*  33.4*   HGB  9.3*  9.7*  11.1*   HCT  29.0*  30.3*  34.2*   PLT  223  277  277     Recent Labs      07/26/18   0424  07/25/18   0353  07/24/18   0355  07/23/18   1105   NA  144  145  146*  148*   K  3.6  4.1  3.8  4.4   CL  110*  112*  112*  111*   CO2  22  17*  20*  19*   GLU  141*  180*  129*  114* BUN  46*  34*  31*  22*   CREA  1.52*  1.66*  1.52*  1.40*   CA  7.8*  7.9*  8.3*  8.1*   MG  2.2  2.2  2.3   --    PHOS  2.8  3.5  3.4   --    ALB  1.4*  1.5*  1.9*  1.3*   TBILI  0.4  0.4  0.5  0.2   SGOT  149*  195*  415*  218*   ALT  37  54  89*  43   INR   --    --   1.3*  1.5*     Recent Labs      07/26/18   0714  07/25/18   0541  07/24/18   0443   PH  7.53*  7.39  7.41   PCO2  26*  31*  31*   PO2  80  97  135*   HCO3  21*  18*  19*   FIO2  40  40  50     Imaging:  I have personally reviewed the patients radiographs and have reviewed the reports:  Increased left sided atelectasis         Total critical care time exclusive of procedures: 25 minutes  Trang Headley MD

## 2018-07-26 NOTE — PROGRESS NOTES
Progress Note 7/26/2018 7:52 AM 
NAME: Anca Dobbins MRN:  [id number] Admit Diagnosis: Cardiac arrest ([facility] [address] Upper GI bleed Metastatic colon cancer in female [facility]) Problem List:            
  
Asystolic arrest in a patient with metastatic colon CA. Intubated, concern for hypoxic brain injury, possible septic shock, anemia, AL, Acidosis, bilateral pleural effusions, mildly increased troponin. 
- No hx of CAD 
- Echo 7/2018 nl EF. 
- AL 
- ECG with low voltage 
- HTN 
- Dyslipidemia   
  
                      Assessment/Plan:                
  
Witnessed arrest found to be asystolic, CPR/ACLS with subsequent vfib in field, PEA in ED, currently with ROSC. 
ECG with no acute ST changes, mildly increased troponin, medically manage. CT with no pericardial effusion, but bilateral pleural effusions. 
  
Echo with nl EF. 
  
- Continue aspirin suppository - Levophed tapered off - Metoprolol 2.5mg q6h PRN HR > 120 
- Hold lisinopril for now 
- Hold zocor for now - Plan for palliative extubation today - Pls call with questions; will sign off Current main concern is for hypoxic brain injury. 
  
ICU care Poor prognosis.  
  
  
 [x]       High complexity decision making was performed in this patient at high risk for decompensation with multiple organ involvement. Subjective:  
 
Anca Dobbins denies unresponsive Discussed with RN events overnight. Review of Systems: 
 
Symptom Y/N Comments  Symptom Y/N Comments Fever/Chills N   Chest Pain N Poor Appetite N   Edema N   
Cough N   Abdominal Pain N Sputum N   Joint Pain N   
SOB/VILLELA N   Pruritis/Rash N   
Nausea/vomit N   Tolerating PT/OT Diarrhea N   Tolerating Diet Constipation N   Other Could NOT obtain due to: Unresponsive Objective:  
  
Physical Exam: 
 
Last 24hrs VS reviewed since prior progress note. Most recent are: 
 
Visit Vitals  /55  Pulse (!) 112  Temp 99.2 °F (37.3 °C)  Resp 25  Wt 92.7 kg (204 lb 5.9 oz)  SpO2 95%  BMI 35.08 kg/m2 Intake/Output Summary (Last 24 hours) at 07/26/18 3840 Last data filed at 07/26/18 0700 Gross per 24 hour Intake             2295 ml Output             1665 ml Net              630 ml General Appearance: Well developed, well nourished, unresponsive,  
 no acute distress. Ears/Nose/Mouth/Throat: Hearing can not be assessed. Neck: Supple. Chest: Lungs clear to auscultation bilaterally. Cardiovascular: Regular rate and rhythm, S1S2 normal, no murmur. Abdomen: Soft, non-tender, bowel sounds are active. Extremities: No edema bilaterally. Skin: Warm and dry. PMH/SH reviewed - no change compared to H&P Data Review Telemetry: normal sinus rhythm Lab Data Personally Reviewed: 
 
Recent Labs  
   07/26/18 
 0424  07/25/18 
 2352 WBC  34.1*  34.4* HGB  9.3*  9.7* HCT  29.0*  30.3* PLT  223  277 Recent Labs  
   07/24/18 
 0355  07/23/18 
 1105 INR  1.3*  1.5* PTP  13.5*  15.1* Recent Labs  
   07/26/18 
 0424  07/25/18 
 0353  07/24/18 
 0355 NA  144  145  146*  
K  3.6  4.1  3.8 CL  110*  112*  112* CO2  22  17*  20* BUN  46*  34*  31* CREA  1.52*  1.66*  1.52* GLU  141*  180*  129* CA  7.8*  7.9*  8.3*  
MG  2.2  2.2  2.3 Recent Labs  
   07/24/18 
 0355  07/23/18 
 1615  07/23/18 
 1105 CPK  462*  670*   --   
CKNDX  1.4  2.2   --   
TROIQ  1.54*  1.82*  1.25* Lab Results Component Value Date/Time Cholesterol, total 174 06/01/2017 09:40 AM  
 HDL Cholesterol 45 06/01/2017 09:40 AM  
 LDL, calculated 92 06/01/2017 09:40 AM  
 Triglyceride 183 (H) 06/01/2017 09:40 AM  
 CHOL/HDL Ratio 3.4 01/29/2010 11:46 AM  
 
 
Recent Labs  
   07/26/18 
 0424  07/25/18 
 0353  07/24/18 
 0355 SGOT  149*  195*  415* AP  215*  185*  186* TP  5.1*  5.1*  5.4* ALB  1.4*  1.5*  1.9*  
GLOB  3.7  3.6  3.5 Recent Labs  
   07/26/18 
 0714  07/25/18 0541  
PH  7.53*  7.39  
PCO2  26*  31* PO2  80  97 Medications Personally Reviewed: 
 
Current Facility-Administered Medications Medication Dose Route Frequency  sodium bicarbonate (8.4%) 100 mEq in sterile water 1,000 mL infusion   IntraVENous CONTINUOUS  
 famotidine (PF) (PEPCID) 20 mg in sodium chloride 0.9% 10 mL injection  20 mg IntraVENous DAILY  insulin glargine (LANTUS) injection 5 Units  5 Units SubCUTAneous Q24H  
 metoprolol (LOPRESSOR) injection 2.5 mg  2.5 mg IntraVENous Q6H PRN  
 cefepime (MAXIPIME) 2 g in 0.9% sodium chloride (MBP/ADV) 100 mL  2 g IntraVENous Q24H  
 morphine injection 2-8 mg  2-8 mg IntraVENous PRN  
 aspirin (ASA) suppository 300 mg  300 mg Rectal DAILY  artificial tears (dextran 70-hypromellose) (NATURAL BALANCE) 0.1-0.3 % ophthalmic solution 1 Drop  1 Drop Both Eyes PRN  
 sodium chloride (NS) flush 5-10 mL  5-10 mL IntraVENous Q8H  
 sodium chloride (NS) flush 5-10 mL  5-10 mL IntraVENous PRN  
 ondansetron (ZOFRAN) injection 4 mg  4 mg IntraVENous Q6H PRN  
 insulin lispro (HUMALOG) injection   SubCUTAneous Q6H  
 glucose chewable tablet 16 g  4 Tab Oral PRN  
 dextrose (D50W) injection syrg 12.5-25 g  12.5-25 g IntraVENous PRN  
 glucagon (GLUCAGEN) injection 1 mg  1 mg IntraMUSCular PRN  
 mupirocin (BACTROBAN) 2 % ointment   Both Nostrils Q12H  levoFLOXacin (LEVAQUIN) 750 mg in D5W IVPB  750 mg IntraVENous Q48H  chlorhexidine (PERIDEX) 0.12 % mouthwash 15 mL  15 mL Oral Q12H Elenita Nyhan III, DO

## 2018-07-27 PROBLEM — Z51.5 CARING FOR THE DYING: Status: ACTIVE | Noted: 2018-01-01

## 2018-07-27 NOTE — PROGRESS NOTES
Called to assess patient with asystole on monitor (on comfort measures). On my arrival, I confirmed asystole on multiple telemetry leads. Absent respiratory effort and breath sounds. No palpable pulses or heart tones. Pupils fixed and dilated. Time of death 12:35. Mr. Zenon Strong was notified.    
Magali Xavier MD

## 2018-07-27 NOTE — PROGRESS NOTES
At change of shift family came and said they were ready to withdrawal care when their father had a chance to say his goodbyes. 2 daughters returned with father. After some time around 2020 daughter came to nursing station and stated, \"OK we have said our goodbyes please call when she passes\". On call internal medication physician called and she was not comfortable with this because she was not familiar with the case. Family notified of this.

## 2018-07-27 NOTE — PROGRESS NOTES
Informed by chaplain John Sharp that family had arrived after pt was extubated. Met with pt's  John Hinton and pt's sister who were standing at bedside. Both tearful and expressed their sadness in difficult moment. Provided supportive pastoral presence affirming lon as sister spoke of a greater hope in the midst of grief. Remained with family until they expressed that they were ready to go. Prayed on behalf of pt and family at bedside. Family extended their gratitude. Accompanied out to cars. Family not expected to return at this time. DESI Newsome. Martín Davila

## 2018-07-27 NOTE — PROGRESS NOTES
Responded to death in 2514. Family had left earlier in day and no more family were present at this time. Prayer of commendation at bedside. RN confirmed family would not be coming in. QIANA Jacob

## 2018-07-27 NOTE — PROGRESS NOTES
Discussed with palliative care team.  The family contacted them and expressed their desire for patient to be converted to comfort measures and extubated. They did not want to be present at the hospital when this occurred. Will proceed with compassionate extubation.  
Cristal Vargas MD

## 2018-07-27 NOTE — PROGRESS NOTES
[address] care of pt from [facility]. Completed BS rounding and assessment. [facility] is following the pt since admission due to apparent anoxic injury. Maintained on vent: 6+/12/450, 40%, SPO2 99%. Noted that pt does overbreathe the vent, RR 20bpm. Gag+ with suctioning via ETT; pale, yellow sputum obtained. NGT to LIWS, dark brown emesis excreting. Pupil response to light is minimal; 1mm constricts to pinpoint. No tracking noted. No response to painful stimulus. Limbs are flaccid x4. Temp maintained to 99.4Fmwith use of the cooling blanket. Plan: Family said their \"goodbyes\" last night and wish to withdraw care. Spoke with Rico Davis, Palliative care NP. Plan to extubate when RRT is available. [address] Family in at bedside. ETT removed. Ativan and Morphine given. 80 Family left for home. Started pt on Scopolamine patch and Rubinol IV for secretions. Ativan 2mg given IV for gasping erespirations. I2 NC in use for comfort.

## 2018-07-27 NOTE — INTERDISCIPLINARY ROUNDS
Interdisciplinary team rounds were held 7/27/2018 with the following team members:Care Management, Diabetes Treatment Specialist, Nursing, Nutrition, Palliative Care, Pastoral Care, Pharmacy and Physician. 
  
Plan of care discussed. See clinical pathway and/or care plan for interventions and desired outcomes.

## 2018-07-27 NOTE — PROGRESS NOTES
[address] care of pt from [facility], [address] [place] [address]. Completed BS rounding and assessment. [facility] is following the pt since admission due to apparent anoxic injury. Maintained on vent: 6+/12/450, 40%, SPO2 99%. Noted that pt does overbreathe the vent, RR 20bpm. Gag+ with suctioning via ETT; pale, yellow sputum obtained. NGT to LIWS, dark brown emesis excreting. Pupil response to light is minimal; 1mm constricts to pinpoint. No tracking noted. No response to painful stimulus. Limbs are flaccid x4. Temp maintained to 99.4Fmwith use of the cooling blanket. Plan: Family said their \"goodbyes\" last night and wish to withdraw care. Spoke with Judd Collado, Palliative care NP. Plan to extubate when RRT is available. [address] Family in at bedside. ETT removed. Ativan and Morphine given. [address] [facility] Family left for home. Started pt on Scopolamine patch and Rubinol IV for secretions. Ativan 2mg given IV for gasping erespirations. I2 NC in use for comfort.

## 2018-07-27 NOTE — DISCHARGE SUMMARY
Death Summary PATIENT ID: Shruthi Zavala MRN: [id number] YOB: 1950 DATE OF ADMISSION: 2018 10:53 AM   
DATE OF DISCHARGE: 2018 PRIMARY CARE PROVIDER: [address] Nurse Navigator (Inactive) DISCHARGING PHYSICIAN: Johnnie Brasher MD   
To contact this individual call [phone] and ask the  to page. If unavailable ask to be transferred the Adult Hospitalist Department. CONSULTATIONS: IP CONSULT TO CARDIOLOGY 
IP CONSULT TO PALLIATIVE CARE - PROVIDER 
IP CONSULT TO NEUROLOGY PROCEDURES/SURGERIES: * No surgery found * [address] COURSE:  
Patient was admitted  status post out of hospital asystolic cardiac arrest but with return of ROSC. Patient was intubated and was admitted into the ICU. Due to extensive medical illness and futility of treatment, patient was terminally extubated and placed on comfort measures on 18. She was pronounced  at 12:35 pm. 
 
[address] / PLAN:   
 
S/p out of hospital asystolic cardiac arrest 
--EKG with indeterminate rhythm and new septal infarct 
--troponin 1.25.  CTA chest negative for PE or dissection. --cardiology following but limited treatment option given gastroccult + brown liquid drainage via NG tube and concern for GI bleeding. 
--echo with ef 70%, no wall motion abnl 
   
Acute encephalopathy, POA  
--post arrest.  Has startle reflex, seems to be breathing above vent, pupils constricted but reactive.  However, not on any sedation and no spontaneous movement 
--CT head negative except right maxillary air fluid level 
--neuro following, EEG noted. Plan is to repeat EEG today per family request.  
- Likely severe anoxic brain injury 
   
Shock, cardiogenic vs septic shock Leukocytosis WBC 55K s/p recent chemo with FOLFOX and then neulasta 18 Recent abdominal wound infection with MRSE (staph epi) Possible right maxillary sinusitis with air fluid level on CT 
--on levophed drip 
--give albumin 
--gentle IVF 
--get blood cultures, lactic.  UA +, culture pending.    
--empiric abx with cefepime and levaquin 
   
Stage 4 colon CA with malignant ascites and bilateral pleural effusions which likely will be malignant.  Liver and right lung mets --dx 5/2018 s/p right hemicolectomy complicated with recurrent wound infection requiring wound vac last month 
--paracentesis with 5L fluid removal earlier this month 
--had FOLFOX chemo 1st round 7/17 and then neulasta 7/20. 
--oncology consulted 
--palliative care  following 
   
Acute on chronic anemia due to possible upper GI bleed vs. gastritis --hgb 9.3, previously 11-12 
--brown gastroccult + liquid drainage via OG tube. --IV protonix q12h 
--transfused 2 units uncrossed blood with repeat hgb 10.5.  Monitor H&H 
--also given 2 units FFP for coagulopathy INR 1.5 
--GI  Has signed off.  
   
Acute respiratory failure Large Bilateral pleural effusions, suspect malignant effusions --intubated.  Oxygenating ok on 100% FIO2 
--consider therapeutic and diagnostic thoracentesis once hemodynamically stable.   
   
AL 
--Cr 1.4, previously 0.47. 
--osorio placed in ER, check UA, no hydronephrosis on CT 
--gentle IVF. --hold lisinopril 
   
DM 
--hold lantus, metformin, saxagliptin, farxiga 
--low dose SSI 
   
HTN Hyperlipidemia 
--hold zocor due to actuely elevated AST, ALP 
   
Body mass index is 32.17 kg/(m^2). 
  
 , family considering terminal extubation today and then comfort care 
   
Disposition: Death PENDING TEST RESULTS:  
At the time of discharge the following test results are still pending: FOLLOW UP APPOINTMENTS:   
Follow-up Information None DISCHARGE MEDICATIONS: 
Current Discharge Medication List  
  
 
 
 
NOTIFY YOUR PHYSICIAN FOR ANY OF THE FOLLOWING:  
Fever over 101 degrees for 24 hours.   
Chest pain, shortness of breath, fever, chills, nausea, vomiting, diarrhea, change in mentation, falling, weakness, bleeding. Severe pain or pain not relieved by medications. Or, any other signs or symptoms that you may have questions about. DISPOSITION: 
  Home With: 
 OT  PT  HH  RN  
  
 Long term SNF/Inpatient Rehab Independent/assisted living Hospice  
x Other:  PATIENT CONDITION AT DISCHARGE:  
 
Functional status Poor Deconditioned Independent Cognition Rhae Josee Forgetful Dementia Catheters/lines (plus indication) Ford PICC   
 PEG None Code status Full code DNR   
 
PHYSICAL EXAMINATION AT DISCHARGE: 
 Refer to Progress Note CHRONIC MEDICAL DIAGNOSES: 
Problem List as of 2018  Date Reviewed: 2018 Codes Class Noted - Resolved Caring for the dying ICD-10-CM: Z51.5 ICD-9-CM: V66.7  2018 - Present Acute encephalopathy ICD-10-CM: G93.40 ICD-9-CM: 348.30  2018 - Present Acute renal failure (HCC) ICD-10-CM: N17.9 ICD-9-CM: 584.9  2018 - Present Acute respiratory failure with hypoxia and hypercapnia (HCC) ICD-10-CM: J96.01, J96.02 
ICD-9-CM: 518.81  2018 - Present Counseling regarding goals of care ICD-10-CM: Z71.89 ICD-9-CM: V65.49  2018 - Present * (Principal)Cardiac arrest ([facility] [address] ICD-10-CM: I46.9 ICD-9-CM: 427.5  2018 - Present Upper GI bleed ICD-10-CM: K92.2 ICD-9-CM: 578.9  2018 - Present Septic shock (HCC) ICD-10-CM: A41.9, R65.21 ICD-9-CM: 038.9, 785.52, 995.92  2018 - Present Pleural effusion, bilateral ICD-10-CM: J90 ICD-9-CM: 511.9  2018 - Present Acute cystitis without hematuria ICD-10-CM: N30.00 ICD-9-CM: 595.0  2018 - Present Palliative chemotherapy underway ICD-10-CM: Z51.11, Z51.5 ICD-9-CM: V58.11, V66.7  2018 - Present Overview Signed 2018 12:43 PM by Fide Kahn MD  
  FOLFOX 50% dose reduced  Nausea and vomiting in adult ICD-10-CM: R11.2 ICD-9-CM: 787.01  7/10/2018 - Present Abdominal pain ICD-10-CM: R10.9 ICD-9-CM: 789.00  7/10/2018 - Present Malignant ascites ICD-10-CM: R18.0 ICD-9-CM: 789.51  7/10/2018 - Present Metastatic colon cancer in female [facility]) ICD-10-CM: C78.5 ICD-9-CM: 197.5  6/25/2018 - Present Generalized abdominal pain ICD-10-CM: R10.84 ICD-9-CM: 789.07  6/25/2018 - Present Class 1 obesity due to excess calories with body mass index (BMI) of 31.0 to 31.9 in adult ICD-10-CM: E66.09, Z68.31 
ICD-9-CM: 278.00, V85.31  6/25/2018 - Present Superficial postoperative wound infection ICD-10-CM: T81. 4XXA ICD-9-CM: 998.59  6/8/2018 - Present Overview Addendum 7/21/2018 12:40 PM by Justyna Garcia MD  
  6/10/18 wound culture Culture result:    Final   
  MODERATE STAPHYLOCOCCUS EPIDERMIDIS (OXACILLIN RESISTANT) (A)  
 
6/12/18 anaerobic wound culture Culture result:    Final  
  MODERATE MIXED ANAEROBIC GRAM NEGATIVE RODS BETA LACTAMASE POSITIVE (A)  
  Culture result:    Final  
  HEAVY ANAEROBIC GRAM POSITIVE COCCI (A)  
 
7/16/18 Aerobic wound culture Culture result: scant Staph aureus Open wound of anterior abdominal wall with complication BQQ-89-UT: E41.969X ICD-9-CM: 879.3  6/8/2018 - Present Severe protein-calorie malnutrition ([address] ICD-10-CM: C99 ICD-9-CM: 048  6/8/2018 - Present Methicillin resistant Staphylococcus epidermidis infection ICD-10-CM: A49.8, Z16.29 ICD-9-CM: 041.19  6/3/2018 - Present Overview Signed 6/11/2018 10:48 AM by Justyna Garcia MD  
  Wound culture 6/8/18 Reported 6/10/18 - sensitive to cipro, clinda, dapto, linezolid, TMP/SMZ and vanc S/P right colectomy ICD-10-CM: Z90.49 ICD-9-CM: V45.89  5/27/2018 - Present Overview Signed 5/29/2018  8:25 AM by Justyna Garcia MD  
  Right hemicolectomy for cecal mass with partial SBO, Navin Del  Leukocytosis ICD-10-CM: S72.896 ICD-9-CM: 288.60  5/26/2018 - Present Type 2 diabetes mellitus with hyperglycemia (HCC) ICD-10-CM: E11.65 ICD-9-CM: 250.00  5/26/2018 - Present Malignant neoplasm of ascending colon (HCC) ICD-10-CM: C18.2 ICD-9-CM: 153.6  5/26/2018 - Present Overview Signed 5/31/2018  3:49 PM by Bran Triana MD  
  Results for Anne Hartman (MRN [id number]) as of 5/31/2018 15:41 Ref. Range 5/31/2018 13:44 CEA Latest Units: ng/mL 28.4 Advance care planning ICD-10-CM: Z71.89 ICD-9-CM: V65.49  10/4/2017 - Present Mixed hyperlipidemia ICD-10-CM: E78.2 ICD-9-CM: 272.2  9/18/2014 - Present HTN (hypertension) ICD-10-CM: I10 
ICD-9-CM: 401.9  5/23/2013 - Present RESOLVED: SBO [facility]) ([facility] ICD-10-CM: N11.657 ICD-9-CM: 560.9  5/26/2018 - 5/27/2018 RESOLVED: Abdominal pain ICD-10-CM: R10.9 ICD-9-CM: 789.00  5/26/2018 - 7/10/2018 RESOLVED: Hypokalemia ICD-10-CM: E87.6 ICD-9-CM: 276.8  5/26/2018 - 6/12/2018 Greater than 30 minutes were spent with the patient on counseling and coordination of care Signed:  
Vance Farmer MD 
7/27/2018 
4:02 PM

## 2018-07-27 NOTE — PROGRESS NOTES
[address] care of pt from Isidra Mainor, [place]. Completed BS rounding and assessment. [facility] is following the pt since admission due to apparent anoxic injury. Maintained on vent: 6+/12/450, 40%, SPO2 99%. Noted that pt does overbreathe the vent, RR 20bpm. Gag+ with suctioning via ETT; pale, yellow sputum obtained. NGT to LIWS, dark brown emesis excreting. Pupil response to light is minimal; 1mm constricts to pinpoint. No tracking noted. No response to painful stimulus. Limbs are flaccid x4. Temp maintained to 99.4Fmwith use of the cooling blanket. Plan: Family said their \"goodbyes\" last night and wish to withdraw care. Spoke with Angi Shirley, Palliative care NP. Plan to extubate when RRT is available. [id number] Family in at bedside. ETT removed. Ativan and Morphine given. [id number] Family left for home. Started pt on Scopolamine patch and Rubinol IV for secretions. Ativan 2mg given IV for gasping respirations. 2 L/ NC in use for comfort. Kalyan Barber MD called to bedside for absence of pulse and respirations. Death pronounced at 12:35. MD notified pt's spouse. [facility] declined body. Eye service declined as well. [address] Postmortem care rendered. No belongings in room. Ford catheter removed intact. TLC and PIV removed intact. [phone] [id number] Assisted with transfer from bed to [facility]. Care tech escorted body to the [facility].

## 2018-07-27 NOTE — PROGRESS NOTES
Palliative Medicine Consult Smith: [phone]) Patient Name: Mattie Pa YOB: 1950 Date of Initial Consult:  
Reason for Consult: care decisions Requesting Provider: Dr. Tam Campbell Primary Care Physician: [address] [place] [address] (Inactive) SUMMARY:  
Mattie Pa is a 79 y.o. with a past history of  DM, HTN, HLD, and colon CA with right hemicolectomy, who was admitted on 7/23/2018 from home with a diagnosis of cardiac arrest. Current medical issues leading to Palliative Medicine involvement include: pt with stage 4 colon cancer, cardiac arrest, anoxic brain injury. HOSPITAL COURSE:  In ED, pulse lost at 10:55am and CPR initiated, 11:02am ROSC. Transferred to CCU on vent, pressors. Fever 101.1, lactic acid 3.5.   
7/24: Seen by Neurology, per note pt has no evidence of CNS function but prognosis is poor. EEG is a markedly abnormal electroencephalogram due to the absence of any alpha or theta activity. This is low voltage. This is consistent with significant structural and/or metabolic encephalopathy. 7/25: troponin peaked early this am at 1.82, now 1.54. Pt was  anointed with the Sacrament of the Sick this am.  
Repeat EEG:  This is a markedly abnormal electroencephalogram, which reveals little or no cerebral activity. The absence of seizures on electroencephalogram does not eliminate a diagnosis of epilepsy. 7/26: no gag reflex when suctioned, remains on cooling blanket. PSYCHOSOCIAL: , 2 daughters PALLIATIVE DIAGNOSES:  
1. Cardiac arrest 
2. Acute respiratory failure 3. Pallor 4. GIB 5. SIRS 6. Leukocytosis 7. Acute renal failure 8. Acute encephalopathy 9. Cardiogenic vs septic shock 10. Stage 4 colon cancer with malignant ascites and bilateral effusions, liver and right lung mets 11. Lactic acidosis 12. Care decisions PLAN:  
1.  Text message received this am from Andree Jacobson, [place] daughter stating that they called the RN last evening to ask that pt be removed from life support, however, the hospitalist covering was not familiar with pt thus was not comfortable placing compassionate extubation orders. She asked that this am we proceed with the compassionate extubation and call when pt has passed. They are not planning to come in as they have all said their \"good-byes. \"   
2. 10:15am: Extubation orders placed.  at bedside. 3. 10:22am: pt extubated. 4. 11:00am: family left for home,  asked to be notified when pt has passed. 5. Initial consult note routed to primary continuity provider 6. Communicated plan of care with: Palliative IDT, Dr. Gerardo Galdamez, P[facility] 
 
 GOALS OF CARE / TREATMENT PREFERENCES:  
 
GOALS OF CARE: 
Patient/Health Care Proxy Stated Goals: Prolong life TREATMENT PREFERENCES:  
Code Status: DNR Advance Care Planning: 
Advance Care Planning 7/24/2018 Patient's Healthcare Decision Maker is: Legal Next of Kin Primary Decision Maker Name Mirian Hand Primary Decision Maker Phone Number [phone] Primary Decision Maker Relationship to Patient Spouse Confirm Advance Directive None Patient Would Like to Complete Advance Directive Unable Medical Interventions: Full interventions Other Instructions: Other: As far as possible, the palliative care team has discussed with patient / health care proxy about goals of care / treatment preferences for patient. HISTORY:  
 
History obtained from: chart, family CHIEF COMPLAINT: cardiac arrest 
 
HPI/SUBJECTIVE: The patient is:  
[] Verbal and participatory [x] Non-participatory due to: intubation BIBA post cardiac arrest with ROSC. EMS reports Pt was c/o b/l flank pain and general weakness upon their arrival then shortly thereafter went into asystole. EMS reports ACLS protocol for 10 min during which pt had bouts of v-fib and was externally cardioverted x2.   She had ROSC with strong pulse and sbp of 77, they then intubated and placed OGT. Pt is on palliative chemotherapy and has a portacath. ED W/U: 
EXAM: severe distress, OG tube draining dark blood, pallor. abd distended and firm LAB: wbc 55, h/h 9.3/31.7, INR 1.5, Na 1248, BUN/Cr 22/1.40, ALT 43, IEB818, alk phos 240. Albumin 1.3, troponin 1.25, pro-BNP 18283. Blood Gas:pH 7.19, bicarb 14 IMAGING:  HEAD CT: no acute intracranial findings, paranasal sinus disease with possible air-fluid level right maxillary sinus. ABD/PELVIS CT: no bowel obstruction, ascites, metastatic disease. CXR: persistent left lung atelectasis, persistent right lung pulmonary edema. HISTORY:   
Admission 7/10-7/10/18: wound infection. abd CT revealed interval significant worsening of metastatic disease, with enlarging lung nodules, enlarging liver lesions, retroperitoneal lymphadenopathy,and new significant peritoneal metastatic disease with ascites. 7/13 portacath placed, 7/17 Folfox 50% strength tolerated, but very weak and no appetite. 5/25-6/2/18: admitted for abd pain, CT revealed colon mass with liver and lung mets, retroperitoneal nodes. 5/27 right hemicolectomy. D/c home,  refused home health assistance. 6/8/18:  called to report wound bleeding and red. 6/8-6/13/18: wound infection. 7/10-7/20/18: wound infection, ascites 5 liters drained, cytology pos adenocarcinoma. Abd, CT reveals significant worsening of metastatic disease, enlarging lung nodules and liver lesions. 7/13: medaport placed. 7/17: Folfox started at 1/2 dose; tolerated but very weak and no appetite. 7/19: nurse navigator report Mr. Lida Scott voiced concerns that wife was feeling bad, worsening abdominal pain. Surgeon's note indicates  states pt will do better at home. 7/20: Neulasta administered at [address]. Clinical Pain Assessment (nonverbal scale for severity on nonverbal patients):  
Clinical Pain Assessment Severity: 0 Activity (Movement): Lying quietly, normal position Duration: for how long has pt been experiencing pain (e.g., 2 days, 1 month, years) Frequency: how often pain is an issue (e.g., several times per day, once every few days, constant) FUNCTIONAL ASSESSMENT:  
 
Palliative Performance Scale (PPS): PPS: 10 PSYCHOSOCIAL/SPIRITUAL SCREENING:  
 
Palliative IDT has assessed this patient for cultural preferences / practices and a referral made as appropriate to needs (Cultural Services, Patient Advocacy, Ethics, etc.) Advance Care Planning: 
Advance Care Planning 7/24/2018 Patient's Healthcare Decision Maker is: Legal Next of Kin Primary Decision Maker Name Tanner Rosa Primary Decision Maker Phone Number [phone] Primary Decision Maker Relationship to Patient Spouse Confirm Advance Directive None Patient Would Like to Complete Advance Directive Unable Any spiritual / Buddhism concerns: 
[] Yes /  [x] No 
 
Caregiver Burnout: 
[] Yes /  [x] No /  [] No Caregiver Present Anticipatory grief assessment:  
[x] Normal  / [] Maladaptive ESAS Anxiety: Anxiety: 0 
 
ESAS Depression:   unable to assess due to pt factors REVIEW OF SYSTEMS:  
 
Positive and pertinent negative findings in ROS are noted above in HPI. The following systems were [] reviewed / [x] unable to be reviewed as noted in HPI Other findings are noted below. Systems: constitutional, ears/nose/mouth/throat, respiratory, gastrointestinal, genitourinary, musculoskeletal, integumentary, neurologic, psychiatric, endocrine. Positive findings noted below. Modified ESAS Completed by: provider Pain: 0 Anxiety: 0 Dyspnea: 0 Constipation: No  
  Stool Occurrence(s): 1 PHYSICAL EXAM:  
 
From RN flowsheet: 
Wt Readings from Last 3 Encounters:  
07/25/18 204 lb 5.9 oz (92.7 kg) 07/17/18 187 lb (84.8 kg) 07/10/18 182 lb (82.6 kg) Blood pressure 123/58, pulse (!) 108, temperature (!) 100.5 °F (38.1 °C), resp.  rate 21, weight 204 lb 5.9 oz (92.7 kg), SpO2 94 %. Pain Scale 1: Adult Nonverbal Pain Scale Pain Intensity 1: 0 Last bowel movement, if known:  
 
Constitutional: extubated, breathing on her own Eyes: pupils equal, pinpoint and unreactive, anicteric ENMT: no nasal discharge, moist mucous membranes Cardiovascular: tachy, regular rhythm, distal pulses intact Respiratory: breathing not labored, symmetric, diminished in bases, course throughout, no gag to sxn Gastrointestinal: distended and firm, hypo bowel sounds Musculoskeletal: no deformity, no tenderness to palpation Skin: warm, dry Neurologic: not following commands, not moving all extremities, does not withdraw from pain Psychiatric: unable to assess due to pt factors HISTORY:  
 
Principal Problem: 
  Cardiac arrest ([address] (7/23/2018) Active Problems: Metastatic colon cancer in [facility]) (6/25/2018) Upper GI bleed (7/23/2018) Septic shock ([address] (7/23/2018) Pleural effusion, bilateral (7/23/2018) Acute cystitis without hematuria (7/23/2018) Acute encephalopathy (7/24/2018) Acute renal failure ([address] (7/24/2018) Acute respiratory failure with hypoxia and hypercapnia ([address] (7/24/2018) Counseling regarding goals of care (7/24/2018) Past Medical History:  
Diagnosis Date  Anemia NEC  Cancer ([address]  Cardiac arrest ([address] 7/23/2018  Class 1 obesity due to excess calories with body mass index (BMI) of 31.0 to 31.9 in adult 6/25/2018  Diabetes ([address]  Headache(784.0)  Hypercholesterolemia  Hypertension  Malignant neoplasm of ascending colon ([address] 05/26/2018 Stage IVc  Palliative chemotherapy underway 7/17/2018 FOLFOX 50% dose reduced  S/P right colectomy 5/27/2018 Right hemicolectomy for cecal mass with partial SBO, Juan Moffett  Severe protein-calorie malnutrition ([address] 6/8/2018  Superficial postoperative wound infection 6/8/2018 Past Surgical History:  
Procedure Laterality Date  HX  SECTION Hysterectomy,total for precancer  HX COLECTOMY Right 2018 Right hemicolectomy for cecal mass with partial SBO, Vaibhav Cutter Family History Problem Relation Age of Onset  Alcohol abuse Father  Cancer Maternal Aunt   
  breast ca  Breast Cancer Maternal Aunt  Breast Cancer Sister  Breast Cancer Daughter History reviewed, no pertinent family history. Social History Substance Use Topics  Smoking status: Never Smoker  Smokeless tobacco: Never Used  Alcohol use No  
 
Allergies Allergen Reactions  Tylenol [Acetaminophen] Swelling Current Facility-Administered Medications Medication Dose Route Frequency  sodium bicarbonate (8.4%) 100 mEq in sterile water 1,000 mL infusion   IntraVENous CONTINUOUS  
 famotidine (PF) (PEPCID) 20 mg in sodium chloride 0.9% 10 mL injection  20 mg IntraVENous DAILY  insulin glargine (LANTUS) injection 5 Units  5 Units SubCUTAneous Q24H  
 metoprolol (LOPRESSOR) injection 2.5 mg  2.5 mg IntraVENous Q6H PRN  
 cefepime (MAXIPIME) 2 g in 0.9% sodium chloride (MBP/ADV) 100 mL  2 g IntraVENous Q24H  
 morphine injection 2-8 mg  2-8 mg IntraVENous PRN  
 aspirin (ASA) suppository 300 mg  300 mg Rectal DAILY  artificial tears (dextran 70-hypromellose) (NATURAL BALANCE) 0.1-0.3 % ophthalmic solution 1 Drop  1 Drop Both Eyes PRN  
 sodium chloride (NS) flush 5-10 mL  5-10 mL IntraVENous Q8H  
 sodium chloride (NS) flush 5-10 mL  5-10 mL IntraVENous PRN  
 ondansetron (ZOFRAN) injection 4 mg  4 mg IntraVENous Q6H PRN  
 insulin lispro (HUMALOG) injection   SubCUTAneous Q6H  
 glucose chewable tablet 16 g  4 Tab Oral PRN  
 dextrose (D50W) injection syrg 12.5-25 g  12.5-25 g IntraVENous PRN  
 glucagon (GLUCAGEN) injection 1 mg  1 mg IntraMUSCular PRN  
 mupirocin (BACTROBAN) 2 % ointment   Both Nostrils Q12H  levoFLOXacin (LEVAQUIN) 750 mg in D5W IVPB 750 mg IntraVENous Q48H  chlorhexidine (PERIDEX) 0.12 % mouthwash 15 mL  15 mL Oral Q12H  
 
 
 
 LAB AND IMAGING FINDINGS:  
 
Lab Results Component Value Date/Time WBC 34.1 (H) 07/26/2018 04:24 AM  
 HGB 9.3 (L) 07/26/2018 04:24 AM  
 PLATELET 556 10/88/2247 04:24 AM  
 
Lab Results Component Value Date/Time Sodium 144 07/26/2018 04:24 AM  
 Potassium 3.6 07/26/2018 04:24 AM  
 Chloride 110 (H) 07/26/2018 04:24 AM  
 CO2 22 07/26/2018 04:24 AM  
 BUN 46 (H) 07/26/2018 04:24 AM  
 Creatinine 1.52 (H) 07/26/2018 04:24 AM  
 Calcium 7.8 (L) 07/26/2018 04:24 AM  
 Magnesium 2.2 07/26/2018 04:24 AM  
 Phosphorus 2.8 07/26/2018 04:24 AM  
  
Lab Results Component Value Date/Time AST (SGOT) 149 (H) 07/26/2018 04:24 AM  
 Alk. phosphatase 215 (H) 07/26/2018 04:24 AM  
 Protein, total 5.1 (L) 07/26/2018 04:24 AM  
 Albumin 1.4 (L) 07/26/2018 04:24 AM  
 Globulin 3.7 07/26/2018 04:24 AM  
 
Lab Results Component Value Date/Time INR 1.3 (H) 07/24/2018 03:55 AM  
 Prothrombin time 13.5 (H) 07/24/2018 03:55 AM  
  
Lab Results Component Value Date/Time Iron 75 04/30/2015 12:00 AM  
 Ferritin 65 04/30/2015 12:00 AM  
  
Lab Results Component Value Date/Time pH 7.53 (H) 07/26/2018 07:14 AM  
 PCO2 26 (L) 07/26/2018 07:14 AM  
 PO2 80 07/26/2018 07:14 AM  
 
No components found for: [place] Lab Results Component Value Date/Time  (H) 07/24/2018 03:55 AM  
 CK - MB 6.5 (H) 07/24/2018 03:55 AM  
  
 
 
   
 
Total time: 75 
Counseling / coordination time, spent as noted above: 65 
> 50% counseling / coordination?: yes Prolonged service was provided for  []30 min   []75 min in face to face time in the presence of the patient, spent as noted above. Time Start:  
Time End:  
Note: this can only be billed with 13427 (initial) or 13101 (follow up). If multiple start / stop times, list each separately.

## 2018-07-27 NOTE — PROGRESS NOTES
[facility] Name: Krish Goodman MRN: [id number] : 1950 Hospital: [facility] Date: 2018 IMPRESSION:  
· Acute respiratory failure · Out of hospital asystole cardiac arrest - asystole and VF, then PEA in ER · Shock, likely septic vs cardiogenic · Likely severe anoxic brain injury · Metabolic/lactic acidosis · Acute renal failure · Abnormal troponin likely due to arrest, but doubt true ACS · Widely metastatic colon cancer (peritoneum, liver, lung) on palliative chemo - FOLFOX, received Neulasta on  · DM 
· Obesity PLAN:  
· Ventilator support · Bronchodilators · IV fluids · Empiric antibiotics · Insulin/glycemic monitoring · DVT/GI prophylaxis · Plan is for compassionate extubation/comfort measures sometime today, presumably Subjective/Interval History:  
I have reviewed the flowsheet and previous days notes. The patient is unable to give any meaningful history or review of systems because the patient is: Intubated/unresponsive - per nursing report, family finally decided to withdraw late last night, but coverage team was uncomfortable with this plan as they were not familiar with patient - need to confirm this (no current documentation available and no family available at this time) The patient is critically ill on: Mechanical ventilation Review of Systems Unable to perform ROS: Intubated Objective:  
Vital Signs:   
Visit Vitals  /58  Pulse (!) 108  Temp (!) 100.5 °F (38.1 °C)  Resp 21  
 Wt 92.7 kg (204 lb 5.9 oz)  SpO2 94%  BMI 35.08 kg/m2 O2 Device: Endotracheal tube Temp (24hrs), Av.6 °F (38.1 °C), Min:100.3 °F (37.9 °C), Max:100.9 °F (38.3 °C) Intake/Output:  
Last shift:        
Last 3 shifts:  1901 -  0700 In: 5590 [I.V.:3950] Out: 2230 [AFMJW:2186] Intake/Output Summary (Last 24 hours) at 07/27/18 [facility] Last data filed at 07/27/18 0600 Gross per 24 hour Intake             2550 ml Output             1300 ml Net             1250 ml Ventilator Settings: 
Mode Rate Tidal Volume Pressure FiO2 PEEP Assist control   450 ml    40 % 6 cm H20 Peak airway pressure: 29 cm H2O Minute ventilation: 9.2 l/min Physical Exam  
Constitutional: She is intubated. HENT:  
Head: Normocephalic and atraumatic. Mouth/Throat: No oropharyngeal exudate. Eyes: No scleral icterus. Cardiovascular: Regular rhythm. Tachycardia present. Pulmonary/Chest: She is intubated. She has decreased breath sounds in the right lower field, the left upper field, the left middle field and the left lower field. She has no wheezes. She has no rales. Musculoskeletal: She exhibits edema. Neurological: She is unresponsive. GCS eye subscore is 1. GCS verbal subscore is 1. GCS motor subscore is 1. Skin: Skin is warm and dry. No rash noted. Data:  
 
Current Facility-Administered Medications Medication Dose Route Frequency  sodium bicarbonate (8.4%) 100 mEq in sterile water 1,000 mL infusion   IntraVENous CONTINUOUS  
 famotidine (PF) (PEPCID) 20 mg in sodium chloride 0.9% 10 mL injection  20 mg IntraVENous DAILY  insulin glargine (LANTUS) injection 5 Units  5 Units SubCUTAneous Q24H  cefepime (MAXIPIME) 2 g in 0.9% sodium chloride (MBP/ADV) 100 mL  2 g IntraVENous Q24H  
 aspirin (ASA) suppository 300 mg  300 mg Rectal DAILY  sodium chloride (NS) flush 5-10 mL  5-10 mL IntraVENous Q8H  
 insulin lispro (HUMALOG) injection   SubCUTAneous Q6H  
 mupirocin (BACTROBAN) 2 % ointment   Both Nostrils Q12H  levoFLOXacin (LEVAQUIN) 750 mg in D5W IVPB  750 mg IntraVENous Q48H  chlorhexidine (PERIDEX) 0.12 % mouthwash 15 mL  15 mL Oral Q12H Labs: 
Recent Labs  
   07/26/18 
 0424  07/25/18 
 5273 WBC  34.1*  34.4* HGB  9.3*  9.7* HCT  29.0*  30.3* PLT  223  277 Recent Labs  
   07/26/18 
 0424  07/25/18 
 3109 NA  144  145  
K  3.6  4.1 CL  110*  112* CO2  22  17* GLU  141*  180* BUN  46*  34* CREA  1.52*  1.66* CA  7.8*  7.9*  
MG  2.2  2.2 PHOS  2.8  3.5 ALB  1.4*  1.5* TBILI  0.4  0.4 SGOT  149*  195* ALT  37  54 Recent Labs  
   07/26/18 
 6860  07/25/18 
 0541 PH  7.53*  7.39  
PCO2  26*  31* PO2  80  97 HCO3  21*  18* FIO2  40  40 Imaging: 
I have personally reviewed the patients radiographs and have reviewed the reports: 
None today Total critical care time exclusive of procedures:   minutes Basilia Friedman MD

## 2018-07-30 LAB
BACTERIA SPEC CULT: NORMAL
SERVICE CMNT-IMP: NORMAL

## 2019-03-15 PROBLEM — E66.01 SEVERE OBESITY (HCC): Status: ACTIVE | Noted: 2019-03-15

## 2021-11-18 NOTE — PROGRESS NOTES
Day #1 of Levaquin  Indication:  SSI  Current regimen:  500 mg Q 24hrs  Abx regimen: Levaquin and Flagyl  Recent Labs      18   0436  07/10/18   1803   WBC  14.6*  15.9*   CREA  0.49*  0.94   BUN  11  17   Est CrCl: >50 ml/min; UO: 0.6 ml/kg/hr (one occurrence)  Temp (24hrs), Av.6 °F (37 °C), Min:98.4 °F (36.9 °C), Max:98.7 °F (37.1 °C)    Cultures:    Fluid, Ascites - in process  Previous Cx reviewed    Plan: Change to 750 mg Q 24hrs with respect to renal function and indication per MEC/P&T-approved Renal Dosing Protocol Breath sounds clear and equal bilaterally. +cough

## 2022-01-01 NOTE — PROGRESS NOTES
HISTORY OF PRESENT ILLNESS  Thien Guzman is a 79 y.o. female. HPI  Chief Complaint   Patient presents with    Surgical Follow-up     nausea and vomiting      Thien Guzman is a 79 y.o. female was seen and evaluated by me yesterday. She was scheduled to have a CT this afternoon, but was feeling worse overnight with fever, chills, nausea and dry heaves. Advised to come in for evaluation and admission to the hospital.    Today arrived in a wheelchair (was walking yesterday) and appears to not feel well. Was having chills overnight and spouse reported \"she felt so hot she wanted no clothes and the fan\". Had an episode of dry heaves at home and then defecated on herself. Spouse reports stool was loose and very dark. She is approximately 7 weeks s/p right colectomy. Review of Systems   Constitutional: Positive for chills, fever (axillary temp at home was 99) and malaise/fatigue. Negative for weight loss. Respiratory: Negative for cough and shortness of breath. Cardiovascular: Negative for chest pain and palpitations. Gastrointestinal: Positive for abdominal pain, diarrhea, nausea and vomiting. Negative for blood in stool, constipation, heartburn and melena (unsure spouse reports was \"very dark\"). Dry heaves and gagging     Not tolerating much po and very early fullness/satiety with abdominal bloating   No appetite   + belching and very little flatus    Genitourinary: Negative for dysuria. Has voided 3x/ today and \"looks ok\"   Musculoskeletal: Positive for myalgias. Negative for falls. Skin:        Abdominal wound with wet to dry dressing    Neurological: Positive for dizziness (with standing ) and weakness (needed wheelchair today ). Negative for loss of consciousness. Endo/Heme/Allergies:        Blood sugar this morning was 60 and she did not take her insulin    Psychiatric/Behavioral: Positive for depression (about situation ).        Physical Exam   Constitutional: She is oriented to person, place, and time. No distress. /74 (BP 1 Location: Right arm, BP Patient Position: Sitting)  Pulse (!) 110  Temp 97.5 °F (36.4 °C) (Oral)   Resp 20  Ht 5' 4\" (1.626 m)  Wt 182 lb (82.6 kg)  SpO2 95%  BMI 31.24 kg/m2   female appears older than stated age and here with spouse  Appears unwell and arrived via wheelchair (was walking yesterday)   Cardiovascular: Regular rhythm and intact distal pulses. Tachycardic    Pulmonary/Chest: Effort normal and breath sounds normal. No respiratory distress. She has no wheezes. She has no rales. Abdominal: Soft. Mildly distended but soft with few faint BS  Midline incision with large area of erythema and warmth to the left of midline   Distal abdomen with midline wound, narrow and currently packed with 1/2\" saline gauze   Tender along midline and lower pannus    Musculoskeletal: She exhibits no edema. Wheelchair    Neurological: She is alert and oriented to person, place, and time. Skin: Skin is warm. She is not diaphoretic. There is pallor. Psychiatric:   tearful       ASSESSMENT and PLAN    ICD-10-CM ICD-9-CM    1. Follow-up examination following surgery Z09 V67.00    2. Nausea and vomiting in adult R11.2 787.01    3. Bloating R14.0 787.3    4. Early satiety R68.81 780.94    5. Malignant neoplasm of ascending colon (HCC) C18.2 153.6    6. Open wound of abdominal wall, sequela S31.109S 906.0      7 weeks s/p right colectomy for metastatic colon cancer   Now with suspected abdominal abscess / wound infection   Concerned about early fullness, abdominal bloating, nausea and vomiting   Has failed outpatient treatment and requires hospital admission   Plan for IV hydration, IV antibiotics, labs, CT abd/pelvis, antiemetics   Case discussed with Dr. Zoran Murillo (covering for Dr. Raysa Nguyen) and awaiting bed placement   [facility] understanding and questions were answered to the best of my knowledge and ability. Hospitalization educational materials were provided. 0

## 2022-10-27 NOTE — ED NOTES
Assumed care of pt from Vance Escobedo RN. Dr. Nigel Bloch currently at bedside evaluating patient.  remains at bedside. Levophed running at 5 mcg/min through R femoral line. [facility] was contacted by offgoing RN who spoke with Tim Pena - states that patient is not a candidate for donation due to metastatic cancer.  Will await orders from hospitalist. Bariatric Progress Report        Referral Diagnosis:   Morbid obesity with BMI of 50.0-59.9, adult (CMS/[facility]) [E66.01, Z68.43],   Appointment #7- previous [facility] patient       Length of Appointment Time:   25 minutes     Assessment / Food intake related directly to surgery readiness:  Oral fluids: Per 24 hour recall: 8-16 oz coffee with stevia and sugar free creamer, 80 oz water     Amount of food: Per 24 hour recall: Breakfast: 2 eggs with coffee.  Lunch: string cheese, 2 turkey sticks, 2 string cheese.  Supper:  salad with fat free Liber[place]     Type of food/meals: Patient reports being sick a month ago and stopped taking Ozempic for 3 weeks and noticed increased hunger.  She restarted it this week.  She notes she didn't follow the diet for the 3 weeks she was off Ozempic and ate more sweets and fast food.  She feels she is back on track this week.  She just moved and has a new job in person.  She feels both of these things will be beneficial to help her stay on track     Meal/Snack pattern: 3 meals/day    Fried Foods: none  Fast Food: yes when off Ozempic  Eating Out: none  Sweets: yes when off Ozempic   Soda: none  Allergies/Intolerances: none  Alcohol: none  Tobacco: 4 cigarettes/day not smoking in her the car, house or at work.     Medications / Supplements  Medications, specify prescription or OTC: ozempic, reglan, levemir, metformin, novolog, MVI and Vit D3  Herbal/complementary products: none      Motivation  Motivation: Patient appears motivated to make nutrition related behavior changes.      Patient is interested in gastric sleeve surgery.     Behavior  Binge eating behavior:  Emotional eating  Meal duration:  25 minutes   Ability to build and utilize social network:  Patient lives in her own apartment     Physical Activity  Type of physical activity: walking the past few days, hopes to start swimming     Anthropometric Measurements:   Height/length: 65.5\"  Weight: 364#   Pre-surgery goal  weight: 344#  Weight change: 7# weight gain x 1 month     Labs:  7/29/22 A1C 10.3%  Per patient blood sugars 7 day average: 229 mg/dL (blood sugars increased when she was off Ozempic)     Nutrition-Focused Physical Findings:   Overall appearance: female with BMI >50  Extremities, muscles and bones: reports joint and right knee pain  Digestive system (mouth to rectum): nausea and constipation- uses 2 colace as needed. Slight intermittent nausea with osempic     Client History:   History - past medical           Past Medical History:   Diagnosis Date   • Depression     • Migraine     • Polycystic ovarian disease           Nutrition Diagnosis:   Overweight/Obesity related to history of excessive calorie intake and physical inactivity as evidenced by BMI.      Nutrition Prescription:    Eat 3 small meals per day, choose foods low in sugar, choose foods low in fat, eat meals slowly, substitute low calorie non-carbonated beverages for carbonated and be physically active.     Intervention:   Modified diet: Liver reduction diet.  Discussed use of vegetables.Encourage to stop smoking.      Recommended modifications:   1. Look for outer aisle wraps  2. Stay consistent with your diet    Nutrition Progress Checklist:  [x] Eats three meals/day  [] Avoids all sweets  [] Avoids all fried food  [] Lean source of protein with every meal  [] Follows the diet provided by the dietitian  [x] Avoids all alcohol  [x] Chews well and takes approximately 30 minutes for meals  [x] Avoids all carbonated and sweetened beverages  [x] Avoids straws and gum  [] Achieve pre surgical goal weight of 344#     Print/Written Resources Provided:   AVS     Monitoring and Evaluation:   Self-reported adherence score: The patient will comply with interventions in order to be an appropriate candidate for bariatric surgery.     The instruction was given to the patient.  Areas and level of knowledge: Patient demonstrates a basic level of knowledge prior to  education and basic level of knowledge after education.  The barriers to self-care and learning limitations were assessed as none.  Preferences for learning: No preference     Learning Topics: Rationale for Diet, Guidelines for Diet, Label Reading/Product Information, Food Preparation, Eating Out and Lifestyle changes      Recommended Follow-up:   1 month     Patient struggled this month due to being off Ozempic (patient stopped Ozempic when she was sick and then her cousin threw away her pen).  She feels she is back on track now.

## 2025-03-31 NOTE — PROGRESS NOTES
Continued Stay SW/CM Assessment/Plan of Care Note       Active Substitute Decision Maker (SDM)    There are no active Substitute Decision Maker (SDM) on file.           Progress note:  SW called and spoke with Mary at [facility] and updated on current hospitalization. SW stated that patient was able to walk 40ft with assist of 1 per last PT note. Mary indicated can accept patient back at that level as patient is typically wheelchair bound and able to pivot transfer  with an assist of 1. SW to coordinate discharge back to the facility once medically stable, daughter to transport.    Pt admitted from:  Facility: [facility]  Address: [address] [place] [address]  Phone: [phone]  Fax: [phone]  Pharmacy: [facility]  Preferred HHC: [facility]   No weekend discharges      See ARNOLDO/JESSICA flowsheets for other objective data.    Disposition Recommendations:  Preliminary discharge destination:    ARNOLDO/JESSICA recommendation for discharge: Assisted living    Destination Pharmacy:        Discharge Plan/Needs:     Continued Care and Services - Admitted Since 3/29/2025    No active coordination exists for this encounter.       Selected Continued Care - Episodes Includes continued care and service providers with selected services from the active episodes listed below      Care Transitions Episode start date: 3/24/2025 (Paused)   There are no active outsourced providers for this episode.                     Devices/ Equipment that need to be arranged for discharge:     Accepted   Pending insurance authorization   Others:    Anticipated date of DME availability: None    Prior To Hospitalization:    Living Situation: Other facility residents and residing at Assisted living    .  Support Systems: Children, Family members   Home Devices/Equipment: Mobility assist device, Sensory assist device            Mobility Assist Devices: Wheelchair, Four wheel walker   Type of Service Prior to Hospitalization: PT            Bedside and Verbal shift change report given to Geovanna Bennett (oncoming nurse) by Lorenzo Salgadoeric Lynch (offgoing nurse). Report included the following information SBAR, Kardex, Procedure Summary, Intake/Output and MAR.     Patient/Family discharge goal (s):  Assisted living     Resources provided:           Prior Function  Bed Mobility: Supervision (Supv) (03/30/25 0800 : Sherron Flores OT)  Transfers: Supervision (Supv) (03/30/25 0800 : Sherron Flores, OT)  Ambulation in the Home: Modified  Independent (pt reports ambulating wtih 4WW in apartment, otherwise uses WC when mobilizing in facility) (03/30/25 1426 : Josefina Shay, PT)       Current Function  Last Filed Values         Value Time User    Current Function  slightly below baseline level of function 3/30/2025  3:13 PM Josefina Shay PT    Therapy Impairments  activity tolerance; balance; strength; safety awareness 3/30/2025  3:13 PM Josefina Shay PT    ADLs Requiring Support  transfers; ambulation 3/30/2025  3:13 PM Josefina Shay, PT            Therapy Recommendations for Discharge:   PT:      Last Filed Values         Value Time User    PT Discharge Needs  therapy 5 or more times per week  potential to progress to PT 1-3x per week at discharge pending facility ability to provide 1 assist 3/30/2025  3:13 PM Josefina Shay, PT          OT:       Last Filed Values         Value Time User    OT Discharge Needs  therapy 5 or more times per week  may progress to 1-3 pending jail ability to provide assist x1 3/30/2025  1:37 PM Sherron Flores OT          SLP:    Last Filed Values       None            Mobility Equipment Recommended for Discharge: owns 4ww      Barriers to Discharge  Identified Barriers to Discharge/Transition Planning:

## (undated) DEVICE — RETAINER INT ORGAN VISCERA L W6 3/8XL9 5/8IN RADPQ DISP

## (undated) DEVICE — DBD-PACK,LAPAROTOMY,2 REINFORCED GOWNS: Brand: MEDLINE

## (undated) DEVICE — DRAPE XR C ARM 41X74IN LF --

## (undated) DEVICE — DEVON™ KNEE AND BODY STRAP 60" X 3" (1.5 M X 7.6 CM): Brand: DEVON

## (undated) DEVICE — COLON CLOSING PACK: Brand: MEDLINE INDUSTRIES, INC.

## (undated) DEVICE — SOLUTION IRRIG 1000ML H2O STRL BLT

## (undated) DEVICE — ROCKER SWITCH PENCIL BLADE ELECTRODE, HOLSTER: Brand: EDGE

## (undated) DEVICE — INTENDED FOR TISSUE SEPARATION, AND OTHER PROCEDURES THAT REQUIRE A SHARP SURGICAL BLADE TO PUNCTURE OR CUT.: Brand: BARD-PARKER ® CARBON RIB-BACK BLADES

## (undated) DEVICE — (D)PREP SKN CHLRAPRP APPL 26ML -- CONVERT TO ITEM 371833

## (undated) DEVICE — SUTURE PERMAHAND SZ 3-0 L30IN NONABSORBABLE BLK SILK BRAID A304H

## (undated) DEVICE — SOLUTION IV 500ML 0.9% SOD CHL FLX CONT

## (undated) DEVICE — SUTURE VCRL SZ 2-0 L36IN ABSRB UD L36MM CT-1 1/2 CIR J945H

## (undated) DEVICE — TOWEL SURG W17XL27IN STD BLU COT NONFENESTRATED PREWASHED

## (undated) DEVICE — UNIVERSAL STAPLER: Brand: ENDO GIA ULTRA

## (undated) DEVICE — ARTICULATING RELOAD WITH TRI-STAPLE TECHNOLOGY: Brand: ENDO GIA

## (undated) DEVICE — STERILE-Z MAYO STAND COVERS CLEAR POLYETHYLENE STERILE UNIVERSAL FIT 20 PER CASE: Brand: STERILE-Z

## (undated) DEVICE — SKIN TEMPERATURE SENSOR: Brand: DEROYAL

## (undated) DEVICE — INTELLIGENT RELOAD: Brand: TRI-STAPLE 2.0

## (undated) DEVICE — SUTURE MCRYL SZ 4-0 L27IN ABSRB UD L19MM PS-2 1/2 CIR PRIM Y426H

## (undated) DEVICE — DUAL LUMEN STOMACH TUBE MULTI-FUNCTIONAL PORT: Brand: SALEM SUMP

## (undated) DEVICE — LIGHT HANDLE: Brand: DEVON

## (undated) DEVICE — Z DISCONTINUED USE 2744636  DRESSING AQUACEL 14 IN ALG W3.5XL14IN POLYUR FLM CVR W/ HYDRCOLL

## (undated) DEVICE — TOWEL,OR,DSP,ST,BLUE,STD,2/PK,40PK/CS: Brand: MEDLINE

## (undated) DEVICE — SLIM BODY SKIN STAPLER: Brand: APPOSE ULC

## (undated) DEVICE — SEALER TISS L20CM DIA13MM ADV BPLR L CRV JAW OPN APPRCH

## (undated) DEVICE — KENDALL SCD EXPRESS SLEEVES, KNEE LENGTH, MEDIUM: Brand: KENDALL SCD

## (undated) DEVICE — REM POLYHESIVE ADULT PATIENT RETURN ELECTRODE: Brand: VALLEYLAB

## (undated) DEVICE — INFECTION CONTROL KIT SYS

## (undated) DEVICE — SUTURE PERMAHAND SZ 2-0 L30IN NONABSORBABLE BLK SILK W/O A305H

## (undated) DEVICE — SUTURE VCRL SZ 3-0 L27IN ABSRB VLT L26MM SH 1/2 CIR J316H

## (undated) DEVICE — TRAY CATH OD16FR SIL URIN M STATLOK STBL DEV SURSTP

## (undated) DEVICE — SYR 10ML LUER LOK 1/5ML GRAD --

## (undated) DEVICE — 1200 GUARD II KIT W/5MM TUBE W/O VAC TUBE: Brand: GUARDIAN

## (undated) DEVICE — SUTURE VCRL SZ 1 L36IN ABSRB UD L36MM CT-1 1/2 CIR J947H

## (undated) DEVICE — WOUND RETRACTOR AND PROTECTOR: Brand: ALEXIS WOUND PROTECTOR-RETRACTOR

## (undated) DEVICE — ARTICULATION RELOAD WITH TRI-STAPLE TECHNOLOGY: Brand: ENDO GIA

## (undated) DEVICE — SURGICAL PROCEDURE PACK BASIN MAJ SET CUST NO CAUT

## (undated) DEVICE — CLIP LIG L TI MTL LIG SYS 24 COUNT HORZ

## (undated) DEVICE — SUTURE PERMAHAND SZ 2-0 L18IN NONABSORBABLE BLK L26MM SH C012D

## (undated) DEVICE — NEEDLE HYPO 25GA L1.5IN BVL ORIENTED ECLIPSE

## (undated) DEVICE — STERILE POLYISOPRENE POWDER-FREE SURGICAL GLOVES: Brand: PROTEXIS

## (undated) DEVICE — WRAP SURG W1.31XL1.34M CARD FOR PT 165-172CM THERMOWRP

## (undated) DEVICE — DRAPE,REIN 53X77,STERILE: Brand: MEDLINE

## (undated) DEVICE — DRAPE FLD WRM W44XL66IN C6L FOR INTRATEMP SYS THERMABASIN

## (undated) DEVICE — APPLICATOR BNDG 1MM ADH PREMIERPRO EXOFIN

## (undated) DEVICE — TRAY PREP DRY W/ PREM GLV 2 APPL 6 SPNG 2 UNDPD 1 OVERWRAP

## (undated) DEVICE — POOLE SUCTION INSTRUMENT WITH REMOVABLE SHEATH: Brand: POOLE

## (undated) DEVICE — Device

## (undated) DEVICE — SOLUTION IV 1000ML 0.9% SOD CHL